# Patient Record
Sex: FEMALE | Race: WHITE | HISPANIC OR LATINO | Employment: OTHER | URBAN - METROPOLITAN AREA
[De-identification: names, ages, dates, MRNs, and addresses within clinical notes are randomized per-mention and may not be internally consistent; named-entity substitution may affect disease eponyms.]

---

## 2017-01-13 ENCOUNTER — TELEPHONE (OUTPATIENT)
Dept: PAIN MEDICINE | Facility: CLINIC | Age: 52
End: 2017-01-13

## 2017-01-16 RX ORDER — TRAZODONE HYDROCHLORIDE 150 MG/1
TABLET ORAL
Qty: 30 TABLET | Refills: 0 | Status: SHIPPED | OUTPATIENT
Start: 2017-01-16 | End: 2017-03-13 | Stop reason: SDUPTHER

## 2017-01-30 ENCOUNTER — TELEPHONE (OUTPATIENT)
Dept: PAIN MEDICINE | Facility: CLINIC | Age: 52
End: 2017-01-30

## 2017-02-13 ENCOUNTER — OFFICE VISIT (OUTPATIENT)
Dept: INTERNAL MEDICINE | Facility: CLINIC | Age: 52
End: 2017-02-13
Attending: INTERNAL MEDICINE
Payer: MEDICARE

## 2017-02-13 ENCOUNTER — TELEPHONE (OUTPATIENT)
Dept: GASTROENTEROLOGY | Facility: CLINIC | Age: 52
End: 2017-02-13

## 2017-02-13 ENCOUNTER — OFFICE VISIT (OUTPATIENT)
Dept: PAIN MEDICINE | Facility: CLINIC | Age: 52
End: 2017-02-13
Payer: MEDICARE

## 2017-02-13 VITALS
BODY MASS INDEX: 17.77 KG/M2 | RESPIRATION RATE: 18 BRPM | DIASTOLIC BLOOD PRESSURE: 68 MMHG | SYSTOLIC BLOOD PRESSURE: 94 MMHG | HEART RATE: 55 BPM | TEMPERATURE: 98 F | WEIGHT: 96.56 LBS | HEIGHT: 62 IN

## 2017-02-13 VITALS
HEART RATE: 59 BPM | BODY MASS INDEX: 18.17 KG/M2 | OXYGEN SATURATION: 98 % | SYSTOLIC BLOOD PRESSURE: 86 MMHG | HEIGHT: 62 IN | DIASTOLIC BLOOD PRESSURE: 60 MMHG | WEIGHT: 98.75 LBS

## 2017-02-13 DIAGNOSIS — Z23 NEED FOR TDAP VACCINATION: Primary | ICD-10-CM

## 2017-02-13 DIAGNOSIS — F31.32 BIPOLAR AFFECTIVE DISORDER, DEPRESSED, MODERATE DEGREE: Primary | ICD-10-CM

## 2017-02-13 DIAGNOSIS — R19.8 CHANGE IN BOWEL MOVEMENT: ICD-10-CM

## 2017-02-13 DIAGNOSIS — Z23 NEED FOR PNEUMOCOCCAL VACCINATION: ICD-10-CM

## 2017-02-13 PROCEDURE — 99999 PR PBB SHADOW E&M-EST. PATIENT-LVL III: CPT | Mod: PBBFAC,,, | Performed by: PSYCHIATRY & NEUROLOGY

## 2017-02-13 PROCEDURE — 99213 OFFICE O/P EST LOW 20 MIN: CPT | Mod: PBBFAC | Performed by: PSYCHIATRY & NEUROLOGY

## 2017-02-13 PROCEDURE — 99999 PR PBB SHADOW E&M-EST. PATIENT-LVL III: CPT | Mod: PBBFAC,,, | Performed by: INTERNAL MEDICINE

## 2017-02-13 PROCEDURE — 99213 OFFICE O/P EST LOW 20 MIN: CPT | Mod: S$PBB,,, | Performed by: INTERNAL MEDICINE

## 2017-02-13 PROCEDURE — 99213 OFFICE O/P EST LOW 20 MIN: CPT | Mod: S$PBB,,, | Performed by: PSYCHIATRY & NEUROLOGY

## 2017-02-13 RX ORDER — OLANZAPINE 5 MG/1
5 TABLET ORAL NIGHTLY
Qty: 30 TABLET | Refills: 1 | Status: SHIPPED | OUTPATIENT
Start: 2017-02-13 | End: 2017-03-13

## 2017-02-13 NOTE — TELEPHONE ENCOUNTER
----- Message from Nita Martinez sent at 2/13/2017  2:11 PM CST -----  Contact: Pt# [phone]  Pt states she was calling to scheduled an Appt

## 2017-02-13 NOTE — MR AVS SNAPSHOT
[facility]  [address]  [facility] [address]  Phone: [phone]  Fax: [phone]                  Gay Gurrola   2017 11:40 AM   Office Visit    Description:  Female : 1965   Provider:  Jessica Houston MD   Department:  [facility]           Reason for Visit     Follow-up                To Do List           Future Appointments        Provider Department Dept Phone    2017 1:20 PM Ismael He MD [facility] [phone]    3/13/2017 11:40 AM Jessica Houston MD [facility] [phone]      Goals (5 Years of Data)     None       These Medications        Disp Refills Start End    olanzapine (ZYPREXA) 5 MG tablet 30 tablet 1 2017    Take 1 tablet (5 mg total) by mouth every evening. - Oral    Pharmacy: [id number] - [place], [place] - [address] Ph #: [phone]         [facility] On Call     [facility] On Call Nurse Care Line -  Assistance  Registered nurses in the [facility] On Call Center provide clinical advisement, health education, appointment booking, and other advisory services.  Call for this free service at [phone].             Medications           Message regarding Medications     Verify the changes and/or additions to your medication regime listed below are the same as discussed with your clinician today.  If any of these changes or additions are incorrect, please notify your healthcare provider.        START taking these NEW medications        Refills    olanzapine (ZYPREXA) 5 MG tablet 1    Sig: Take 1 tablet (5 mg total) by mouth every evening.    Class: Normal    Route: Oral      STOP taking these medications     risperidone (RISPERDAL) 1 MG tablet Take 1 tablet (1 mg total) by mouth every evening.           Verify that the below list of medications is an accurate representation of the medications you are currently taking.  If none reported,  "the list may be blank. If incorrect, please contact your healthcare provider. Carry this list with you in case of emergency.           Current Medications     Ca-D3-mag#11-zinc-cupr-man-bor 600 mg calcium- 800 unit-50 mg Tab Take 1 tablet by mouth once daily.    citalopram (CELEXA) 20 MG tablet Take 1 tablet (20 mg total) by mouth once daily.    cyanocobalamin, vitamin B-12, (VITAMIN B-12) 5,000 mcg Subl Place under the tongue once daily.      divalproex (DEPAKOTE ER) 500 MG Tb24 Take 1 tablet (500 mg total) by mouth once daily.    ketoconazole (NIZORAL) 2 % shampoo Wash hair with medicated shampoo at least 2x/week - let sit on scalp at least 5 minutes prior to rinsing    levothyroxine (SYNTHROID) 25 MCG tablet Take 1 tablet (25 mcg total) by mouth once daily.    trazodone (DESYREL) 150 MG tablet TAKE ONE TABLET BY MOUTH ONCE DAILY IN THE EVENING    tretinoin (RETIN-A) 0.025 % cream Apply topically nightly.    olanzapine (ZYPREXA) 5 MG tablet Take 1 tablet (5 mg total) by mouth every evening.           Clinical Reference Information           Your Vitals Were     BP Pulse Temp Resp Height Weight    94/68 55 97.5 °F (36.4 °C) (Oral) 18 5' 2" (1.575 m) 43.8 kg (96 lb 9 oz)    BMI                17.66 kg/m2          Blood Pressure          Most Recent Value    BP  94/68      Allergies as of 2/13/2017     No Known Allergies      Immunizations Administered on Date of Encounter - 2/13/2017     None      Language Assistance Services     ATTENTION: Language assistance services are available, free of charge. Please call [phone].      ATENCIÓN: Si cesarla melyssamaximilaino, tiene a hodge disposición servicios gratuitos de asistencia lingüística. Llbonnie al [phone].     PADMINI Ý: N?u b?n nói Ti?ng Vi?t, có các d?ch v? h? tr? ngôn ng? mi?n phí dành cho b?n. G?i s? [phone].         [facility] complies with applicable Federal civil rights laws and does not discriminate on the basis of race, color, national origin, " age, disability, or sex.

## 2017-02-13 NOTE — MR AVS SNAPSHOT
[facility]  [address]  [place] [address]  Phone: [phone]  Fax: [phone]                  Gay Gurrola   2017 1:20 PM   Office Visit    Description:  Female : 1965   Provider:  Ismael He MD   Department:  [facility]           Reason for Visit     Abdominal Pain           Diagnoses this Visit        Comments    Need for Tdap vaccination    -  Primary     Need for pneumococcal vaccination         Change in bowel movement                To Do List           Future Appointments        Provider Department Dept Phone    3/13/2017 11:40 AM Jessica Houston MD [facility] [phone]      Goals (5 Years of Data)     None      [facility] On Call     [facility] On Call Nurse Care Line -  Assistance  Registered nurses in the [facility] On Call Center provide clinical advisement, health education, appointment booking, and other advisory services.  Call for this free service at [phone].             Medications           Message regarding Medications     Verify the changes and/or additions to your medication regime listed below are the same as discussed with your clinician today.  If any of these changes or additions are incorrect, please notify your healthcare provider.             Verify that the below list of medications is an accurate representation of the medications you are currently taking.  If none reported, the list may be blank. If incorrect, please contact your healthcare provider. Carry this list with you in case of emergency.           Current Medications     Ca-D3-mag#11-zinc-cupr-man-bor 600 mg calcium- 800 unit-50 mg Tab Take 1 tablet by mouth once daily.    citalopram (CELEXA) 20 MG tablet Take 1 tablet (20 mg total) by mouth once daily.    cyanocobalamin, vitamin B-12, (VITAMIN B-12) 5,000 mcg Subl Place under the tongue once daily.      divalproex (DEPAKOTE ER) 500 MG Tb24 Take 1 tablet (500 mg  "total) by mouth once daily.    ketoconazole (NIZORAL) 2 % shampoo Wash hair with medicated shampoo at least 2x/week - let sit on scalp at least 5 minutes prior to rinsing    levothyroxine (SYNTHROID) 25 MCG tablet Take 1 tablet (25 mcg total) by mouth once daily.    trazodone (DESYREL) 150 MG tablet TAKE ONE TABLET BY MOUTH ONCE DAILY IN THE EVENING    tretinoin (RETIN-A) 0.025 % cream Apply topically nightly.    olanzapine (ZYPREXA) 5 MG tablet Take 1 tablet (5 mg total) by mouth every evening.           Clinical Reference Information           Your Vitals Were     BP Pulse Height Weight Last Period SpO2    86/60 59 5' 2" (1.575 m) 44.8 kg (98 lb 12.3 oz) 02/01/2017 98%    BMI                18.06 kg/m2          Blood Pressure          Most Recent Value    BP  (!)  86/60      Allergies as of 2/13/2017     No Known Allergies      Immunizations Administered on Date of Encounter - 2/13/2017     None      Orders Placed During Today's Visit      Normal Orders This Visit    Ambulatory consult to Gastroenterology       Instructions    FODMAP diet for IBS like symptoms.      Please contact Dr. Zev Moreno in the Gastroenterology department at [phone] to get scheduled for an appointment.       Language Assistance Services     ATTENTION: Language assistance services are available, free of charge. Please call [phone].      ATENCIÓN: Si christopher barron, tiene a hodge disposición servicios gratuitos de asistencia lingüística. Llame al [phone].     CHÚ Ý: N?u b?n nói Ti?ng Vi?t, có các d?ch v? h? tr? ngôn ng? mi?n phí dành cho b?n. G?i s? [phone].         [facility] complies with applicable Federal civil rights laws and does not discriminate on the basis of race, color, national origin, age, disability, or sex.        "

## 2017-02-13 NOTE — PATIENT INSTRUCTIONS
FODMAP diet for IBS like symptoms.      Please contact Dr. Zev Moreno in the Gastroenterology department at [phone] to get scheduled for an appointment.

## 2017-02-13 NOTE — PROGRESS NOTES
"Outpatient Psychiatry Follow-Up Visit (MD/NP)    2/13/2017    Clinical Status of Patient:  Outpatient (Ambulatory)    Chief Complaint:  Gay Gurrola is a 51 y.o. female who presents today for follow-up of mood disorder.  Met with patient.      Interval History and Content of Current Session:  Interim Events/Subjective Report/Content of Current Session: Pt reports that she has been having a lot of GI issues and been trying to get an appointment to see a GI MD. That she has lost over 10 lbs in the last several weeks. That she has " an allergy to something" that she is eating because she has been having diarrhea. That her mood has been " better", she and her exhusband are getting along better and her son is also better.    Psychotherapy:  · Target symptoms: depression  · Why chosen therapy is appropriate versus another modality: relevant to diagnosis, patient responds to this modality  · Outcome monitoring methods: self-report, observation  · Therapeutic intervention type: behavior modifying psychotherapy, supportive psychotherapy  · Topics discussed/themes: medical comorbidities  · The patient's response to the intervention is accepting. The patient's progress toward treatment goals is fair.   · Duration of intervention: 30 minutes.    Review of Systems   · PSYCHIATRIC: Pertinant items are noted in the narrative.    Past Medical, Family and Social History: The patient's past medical, family and social history have been reviewed and updated as appropriate within the electronic medical record - see encounter notes.    Compliance: yes    Side effects: None    Risk Parameters:  Patient reports no suicidal ideation  Patient reports no homicidal ideation  Patient reports no self-injurious behavior  Patient reports no violent behavior    Exam (detailed: at least 9 elements; comprehensive: all 15 elements)   Constitutional  Vitals:  Most recent vital signs, dated less than 90 days prior to this appointment, were " "reviewed.   Vitals:    02/13/17 1159   BP: 94/68   Pulse: (!) 55   Resp: 18   Temp: 97.5 °F (36.4 °C)   TempSrc: Oral   Weight: 43.8 kg (96 lb 9 oz)   Height: 5' 2" (1.575 m)        General:  thin & gaunt looking     Musculoskeletal  Muscle Strength/Tone:  no tremor, no tic   Gait & Station:  non-ataxic     Psychiatric  Speech:  no latency; no press   Mood & Affect:  anxious  congruent and appropriate   Thought Process:  normal and logical, goal-directed   Associations:  intact   Thought Content:  normal, no suicidality, no homicidality, delusions, or paranoia   Insight:  intact   Judgement: behavior is adequate to circumstances   Orientation:  grossly intact   Memory: intact for content of interview   Language: grossly intact   Attention Span & Concentration:  able to focus   Fund of Knowledge:  intact and appropriate to age and level of education     Assessment and Diagnosis   Status/Progress: Based on the examination today, the patient's problem(s) is/are adequately but not ideally controlled.  New problems have been presented today.   Co-morbidities are complicating management of the primary condition.  There are no active rule-out diagnoses for this patient at this time.       General Impression: Pt is a 50 Y/O HW with    Bipolar disorder II    Alcohol dep in efr.                  Intervention/Counseling/Treatment Plan             Medication Management: Continue current medications. The risks and benefits of medication were discussed with the patient.  · Counseling provided with patient as follows: importance of compliance with chosen treatment options was emphasized, risks and benefits of treatment options, including medications, were discussed with the patient  Will continue the current treatment regiment that she is on currently. Cont  500 mgQhs ,D/C risperdal, will try zyprexa 5 mg q evening. and celexa 20 mg and d/c trazodone and d/c  lunesta 1mg  qhs prn sleep..  Have encouraged her to take the " medications that she is prescribed    Cont to maintain sobriety  Cont AA, groups.  Provided Supportive psychotherapy  Will Cont care with Primary care., discuss weight loss            Return to Clinic: 1 month

## 2017-02-13 NOTE — PROGRESS NOTES
"Subjective:       Patient ID: Gay Gurrola is a 51 y.o. female.    Chief Complaint: Abdominal Pain    HPI Comments: Here for urgent visit    Worsening abdominal discomfort for the past 2 months. She reports frequent "rumbling" and bloating that occurs most predominately in the morning and is relived by BM. She reports her stools have recently become pencil thin and she is concerned. She notes frequent food triggers of fruits, kale and spinach. She reports one day of abdominal pain but symptoms are mostly a discomfort. She denies any constipation, BRBPR, melena, weight loss, excessive belching, frequent heartburn. No family hx of colon CA or IBD.       Review of Systems       Objective:      Vitals:    02/13/17 1331   BP: (!) 86/60   Pulse: (!) 59   SpO2: 98%   Weight: 44.8 kg (98 lb 12.3 oz)   Height: 5' 2" (1.575 m)      Physical Exam   Constitutional: She is oriented to person, place, and time. She appears well-developed and well-nourished. She does not have a sickly appearance. No distress.   HENT:   Head: Normocephalic and atraumatic.   Eyes: Conjunctivae and EOM are normal. Right eye exhibits no discharge. Left eye exhibits no discharge. No scleral icterus.   Pulmonary/Chest: Effort normal. No respiratory distress.   Abdominal: Normal appearance. She exhibits no distension. There is no tenderness. There is no rigidity and no guarding.   Neurological: She is alert and oriented to person, place, and time.   Skin: Skin is warm and dry. She is not diaphoretic.   Psychiatric: She has a normal mood and affect. Her speech is normal.       Assessment:       1. Need for Tdap vaccination    2. Need for pneumococcal vaccination    3. Change in bowel movement        Plan:       Gay was seen today for abdominal pain.    Diagnoses and all orders for this visit    Change in bowel movement  -IBS like symptoms. FODMAP diet and continued food diary. Pt is near due for screening colonolscopy.   -     Ambulatory " consult to Gastroenterology                 Side effects of medication(s) were discussed in detail and patient voiced understanding.  Patient will call back for any issues or complications.

## 2017-02-21 RX ORDER — CITALOPRAM 20 MG/1
TABLET, FILM COATED ORAL
Qty: 30 TABLET | Refills: 0 | Status: SHIPPED | OUTPATIENT
Start: 2017-02-21 | End: 2017-03-13 | Stop reason: SDUPTHER

## 2017-03-13 ENCOUNTER — OFFICE VISIT (OUTPATIENT)
Dept: PAIN MEDICINE | Facility: CLINIC | Age: 52
End: 2017-03-13
Payer: MEDICARE

## 2017-03-13 VITALS
DIASTOLIC BLOOD PRESSURE: 70 MMHG | HEART RATE: 70 BPM | HEIGHT: 62 IN | WEIGHT: 93.94 LBS | SYSTOLIC BLOOD PRESSURE: 110 MMHG | TEMPERATURE: 97 F | BODY MASS INDEX: 17.29 KG/M2 | RESPIRATION RATE: 20 BRPM

## 2017-03-13 DIAGNOSIS — L98.8 AGE-RELATED FACIAL WRINKLES: ICD-10-CM

## 2017-03-13 PROCEDURE — 99213 OFFICE O/P EST LOW 20 MIN: CPT | Mod: PBBFAC | Performed by: PSYCHIATRY & NEUROLOGY

## 2017-03-13 PROCEDURE — 99999 PR PBB SHADOW E&M-EST. PATIENT-LVL III: CPT | Mod: PBBFAC,,, | Performed by: PSYCHIATRY & NEUROLOGY

## 2017-03-13 PROCEDURE — 99213 OFFICE O/P EST LOW 20 MIN: CPT | Mod: S$PBB,,, | Performed by: PSYCHIATRY & NEUROLOGY

## 2017-03-13 RX ORDER — CITALOPRAM 20 MG/1
20 TABLET, FILM COATED ORAL DAILY
Qty: 30 TABLET | Refills: 2 | Status: SHIPPED | OUTPATIENT
Start: 2017-03-13 | End: 2017-05-18 | Stop reason: SDUPTHER

## 2017-03-13 RX ORDER — LURASIDONE HYDROCHLORIDE 20 MG/1
20 TABLET, FILM COATED ORAL DAILY
Qty: 30 TABLET | Refills: 1 | Status: ON HOLD | OUTPATIENT
Start: 2017-03-13 | End: 2017-04-12 | Stop reason: HOSPADM

## 2017-03-13 RX ORDER — TRAZODONE HYDROCHLORIDE 150 MG/1
150 TABLET ORAL NIGHTLY PRN
Qty: 30 TABLET | Refills: 1 | Status: SHIPPED | OUTPATIENT
Start: 2017-03-13 | End: 2017-04-18

## 2017-03-13 NOTE — PROGRESS NOTES
Outpatient Psychiatry Follow-Up Visit (MD/NP)    3/13/2017    Clinical Status of Patient:  Outpatient (Ambulatory)    Chief Complaint:  Gay Gurrola is a 51 y.o. female who presents today for follow-up of mood disorder.  Met with patient.      Interval History and Content of Current Session:  Interim Events/Subjective Report/Content of Current Session: Pt reports that she has been compliant with her medications but she does not feel that they are helping her. That she has also been feeling more depressed. Discussed past medications, pt reports that she did a lot better when she was on Latuda and it was discontinued , will retry latuda. Discussed individual psychotherapy. Pt reports that she has also been very worried about her losing weight but she has an appointment with a Gastroenterologist coming up.     Psychotherapy:  · Target symptoms: depression  · Why chosen therapy is appropriate versus another modality: relevant to diagnosis, patient responds to this modality  · Outcome monitoring methods: self-report, observation  · Therapeutic intervention type: behavior modifying psychotherapy, supportive psychotherapy  · Topics discussed/themes: medical comorbidities  · The patient's response to the intervention is accepting. The patient's progress toward treatment goals is fair.   · Duration of intervention: 30 minutes.    Review of Systems   · PSYCHIATRIC: Pertinant items are noted in the narrative.    Past Medical, Family and Social History: The patient's past medical, family and social history have been reviewed and updated as appropriate within the electronic medical record - see encounter notes.    Compliance: yes    Side effects: None    Risk Parameters:  Patient reports no suicidal ideation  Patient reports no homicidal ideation  Patient reports no self-injurious behavior  Patient reports no violent behavior    Exam (detailed: at least 9 elements; comprehensive: all 15 elements)  "  Constitutional  Vitals:  Most recent vital signs, dated less than 90 days prior to this appointment, were reviewed.   Vitals:    03/13/17 1144   BP: 110/70   Pulse: 70   Resp: 20   Temp: 97.4 °F (36.3 °C)   TempSrc: Oral   Weight: 42.6 kg (93 lb 14.7 oz)   Height: 5' 2" (1.575 m)        General:  thin & gaunt looking     Musculoskeletal  Muscle Strength/Tone:  no tremor, no tic   Gait & Station:  non-ataxic     Psychiatric  Speech:  no latency; no press   Mood & Affect:  anxious  congruent and appropriate   Thought Process:  normal and logical, goal-directed   Associations:  intact   Thought Content:  normal, no suicidality, no homicidality, delusions, or paranoia   Insight:  intact   Judgement: behavior is adequate to circumstances   Orientation:  grossly intact   Memory: intact for content of interview   Language: grossly intact   Attention Span & Concentration:  able to focus   Fund of Knowledge:  intact and appropriate to age and level of education     Assessment and Diagnosis   Status/Progress: Based on the examination today, the patient's problem(s) is/are adequately but not ideally controlled.  New problems have been presented today.   Co-morbidities are complicating management of the primary condition.  There are no active rule-out diagnoses for this patient at this time.       General Impression: Pt is a 52 Y/O HW with    Bipolar disorder II    Alcohol dep in efr.                  Intervention/Counseling/Treatment Plan             Medication Management: Continue current medications. The risks and benefits of medication were discussed with the patient.  · Counseling provided with patient as follows: importance of compliance with chosen treatment options was emphasized, risks and benefits of treatment options, including medications, were discussed with the patient  Will continue the current treatment regiment that she is on currently. Cont depakote  500 mgQhs ,D/C risperdal, will d/c zyprexa. and celexa 20 " mg and d/c trazodone and d/c  lunesta .  Will retry latuda  Will try belsomra for sleep.  Have encouraged her to take the medications that she is prescribed    Cont to maintain sobriety  Cont AA, groups.  Provided Supportive psychotherapy  Will Cont care with Primary care., discuss weight loss            Return to Clinic: 1 month

## 2017-03-13 NOTE — MR AVS SNAPSHOT
[facility]  [address]  [facility] [address]  Phone: [phone]  Fax: [phone]                  Gay Gurrola   3/13/2017 11:40 AM   Office Visit    Description:  Female : 1965   Provider:  Jessica Houston MD   Department:  [facility]           Reason for Visit     Follow-up           Diagnoses this Visit        Comments    Age-related facial wrinkles                To Do List           Future Appointments        Provider Department Dept Phone    3/20/2017 4:30 PM Estuardo Salazar MD [facility] G[facility]enterology [phone]    2017 11:40 AM Jessica Houston MD [facility] [phone]      Goals (5 Years of Data)     None       These Medications        Disp Refills Start End    lurasidone (LATUDA) 20 mg Tab tablet 30 tablet 1 3/13/2017 3/13/2018    Take 1 tablet (20 mg total) by mouth once daily. - Oral    Pharmacy: [facility] - [place], [place] - [address] Ph #: [phone]       suvorexant (BELSOMRA) 5 mg Tab 30 tablet 0 3/13/2017     Take 5 mg by mouth nightly as needed. - Oral    Pharmacy: [facility] - [place], [place] - [address] Ph #: [phone]       trazodone (DESYREL) 150 MG tablet 30 tablet 1 3/13/2017     Take 1 tablet (150 mg total) by mouth nightly as needed for Insomnia. - Oral    Pharmacy: [facility] - [place], [place] - [address] Ph #: [phone]       citalopram (CELEXA) 20 MG tablet 30 tablet 2 3/13/2017     Take 1 tablet (20 mg total) by mouth once daily. - Oral    Pharmacy: [facility] - [place], [place] - [address] Ph #: [phone]         [facility] On Call     [facility] On Call Nurse Care Line -  Assistance  Registered nurses in the [facility] On Call Center provide clinical advisement, health education, appointment booking, and other advisory services.  Call for this free service at [phone].             Medications            Message regarding Medications     Verify the changes and/or additions to your medication regime listed below are the same as discussed with your clinician today.  If any of these changes or additions are incorrect, please notify your healthcare provider.        START taking these NEW medications        Refills    lurasidone (LATUDA) 20 mg Tab tablet 1    Sig: Take 1 tablet (20 mg total) by mouth once daily.    Class: Normal    Route: Oral    suvorexant (BELSOMRA) 5 mg Tab 0    Sig: Take 5 mg by mouth nightly as needed.    Class: Print    Route: Oral      CHANGE how you are taking these medications     Start Taking Instead of    trazodone (DESYREL) 150 MG tablet trazodone (DESYREL) 150 MG tablet    Dosage:  Take 1 tablet (150 mg total) by mouth nightly as needed for Insomnia. Dosage:  TAKE ONE TABLET BY MOUTH ONCE DAILY IN THE EVENING    Reason for Change:  Reorder     citalopram (CELEXA) 20 MG tablet citalopram (CELEXA) 20 MG tablet    Dosage:  Take 1 tablet (20 mg total) by mouth once daily. Dosage:  TAKE ONE TABLET BY MOUTH ONCE DAILY    Reason for Change:  Reorder       STOP taking these medications     olanzapine (ZYPREXA) 5 MG tablet Take 1 tablet (5 mg total) by mouth every evening.           Verify that the below list of medications is an accurate representation of the medications you are currently taking.  If none reported, the list may be blank. If incorrect, please contact your healthcare provider. Carry this list with you in case of emergency.           Current Medications     Ca-D3-mag#11-zinc-cupr-man-bor 600 mg calcium- 800 unit-50 mg Tab Take 1 tablet by mouth once daily.    citalopram (CELEXA) 20 MG tablet Take 1 tablet (20 mg total) by mouth once daily.    cyanocobalamin, vitamin B-12, (VITAMIN B-12) 5,000 mcg Subl Place under the tongue once daily.      divalproex (DEPAKOTE ER) 500 MG Tb24 Take 1 tablet (500 mg total) by mouth once daily.    ketoconazole (NIZORAL) 2 % shampoo Wash hair with  "medicated shampoo at least 2x/week - let sit on scalp at least 5 minutes prior to rinsing    levothyroxine (SYNTHROID) 25 MCG tablet Take 1 tablet (25 mcg total) by mouth once daily.    trazodone (DESYREL) 150 MG tablet Take 1 tablet (150 mg total) by mouth nightly as needed for Insomnia.    tretinoin (RETIN-A) 0.025 % cream Apply topically nightly.    lurasidone (LATUDA) 20 mg Tab tablet Take 1 tablet (20 mg total) by mouth once daily.    suvorexant (BELSOMRA) 5 mg Tab Take 5 mg by mouth nightly as needed.           Clinical Reference Information           Your Vitals Were     BP Pulse Temp Resp Height Weight    110/70 70 97.4 °F (36.3 °C) (Oral) 20 5' 2" (1.575 m) 42.6 kg (93 lb 14.7 oz)    Last Period BMI             02/01/2017 17.18 kg/m2         Blood Pressure          Most Recent Value    BP  110/70      Allergies as of 3/13/2017     No Known Allergies      Immunizations Administered on Date of Encounter - 3/13/2017     None      Orders Placed During Today's Visit      Normal Orders This Visit    Ambulatory Referral to Psychology       Language Assistance Services     ATTENTION: Language assistance services are available, free of charge. Please call [phone].      ATENCIÓN: Si christopher anderson, tiene a hodge disposición servicios gratuitos de asistencia lingüística. Llame al [phone].     PADMINI Ý: N?u b?n nói Ti?ng Vi?t, có các d?ch v? h? tr? ngôn ng? mi?n phí dành cho b?n. G?i s? [phone].         [facility] complies with applicable Federal civil rights laws and does not discriminate on the basis of race, color, national origin, age, disability, or sex.        "

## 2017-03-17 ENCOUNTER — TELEPHONE (OUTPATIENT)
Dept: PAIN MEDICINE | Facility: CLINIC | Age: 52
End: 2017-03-17

## 2017-03-20 ENCOUNTER — LAB VISIT (OUTPATIENT)
Dept: LAB | Facility: HOSPITAL | Age: 52
End: 2017-03-20
Attending: INTERNAL MEDICINE
Payer: MEDICARE

## 2017-03-20 ENCOUNTER — OFFICE VISIT (OUTPATIENT)
Dept: GASTROENTEROLOGY | Facility: CLINIC | Age: 52
End: 2017-03-20
Payer: MEDICARE

## 2017-03-20 VITALS
DIASTOLIC BLOOD PRESSURE: 60 MMHG | HEART RATE: 65 BPM | SYSTOLIC BLOOD PRESSURE: 92 MMHG | BODY MASS INDEX: 17.06 KG/M2 | HEIGHT: 62 IN | WEIGHT: 92.69 LBS

## 2017-03-20 DIAGNOSIS — R63.4 WEIGHT LOSS: ICD-10-CM

## 2017-03-20 DIAGNOSIS — Z12.11 SPECIAL SCREENING FOR MALIGNANT NEOPLASMS, COLON: ICD-10-CM

## 2017-03-20 DIAGNOSIS — R63.4 WEIGHT LOSS: Primary | ICD-10-CM

## 2017-03-20 LAB
ALBUMIN SERPL BCP-MCNC: 4.2 G/DL
ALP SERPL-CCNC: 45 U/L
ALT SERPL W/O P-5'-P-CCNC: 12 U/L
ANION GAP SERPL CALC-SCNC: 12 MMOL/L
AST SERPL-CCNC: 24 U/L
BASOPHILS # BLD AUTO: 0.02 K/UL
BASOPHILS NFR BLD: 0.5 %
BILIRUB SERPL-MCNC: 0.5 MG/DL
BUN SERPL-MCNC: 9 MG/DL
CALCIUM SERPL-MCNC: 9.6 MG/DL
CHLORIDE SERPL-SCNC: 102 MMOL/L
CO2 SERPL-SCNC: 28 MMOL/L
CREAT SERPL-MCNC: 1 MG/DL
DIFFERENTIAL METHOD: ABNORMAL
EOSINOPHIL # BLD AUTO: 0 K/UL
EOSINOPHIL NFR BLD: 0.9 %
ERYTHROCYTE [DISTWIDTH] IN BLOOD BY AUTOMATED COUNT: 15.1 %
EST. GFR  (AFRICAN AMERICAN): >60 ML/MIN/1.73 M^2
EST. GFR  (NON AFRICAN AMERICAN): >60 ML/MIN/1.73 M^2
GLUCOSE SERPL-MCNC: 76 MG/DL
HCT VFR BLD AUTO: 38.2 %
HGB BLD-MCNC: 12.5 G/DL
LYMPHOCYTES # BLD AUTO: 2 K/UL
LYMPHOCYTES NFR BLD: 46.9 %
MCH RBC QN AUTO: 27.2 PG
MCHC RBC AUTO-ENTMCNC: 32.7 %
MCV RBC AUTO: 83 FL
MONOCYTES # BLD AUTO: 0.2 K/UL
MONOCYTES NFR BLD: 5.3 %
NEUTROPHILS # BLD AUTO: 2 K/UL
NEUTROPHILS NFR BLD: 46.4 %
PLATELET # BLD AUTO: 173 K/UL
PMV BLD AUTO: 11.2 FL
POTASSIUM SERPL-SCNC: 4 MMOL/L
PROT SERPL-MCNC: 7.5 G/DL
RBC # BLD AUTO: 4.6 M/UL
SODIUM SERPL-SCNC: 142 MMOL/L
TSH SERPL DL<=0.005 MIU/L-ACNC: 2.07 UIU/ML
WBC # BLD AUTO: 4.35 K/UL

## 2017-03-20 PROCEDURE — 99204 OFFICE O/P NEW MOD 45 MIN: CPT | Mod: S$PBB,,, | Performed by: INTERNAL MEDICINE

## 2017-03-20 PROCEDURE — 99213 OFFICE O/P EST LOW 20 MIN: CPT | Mod: PBBFAC | Performed by: INTERNAL MEDICINE

## 2017-03-20 PROCEDURE — 80053 COMPREHEN METABOLIC PANEL: CPT

## 2017-03-20 PROCEDURE — 86677 HELICOBACTER PYLORI ANTIBODY: CPT

## 2017-03-20 PROCEDURE — 99999 PR PBB SHADOW E&M-EST. PATIENT-LVL III: CPT | Mod: PBBFAC,,, | Performed by: INTERNAL MEDICINE

## 2017-03-20 PROCEDURE — 84443 ASSAY THYROID STIM HORMONE: CPT

## 2017-03-20 PROCEDURE — 86703 HIV-1/HIV-2 1 RESULT ANTBDY: CPT

## 2017-03-20 PROCEDURE — 36415 COLL VENOUS BLD VENIPUNCTURE: CPT

## 2017-03-20 PROCEDURE — 85025 COMPLETE CBC W/AUTO DIFF WBC: CPT

## 2017-03-20 NOTE — PROGRESS NOTES
[facility] Consultation Note    Reason for Consult:  The primary encounter diagnosis was Weight loss. A diagnosis of Special screening for malignant neoplasms, colon was also pertinent to this visit.    PCP:   Jaja Mckeon   [address] / [facility] [address]    Referring MD:  Ismael He Md  [address]  [address]  [place] [address]    HPI:  This is a 51 y.o. female here for evaluation of weight loss of 12 lbs over 6 weeks. Pencil thin stools. Slimy in the morning.  Tried low fodmap diet which has helped with BM urgency.  Also gets bloated and gassy. Lactose seems to play some role.  She runs a few times a week.   and kids have H pylori.    Abdominal pain - has improved  Reflux - resolved after few weeks of prilosec.  Dysphagia - no   Bowel habits - normal  GI bleeding - none  NSAID usage - none    Interval history:      ROS:  Constitutional: No fevers, chills, + weight loss  ENT: No allergies  CV: No chest pain  Pulm: No cough, No shortness of breath  Ophtho: No vision changes  GI: see HPI  Derm: No rash  Heme: No lymphadenopathy, No bruising  MSK: No arthritis  : No dysuria, No hematuria  Endo: No hot or cold intolerance  Neuro: No syncope, No seizure  Psych: No anxiety, No depression    Medical History:  has a past medical history of Alcohol abuse, in remission; Anxiety; Bipolar affective; Depression; Headache(784.0); Hypothyroid; Memory loss; Osteopenia; and Seizures.    Surgical History:  has a past surgical history that includes Tubal ligation and Bunionectomy.    Family History: family history includes Crohn's disease in her maternal aunt and other; Dementia in her maternal grandmother; Diabetes in her mother; Stroke in her mother. There is no history of Melanoma, Psoriasis, Lupus, or Colon cancer..     Social History:  reports that she has never smoked. She has never used smokeless tobacco. She reports that she does not drink alcohol  "or use illicit drugs.    Review of patient's allergies indicates:  No Known Allergies    Current Outpatient Prescriptions   Medication Sig Dispense Refill    Ca-D3-mag#11-zinc-cupr-man-damian 600 mg calcium- 800 unit-50 mg Tab Take 1 tablet by mouth once daily.      citalopram (CELEXA) 20 MG tablet Take 1 tablet (20 mg total) by mouth once daily. 30 tablet 2    cyanocobalamin, vitamin B-12, (VITAMIN B-12) 5,000 mcg Subl Place under the tongue once daily.        divalproex (DEPAKOTE ER) 500 MG Tb24 Take 1 tablet (500 mg total) by mouth once daily. 30 tablet 2    ketoconazole (NIZORAL) 2 % shampoo Wash hair with medicated shampoo at least 2x/week - let sit on scalp at least 5 minutes prior to rinsing 120 mL 5    levothyroxine (SYNTHROID) 25 MCG tablet Take 1 tablet (25 mcg total) by mouth once daily. 90 tablet 3    lurasidone (LATUDA) 20 mg Tab tablet Take 1 tablet (20 mg total) by mouth once daily. 30 tablet 1    suvorexant (BELSOMRA) 5 mg Tab Take 5 mg by mouth nightly as needed. 30 tablet 0    trazodone (DESYREL) 150 MG tablet Take 1 tablet (150 mg total) by mouth nightly as needed for Insomnia. 30 tablet 1    tretinoin (RETIN-A) 0.025 % cream Apply topically nightly. 45 g 1     No current facility-administered medications for this visit.            Objective Findings:    Vital Signs:  BP 92/60  Pulse 65  Ht 5' 2" (1.575 m)  Wt 42 kg (92 lb 10.6 oz)  LMP 03/08/2017  BMI 16.95 kg/m2  Body mass index is 16.95 kg/(m^2).    Physical Exam:  General Appearance: Well appearing in no acute distress  Head:   Normocephalic, without obvious abnormality  Eyes:    No scleral icterus, EOMI  ENT: Neck supple, Lips, mucosa, and tongue normal; teeth and gums normal  Lungs: CTA bilaterally in anterior and posterior fields, no wheezes, no crackles.  Heart:  Regular rate and rhythm, S1, S2 normal, no murmurs heard  Abdomen: Soft, non tender, non distended with positive bowel sounds in all four quadrants. No " hepatosplenomegaly, ascites, or mass  Extremities: 2+ pulses, no clubbing, cyanosis or edema  Skin: No rash  Neurologic: CN II-XII intact      Labs:  Lab Results   Component Value Date    WBC 3.72 (L) 11/22/2016    HGB 12.6 11/22/2016    HCT 41.1 11/22/2016     11/22/2016    CHOL 169 11/22/2016    TRIG 34 11/22/2016    HDL 84 (H) 11/22/2016    ALT 13 11/22/2016    AST 21 11/22/2016     11/22/2016    K 4.8 11/22/2016     11/22/2016    CREATININE 0.9 11/22/2016    BUN 11 11/22/2016    CO2 28 11/22/2016    TSH 2.372 11/22/2016    TSH 2.352 11/22/2016         Imaging:    Endoscopy:      Assessment:  1. Weight loss    2. Special screening for malignant neoplasms, colon           Recommendations:  1. Labs today, repeat TSH and add HIV  2. Colon as she is due for screening and EGD as well    No Follow-up on file.      Order summary:  Orders Placed This Encounter    CBC auto differential    Comprehensive metabolic panel    TSH    H.Pylori Antibody IgG    HIV-1 and HIV-2 antibodies    Case request GI: ESOPHAGOGASTRODUODENOSCOPY (EGD), COLONOSCOPY         Thank you so much for allowing me to participate in the care of Gay Salazar MD

## 2017-03-20 NOTE — LETTER
March 20, 2017      Ismael He MD  [address]  [address]  [facility] [address]           [facility]  [address]  [place] [address]  Phone: [phone]  Fax: [phone]          Patient: Gay Gurrola   MR Number: [id number]   YOB: 1965   Date of Visit: 3/20/2017       Dear Dr. Ismael He:    Thank you for referring Gay Gurrola to me for evaluation. Attached you will find relevant portions of my assessment and plan of care.    If you have questions, please do not hesitate to call me. I look forward to following Gay Gurrola along with you.    Sincerely,    Estuardo Salazar MD    Enclosure  CC:  No Recipients    If you would like to receive this communication electronically, please contact [email or web address] or [phone] to request more information on [employer] Link access.    For providers and/or their staff who would like to refer a patient to [facility], please contact us through our one-stop-shop provider referral line, [facility], at [phone].    If you feel you have received this communication in error or would no longer like to receive these types of communications, please e-mail [email or web address]

## 2017-03-20 NOTE — MR AVS SNAPSHOT
[facility]  [address] [address]  Phone: [phone]  Fax: [phone]                  Gay Gurrola   3/20/2017 4:30 PM   Office Visit    Description:  Female : 1965   Provider:  Estuardo Salazar MD   Department:  [facility]           Reason for Visit     Abdominal Pain     Heartburn           Diagnoses this Visit        Comments    Weight loss    -  Primary     Special screening for malignant neoplasms, colon                To Do List           Future Appointments        Provider Department Dept Phone    3/20/2017 4:30 PM MD Abraham Bryant [facility] Gastroenterology [phone]    3/20/2017 5:10 PM LAB, APPOINTMENT [place] [facility] [phone]    2017 11:40 AM Jessica Houston MD [facility] - Pain Management [phone]      Goals (5 Years of Data)     None      [facility] On Call     [facility] On Call Nurse Care Line -  Assistance  Registered nurses in the [facility] On Call Center provide clinical advisement, health education, appointment booking, and other advisory services.  Call for this free service at [phone].             Medications           Message regarding Medications     Verify the changes and/or additions to your medication regime listed below are the same as discussed with your clinician today.  If any of these changes or additions are incorrect, please notify your healthcare provider.             Verify that the below list of medications is an accurate representation of the medications you are currently taking.  If none reported, the list may be blank. If incorrect, please contact your healthcare provider. Carry this list with you in case of emergency.           Current Medications     Ca-D3-mag#11-zinc-cupr-man-bor 600 mg calcium- 800 unit-50 mg Tab Take 1 tablet by mouth once daily.    citalopram (CELEXA) 20 MG tablet Take 1 tablet (20 mg total) by mouth once daily.    cyanocobalamin,  "vitamin B-12, (VITAMIN B-12) 5,000 mcg Subl Place under the tongue once daily.      divalproex (DEPAKOTE ER) 500 MG Tb24 Take 1 tablet (500 mg total) by mouth once daily.    ketoconazole (NIZORAL) 2 % shampoo Wash hair with medicated shampoo at least 2x/week - let sit on scalp at least 5 minutes prior to rinsing    levothyroxine (SYNTHROID) 25 MCG tablet Take 1 tablet (25 mcg total) by mouth once daily.    lurasidone (LATUDA) 20 mg Tab tablet Take 1 tablet (20 mg total) by mouth once daily.    suvorexant (BELSOMRA) 5 mg Tab Take 5 mg by mouth nightly as needed.    trazodone (DESYREL) 150 MG tablet Take 1 tablet (150 mg total) by mouth nightly as needed for Insomnia.    tretinoin (RETIN-A) 0.025 % cream Apply topically nightly.           Clinical Reference Information           Your Vitals Were     BP Pulse Height Weight Last Period BMI    92/60 65 5' 2" (1.575 m) 42 kg (92 lb 10.6 oz) 03/08/2017 16.95 kg/m2      Blood Pressure          Most Recent Value    BP  92/60      Allergies as of 3/20/2017     No Known Allergies      Immunizations Administered on Date of Encounter - 3/20/2017     None      Orders Placed During Today's Visit      Normal Orders This Visit    Case request GI: ESOPHAGOGASTRODUODENOSCOPY (EGD), COLONOSCOPY     Future Labs/Procedures Expected by Expires    CBC auto differential  3/20/2017 5/19/2018    Comprehensive metabolic panel  3/20/2017 5/19/2018    H.Pylori Antibody IgG  3/20/2017 5/19/2018    HIV-1 and HIV-2 antibodies  3/20/2017 5/19/2018    TSH  3/20/2017 5/19/2018      Language Assistance Services     ATTENTION: Language assistance services are available, free of charge. Please call [phone].      ATENCIÓN: Si cesarla anderson, tiene a hodge disposición servicios gratuitos de asistencia lingüística. Llame al [phone].     CHÚ Ý: N?u b?n nói Ti?ng Vi?t, có các d?ch v? h? tr? ngôn ng? mi?n phí dành cho b?n. G?i s? [phone].         [facility] complies with " applicable Federal civil rights laws and does not discriminate on the basis of race, color, national origin, age, disability, or sex.

## 2017-03-21 LAB — HIV 1+2 AB+HIV1 P24 AG SERPL QL IA: NEGATIVE

## 2017-03-22 ENCOUNTER — PATIENT MESSAGE (OUTPATIENT)
Dept: GASTROENTEROLOGY | Facility: CLINIC | Age: 52
End: 2017-03-22

## 2017-03-22 ENCOUNTER — TELEPHONE (OUTPATIENT)
Dept: ENDOSCOPY | Facility: HOSPITAL | Age: 52
End: 2017-03-22

## 2017-03-22 DIAGNOSIS — Z12.11 SPECIAL SCREENING FOR MALIGNANT NEOPLASMS, COLON: Primary | ICD-10-CM

## 2017-03-22 LAB — H PYLORI IGG SERPL QL IA: POSITIVE

## 2017-03-22 RX ORDER — POLYETHYLENE GLYCOL 3350, SODIUM SULFATE ANHYDROUS, SODIUM BICARBONATE, SODIUM CHLORIDE, POTASSIUM CHLORIDE 236; 22.74; 6.74; 5.86; 2.97 G/4L; G/4L; G/4L; G/4L; G/4L
4 POWDER, FOR SOLUTION ORAL ONCE
Qty: 4000 ML | Refills: 0 | Status: SHIPPED | OUTPATIENT
Start: 2017-03-22 | End: 2017-03-22

## 2017-03-22 RX ORDER — AMOXICILLIN 500 MG/1
1000 TABLET, FILM COATED ORAL EVERY 12 HOURS
Qty: 56 TABLET | Refills: 0 | Status: SHIPPED | OUTPATIENT
Start: 2017-03-22 | End: 2017-04-05

## 2017-03-22 RX ORDER — CLARITHROMYCIN 500 MG/1
500 TABLET, FILM COATED ORAL EVERY 12 HOURS
Qty: 28 TABLET | Refills: 0 | Status: SHIPPED | OUTPATIENT
Start: 2017-03-22 | End: 2017-04-05

## 2017-03-22 RX ORDER — OMEPRAZOLE 20 MG/1
20 CAPSULE, DELAYED RELEASE ORAL 2 TIMES DAILY
Qty: 28 CAPSULE | Refills: 0 | Status: SHIPPED | OUTPATIENT
Start: 2017-03-22 | End: 2017-04-17 | Stop reason: SDUPTHER

## 2017-03-22 NOTE — TELEPHONE ENCOUNTER
Patient is scheduled for EGD & Colonoscopy 4/12/2017 with Dr. Salazar.  Instructions sent via Mail.  Prep used: PEG. PM Prep.

## 2017-03-23 ENCOUNTER — TELEPHONE (OUTPATIENT)
Dept: PAIN MEDICINE | Facility: CLINIC | Age: 52
End: 2017-03-23

## 2017-03-24 ENCOUNTER — TELEPHONE (OUTPATIENT)
Dept: GASTROENTEROLOGY | Facility: CLINIC | Age: 52
End: 2017-03-24

## 2017-03-24 NOTE — TELEPHONE ENCOUNTER
----- Message from Estuardo Salazar MD sent at 3/24/2017 11:26 AM CDT -----  Mrs Gurrola,     The blood test was positive for H pylori bacteria so I will go ahead and treat with antibiotics.   Will see you at the endoscopy test   Also do not start taking the Latuda medicine until finishing the antibiotics as they could have an interaction       Dr. salazar    ----- Message -----     From: Odette Syed MA     Sent: 3/24/2017  11:19 AM       To: Estuardo Salazar MD    [facility] so can you sent the results to me so I can inform her on what they are? Please advise

## 2017-03-27 ENCOUNTER — TELEPHONE (OUTPATIENT)
Dept: GASTROENTEROLOGY | Facility: CLINIC | Age: 52
End: 2017-03-27

## 2017-03-27 NOTE — TELEPHONE ENCOUNTER
----- Message from Nicole Bryant sent at 3/27/2017 10:51 AM CDT -----  Contact: Self- [phone]  Martin- pt called to speak with Odette- wanted her lab results- please call pt back at [phone]

## 2017-03-29 ENCOUNTER — TELEPHONE (OUTPATIENT)
Dept: PAIN MEDICINE | Facility: CLINIC | Age: 52
End: 2017-03-29

## 2017-03-29 NOTE — TELEPHONE ENCOUNTER
----- Message from Sloane Seals sent at 3/29/2017 12:24 PM CDT -----  _X  1st Request  _  2nd Request  _  3rd Request        Who: MEHDI ARVIZU [[id number]]    Why: Pt would like to speak with Dr. Houston before she leaves today. Please call back.    What Number to Call Back:[phone]    When to Expect a call back: (Before the end of the day)   -- if the call is after 12:00, the call back will be tomorrow.

## 2017-03-30 RX ORDER — TEMAZEPAM 7.5 MG/1
15 CAPSULE ORAL NIGHTLY PRN
Qty: 30 CAPSULE | Refills: 0 | Status: SHIPPED | OUTPATIENT
Start: 2017-03-30 | End: 2017-04-18 | Stop reason: SDUPTHER

## 2017-04-12 ENCOUNTER — ANESTHESIA EVENT (OUTPATIENT)
Dept: ENDOSCOPY | Facility: HOSPITAL | Age: 52
End: 2017-04-12
Payer: MEDICARE

## 2017-04-12 ENCOUNTER — HOSPITAL ENCOUNTER (OUTPATIENT)
Facility: HOSPITAL | Age: 52
Discharge: HOME | End: 2017-04-12
Attending: INTERNAL MEDICINE | Admitting: INTERNAL MEDICINE
Payer: MEDICARE

## 2017-04-12 ENCOUNTER — SURGERY (OUTPATIENT)
Age: 52
End: 2017-04-12

## 2017-04-12 ENCOUNTER — ANESTHESIA (OUTPATIENT)
Dept: ENDOSCOPY | Facility: HOSPITAL | Age: 52
End: 2017-04-12
Payer: MEDICARE

## 2017-04-12 VITALS
RESPIRATION RATE: 16 BRPM | BODY MASS INDEX: 18.03 KG/M2 | DIASTOLIC BLOOD PRESSURE: 60 MMHG | HEIGHT: 62 IN | OXYGEN SATURATION: 100 % | TEMPERATURE: 98 F | HEART RATE: 75 BPM | SYSTOLIC BLOOD PRESSURE: 97 MMHG | WEIGHT: 98 LBS

## 2017-04-12 DIAGNOSIS — F32.A DEPRESSION, UNSPECIFIED DEPRESSION TYPE: Primary | ICD-10-CM

## 2017-04-12 LAB
B-HCG UR QL: NEGATIVE
CTP QC/QA: YES

## 2017-04-12 PROCEDURE — G0121 COLON CA SCRN NOT HI RSK IND: HCPCS | Performed by: INTERNAL MEDICINE

## 2017-04-12 PROCEDURE — 43239 EGD BIOPSY SINGLE/MULTIPLE: CPT | Performed by: INTERNAL MEDICINE

## 2017-04-12 PROCEDURE — 63600175 PHARM REV CODE 636 W HCPCS: Performed by: NURSE ANESTHETIST, CERTIFIED REGISTERED

## 2017-04-12 PROCEDURE — G0121 COLON CA SCRN NOT HI RSK IND: HCPCS | Mod: ,,, | Performed by: INTERNAL MEDICINE

## 2017-04-12 PROCEDURE — 88305 TISSUE EXAM BY PATHOLOGIST: CPT | Performed by: PATHOLOGY

## 2017-04-12 PROCEDURE — 27201012 HC FORCEPS, HOT/COLD, DISP: Performed by: INTERNAL MEDICINE

## 2017-04-12 PROCEDURE — 25000003 PHARM REV CODE 250: Performed by: INTERNAL MEDICINE

## 2017-04-12 PROCEDURE — 43239 EGD BIOPSY SINGLE/MULTIPLE: CPT | Mod: 51,,, | Performed by: INTERNAL MEDICINE

## 2017-04-12 PROCEDURE — 88305 TISSUE EXAM BY PATHOLOGIST: CPT | Mod: 26,,, | Performed by: PATHOLOGY

## 2017-04-12 PROCEDURE — 37000009 HC ANESTHESIA EA ADD 15 MINS: Performed by: INTERNAL MEDICINE

## 2017-04-12 PROCEDURE — 25000003 PHARM REV CODE 250: Performed by: NURSE ANESTHETIST, CERTIFIED REGISTERED

## 2017-04-12 PROCEDURE — D9220A PRA ANESTHESIA: Mod: 33,CRNA,, | Performed by: NURSE ANESTHETIST, CERTIFIED REGISTERED

## 2017-04-12 PROCEDURE — 81025 URINE PREGNANCY TEST: CPT | Performed by: INTERNAL MEDICINE

## 2017-04-12 PROCEDURE — 37000008 HC ANESTHESIA 1ST 15 MINUTES: Performed by: INTERNAL MEDICINE

## 2017-04-12 PROCEDURE — D9220A PRA ANESTHESIA: Mod: 33,ANES,, | Performed by: ANESTHESIOLOGY

## 2017-04-12 RX ORDER — SODIUM CHLORIDE 9 MG/ML
INJECTION, SOLUTION INTRAVENOUS CONTINUOUS
Status: DISCONTINUED | OUTPATIENT
Start: 2017-04-12 | End: 2017-04-12 | Stop reason: HOSPADM

## 2017-04-12 RX ORDER — PROPOFOL 10 MG/ML
INJECTION, EMULSION INTRAVENOUS
Status: DISCONTINUED | OUTPATIENT
Start: 2017-04-12 | End: 2017-04-12

## 2017-04-12 RX ORDER — FENTANYL CITRATE 50 UG/ML
25 INJECTION, SOLUTION INTRAMUSCULAR; INTRAVENOUS EVERY 5 MIN PRN
Status: DISCONTINUED | OUTPATIENT
Start: 2017-04-12 | End: 2017-04-12 | Stop reason: HOSPADM

## 2017-04-12 RX ORDER — ONDANSETRON 2 MG/ML
4 INJECTION INTRAMUSCULAR; INTRAVENOUS DAILY PRN
Status: DISCONTINUED | OUTPATIENT
Start: 2017-04-12 | End: 2017-04-12 | Stop reason: HOSPADM

## 2017-04-12 RX ORDER — PROPOFOL 10 MG/ML
INJECTION, EMULSION INTRAVENOUS CONTINUOUS PRN
Status: DISCONTINUED | OUTPATIENT
Start: 2017-04-12 | End: 2017-04-12

## 2017-04-12 RX ORDER — LIDOCAINE HCL/PF 100 MG/5ML
SYRINGE (ML) INTRAVENOUS
Status: DISCONTINUED | OUTPATIENT
Start: 2017-04-12 | End: 2017-04-12

## 2017-04-12 RX ADMIN — SODIUM CHLORIDE: 0.9 INJECTION, SOLUTION INTRAVENOUS at 02:04

## 2017-04-12 RX ADMIN — PROPOFOL 50 MG: 10 INJECTION, EMULSION INTRAVENOUS at 02:04

## 2017-04-12 RX ADMIN — PROPOFOL 200 MCG/KG/MIN: 10 INJECTION, EMULSION INTRAVENOUS at 02:04

## 2017-04-12 RX ADMIN — PROPOFOL 100 MG: 10 INJECTION, EMULSION INTRAVENOUS at 02:04

## 2017-04-12 RX ADMIN — LIDOCAINE HYDROCHLORIDE 100 MG: 20 INJECTION, SOLUTION INTRAVENOUS at 02:04

## 2017-04-12 NOTE — IP AVS SNAPSHOT
[facility]  [address]  [facility] [address]  Phone: [phone]           Patient Discharge Instructions   Our goal is to set you up for success. This packet includes information on your condition, medications, and your home care.  It will help you care for yourself to prevent having to return to the hospital.     Please ask your nurse if you have any questions.      There are many details to remember when preparing to leave the hospital. Here is what you will need to do:    1. Take your medicine. If you are prescribed medications, review your Medication List on the following pages. You may have new medications to  at the pharmacy and others that you'll need to stop taking. Review the instructions for how and when to take your medications. Talk with your doctor or nurses if you are unsure of what to do.     2. Go to your follow-up appointments. Specific follow-up information is listed in the following pages. Your may be contacted by a nurse or clinical provider about future appointments. Be sure we have all of the phone numbers to reach you. Please contact your provider's office if you are unable to make an appointment.     3. Watch for warning signs. Your doctor or nurse will give you detailed warning signs to watch for and when to call for assistance. These instructions may also include educational information about your condition. If you experience any of warning signs to your health, call your doctor.           [facility]  Unless otherwise directed by your provider, please   contact [facility], our nurse care line   that is available for 24/7 assistance.     [phone] (toll-free)     Registered nurses in the [facility]   provide: appointment scheduling, clinical advisement, health education, and other advisory services.                  ** Verify the list of medication(s) below is accurate and up to date. Carry this with you in case of  emergency. If your medications have changed, please notify your healthcare provider.             Medication List      CONTINUE taking these medications        Additional Info                      Ca-D3-mag#11-zinc-cupr-man-bor 600 mg calcium- 800 unit-50 mg Tab   Refills:  0   Dose:  1 tablet    Instructions:  Take 1 tablet by mouth once daily.     Begin Date    AM    Noon    PM    Bedtime       citalopram 20 MG tablet   Commonly known as:  CELEXA   Quantity:  30 tablet   Refills:  2   Dose:  20 mg    Instructions:  Take 1 tablet (20 mg total) by mouth once daily.     Begin Date    AM    Noon    PM    Bedtime       divalproex 500 MG Tb24   Commonly known as:  DEPAKOTE ER   Quantity:  30 tablet   Refills:  2   Dose:  500 mg    Instructions:  Take 1 tablet (500 mg total) by mouth once daily.     Begin Date    AM    Noon    PM    Bedtime       ketoconazole 2 % shampoo   Commonly known as:  NIZORAL   Quantity:  120 mL   Refills:  5    Instructions:  Wash hair with medicated shampoo at least 2x/week - let sit on scalp at least 5 minutes prior to rinsing     Begin Date    AM    Noon    PM    Bedtime       levothyroxine 25 MCG tablet   Commonly known as:  SYNTHROID   Quantity:  90 tablet   Refills:  3   Dose:  25 mcg    Instructions:  Take 1 tablet (25 mcg total) by mouth once daily.     Begin Date    AM    Noon    PM    Bedtime       omeprazole 20 MG capsule   Commonly known as:  PRILOSEC   Quantity:  28 capsule   Refills:  0   Dose:  20 mg    Instructions:  Take 1 capsule (20 mg total) by mouth 2 (two) times daily.     Begin Date    AM    Noon    PM    Bedtime       temazepam 7.5 MG Cap   Commonly known as:  RESTORIL   Quantity:  30 capsule   Refills:  0   Dose:  15 mg    Instructions:  Take 2 capsules (15 mg total) by mouth nightly as needed.     Begin Date    AM    Noon    PM    Bedtime       trazodone 150 MG tablet   Commonly known as:  DESYREL   Quantity:  30 tablet   Refills:  1   Dose:  150 mg    Instructions:   Take 1 tablet (150 mg total) by mouth nightly as needed for Insomnia.     Begin Date    AM    Noon    PM    Bedtime       tretinoin 0.025 % cream   Commonly known as:  RETIN-A   Quantity:  45 g   Refills:  1    Instructions:  Apply topically nightly.     Begin Date    AM    Noon    PM    Bedtime       VITAMIN B-12 5,000 mcg Subl   Refills:  0   Generic drug:  cyanocobalamin (vitamin B-12)    Instructions:  Place under the tongue once daily.     Begin Date    AM    Noon    PM    Bedtime         STOP taking these medications     lurasidone 20 mg Tab tablet   Commonly known as:  LATUDA       suvorexant 5 mg Tab   Commonly known as:  BELSOMRA                  Please bring to all follow up appointments:    1. A copy of your discharge instructions.  2. All medicines you are currently taking in their original bottles.  3. Identification and insurance card.    Please arrive 15 minutes ahead of scheduled appointment time.    Please call 24 hours in advance if you must reschedule your appointment and/or time.        Your Scheduled Appointments     Apr 18, 2017 11:40 AM CDT   Established Patient Visit with Jessica Houston MD   [facility] ([facility])    [address]  [place] [address]   [phone]                Discharge Instructions     Future Orders    Diet general     Questions:    Total calories:      Fat restriction, if any:      Protein restriction, if any:      Na restriction, if any:      Fluid restriction:      Additional restrictions:          Discharge Instructions         Upper GI Endoscopy     During endoscopy, a long, flexible tube is used to view the inside of your upper GI tract.      Upper GI endoscopy allows your healthcare provider to look directly into the beginning of your gastrointestinal (GI) tract. The esophagus, stomach, and duodenum (the first part of the small intestine) make up the upper GI tract.   Before the exam  Follow these and any other instructions you  are given before your endoscopy. If you dont follow the healthcare providers instructions carefully, the test may need to be canceled or done over:  · Don't eat or drink anything after midnight the night before your exam. If your exam is in the afternoon, drink only clear liquids in the morning. Don't eat or drink anything for 8 hours before the exam. In some cases, you may be able to take medicines with sips of water until 2 hours before the procedure. Speak with your healthcare provider about this.   · Bring your X-rays and any other test results you have.  · Because you will be sedated, arrange for an adult to drive you home after the exam.  · Tell your healthcare provider before the exam if you are taking any medicines or have any medical problems.  The procedure  Here is what to expect:  · You will lie on the endoscopy table. Usually patients lie on the left side.  · You will be monitored and given oxygen.  · Your throat may be numbed with a spray or gargle. You are given medicine through an intravenous (IV) line that will help you relax and remain comfortable. You may be awake or asleep during the procedure.  · The healthcare provider will put the endoscope in your mouth and down your esophagus. It is thinner than most pieces of food that you swallow. It will not affect your breathing. The medicine helps keep you from gagging.  · Air is put into your GI tract to expand it. It can make you burp.  · During the procedure, the healthcare provider can take biopsies (tissue samples), remove abnormalities, such as polyps, or treat abnormalities through a variety of devices placed through the endoscope. You will not feel this.   · The endoscope carries images of your upper GI tract to a video screen. If you are awake, you may be able to look at the images.  · After the procedure is done, you will rest for a time. An adult must drive you home.  When to call your healthcare provider  Contact your healthcare provider  if you have:  · Black or tarry stools, or blood in your stool  · Fever  · Pain in your belly that does not go away  · Nausea and vomiting, or vomiting blood   Date Last Reviewed: 7/1/2016 © 2000-2016 [employer]. [address] [place] [address]. All rights reserved. This information is not intended as a substitute for professional medical care. Always follow your healthcare professional's instructions.      Colonoscopy     A camera attached to a flexible tube with a viewing lens is used to take video pictures.     Colonoscopy is a test to view the inside of your lower digestive tract (colon and rectum). Sometimes it can show the last part of the small intestine (ileum). During the test, small pieces of tissue may be removed for testing. This is called a biopsy. Small growths, such as polyps, may also be removed.   Why is colonoscopy done?  The test is done to help look for colon cancer. And it can help find the source of abdominal pain, bleeding, and changes in bowel habits. It may be needed once a year, depending on factors such as your:  · Age  · Health history  · Family health history  · Symptoms  · Results from any prior colonoscopy  Risks and possible complications  These include:  · Bleeding               · A puncture or tear in the colon   · Risks of anesthesia  · A cancer lesion not being seen  Getting ready   To prepare for the test:  · Talk with your healthcare provider about the risks of the test (see below). Also ask your healthcare provider about alternatives to the test.  · Tell your healthcare provider about any medicines you take. Also tell him or her about any health conditions you may have.  · Make sure your rectum and colon are empty for the test. Follow the diet and bowel prep instructions exactly. If you dont, the test may need to be rescheduled.  · Plan for a friend or family member to drive you home after the test.     Colonoscopy provides an inside view of the entire  colon.     You may discuss the results with your doctor right away or at a future visit.  During the test   The test is usually done in the hospital on an outpatient basis. This means you go home the same day. The procedure takes about 30 minutes. During that time:  · You are given relaxing (sedating) medicine through an IV line. You may be drowsy, or fully asleep.  · The healthcare provider will first give you a physical exam to check for anal and rectal problems.  · Then the anus is lubricated and the scope inserted.  · If you are awake, you may have a feeling similar to needing to have a bowel movement. You may also feel pressure as air is pumped into the colon. Its OK to pass gas during the procedure.  · Biopsy, polyp removal, or other treatments may be done during the test.  After the test   You may have gas right after the test. It can help to try to pass it to help prevent later bloating. Your healthcare provider may discuss the results with you right away. Or you may need to schedule a follow-up visit to talk about the results. After the test, you can go back to your normal eating and other activities. You may be tired from the sedation and need to rest for a few hours.  Date Last Reviewed: 11/1/2016  © 4567-6134 [employer]. [address] [place] [address], [place] [address]. All rights reserved. This information is not intended as a substitute for professional medical care. Always follow your healthcare professional's instructions.            Admission Information     Date & Time Provider Department CSN    4/12/2017  1:39 PM Estuardo Salazar MD [facility] [id number]      Care Providers     Provider Role Specialty Primary office phone    Estuardo Salazar MD Attending Provider Gastroenterology [phone]    Estuardo Salazar MD Surgeon  Gastroenterology [phone]      Your Vitals Were     BP Pulse Temp Resp Height Weight    92/57 (BP Location: Left arm, Patient Position: Lying, BP Method:  "Automatic) 73 97.6 °F (36.4 °C) (Oral) 16 5' 2" (1.575 m) 44.5 kg (98 lb)    Last Period SpO2 BMI          04/12/2017 (Exact Date) 100% 17.92 kg/m2        Recent Lab Values     No lab values to display.      Pending Labs     Order Current Status    Specimen to Pathology - Surgery Collected (04/12/17 1509)      Allergies as of 4/12/2017     No Known Allergies      Advance Directives     An advance directive is a document which, in the event you are no longer able to make decisions for yourself, tells your healthcare team what kind of treatment you do or do not want to receive, or who you would like to make those decisions for you.  If you do not currently have an advance directive, [facility] encourages you to create one.  For more information call:  [phone]), [phone] ([phone]),  or log on to [email or web address].        Language Assistance Services     ATTENTION: Language assistance services are available, free of charge. Please call [phone].      ATENCIÓN: Si habla español, tiene a hodge disposición servicios gratuitos de asistencia lingüística. Llame al [phone].     [facility] Ý: N?u b?n nói Ti?ng Vi?t, có các d?ch v? h? tr? ngôn ng? mi?n phí dành cho b?n. G?i s? [phone].         [facility] complies with applicable Federal civil rights laws and does not discriminate on the basis of race, color, national origin, age, disability, or sex.        "

## 2017-04-12 NOTE — ANESTHESIA PREPROCEDURE EVALUATION
04/12/2017  Gay Gurrola is a 51 y.o., female   with a pre-operative diagnosis of Special screening for malignant neoplasms, colon [Z12.11]  Weight loss [R63.4] who is scheduled for Procedure(s) (LRB):  ESOPHAGOGASTRODUODENOSCOPY (EGD) (N/A)  COLONOSCOPY (N/A).     Requested anesthesia type: General  Surgeon: Estuardo Salazar MD  Allergies: Review of patient's allergies indicates:  No Known Allergies  Vital Sign Range:    Chronic Medications:   Prescriptions Prior to Admission   Medication Sig Dispense Refill Last Dose    Ca-D3-mag#11-zinc-cupr-man-bor 600 mg calcium- 800 unit-50 mg Tab Take 1 tablet by mouth once daily.   Taking    citalopram (CELEXA) 20 MG tablet Take 1 tablet (20 mg total) by mouth once daily. 30 tablet 2 Taking    cyanocobalamin, vitamin B-12, (VITAMIN B-12) 5,000 mcg Subl Place under the tongue once daily.     Taking    divalproex (DEPAKOTE ER) 500 MG Tb24 Take 1 tablet (500 mg total) by mouth once daily. 30 tablet 2 Taking    ketoconazole (NIZORAL) 2 % shampoo Wash hair with medicated shampoo at least 2x/week - let sit on scalp at least 5 minutes prior to rinsing 120 mL 5 Taking    levothyroxine (SYNTHROID) 25 MCG tablet Take 1 tablet (25 mcg total) by mouth once daily. 90 tablet 3 Taking    lurasidone (LATUDA) 20 mg Tab tablet Take 1 tablet (20 mg total) by mouth once daily. 30 tablet 1 Taking    omeprazole (PRILOSEC) 20 MG capsule Take 1 capsule (20 mg total) by mouth 2 (two) times daily. 28 capsule 0     suvorexant (BELSOMRA) 5 mg Tab Take 5 mg by mouth nightly as needed. 30 tablet 0 Taking    temazepam (RESTORIL) 7.5 MG Cap Take 2 capsules (15 mg total) by mouth nightly as needed. 30 capsule 0     trazodone (DESYREL) 150 MG tablet Take 1 tablet (150 mg total) by mouth nightly as needed for Insomnia. 30 tablet 1 Taking    tretinoin (RETIN-A) 0.025 % cream Apply  topically nightly. 45 g 1 Taking     Current Medications:   No current facility-administered medications for this encounter.      Medical History:   Past Medical History:   Diagnosis Date    Alcohol abuse, in remission     Anxiety     Bipolar affective     since teenage years    Depression     H. pylori infection     Headache     followed by Dr. Corona    Hypothyroid     Memory loss     Osteopenia     Seizures      .    [facility] Anesthesia Evaluation    I have reviewed the Patient Summary Reports.    I have reviewed the Nursing Notes.   I have reviewed the Medications.     Review of Systems  Anesthesia Hx:  No problems with previous Anesthesia  Denies Family Hx of Anesthesia complications.   Denies Personal Hx of Anesthesia complications.   Social:  Alcohol Use    Hematology/Oncology:  Hematology Normal   Oncology Normal     EENT/Dental:EENT/Dental Normal   Cardiovascular:  Cardiovascular Normal     Pulmonary:  Pulmonary Normal    Renal/:  Renal/ Normal     Hepatic/GI:  Hepatic/GI Normal    Musculoskeletal:  Musculoskeletal Normal    Neurological:   Headaches Seizures, well controlled    Endocrine:   Hypothyroidism    Dermatological:  Skin Normal    Psych:   Psychiatric History          Physical Exam  General:  Malnutrition    Airway/Jaw/Neck:  Airway Findings: Mouth Opening: Normal Tongue: Normal  General Airway Assessment: Adult, Good  Jaw/Neck Findings:     Neck ROM: Normal ROM      Dental:  Dental Findings: In tact   Chest/Lungs:  Chest/Lungs Findings: Clear to auscultation, Normal Respiratory Rate     Heart/Vascular:  Heart Findings: Rate: Normal  Rhythm: Regular Rhythm  Sounds: Normal     Abdomen:  Abdomen Findings:  Normal, Soft, Nontender            Anesthesia Plan  Type of Anesthesia, risks & benefits discussed:  Anesthesia Type:  general, MAC  Patient's Preference: as indicated  Intra-op Monitoring Plan: standard ASA monitors  Intra-op Monitoring Plan Comments:   Post Op Pain Control Plan:   Post  Op Pain Control Plan Comments:   Induction:   IV  Beta Blocker:  Patient is not currently on a Beta-Blocker (No further documentation required).       Informed Consent: Patient understands risks and agrees with Anesthesia plan.  Questions answered. Anesthesia consent signed with patient.  ASA Score: 3     Day of Surgery Review of History & Physical:  There are no significant changes.  H&P update referred to the provider.     Anesthesia Plan Notes: Reassurance given.        Ready For Surgery From Anesthesia Perspective.

## 2017-04-12 NOTE — INTERVAL H&P NOTE
The patient has been examined and the H&P has been reviewed:    I concur with the findings and no changes have occurred since H&P was written.     History and Exam unchanged from visit.    Procedure - EGD/Colon  Neck - supple  Plan of anesthesia - General  ASA - per anesthesia  Mallampati - per anesthesia  Anesthesia problems - no  Family history of anesthesia problems - no      Anesthesia/Surgery risks, benefits and alternative options discussed and understood by patient/family.          There are no hospital problems to display for this patient.

## 2017-04-12 NOTE — ANESTHESIA POSTPROCEDURE EVALUATION
"Anesthesia Post Evaluation    Patient: Gay Gurrola    Procedure(s) Performed: Procedure(s) (LRB):  ESOPHAGOGASTRODUODENOSCOPY (EGD) (N/A)  COLONOSCOPY (N/A)    Final Anesthesia Type: general  Patient location during evaluation: GI PACU  Patient participation: Yes- Able to Participate  Level of consciousness: awake and alert and oriented  Post-procedure vital signs: reviewed and stable  Pain management: adequate  Airway patency: patent  PONV status at discharge: No PONV  Anesthetic complications: no      Cardiovascular status: stable  Respiratory status: unassisted, spontaneous ventilation and room air  Hydration status: euvolemic  Follow-up not needed.          Post vital signs: BP 92/57 (BP Location: Left arm, Patient Position: Lying, BP Method: Automatic)  Pulse 73  Temp 36.4 °C (97.6 °F) (Oral)   Resp 16  Ht 5' 2" (1.575 m)  Wt 44.5 kg (98 lb)  LMP 04/12/2017 (Exact Date)  SpO2 100%  Breastfeeding? No  BMI 17.92 kg/m2    Pain/Cat Score: Pain Assessment Performed: Yes (4/12/2017  3:27 PM)  Presence of Pain: denies (4/12/2017  3:27 PM)  Cat Score: 10 (4/12/2017  3:27 PM)      "

## 2017-04-12 NOTE — TRANSFER OF CARE
"Anesthesia Transfer of Care Note    Patient: Gay Gurrola    Procedure(s) Performed: Procedure(s) (LRB):  ESOPHAGOGASTRODUODENOSCOPY (EGD) (N/A)  COLONOSCOPY (N/A)    Patient location: PACU    Anesthesia Type: general    Transport from OR: Transported from OR on 2-3 L/min O2 by NC with adequate spontaneous ventilation    Post pain: adequate analgesia    Post assessment: no apparent anesthetic complications    Post vital signs: stable    Level of consciousness: sedated    Nausea/Vomiting: no nausea/vomiting    Complications: none          Last vitals:   Visit Vitals    BP (!) 100/53 (BP Location: Left arm, Patient Position: Lying, BP Method: Automatic)    Pulse 73    Temp 36.7 °C (98 °F) (Oral)    Resp 18    Ht 5' 2" (1.575 m)    Wt 44.5 kg (98 lb)    LMP 04/12/2017 (Exact Date)    SpO2 100%    Breastfeeding No    BMI 17.92 kg/m2     "

## 2017-04-12 NOTE — H&P (VIEW-ONLY)
[facility] Consultation Note    Reason for Consult:  The primary encounter diagnosis was Weight loss. A diagnosis of Special screening for malignant neoplasms, colon was also pertinent to this visit.    PCP:   Jaja Mckeon   [address] / [facility] [address]    Referring MD:  Ismael He Md  [address]  [address]  [place] [address]    HPI:  This is a 51 y.o. female here for evaluation of weight loss of 12 lbs over 6 weeks. Pencil thin stools. Slimy in the morning.  Tried low fodmap diet which has helped with BM urgency.  Also gets bloated and gassy. Lactose seems to play some role.  She runs a few times a week.   and kids have H pylori.    Abdominal pain - has improved  Reflux - resolved after few weeks of prilosec.  Dysphagia - no   Bowel habits - normal  GI bleeding - none  NSAID usage - none    Interval history:      ROS:  Constitutional: No fevers, chills, + weight loss  ENT: No allergies  CV: No chest pain  Pulm: No cough, No shortness of breath  Ophtho: No vision changes  GI: see HPI  Derm: No rash  Heme: No lymphadenopathy, No bruising  MSK: No arthritis  : No dysuria, No hematuria  Endo: No hot or cold intolerance  Neuro: No syncope, No seizure  Psych: No anxiety, No depression    Medical History:  has a past medical history of Alcohol abuse, in remission; Anxiety; Bipolar affective; Depression; Headache(784.0); Hypothyroid; Memory loss; Osteopenia; and Seizures.    Surgical History:  has a past surgical history that includes Tubal ligation and Bunionectomy.    Family History: family history includes Crohn's disease in her maternal aunt and other; Dementia in her maternal grandmother; Diabetes in her mother; Stroke in her mother. There is no history of Melanoma, Psoriasis, Lupus, or Colon cancer..     Social History:  reports that she has never smoked. She has never used smokeless tobacco. She reports that she does not drink alcohol  "or use illicit drugs.    Review of patient's allergies indicates:  No Known Allergies    Current Outpatient Prescriptions   Medication Sig Dispense Refill    Ca-D3-mag#11-zinc-cupr-man-damian 600 mg calcium- 800 unit-50 mg Tab Take 1 tablet by mouth once daily.      citalopram (CELEXA) 20 MG tablet Take 1 tablet (20 mg total) by mouth once daily. 30 tablet 2    cyanocobalamin, vitamin B-12, (VITAMIN B-12) 5,000 mcg Subl Place under the tongue once daily.        divalproex (DEPAKOTE ER) 500 MG Tb24 Take 1 tablet (500 mg total) by mouth once daily. 30 tablet 2    ketoconazole (NIZORAL) 2 % shampoo Wash hair with medicated shampoo at least 2x/week - let sit on scalp at least 5 minutes prior to rinsing 120 mL 5    levothyroxine (SYNTHROID) 25 MCG tablet Take 1 tablet (25 mcg total) by mouth once daily. 90 tablet 3    lurasidone (LATUDA) 20 mg Tab tablet Take 1 tablet (20 mg total) by mouth once daily. 30 tablet 1    suvorexant (BELSOMRA) 5 mg Tab Take 5 mg by mouth nightly as needed. 30 tablet 0    trazodone (DESYREL) 150 MG tablet Take 1 tablet (150 mg total) by mouth nightly as needed for Insomnia. 30 tablet 1    tretinoin (RETIN-A) 0.025 % cream Apply topically nightly. 45 g 1     No current facility-administered medications for this visit.            Objective Findings:    Vital Signs:  BP 92/60  Pulse 65  Ht 5' 2" (1.575 m)  Wt 42 kg (92 lb 10.6 oz)  LMP 03/08/2017  BMI 16.95 kg/m2  Body mass index is 16.95 kg/(m^2).    Physical Exam:  General Appearance: Well appearing in no acute distress  Head:   Normocephalic, without obvious abnormality  Eyes:    No scleral icterus, EOMI  ENT: Neck supple, Lips, mucosa, and tongue normal; teeth and gums normal  Lungs: CTA bilaterally in anterior and posterior fields, no wheezes, no crackles.  Heart:  Regular rate and rhythm, S1, S2 normal, no murmurs heard  Abdomen: Soft, non tender, non distended with positive bowel sounds in all four quadrants. No " hepatosplenomegaly, ascites, or mass  Extremities: 2+ pulses, no clubbing, cyanosis or edema  Skin: No rash  Neurologic: CN II-XII intact      Labs:  Lab Results   Component Value Date    WBC 3.72 (L) 11/22/2016    HGB 12.6 11/22/2016    HCT 41.1 11/22/2016     11/22/2016    CHOL 169 11/22/2016    TRIG 34 11/22/2016    HDL 84 (H) 11/22/2016    ALT 13 11/22/2016    AST 21 11/22/2016     11/22/2016    K 4.8 11/22/2016     11/22/2016    CREATININE 0.9 11/22/2016    BUN 11 11/22/2016    CO2 28 11/22/2016    TSH 2.372 11/22/2016    TSH 2.352 11/22/2016         Imaging:    Endoscopy:      Assessment:  1. Weight loss    2. Special screening for malignant neoplasms, colon           Recommendations:  1. Labs today, repeat TSH and add HIV  2. Colon as she is due for screening and EGD as well    No Follow-up on file.      Order summary:  Orders Placed This Encounter    CBC auto differential    Comprehensive metabolic panel    TSH    H.Pylori Antibody IgG    HIV-1 and HIV-2 antibodies    Case request GI: ESOPHAGOGASTRODUODENOSCOPY (EGD), COLONOSCOPY         Thank you so much for allowing me to participate in the care of Gay Salazar MD

## 2017-04-12 NOTE — DISCHARGE INSTRUCTIONS
Upper GI Endoscopy     During endoscopy, a long, flexible tube is used to view the inside of your upper GI tract.      Upper GI endoscopy allows your healthcare provider to look directly into the beginning of your gastrointestinal (GI) tract. The esophagus, stomach, and duodenum (the first part of the small intestine) make up the upper GI tract.   Before the exam  Follow these and any other instructions you are given before your endoscopy. If you dont follow the healthcare providers instructions carefully, the test may need to be canceled or done over:  · Don't eat or drink anything after midnight the night before your exam. If your exam is in the afternoon, drink only clear liquids in the morning. Don't eat or drink anything for 8 hours before the exam. In some cases, you may be able to take medicines with sips of water until 2 hours before the procedure. Speak with your healthcare provider about this.   · Bring your X-rays and any other test results you have.  · Because you will be sedated, arrange for an adult to drive you home after the exam.  · Tell your healthcare provider before the exam if you are taking any medicines or have any medical problems.  The procedure  Here is what to expect:  · You will lie on the endoscopy table. Usually patients lie on the left side.  · You will be monitored and given oxygen.  · Your throat may be numbed with a spray or gargle. You are given medicine through an intravenous (IV) line that will help you relax and remain comfortable. You may be awake or asleep during the procedure.  · The healthcare provider will put the endoscope in your mouth and down your esophagus. It is thinner than most pieces of food that you swallow. It will not affect your breathing. The medicine helps keep you from gagging.  · Air is put into your GI tract to expand it. It can make you burp.  · During the procedure, the healthcare provider can take biopsies (tissue samples), remove abnormalities,  such as polyps, or treat abnormalities through a variety of devices placed through the endoscope. You will not feel this.   · The endoscope carries images of your upper GI tract to a video screen. If you are awake, you may be able to look at the images.  · After the procedure is done, you will rest for a time. An adult must drive you home.  When to call your healthcare provider  Contact your healthcare provider if you have:  · Black or tarry stools, or blood in your stool  · Fever  · Pain in your belly that does not go away  · Nausea and vomiting, or vomiting blood   Date Last Reviewed: 7/1/2016 © 2000-2016 [facility]. [address] [place] [address], [place] [address]. All rights reserved. This information is not intended as a substitute for professional medical care. Always follow your healthcare professional's instructions.      Colonoscopy     A camera attached to a flexible tube with a viewing lens is used to take video pictures.     Colonoscopy is a test to view the inside of your lower digestive tract (colon and rectum). Sometimes it can show the last part of the small intestine (ileum). During the test, small pieces of tissue may be removed for testing. This is called a biopsy. Small growths, such as polyps, may also be removed.   Why is colonoscopy done?  The test is done to help look for colon cancer. And it can help find the source of abdominal pain, bleeding, and changes in bowel habits. It may be needed once a year, depending on factors such as your:  · Age  · Health history  · Family health history  · Symptoms  · Results from any prior colonoscopy  Risks and possible complications  These include:  · Bleeding               · A puncture or tear in the colon   · Risks of anesthesia  · A cancer lesion not being seen  Getting ready   To prepare for the test:  · Talk with your healthcare provider about the risks of the test (see below). Also ask your healthcare provider about alternatives to the  test.  · Tell your healthcare provider about any medicines you take. Also tell him or her about any health conditions you may have.  · Make sure your rectum and colon are empty for the test. Follow the diet and bowel prep instructions exactly. If you dont, the test may need to be rescheduled.  · Plan for a friend or family member to drive you home after the test.     Colonoscopy provides an inside view of the entire colon.     You may discuss the results with your doctor right away or at a future visit.  During the test   The test is usually done in the hospital on an outpatient basis. This means you go home the same day. The procedure takes about 30 minutes. During that time:  · You are given relaxing (sedating) medicine through an IV line. You may be drowsy, or fully asleep.  · The healthcare provider will first give you a physical exam to check for anal and rectal problems.  · Then the anus is lubricated and the scope inserted.  · If you are awake, you may have a feeling similar to needing to have a bowel movement. You may also feel pressure as air is pumped into the colon. Its OK to pass gas during the procedure.  · Biopsy, polyp removal, or other treatments may be done during the test.  After the test   You may have gas right after the test. It can help to try to pass it to help prevent later bloating. Your healthcare provider may discuss the results with you right away. Or you may need to schedule a follow-up visit to talk about the results. After the test, you can go back to your normal eating and other activities. You may be tired from the sedation and need to rest for a few hours.  Date Last Reviewed: 11/1/2016 © 2000-2016 [employer]. [address] [place] [address]. All rights reserved. This information is not intended as a substitute for professional medical care. Always follow your healthcare professional's instructions.

## 2017-04-12 NOTE — ANESTHESIA RELEASE NOTE
"Anesthesia Release from PACU Note    Patient: Gay Gurrola    Procedure(s) Performed: Procedure(s) (LRB):  ESOPHAGOGASTRODUODENOSCOPY (EGD) (N/A)  COLONOSCOPY (N/A)    Anesthesia type: GEN    Post pain: Adequate analgesia reported    Post assessment: no apparent anesthetic complications, tolerated procedure well and no evidence of recall    Post vital signs: BP (!) 92/57 (BP Location: Left arm, Patient Position: Lying, BP Method: Automatic)  Pulse 73  Temp 36.4 °C (97.6 °F) (Oral)   Resp 16  Ht 5' 2" (1.575 m)  Wt 44.5 kg (98 lb)  LMP 04/12/2017 (Exact Date)  SpO2 100%  Breastfeeding? No  BMI 17.92 kg/m2    Level of consciousness: awake, alert and oriented    Nausea/Vomiting: no nausea/no vomiting    Complications: none    Airway Patency: patent    Respiratory: unassisted, spontaneous ventilation, room air    Cardiovascular: stable and blood pressure at baseline    Hydration: euvolemic    "

## 2017-04-17 RX ORDER — OMEPRAZOLE 20 MG/1
20 CAPSULE, DELAYED RELEASE ORAL DAILY
Qty: 30 CAPSULE | Refills: 3 | Status: SHIPPED | OUTPATIENT
Start: 2017-04-17 | End: 2017-12-19 | Stop reason: SDUPTHER

## 2017-04-17 NOTE — TELEPHONE ENCOUNTER
----- Message from Nicole Manny sent at 2017 11:42 AM CDT -----  Contact: Self- [phone]  Martin- pt called to speak with Odette- needs her rx omeprazole (PRILOSEC) 20 MG capsule () refilled- pt uses [facility] - [place], [place] - [address] [phone]  - please call pt back at [phone]

## 2017-04-18 ENCOUNTER — OFFICE VISIT (OUTPATIENT)
Dept: PAIN MEDICINE | Facility: CLINIC | Age: 52
End: 2017-04-18
Payer: MEDICARE

## 2017-04-18 ENCOUNTER — PATIENT MESSAGE (OUTPATIENT)
Dept: GASTROENTEROLOGY | Facility: CLINIC | Age: 52
End: 2017-04-18

## 2017-04-18 VITALS
RESPIRATION RATE: 20 BRPM | TEMPERATURE: 98 F | BODY MASS INDEX: 16.95 KG/M2 | WEIGHT: 92.13 LBS | SYSTOLIC BLOOD PRESSURE: 100 MMHG | DIASTOLIC BLOOD PRESSURE: 64 MMHG | HEIGHT: 62 IN | HEART RATE: 60 BPM

## 2017-04-18 DIAGNOSIS — F31.32 BIPOLAR AFFECTIVE DISORDER, DEPRESSED, MODERATE DEGREE: Primary | ICD-10-CM

## 2017-04-18 PROCEDURE — 99213 OFFICE O/P EST LOW 20 MIN: CPT | Mod: S$PBB,,, | Performed by: PSYCHIATRY & NEUROLOGY

## 2017-04-18 PROCEDURE — 99999 PR PBB SHADOW E&M-EST. PATIENT-LVL III: CPT | Mod: PBBFAC,,, | Performed by: PSYCHIATRY & NEUROLOGY

## 2017-04-18 PROCEDURE — 99213 OFFICE O/P EST LOW 20 MIN: CPT | Mod: PBBFAC | Performed by: PSYCHIATRY & NEUROLOGY

## 2017-04-18 RX ORDER — AMITRIPTYLINE HYDROCHLORIDE 50 MG/1
50 TABLET, FILM COATED ORAL NIGHTLY
Qty: 30 TABLET | Refills: 0 | Status: SHIPPED | OUTPATIENT
Start: 2017-04-18 | End: 2017-08-14 | Stop reason: SDUPTHER

## 2017-04-18 RX ORDER — RISPERIDONE 1 MG/1
1 TABLET ORAL 2 TIMES DAILY
Qty: 60 TABLET | Refills: 0 | Status: SHIPPED | OUTPATIENT
Start: 2017-04-18 | End: 2017-05-18 | Stop reason: SDUPTHER

## 2017-04-18 RX ORDER — TEMAZEPAM 7.5 MG/1
7.5 CAPSULE ORAL NIGHTLY PRN
Qty: 30 CAPSULE | Refills: 0 | Status: SHIPPED | OUTPATIENT
Start: 2017-04-18 | End: 2017-05-18

## 2017-04-18 NOTE — TELEPHONE ENCOUNTER
[facility]

## 2017-04-18 NOTE — PROGRESS NOTES
Outpatient Psychiatry Follow-Up Visit (MD/NP)    4/18/2017    Clinical Status of Patient:  Outpatient (Ambulatory)    Chief Complaint:  Gay Gurrola is a 51 y.o. female who presents today for follow-up of mood disorder.  Met with patient.      Interval History and Content of Current Session:  Interim Events/Subjective Report/Content of Current Session: Pt reports that she has been compliant with her medications and that the risperidal is helping with her mood but that she is still not getting much sleep at all. That she gets maybe 2-3 hrs and  Is very tired but that she does not even take a nap during the day time. Talked about the test that she had done. She is on a carbohydrate  restricted diet and that she feels that she is eating enough. She will continue to follow up with her GI MD.        Psychotherapy:  · Target symptoms: depression   · Why chosen therapy is appropriate versus another modality: relevant to diagnosis, patient responds to this modality  · Outcome monitoring methods: self-report, observation  · Therapeutic intervention type: behavior modifying psychotherapy, supportive psychotherapy  · Topics discussed/themes: medical comorbidities  · The patient's response to the intervention is accepting. The patient's progress toward treatment goals is fair.   · Duration of intervention: 30 minutes.    Review of Systems   · PSYCHIATRIC: Pertinant items are noted in the narrative.    Past Medical, Family and Social History: The patient's past medical, family and social history have been reviewed and updated as appropriate within the electronic medical record - see encounter notes.    Compliance: yes    Side effects: None    Risk Parameters:  Patient reports no suicidal ideation  Patient reports no homicidal ideation  Patient reports no self-injurious behavior  Patient reports no violent behavior    Exam (detailed: at least 9 elements; comprehensive: all 15 elements)   Constitutional  Vitals:  Most  recent vital signs, dated less than 90 days prior to this appointment, were reviewed.   There were no vitals filed for this visit.     General:  thin & gaunt looking     Musculoskeletal  Muscle Strength/Tone:  no tremor, no tic   Gait & Station:  non-ataxic     Psychiatric  Speech:  no latency; no press   Mood & Affect:  anxious  congruent and appropriate   Thought Process:  normal and logical, goal-directed   Associations:  intact   Thought Content:  normal, no suicidality, no homicidality, delusions, or paranoia   Insight:  intact   Judgement: behavior is adequate to circumstances   Orientation:  grossly intact   Memory: intact for content of interview   Language: grossly intact   Attention Span & Concentration:  able to focus   Fund of Knowledge:  intact and appropriate to age and level of education     Assessment and Diagnosis   Status/Progress: Based on the examination today, the patient's problem(s) is/are adequately but not ideally controlled.  New problems have been presented today.   Co-morbidities are complicating management of the primary condition.  There are no active rule-out diagnoses for this patient at this time.       General Impression: Pt is a 52 Y/O HW with    Bipolar disorder II    Alcohol dep in efr.                  Intervention/Counseling/Treatment Plan             Medication Management: Continue current medications. The risks and benefits of medication were discussed with the patient.  · Counseling provided with patient as follows: importance of compliance with chosen treatment options was emphasized, risks and benefits of treatment options, including medications, were discussed with the patient  Will continue the current treatment regiment that she is on currently. Cont depakote  500 mgQhs.  Will cont Risperidal 1 mg bid.  Cont celexa 20 mg daily.   Will try elavil 50 mg qhs prn sleep. If this does not help with sleep will start on restoril 7.5 mg qhs prn.  Have encouraged her to take the  medications that she is prescribed    Cont to maintain sobriety  Cont AA, groups.  Provided Supportive psychotherapy  Will have pt cont to f/u with GI and Primary Care MD.            Return to Clinic:1 month

## 2017-04-18 NOTE — MR AVS SNAPSHOT
[facility]  [address]  [facility] [address]  Phone: [phone]  Fax: [phone]                  Gay Gurrola   2017 11:40 AM   Office Visit    Description:  Female : 1965   Provider:  Jessica oHuston MD   Department:  [facility]           Reason for Visit     Follow-up                To Do List           Goals (5 Years of Data)     None       These Medications        Disp Refills Start End    temazepam (RESTORIL) 7.5 MG Cap 30 capsule 0 2017    Take 1 capsule (7.5 mg total) by mouth nightly as needed. - Oral    Pharmacy: [facility] - [place], [place] - [address] Ph #: [phone]       amitriptyline (ELAVIL) 50 MG tablet 30 tablet 0 2017    Take 1 tablet (50 mg total) by mouth every evening. - Oral    Pharmacy: [facility] - [place], [place] - [address] Ph #: [phone]       risperidone (RISPERDAL) 1 MG tablet 60 tablet 0 2017    Take 1 tablet (1 mg total) by mouth 2 (two) times daily. - Oral    Pharmacy: [facility] - [place], [place] - [address] Ph #: [phone]         [facility]     [facility] -  Assistance  Unless otherwise directed by your provider, please contact [facility], our nurse care line that is available for  assistance.     Registered nurses in the [facility] provide: appointment scheduling, clinical advisement, health education, and other advisory services.  Call: [phone] (toll free)               Medications           Message regarding Medications     Verify the changes and/or additions to your medication regime listed below are the same as discussed with your clinician today.  If any of these changes or additions are incorrect, please notify your healthcare provider.        START taking these NEW medications        Refills    amitriptyline (ELAVIL) 50 MG tablet 0     Sig: Take 1 tablet (50 mg total) by mouth every evening.    Class: Normal    Route: Oral    risperidone (RISPERDAL) 1 MG tablet 0    Sig: Take 1 tablet (1 mg total) by mouth 2 (two) times daily.    Class: Normal    Route: Oral      CHANGE how you are taking these medications     Start Taking Instead of    temazepam (RESTORIL) 7.5 MG Cap temazepam (RESTORIL) 7.5 MG Cap    Dosage:  Take 1 capsule (7.5 mg total) by mouth nightly as needed. Dosage:  Take 2 capsules (15 mg total) by mouth nightly as needed.    Reason for Change:  Reorder       STOP taking these medications     trazodone (DESYREL) 150 MG tablet Take 1 tablet (150 mg total) by mouth nightly as needed for Insomnia.           Verify that the below list of medications is an accurate representation of the medications you are currently taking.  If none reported, the list may be blank. If incorrect, please contact your healthcare provider. Carry this list with you in case of emergency.           Current Medications     amitriptyline (ELAVIL) 50 MG tablet Take 1 tablet (50 mg total) by mouth every evening.    Ca-D3-mag#11-zinc-cupr-man-bor 600 mg calcium- 800 unit-50 mg Tab Take 1 tablet by mouth once daily.    citalopram (CELEXA) 20 MG tablet Take 1 tablet (20 mg total) by mouth once daily.    cyanocobalamin, vitamin B-12, (VITAMIN B-12) 5,000 mcg Subl Place under the tongue once daily.      divalproex (DEPAKOTE ER) 500 MG Tb24 Take 1 tablet (500 mg total) by mouth once daily.    ketoconazole (NIZORAL) 2 % shampoo Wash hair with medicated shampoo at least 2x/week - let sit on scalp at least 5 minutes prior to rinsing    levothyroxine (SYNTHROID) 25 MCG tablet Take 1 tablet (25 mcg total) by mouth once daily.    omeprazole (PRILOSEC) 20 MG capsule Take 1 capsule (20 mg total) by mouth once daily.    risperidone (RISPERDAL) 1 MG tablet Take 1 tablet (1 mg total) by mouth 2 (two) times daily.    temazepam (RESTORIL) 7.5 MG Cap Take 1 capsule (7.5 mg total) by  "mouth nightly as needed.    tretinoin (RETIN-A) 0.025 % cream Apply topically nightly.           Clinical Reference Information           Your Vitals Were     BP Pulse Temp Resp Height Weight    100/64 60 97.8 °F (36.6 °C) (Oral) 20 5' 2" (1.575 m) 41.8 kg (92 lb 2.4 oz)    Last Period BMI             04/12/2017 (Exact Date) 16.85 kg/m2         Blood Pressure          Most Recent Value    BP  100/64      Allergies as of 4/18/2017     No Known Allergies      Immunizations Administered on Date of Encounter - 4/18/2017     None      Language Assistance Services     ATTENTION: Language assistance services are available, free of charge. Please call [phone].      ATENCIÓN: Si cesarla anderson, tiene a hodge disposición servicios gratuitos de asistencia lingüística. Llame al [phone].     CHÚ Ý: N?u b?n nói Ti?ng Vi?t, có các d?ch v? h? tr? ngôn ng? mi?n phí dành cho b?n. G?i s? [phone].         [facility] complies with applicable Federal civil rights laws and does not discriminate on the basis of race, color, national origin, age, disability, or sex.        "

## 2017-05-18 ENCOUNTER — OFFICE VISIT (OUTPATIENT)
Dept: PAIN MEDICINE | Facility: CLINIC | Age: 52
End: 2017-05-18
Payer: MEDICARE

## 2017-05-18 ENCOUNTER — TELEPHONE (OUTPATIENT)
Dept: PAIN MEDICINE | Facility: CLINIC | Age: 52
End: 2017-05-18

## 2017-05-18 VITALS
HEIGHT: 62 IN | RESPIRATION RATE: 18 BRPM | DIASTOLIC BLOOD PRESSURE: 58 MMHG | BODY MASS INDEX: 16.68 KG/M2 | TEMPERATURE: 98 F | HEART RATE: 73 BPM | SYSTOLIC BLOOD PRESSURE: 95 MMHG | WEIGHT: 90.63 LBS

## 2017-05-18 DIAGNOSIS — F31.11 BIPOLAR AFFECTIVE DISORDER, CURRENTLY MANIC, MILD: Primary | ICD-10-CM

## 2017-05-18 PROCEDURE — 99999 PR PBB SHADOW E&M-EST. PATIENT-LVL III: CPT | Mod: PBBFAC,,, | Performed by: PSYCHIATRY & NEUROLOGY

## 2017-05-18 PROCEDURE — 99213 OFFICE O/P EST LOW 20 MIN: CPT | Mod: PBBFAC | Performed by: PSYCHIATRY & NEUROLOGY

## 2017-05-18 PROCEDURE — 99213 OFFICE O/P EST LOW 20 MIN: CPT | Mod: S$PBB,,, | Performed by: PSYCHIATRY & NEUROLOGY

## 2017-05-18 RX ORDER — DIVALPROEX SODIUM 500 MG/1
500 TABLET, FILM COATED, EXTENDED RELEASE ORAL DAILY
Qty: 30 TABLET | Refills: 2 | Status: SHIPPED | OUTPATIENT
Start: 2017-05-18 | End: 2017-08-14 | Stop reason: SDUPTHER

## 2017-05-18 RX ORDER — RISPERIDONE 1 MG/1
1 TABLET ORAL 2 TIMES DAILY
Qty: 60 TABLET | Refills: 2 | Status: SHIPPED | OUTPATIENT
Start: 2017-05-18 | End: 2017-08-14

## 2017-05-18 RX ORDER — CITALOPRAM 20 MG/1
20 TABLET, FILM COATED ORAL DAILY
Qty: 30 TABLET | Refills: 2 | Status: SHIPPED | OUTPATIENT
Start: 2017-05-18 | End: 2017-08-14 | Stop reason: SDUPTHER

## 2017-05-18 NOTE — MR AVS SNAPSHOT
[facility]  [address]  [facility] [address]  Phone: [phone]  Fax: [phone]                  Gay Gurrola   2017 12:20 PM   Office Visit    Description:  Female : 1965   Provider:  Jessica Houston MD   Department:  [facility]           Reason for Visit     Follow-up                To Do List           Goals (5 Years of Data)     None       These Medications        Disp Refills Start End    risperidone (RISPERDAL) 1 MG tablet 60 tablet 2 2017    Take 1 tablet (1 mg total) by mouth 2 (two) times daily. - Oral    Pharmacy: [id number] - [place], [place] - [address] Ph #: [phone]       divalproex ER (DEPAKOTE ER) 500 MG Tb24 30 tablet 2 2017    Take 1 tablet (500 mg total) by mouth once daily. - Oral    Pharmacy: [facility] - [place], [place] - [address] Ph #: [phone]       citalopram (CELEXA) 20 MG tablet 30 tablet 2 2017     Take 1 tablet (20 mg total) by mouth once daily. - Oral    Pharmacy: [facility] - [place], [place] - [address] Ph #: [phone]         [facility] On Call     [facility] -  Assistance  Unless otherwise directed by your provider, please contact [facility], our nurse care line that is available for  assistance.     Registered nurses in the [facility] provide: appointment scheduling, clinical advisement, health education, and other advisory services.  Call: [phone] (toll free)               Medications           Message regarding Medications     Verify the changes and/or additions to your medication regime listed below are the same as discussed with your clinician today.  If any of these changes or additions are incorrect, please notify your healthcare provider.        CHANGE how you are taking these medications     Start Taking Instead of    divalproex ER (DEPAKOTE ER) 500  "MG Tb24 divalproex (DEPAKOTE ER) 500 MG Tb24    Dosage:  Take 1 tablet (500 mg total) by mouth once daily. Dosage:  Take 1 tablet (500 mg total) by mouth once daily.    Reason for Change:  Reorder            Verify that the below list of medications is an accurate representation of the medications you are currently taking.  If none reported, the list may be blank. If incorrect, please contact your healthcare provider. Carry this list with you in case of emergency.           Current Medications     amitriptyline (ELAVIL) 50 MG tablet Take 1 tablet (50 mg total) by mouth every evening.    Ca-D3-mag#11-zinc-cupr-man-bor 600 mg calcium- 800 unit-50 mg Tab Take 1 tablet by mouth once daily.    citalopram (CELEXA) 20 MG tablet Take 1 tablet (20 mg total) by mouth once daily.    cyanocobalamin, vitamin B-12, (VITAMIN B-12) 5,000 mcg Subl Place under the tongue once daily.      divalproex ER (DEPAKOTE ER) 500 MG Tb24 Take 1 tablet (500 mg total) by mouth once daily.    ketoconazole (NIZORAL) 2 % shampoo Wash hair with medicated shampoo at least 2x/week - let sit on scalp at least 5 minutes prior to rinsing    levothyroxine (SYNTHROID) 25 MCG tablet Take 1 tablet (25 mcg total) by mouth once daily.    omeprazole (PRILOSEC) 20 MG capsule Take 1 capsule (20 mg total) by mouth once daily.    risperidone (RISPERDAL) 1 MG tablet Take 1 tablet (1 mg total) by mouth 2 (two) times daily.    temazepam (RESTORIL) 7.5 MG Cap Take 1 capsule (7.5 mg total) by mouth nightly as needed.    tretinoin (RETIN-A) 0.025 % cream Apply topically nightly.           Clinical Reference Information           Your Vitals Were     BP Pulse Temp Resp Height Weight    95/58 73 97.8 °F (36.6 °C) (Oral) 18 5' 2" (1.575 m) 41.1 kg (90 lb 9.7 oz)    BMI                16.57 kg/m2          Blood Pressure          Most Recent Value    BP  (!)  95/58      Allergies as of 5/18/2017     No Known Allergies      Immunizations Administered on Date of Encounter - " 5/18/2017     None      Language Assistance Services     ATTENTION: Language assistance services are available, free of charge. Please call [phone].      ATENCIÓN: Si habla anderson, tiene a hodge disposición servicios gratuitos de asistencia lingüística. Llame al [phone].     CHÚ Ý: N?u b?n nói Ti?ng Vi?t, có các d?ch v? h? tr? ngôn ng? mi?n phí dành cho b?n. G?i s? [phone].         [facility] complies with applicable Federal civil rights laws and does not discriminate on the basis of race, color, national origin, age, disability, or sex.

## 2017-05-18 NOTE — PROGRESS NOTES
Outpatient Psychiatry Follow-Up Visit (MD/NP)    5/18/2017    Clinical Status of Patient:  Outpatient (Ambulatory)    Chief Complaint:  Gay Gurrola is a 51 y.o. female who presents today for follow-up of mood disorder.  Met with patient.      Interval History and Content of Current Session:  Interim Events/Subjective Report/Content of Current Session: Pt reports that she has been doing better. She is taking the risperidal 1mg bid, the depakote  mg qhs, celexa 20 mg daily  and temazepam 7.5 mg qhs. I have discusse with her that the plan was not for her to take this everynight but only as needed. She reports that she is aware of that. That she has been running 5 miles every morning 5 days a week because she feels better when she stays active, That she is trying to volunteer and might even be getting a job. Talked about other plans that she is working on.Still sober        Psychotherapy:  · Target symptoms: depression   · Why chosen therapy is appropriate versus another modality: relevant to diagnosis, patient responds to this modality  · Outcome monitoring methods: self-report, observation  · Therapeutic intervention type: behavior modifying psychotherapy, supportive psychotherapy  · Topics discussed/themes: medical comorbidities  · The patient's response to the intervention is accepting. The patient's progress toward treatment goals is fair.   · Duration of intervention: 30 minutes.    Review of Systems   · PSYCHIATRIC: Pertinant items are noted in the narrative.    Past Medical, Family and Social History: The patient's past medical, family and social history have been reviewed and updated as appropriate within the electronic medical record - see encounter notes.    Compliance: yes    Side effects: None    Risk Parameters:  Patient reports no suicidal ideation  Patient reports no homicidal ideation  Patient reports no self-injurious behavior  Patient reports no violent behavior    Exam (detailed:  "at least 9 elements; comprehensive: all 15 elements)   Constitutional  Vitals:  Most recent vital signs, dated less than 90 days prior to this appointment, were reviewed.   Vitals:    05/18/17 1223   BP: (!) 95/58   Pulse: 73   Resp: 18   Temp: 97.8 °F (36.6 °C)   TempSrc: Oral   Weight: 41.1 kg (90 lb 9.7 oz)   Height: 5' 2" (1.575 m)        General:  thin & gaunt looking     Musculoskeletal  Muscle Strength/Tone:  no tremor, no tic   Gait & Station:  non-ataxic     Psychiatric  Speech:  no latency; no press   Mood & Affect:  anxious  congruent and appropriate   Thought Process:  normal and logical, goal-directed   Associations:  intact   Thought Content:  normal, no suicidality, no homicidality, delusions, or paranoia   Insight:  intact   Judgement: behavior is adequate to circumstances   Orientation:  grossly intact   Memory: intact for content of interview   Language: grossly intact   Attention Span & Concentration:  able to focus   Fund of Knowledge:  intact and appropriate to age and level of education     Assessment and Diagnosis   Status/Progress: Based on the examination today, the patient's problem(s) is/are adequately but not ideally controlled.  New problems have been presented today.   Co-morbidities are complicating management of the primary condition.  There are no active rule-out diagnoses for this patient at this time.       General Impression: Pt is a 50 Y/O HW with    Bipolar disorder II    Alcohol dep in efr.                  Intervention/Counseling/Treatment Plan             Medication Management: Continue current medications. The risks and benefits of medication were discussed with the patient.  · Counseling provided with patient as follows: importance of compliance with chosen treatment options was emphasized, risks and benefits of treatment options, including medications, were discussed with the patient  Will continue the current treatment regiment that she is on currently. Cont depakote  500 " mgQhs.  Will cont Risperidal 1 mg bid.  Cont celexa 20 mg daily.    Cont  on restoril 7.5 mg qhs prn.  Have encouraged her to take the medications that she is prescribed    Cont to maintain sobriety  Cont AA, groups.  Provided Supportive psychotherapy  Will have pt cont to f/u with GI and Primary Care MD.            Return to Clinic: 3 month

## 2017-06-01 ENCOUNTER — TELEPHONE (OUTPATIENT)
Dept: PAIN MEDICINE | Facility: CLINIC | Age: 52
End: 2017-06-01

## 2017-06-06 ENCOUNTER — TELEPHONE (OUTPATIENT)
Dept: PAIN MEDICINE | Facility: CLINIC | Age: 52
End: 2017-06-06

## 2017-06-07 ENCOUNTER — TELEPHONE (OUTPATIENT)
Dept: PAIN MEDICINE | Facility: CLINIC | Age: 52
End: 2017-06-07

## 2017-06-07 NOTE — TELEPHONE ENCOUNTER
Called [facility] and spoke with Rosa M today and refilled trazodone (DESYREL) 150 MG tablet #60 called to   [facility] [address] [place] [address] [address] [phone] (Phone)  [phone] (Fax)     Per Dr Celso najera and Shelby acuna. No refill for pt and follow schedule for 7/17/17 at 11:40pm..

## 2017-07-12 ENCOUNTER — TELEPHONE (OUTPATIENT)
Dept: PAIN MEDICINE | Facility: CLINIC | Age: 52
End: 2017-07-12

## 2017-07-12 NOTE — TELEPHONE ENCOUNTER
----- Message from Cristina Benitez sent at 7/12/2017  1:25 PM CDT -----  _  1st Request  _  2nd Request  _  3rd Request        Who: patient    Why: pt is calling to schedule an appt in august     What Number to Call Back: [phone]     When to Expect a call back: (With in 24 hours)

## 2017-07-12 NOTE — TELEPHONE ENCOUNTER
----- Message from Desire Hernandez sent at 7/12/2017  9:42 AM CDT -----  x_  1st Request  _  2nd Request  _  3rd Request        Who: carolina    Why: pt.would like to schedule appt. For august,2017    What Number to Call Back:[phone]    When to Expect a call back: (With in 24 hours)

## 2017-07-12 NOTE — TELEPHONE ENCOUNTER
Called and spoke with pt today about rescheduling her appointment from 7/12/17 to 8/14/17 with Dr. Houston.

## 2017-08-03 ENCOUNTER — TELEPHONE (OUTPATIENT)
Dept: PAIN MEDICINE | Facility: CLINIC | Age: 52
End: 2017-08-03

## 2017-08-03 NOTE — TELEPHONE ENCOUNTER
----- Message from Cristina Benitez sent at 8/3/2017  9:41 AM CDT -----  _  1st Request  _  2nd Request  _  3rd Request        Who: patient    Why: Nasrin, please call pt, she needs to speak to you and states message too long     What Number to Call Back:[phone]    When to Expect a call back: (With in 24 hours)

## 2017-08-08 ENCOUNTER — TELEPHONE (OUTPATIENT)
Dept: PAIN MEDICINE | Facility: CLINIC | Age: 52
End: 2017-08-08

## 2017-08-08 NOTE — TELEPHONE ENCOUNTER
----- Message from Marjorie Powers sent at 8/8/2017 11:08 AM CDT -----  x 1st Request  _ 2nd Request  _ 3rd Request    Who: Gay     Why: Pt states she is returning a calling from santi in regards to a medication. Please call and advise.     What Number to Call Back: [phone]     When to Expect a call back: (Before the end of the day)  -- if call after 3:00 call back will be tomorrow.

## 2017-08-14 ENCOUNTER — OFFICE VISIT (OUTPATIENT)
Dept: PAIN MEDICINE | Facility: CLINIC | Age: 52
End: 2017-08-14
Payer: MEDICARE

## 2017-08-14 VITALS
DIASTOLIC BLOOD PRESSURE: 52 MMHG | WEIGHT: 96.81 LBS | RESPIRATION RATE: 20 BRPM | SYSTOLIC BLOOD PRESSURE: 90 MMHG | HEART RATE: 85 BPM | BODY MASS INDEX: 17.81 KG/M2 | TEMPERATURE: 98 F | HEIGHT: 62 IN

## 2017-08-14 DIAGNOSIS — F31.32 BIPOLAR AFFECTIVE DISORDER, DEPRESSED, MODERATE DEGREE: Primary | ICD-10-CM

## 2017-08-14 PROCEDURE — 99213 OFFICE O/P EST LOW 20 MIN: CPT | Mod: PBBFAC | Performed by: PSYCHIATRY & NEUROLOGY

## 2017-08-14 PROCEDURE — 99214 OFFICE O/P EST MOD 30 MIN: CPT | Mod: S$PBB,,, | Performed by: PSYCHIATRY & NEUROLOGY

## 2017-08-14 PROCEDURE — 99999 PR PBB SHADOW E&M-EST. PATIENT-LVL III: CPT | Mod: PBBFAC,,, | Performed by: PSYCHIATRY & NEUROLOGY

## 2017-08-14 RX ORDER — DIVALPROEX SODIUM 500 MG/1
500 TABLET, FILM COATED, EXTENDED RELEASE ORAL DAILY
Qty: 30 TABLET | Refills: 2 | Status: SHIPPED | OUTPATIENT
Start: 2017-08-14 | End: 2017-09-28 | Stop reason: SDUPTHER

## 2017-08-14 RX ORDER — CITALOPRAM 20 MG/1
20 TABLET, FILM COATED ORAL DAILY
Qty: 30 TABLET | Refills: 2 | Status: SHIPPED | OUTPATIENT
Start: 2017-08-14 | End: 2017-09-28 | Stop reason: SDUPTHER

## 2017-08-14 RX ORDER — TRAZODONE HYDROCHLORIDE 150 MG/1
150 TABLET ORAL NIGHTLY
Qty: 60 TABLET | Refills: 0 | Status: SHIPPED | OUTPATIENT
Start: 2017-08-14 | End: 2017-10-30

## 2017-08-14 RX ORDER — LURASIDONE HYDROCHLORIDE 20 MG/1
80 TABLET, FILM COATED ORAL DAILY
Qty: 30 TABLET | Refills: 1 | Status: SHIPPED | OUTPATIENT
Start: 2017-08-14 | End: 2017-08-17 | Stop reason: SDUPTHER

## 2017-08-14 NOTE — PROGRESS NOTES
"Outpatient Psychiatry Follow-Up Visit (MD/NP)    8/14/2017    Clinical Status of Patient:  Outpatient (Ambulatory)    Chief Complaint:  Gay Gurrola is a 52 y.o. female who presents today for follow-up of mood disorder.  Met with patient.      Interval History and Content of Current Session:  Interim Events/Subjective Report/Content of Current Session: Pt reports that she has been doing better continues to stay sober. That she is staying busy and doing all that she can to make good  use of her time. That her main issue at this time is that she is not able to sleep at night. That she is very tired and looks forward to the rest but then tosses and turns the " whole night". That her son has been in town and she has been having a good time with him, she and her  have been getting along well and that she does not have as much to worry about yet she is not sleeping.          Psychotherapy:  · Target symptoms: depression   · Why chosen therapy is appropriate versus another modality: relevant to diagnosis, patient responds to this modality  · Outcome monitoring methods: self-report, observation  · Therapeutic intervention type: behavior modifying psychotherapy, supportive psychotherapy  · Topics discussed/themes: medical comorbidities  · The patient's response to the intervention is accepting. The patient's progress toward treatment goals is fair.   · Duration of intervention: 30 minutes.    Review of Systems   · PSYCHIATRIC: Pertinant items are noted in the narrative.    Past Medical, Family and Social History: The patient's past medical, family and social history have been reviewed and updated as appropriate within the electronic medical record - see encounter notes.    Compliance: yes    Side effects: None    Risk Parameters:  Patient reports no suicidal ideation  Patient reports no homicidal ideation  Patient reports no self-injurious behavior  Patient reports no violent behavior    Exam (detailed: " "at least 9 elements; comprehensive: all 15 elements)   Constitutional  Vitals:  Most recent vital signs, dated less than 90 days prior to this appointment, were reviewed.   Vitals:    08/14/17 1101   BP: (!) 90/52   Pulse: 85   Resp: 20   Temp: 97.9 °F (36.6 °C)   TempSrc: Oral   Weight: 43.9 kg (96 lb 12.5 oz)   Height: 5' 2" (1.575 m)        General:  thin & gaunt looking     Musculoskeletal  Muscle Strength/Tone:  no tremor, no tic   Gait & Station:  non-ataxic     Psychiatric  Speech:  no latency; no press   Mood & Affect:  anxious  congruent and appropriate   Thought Process:  normal and logical, goal-directed   Associations:  intact   Thought Content:  normal, no suicidality, no homicidality, delusions, or paranoia   Insight:  intact   Judgement: behavior is adequate to circumstances   Orientation:  grossly intact   Memory: intact for content of interview   Language: grossly intact   Attention Span & Concentration:  able to focus   Fund of Knowledge:  intact and appropriate to age and level of education     Assessment and Diagnosis   Status/Progress: Based on the examination today, the patient's problem(s) is/are adequately but not ideally controlled.  New problems have been presented today.   Co-morbidities are complicating management of the primary condition.  There are no active rule-out diagnoses for this patient at this time.       General Impression: Pt is a 52 Y/O HW with    Bipolar disorder II    Alcohol dep in efr.                  Intervention/Counseling/Treatment Plan             Medication Management: Continue current medications. The risks and benefits of medication were discussed with the patient.  · Counseling provided with patient as follows: importance of compliance with chosen treatment options was emphasized, risks and benefits of treatment options, including medications, were discussed with the patient  Will continue the current treatment regiment that she is on currently. Cont depakote  " 500 mgQhs.  Will D/C Risperidal  Cont celexa 20 mg daily.   Will retry latuda 20 mg at bedtime.   Have encouraged her to take the medications that she is prescribed    Cont the trazodone 150 mg 1-2 nightly   Cont to maintain sobriety  Cont AA, groups.  Provided Supportive psychotherapy  Will also try CBTI.            Return to Clinic: 1 month

## 2017-08-17 RX ORDER — LURASIDONE HYDROCHLORIDE 20 MG/1
20 TABLET, FILM COATED ORAL DAILY
Qty: 30 TABLET | Refills: 1 | Status: SHIPPED | OUTPATIENT
Start: 2017-08-17 | End: 2017-09-28 | Stop reason: SDUPTHER

## 2017-08-18 ENCOUNTER — TELEPHONE (OUTPATIENT)
Dept: PAIN MEDICINE | Facility: CLINIC | Age: 52
End: 2017-08-18

## 2017-08-18 NOTE — TELEPHONE ENCOUNTER
Pharmacist sent a request for PA on the Latuda. A form has been sent to Dr. Houston to request for pt PA. Patient was inform on Thursday and wanted to get an Rx for Seroquel. Spoke with pt letting her know about PA and she states will wait until Monday.

## 2017-08-23 ENCOUNTER — TELEPHONE (OUTPATIENT)
Dept: PAIN MEDICINE | Facility: CLINIC | Age: 52
End: 2017-08-23

## 2017-08-23 NOTE — TELEPHONE ENCOUNTER
----- Message from Veronica Ordaz sent at 8/23/2017  8:56 AM CDT -----  Contact: pt  _  1st Request  _  2nd Request  _  3rd Request        Who: pt    Why: pt is requesting to change her appt 9/12/17 to the end of the month @ 11:40 a.m. Schedule not open to reschedule.    What Number to Call Back:[phone]    When to Expect a call back: (With in 24 hours)

## 2017-08-23 NOTE — TELEPHONE ENCOUNTER
Called and spoke with [facility] pharmacist about pt needing a Prior Authorization for LUTUDA 20 mg tablet. Per Dr Celso mcadams PA was initiated on 8/21/17 faxed to [facility] [phone] phone #[phone].

## 2017-08-30 ENCOUNTER — TELEPHONE (OUTPATIENT)
Dept: PAIN MEDICINE | Facility: CLINIC | Age: 52
End: 2017-08-30

## 2017-09-05 ENCOUNTER — TELEPHONE (OUTPATIENT)
Dept: PAIN MEDICINE | Facility: CLINIC | Age: 52
End: 2017-09-05

## 2017-09-05 NOTE — TELEPHONE ENCOUNTER
----- Message from Devonte Coles MA sent at 8/31/2017  2:23 PM CDT -----  Called and spoke with pt today about her condition she states she haven't taking any of her bipolar medicine and it's bad. She hasn't gotten ant sleep in a while. She found Zequel over the counter. That didn't help. I advise pt that we are waiting on the PA to approve her meds. Is there anything else for her to take while we'll waiting.  ----- Message -----  From: Marjorie Powers  Sent: 8/31/2017  12:25 PM  To: [facility] Lopez Staff    x 1st Request  _ 2nd Request  _ 3rd Request    Who: pt      Why: pt is calling to speak to  in regards to her bipolar. Please call and advise.     What Number to Call Back: [phone]     When to Expect a call back: (Before the end of the day)  -- if call after 3:00 call back will be tomorrow.     Spoke to pt today, she just got the latuda filled and just started it yesterday and that she is feeling better. She has an appointment coping up will discuss furhter changes then.

## 2017-09-28 ENCOUNTER — OFFICE VISIT (OUTPATIENT)
Dept: PAIN MEDICINE | Facility: CLINIC | Age: 52
End: 2017-09-28
Payer: MEDICARE

## 2017-09-28 VITALS
HEART RATE: 60 BPM | WEIGHT: 94.56 LBS | RESPIRATION RATE: 20 BRPM | BODY MASS INDEX: 17.4 KG/M2 | HEIGHT: 62 IN | DIASTOLIC BLOOD PRESSURE: 55 MMHG | TEMPERATURE: 98 F | SYSTOLIC BLOOD PRESSURE: 84 MMHG

## 2017-09-28 DIAGNOSIS — F31.32 BIPOLAR AFFECTIVE DISORDER, DEPRESSED, MODERATE DEGREE: Primary | ICD-10-CM

## 2017-09-28 PROCEDURE — 99213 OFFICE O/P EST LOW 20 MIN: CPT | Mod: S$PBB,,, | Performed by: PSYCHIATRY & NEUROLOGY

## 2017-09-28 PROCEDURE — 99999 PR PBB SHADOW E&M-EST. PATIENT-LVL III: CPT | Mod: PBBFAC,,, | Performed by: PSYCHIATRY & NEUROLOGY

## 2017-09-28 PROCEDURE — 99213 OFFICE O/P EST LOW 20 MIN: CPT | Mod: PBBFAC | Performed by: PSYCHIATRY & NEUROLOGY

## 2017-09-28 RX ORDER — DIVALPROEX SODIUM 500 MG/1
500 TABLET, FILM COATED, EXTENDED RELEASE ORAL DAILY
Qty: 30 TABLET | Refills: 2 | Status: SHIPPED | OUTPATIENT
Start: 2017-09-28 | End: 2017-10-30 | Stop reason: SDUPTHER

## 2017-09-28 RX ORDER — LURASIDONE HYDROCHLORIDE 40 MG/1
40 TABLET, FILM COATED ORAL DAILY
Qty: 45 TABLET | Refills: 1 | Status: SHIPPED | OUTPATIENT
Start: 2017-09-28 | End: 2017-10-30 | Stop reason: SDUPTHER

## 2017-09-28 RX ORDER — CITALOPRAM 20 MG/1
20 TABLET, FILM COATED ORAL DAILY
Qty: 30 TABLET | Refills: 2 | Status: SHIPPED | OUTPATIENT
Start: 2017-09-28 | End: 2017-10-30 | Stop reason: SDUPTHER

## 2017-09-28 RX ORDER — VALACYCLOVIR HYDROCHLORIDE 500 MG/1
500 TABLET, FILM COATED ORAL 2 TIMES DAILY
Qty: 14 TABLET | Refills: 0 | Status: SHIPPED | OUTPATIENT
Start: 2017-09-28 | End: 2018-02-27

## 2017-09-28 RX ORDER — TEMAZEPAM 15 MG/1
15 CAPSULE ORAL NIGHTLY PRN
Qty: 30 CAPSULE | Refills: 1 | Status: SHIPPED | OUTPATIENT
Start: 2017-09-28 | End: 2017-10-28

## 2017-09-28 NOTE — PROGRESS NOTES
Outpatient Psychiatry Follow-Up Visit (MD/NP)    9/28/2017    Clinical Status of Patient:  Outpatient (Ambulatory)    Chief Complaint:  Gay Gurrola is a 52 y.o. female who presents today for follow-up of mood disorder.  Met with patient.      Interval History and Content of Current Session:  Interim Events/Subjective Report/Content of Current Session: Pt reports that she has been doing better  With the latuda  And her mood has been more stable. That she is however not chasity to sleep at night for more than 2-3 hours and then she feels very tired during the daytime. That she does not take any naps and is working partime and stay busy with her family. That she continues to sty sober. That trazodone is not helping with sleep at all.         Psychotherapy:  · Target symptoms: mood swings, insomnia.  · Why chosen therapy is appropriate versus another modality: relevant to diagnosis, patient responds to this modality  · Outcome monitoring methods: self-report, observation  · Therapeutic intervention type: behavior modifying psychotherapy, supportive psychotherapy  · Topics discussed/themes: medical comorbidities  · The patient's response to the intervention is accepting. The patient's progress toward treatment goals is fair.   · Duration of intervention: 30 minutes.    Review of Systems   · PSYCHIATRIC: Pertinant items are noted in the narrative.    Past Medical, Family and Social History: The patient's past medical, family and social history have been reviewed and updated as appropriate within the electronic medical record - see encounter notes.    Compliance: yes    Side effects: None    Risk Parameters:  Patient reports no suicidal ideation  Patient reports no homicidal ideation  Patient reports no self-injurious behavior  Patient reports no violent behavior    Exam (detailed: at least 9 elements; comprehensive: all 15 elements)   Constitutional  Vitals:  Most recent vital signs, dated less than 90 days  prior to this appointment, were reviewed.   There were no vitals filed for this visit.     General:  thin & gaunt looking     Musculoskeletal  Muscle Strength/Tone:  no tremor, no tic   Gait & Station:  non-ataxic     Psychiatric  Speech:  no latency; no press   Mood & Affect:  anxious  congruent and appropriate   Thought Process:  normal and logical, goal-directed   Associations:  intact   Thought Content:  normal, no suicidality, no homicidality, delusions, or paranoia   Insight:  intact   Judgement: behavior is adequate to circumstances   Orientation:  grossly intact   Memory: intact for content of interview   Language: grossly intact   Attention Span & Concentration:  able to focus   Fund of Knowledge:  intact and appropriate to age and level of education     Assessment and Diagnosis   Status/Progress: Based on the examination today, the patient's problem(s) is/are adequately but not ideally controlled.  New problems have been presented today.   Co-morbidities are complicating management of the primary condition.  There are no active rule-out diagnoses for this patient at this time.       General Impression: Pt is a 50 Y/O HW with    Bipolar disorder II    Alcohol dep in efr.                  Intervention/Counseling/Treatment Plan             Medication Management: Continue current medications. The risks and benefits of medication were discussed with the patient.  · Counseling provided with patient as follows: importance of compliance with chosen treatment options was emphasized, risks and benefits of treatment options, including medications, were discussed with the patient  Will continue depakote  500 mgQhs.    Cont celexa 20 mg daily.   Will  Cont  latuda 60 mg at bedtime.   Have encouraged her to take the medications that she is prescribed    D/C trazodone  Will add restoril  Cont to maintain sobriety  Cont AA, groups.  Provided Supportive psychotherapy  Will also try CBTI.            Return to Clinic:1  month

## 2017-10-30 ENCOUNTER — TELEPHONE (OUTPATIENT)
Dept: PAIN MEDICINE | Facility: CLINIC | Age: 52
End: 2017-10-30

## 2017-10-30 ENCOUNTER — OFFICE VISIT (OUTPATIENT)
Dept: PAIN MEDICINE | Facility: CLINIC | Age: 52
End: 2017-10-30
Payer: MEDICARE

## 2017-10-30 VITALS
WEIGHT: 97.88 LBS | HEIGHT: 62 IN | RESPIRATION RATE: 20 BRPM | BODY MASS INDEX: 18.01 KG/M2 | TEMPERATURE: 98 F | HEART RATE: 80 BPM | DIASTOLIC BLOOD PRESSURE: 56 MMHG | SYSTOLIC BLOOD PRESSURE: 96 MMHG

## 2017-10-30 DIAGNOSIS — F31.11 BIPOLAR AFFECTIVE DISORDER, CURRENTLY MANIC, MILD: Primary | ICD-10-CM

## 2017-10-30 PROCEDURE — 99213 OFFICE O/P EST LOW 20 MIN: CPT | Mod: S$PBB,,, | Performed by: PSYCHIATRY & NEUROLOGY

## 2017-10-30 PROCEDURE — 99213 OFFICE O/P EST LOW 20 MIN: CPT | Mod: PBBFAC | Performed by: PSYCHIATRY & NEUROLOGY

## 2017-10-30 PROCEDURE — 99999 PR PBB SHADOW E&M-EST. PATIENT-LVL III: CPT | Mod: PBBFAC,,, | Performed by: PSYCHIATRY & NEUROLOGY

## 2017-10-30 RX ORDER — LURASIDONE HYDROCHLORIDE 60 MG/1
60 TABLET, FILM COATED ORAL DAILY
Qty: 30 TABLET | Refills: 1 | Status: SHIPPED | OUTPATIENT
Start: 2017-10-30 | End: 2017-11-30 | Stop reason: SDUPTHER

## 2017-10-30 RX ORDER — LEVOTHYROXINE SODIUM 25 UG/1
25 TABLET ORAL DAILY
Qty: 90 TABLET | Refills: 3 | Status: SHIPPED | OUTPATIENT
Start: 2017-10-30 | End: 2018-11-23 | Stop reason: SDUPTHER

## 2017-10-30 RX ORDER — DIVALPROEX SODIUM 500 MG/1
500 TABLET, FILM COATED, EXTENDED RELEASE ORAL DAILY
Qty: 30 TABLET | Refills: 2 | Status: SHIPPED | OUTPATIENT
Start: 2017-10-30 | End: 2017-11-30 | Stop reason: SDUPTHER

## 2017-10-30 RX ORDER — CITALOPRAM 20 MG/1
20 TABLET, FILM COATED ORAL DAILY
Qty: 30 TABLET | Refills: 2 | Status: SHIPPED | OUTPATIENT
Start: 2017-10-30 | End: 2017-11-30 | Stop reason: SDUPTHER

## 2017-10-30 NOTE — TELEPHONE ENCOUNTER
Called and spoke with  Levon Marmolejo at the [facility] for schedule appointment with Dr. Jeffery and he's no longer taking new patient. However, pt was in a group setting with him and Dr. Houston perfer him to see pt. A message was sent to Dr. Jeffery for approval to see Ms. Galileo.

## 2017-10-30 NOTE — TELEPHONE ENCOUNTER
----- Message from Veronica Ordaz sent at 10/30/2017 10:58 AM CDT -----  Contact: pt      _  1st Request  _  2nd Request  _  3rd Request    Please refill the medication(s) listed below. Please call the patient when the prescription(s) is ready for  at this phone number      [phone]      Medication #1levothyroxine (SYNTHROID) 25 MCG tablet     Medication #2      [facility] [address]

## 2017-10-30 NOTE — PROGRESS NOTES
Outpatient Psychiatry Follow-Up Visit (MD/NP)    10/30/2017    Clinical Status of Patient:  Outpatient (Ambulatory)    Chief Complaint:  Gay Gurrola is a 52 y.o. female who presents today for follow-up of mood disorder.  Met with patient.      Interval History and Content of Current Session:  Interim Events/Subjective Report/Content of Current Session: Pt reports that she continues to do better on the latuda. She has been complaint and is tolerating it well. She is still having issues with sleep, she has an appointment with a sleep MD That she does not take any naps and is working partime and stay busy with her family. That she continues to stay sober, has no urges to drink. . That she is working now part time. She is getting along well with family memebers and her stress level has been a lot lower with these issues. I have discussed with her that I am leaving [facility]. She would like to follow up with Dr Valdivia. Will assist with the appointment.          Psychotherapy:  · Target symptoms: mood swings, insomnia.  · Why chosen therapy is appropriate versus another modality: relevant to diagnosis, patient responds to this modality  · Outcome monitoring methods: self-report, observation  · Therapeutic intervention type: behavior modifying psychotherapy, supportive psychotherapy  · Topics discussed/themes: medical comorbidities  · The patient's response to the intervention is accepting. The patient's progress toward treatment goals is fair.   · Duration of intervention: 30 minutes.    Review of Systems   · PSYCHIATRIC: Pertinant items are noted in the narrative.    Past Medical, Family and Social History: The patient's past medical, family and social history have been reviewed and updated as appropriate within the electronic medical record - see encounter notes.    Compliance: yes    Side effects: None    Risk Parameters:  Patient reports no suicidal ideation  Patient reports no homicidal  ideation  Patient reports no self-injurious behavior  Patient reports no violent behavior    Exam (detailed: at least 9 elements; comprehensive: all 15 elements)   Constitutional  Vitals:  Most recent vital signs, dated less than 90 days prior to this appointment, were reviewed.   There were no vitals filed for this visit.     General:  thin & gaunt looking     Musculoskeletal  Muscle Strength/Tone:  no tremor, no tic   Gait & Station:  non-ataxic     Psychiatric  Speech:  no latency; no press   Mood & Affect:  anxious  congruent and appropriate   Thought Process:  normal and logical, goal-directed   Associations:  intact   Thought Content:  normal, no suicidality, no homicidality, delusions, or paranoia   Insight:  intact   Judgement: behavior is adequate to circumstances   Orientation:  grossly intact   Memory: intact for content of interview   Language: grossly intact   Attention Span & Concentration:  able to focus   Fund of Knowledge:  intact and appropriate to age and level of education     Assessment and Diagnosis   Status/Progress: Based on the examination today, the patient's problem(s) is/are adequately but not ideally controlled.  New problems have been presented today.   Co-morbidities are complicating management of the primary condition.  There are no active rule-out diagnoses for this patient at this time.       General Impression: Pt is a 52 Y/O HW with    Bipolar disorder II    Alcohol dep in efr.                  Intervention/Counseling/Treatment Plan             Medication Management: Continue current medications. The risks and benefits of medication were discussed with the patient.  · Counseling provided with patient as follows: importance of compliance with chosen treatment options was emphasized, risks and benefits of treatment options, including medications, were discussed with the patient  Will continue depakote  500 mgQhs.    Cont celexa 20 mg daily.   Will  Cont  latuda increase  60 mg at  bedtime.   Have encouraged her to take the medications that she is prescribed    D/C trazodone  Will add restoril  Cont to maintain sobriety  Cont AA, groups.  Provided Supportive psychotherapy  Will also try CBTI.            Return to Clinic: 1 month.

## 2017-11-07 ENCOUNTER — TELEPHONE (OUTPATIENT)
Dept: PAIN MEDICINE | Facility: CLINIC | Age: 52
End: 2017-11-07

## 2017-11-07 NOTE — TELEPHONE ENCOUNTER
----- Message from Devonte Coles MA sent at 11/1/2017  5:22 PM CDT -----  Message   Received: Today      Pt Advice   Message Contents   Cristina Lopez Staff  Caller: Unspecified (Today, 10:46 AM)         _  1st Request   _  2nd Request   _  3rd Request         Who: patient     Why: Requesting a call back in regards to an appt at the [facility], please call pt   The doctor you referred her to is no longer taking new patients and the one they referred her to can't see her until march.       What Number to Call Back: [phone]   When to Expect a call back: (Within 24 hours)     Please return the call at earliest convenience. Thanks!

## 2017-11-07 NOTE — TELEPHONE ENCOUNTER
Patient called to letting you know the only opening for her to see Lay Pérez MD on 03/02/2018. The other doctor is booked and are not accepting her as a new pt. Should she see Lay Pérez MD or do you have anyone else in mine. Please advise.

## 2017-11-08 ENCOUNTER — TELEPHONE (OUTPATIENT)
Dept: PAIN MEDICINE | Facility: CLINIC | Age: 52
End: 2017-11-08

## 2017-11-13 ENCOUNTER — TELEPHONE (OUTPATIENT)
Dept: PAIN MEDICINE | Facility: CLINIC | Age: 52
End: 2017-11-13

## 2017-11-13 NOTE — TELEPHONE ENCOUNTER
Called and spoke with pt today about she didn't get her Rx for restoril. She will be over her today for another appointment. Can she pick it up then. Please advise.

## 2017-11-13 NOTE — TELEPHONE ENCOUNTER
----- Message from Madisyn Frances sent at 11/13/2017  9:33 AM CST -----  Contact: MEHDI ARVIZU [[id number]]  _x  1st Request  _  2nd Request  _  3rd Request        Who: MEHDI ARVIZU [[id number]]    Why: patient states she would like a call back in regards to needing a refill on Rx Restoril and would like to know if she can come tomorrow to  Rx. Please advise.     What Number to Call Back: [phone]     When to Expect a call back: (Before the end of the day)   -- if call after 3:00 call back will be tomorrow.

## 2017-11-14 ENCOUNTER — OFFICE VISIT (OUTPATIENT)
Dept: SLEEP MEDICINE | Facility: CLINIC | Age: 52
End: 2017-11-14
Payer: MEDICARE

## 2017-11-14 VITALS
BODY MASS INDEX: 17.9 KG/M2 | DIASTOLIC BLOOD PRESSURE: 68 MMHG | HEIGHT: 62 IN | SYSTOLIC BLOOD PRESSURE: 105 MMHG | WEIGHT: 97.25 LBS | HEART RATE: 57 BPM

## 2017-11-14 DIAGNOSIS — G47.00 INSOMNIA, UNSPECIFIED TYPE: ICD-10-CM

## 2017-11-14 DIAGNOSIS — F31.9 BIPOLAR 1 DISORDER: ICD-10-CM

## 2017-11-14 DIAGNOSIS — G47.30 SLEEP APNEA, UNSPECIFIED TYPE: Primary | ICD-10-CM

## 2017-11-14 PROCEDURE — 99213 OFFICE O/P EST LOW 20 MIN: CPT | Mod: PBBFAC | Performed by: PSYCHIATRY & NEUROLOGY

## 2017-11-14 PROCEDURE — 99999 PR PBB SHADOW E&M-EST. PATIENT-LVL III: CPT | Mod: PBBFAC,,, | Performed by: PSYCHIATRY & NEUROLOGY

## 2017-11-14 PROCEDURE — 99204 OFFICE O/P NEW MOD 45 MIN: CPT | Mod: S$PBB,,, | Performed by: PSYCHIATRY & NEUROLOGY

## 2017-11-14 RX ORDER — TEMAZEPAM 30 MG/1
30 CAPSULE ORAL NIGHTLY PRN
Qty: 30 CAPSULE | Refills: 0 | Status: SHIPPED | OUTPATIENT
Start: 2017-11-14 | End: 2017-12-14

## 2017-11-14 RX ORDER — TEMAZEPAM 30 MG/1
30 CAPSULE ORAL NIGHTLY PRN
Qty: 30 CAPSULE | Refills: 0 | Status: SHIPPED | OUTPATIENT
Start: 2017-11-14 | End: 2017-11-14 | Stop reason: SDUPTHER

## 2017-11-14 RX ORDER — QUETIAPINE FUMARATE 25 MG/1
TABLET, FILM COATED ORAL
Qty: 30 TABLET | Refills: 3 | Status: SHIPPED | OUTPATIENT
Start: 2017-11-14 | End: 2018-02-22 | Stop reason: ALTCHOICE

## 2017-11-14 NOTE — PATIENT INSTRUCTIONS
Please do CBTI- meditations -> tools -> quiet your mind  Try to go to bed later when sleepy  Use another bedrooom to unwind    Will add a very small dose Seroquel 25 mg to alternate or combine with Restoril 30 mg.     SLEEP LAB ([facility]) will contact you to schedulethe sleep study. Their number is [phone] (ext 2). Please call them if you do not hear from them in 10 business days from now.  The [facility] is located on 7th floor of the [facility]; [facility] is located in [facility].    SLEEP CLINIC (my assistant) will call you when the sleep study results are ready - if you have not heard from us by 2 weeks from the date of the study, please call [phone] (ext 1) or you can use [facility] to contact me.    You are advised to abstain from driving should you feel sleepy or drowsy.

## 2017-11-14 NOTE — PROGRESS NOTES
Gay Gurrola  was seen as a new patient at the request of  Self, Aaareferral for the evaluation of  insomnia.    CHIEF COMPLAINT:    Chief Complaint   Patient presents with    Insomnia       HISTORY OF PRESENT ILLNESS: Gay Gurrola is a 52 y.o. female is here for sleep evaluation.   Patient with h/o Bipolar, chronic headache, h/o seizure is here for evaluation of insomnia since age of 22.  Patient admits to being a light sleeper.  Patient found out that her  was cheating in her with her niece and sleep difficulty worsen.  Patient stated that her relationship with family is in a good state.  Yet, patient still has difficulty with sleep initiation and maintenance.  Patient had taken that elavil, ambien with some initial success.  Patient was switched different meds due to lack of effectiveness.  Latest med was restoril with improvement in sleep latency and sleep maintenance issue.  However, patient with excessive daytime sedating effect.  Patient was recently switched from restoril to neurontin and seroquel.  Currently, sleep latency of 45 minutes while watching tv in bed.  Patient admits to having lots of anxiety about sleeping.  Patient has stopped working due to disability.  No awakening in the middle of the night.     Patient denied snoring or witnessed sleep apnea.  +fatigue upon awakening.  +sleepiness a/w driving at age 28 year old.  Otherwise, no other episode of sleepiness a/w driving.  No parasomnia.  No cataplexy.  No RLS symptoms.    [place] Sleepiness Scale score during initial sleep evaluation was 2.    INTERVAL HISTORY:    11/14/2017:  The patient has not presented any new complaints since the previous visit. No longer taking Seroquel - had sleepwalking on it (but took high dose). Now just taking  Tried Ambien and Lunesta before. Trazodone exacerbated bipolar (stopped 6 months ago).  Exercises regularly. She has had insomnia since very young age. His son has insomnia. Runs 4  miles every day. Coffee  Just in AM. Bedroom pitch black. T 69-70%. Tried sleep meditations - 2 times a week.      SLEEP ROUTINE:  Activity the hour prior to sleep: watch tv or read in bed    Bed partner:  alone  Time to bed:  10 pm - unwinds for 2 hrs  Lights off:  Most nights  Sleep onset latency:  40-60 minutes        Disruptions or awakenings:    5-6  Wakeup time:      6 am   Perceived sleep quality:  tired       Daytime naps:      none  Weekend sleep routine:      same  Caffeine use: none  exercise habit:   Walk 3 miles per day      PAST MEDICAL HISTORY:    Active Ambulatory Problems     Diagnosis Date Noted    Depression     Alcohol abuse, in remission     Memory loss 07/24/2012    Bipolar affective     Alcohol related seizure 07/24/2012    Attention or concentration deficit 07/24/2012    Alcohol dependence, continuous     Other and unspecified alcohol dependence, continuous drinking behavior     Bipolar I disorder, most recent episode (or current) unspecified     Alcohol dependence in remission     Hypothyroid      Resolved Ambulatory Problems     Diagnosis Date Noted    No Resolved Ambulatory Problems     Past Medical History:   Diagnosis Date    Alcohol abuse, in remission     Anxiety     Bipolar affective     Depression     H. pylori infection     Headache(784.0)     Hypothyroid     Memory loss     Osteopenia     Seizures                 PAST SURGICAL HISTORY:    Past Surgical History:   Procedure Laterality Date    BUNIONECTOMY      COLONOSCOPY N/A 4/12/2017    Procedure: COLONOSCOPY;  Surgeon: Estuardo Salazar MD;  Location: [facility] ([address] [place] [address]);  Service: Endoscopy;  Laterality: N/A;  PM prep    TUBAL LIGATION           FAMILY HISTORY:                Family History   Problem Relation Age of Onset    Stroke Mother     Diabetes Mother     Dementia Maternal Grandmother     Crohn's disease Maternal Aunt     Crohn's disease Other     Melanoma Neg Hx     Psoriasis Neg Hx     Lupus  Neg Hx     Colon cancer Neg Hx        SOCIAL HISTORY:          Tobacco:   History   Smoking Status    Never Smoker   Smokeless Tobacco    Never Used       alcohol use:    History   Alcohol Use No     Comment: History of alcoholism in remission.                 Occupation:  Former cardiology tech    ALLERGIES:  No Known Allergies    CURRENT MEDICATIONS:    Current Outpatient Prescriptions   Medication Sig Dispense Refill    Ca-D3-mag#11-zinc-cupr-man-bor 600 mg calcium- 800 unit-50 mg Tab Take 1 tablet by mouth once daily.      citalopram (CELEXA) 20 MG tablet Take 1 tablet (20 mg total) by mouth once daily. 30 tablet 2    cyanocobalamin, vitamin B-12, (VITAMIN B-12) 5,000 mcg Subl Place under the tongue once daily.        divalproex ER (DEPAKOTE ER) 500 MG Tb24 Take 1 tablet (500 mg total) by mouth once daily. 30 tablet 2    ketoconazole (NIZORAL) 2 % shampoo Wash hair with medicated shampoo at least 2x/week - let sit on scalp at least 5 minutes prior to rinsing 120 mL 5    levothyroxine (SYNTHROID) 25 MCG tablet Take 1 tablet (25 mcg total) by mouth once daily. 90 tablet 3    lurasidone 60 mg Tab tablet Take 1 tablet (60 mg total) by mouth once daily. 30 tablet 1    omeprazole (PRILOSEC) 20 MG capsule Take 1 capsule (20 mg total) by mouth once daily. 30 capsule 3    tretinoin (RETIN-A) 0.025 % cream Apply topically nightly. 45 g 1    valacyclovir (VALTREX) 500 MG tablet Take 1 tablet (500 mg total) by mouth 2 (two) times daily. 14 tablet 0     No current facility-administered medications for this visit.                   REVIEW OF SYSTEMS:     Sleep related symptoms as per HPI.  CONST:Denies weight gain    HEENT: Denies sinus congestion  PULM: Denies dyspnea  CARD:  Denies palpitations   GI:  Denies acid reflux  : Denies polyuria  NEURO: Denies headaches  PSYCH: bipolar  HEME: Denies anemia   Otherwise, a balance of systems reviewed is negative.          PHYSICAL EXAM:  Vitals:    11/14/17 0837   BP:  "105/68   Pulse: (!) 57   Weight: 44.1 kg (97 lb 3.6 oz)   Height: 5' 2" (1.575 m)   PainSc: 0-No pain     Body mass index is 17.78 kg/m².     GENERAL: Normal development, well groomed  HEENT:  Conjunctivae are non-erythematous; Pupils equal, round, and reactive to light; Nose is symmetrical; Nasal mucosa is pink and moist; Septum is midline; Inferior turbinates are normal; Nasal airflow is normal; Posterior pharynx is pink; Modified Mallampati: 2; Posterior palate is normal; Tonsils +1; Uvula is normal and pink;Tongue is normal; Dentition is fair; No TMJ tenderness; Jaw opening and protrusion without click and without discomfort.  NECK: Supple. Neck circumference is 11.75 inches. No thyromegaly. No palpable nodes.     SKIN: On face and neck: No abrasions, no rashes, no lesions.  No subcutaneous nodules are palpable.  RESPIRATORY: Chest is clear to auscultation.  Normal chest expansion and non-labored breathing at rest.  CARDIOVASCULAR: Normal S1, S2.  No murmurs, gallops or rubs. No carotid bruits bilaterally.  EXTREMITIES: No edema. No clubbing. No cyanosis. Station normal. Gait normal.        NEURO/PSYCH: Oriented to time, place and person. Normal attention span and concentration. Affect is full. Mood is normal.                                              DATA no prior sleep study   Lab Results   Component Value Date    TSH 2.069 03/20/2017     ASSESSMENT  No diagnosis found.    PLAN:    Insomnia - multifactorial.  Bipolar + anxiety + psychophysiologic. Idiopathic component.   Currently on seroquel and neurontin.    Possible sleep apnea - crowded airway, very interrupted sleep, although no clear snoring or gasping.       Following recommendations were given in the AVS:   Please do CBTI- meditations -> tools -> quiet your mind  Try to go to bed later when sleepy  Use another bedrooom to unwind  Sleep diary was given    Will add a very small dose Seroquel 25 mg to alternate or combine with Restoril 30 mg. "     Precautions: The patient was advised to abstain from driving should they feel sleepy or drowsy.       Thank you for allowing me the opportunity to participate in the care of your patient.    Patient will No Follow-up on file.    Please cc note to  Self, Aaareferral.  This is 45 minutes visit, over 50% of time spent in direct consultation with patient.

## 2017-11-17 ENCOUNTER — TELEPHONE (OUTPATIENT)
Dept: PAIN MEDICINE | Facility: CLINIC | Age: 52
End: 2017-11-17

## 2017-11-24 ENCOUNTER — TELEPHONE (OUTPATIENT)
Dept: SLEEP MEDICINE | Facility: CLINIC | Age: 52
End: 2017-11-24

## 2017-11-30 ENCOUNTER — OFFICE VISIT (OUTPATIENT)
Dept: INTERNAL MEDICINE | Facility: CLINIC | Age: 52
End: 2017-11-30
Attending: FAMILY MEDICINE
Payer: MEDICARE

## 2017-11-30 ENCOUNTER — HOSPITAL ENCOUNTER (OUTPATIENT)
Dept: RADIOLOGY | Facility: OTHER | Age: 52
Discharge: HOME | End: 2017-11-30
Attending: FAMILY MEDICINE
Payer: MEDICARE

## 2017-11-30 ENCOUNTER — OFFICE VISIT (OUTPATIENT)
Dept: PAIN MEDICINE | Facility: CLINIC | Age: 52
End: 2017-11-30
Payer: MEDICARE

## 2017-11-30 VITALS
TEMPERATURE: 97 F | BODY MASS INDEX: 17.64 KG/M2 | SYSTOLIC BLOOD PRESSURE: 96 MMHG | RESPIRATION RATE: 18 BRPM | HEIGHT: 62 IN | WEIGHT: 95.88 LBS | HEART RATE: 55 BPM | DIASTOLIC BLOOD PRESSURE: 66 MMHG

## 2017-11-30 VITALS
SYSTOLIC BLOOD PRESSURE: 99 MMHG | DIASTOLIC BLOOD PRESSURE: 68 MMHG | BODY MASS INDEX: 17.85 KG/M2 | WEIGHT: 97 LBS | HEIGHT: 62 IN

## 2017-11-30 DIAGNOSIS — M85.80 OSTEOPENIA, UNSPECIFIED LOCATION: ICD-10-CM

## 2017-11-30 DIAGNOSIS — Z00.00 ANNUAL PHYSICAL EXAM: Primary | ICD-10-CM

## 2017-11-30 DIAGNOSIS — E03.9 HYPOTHYROIDISM, UNSPECIFIED TYPE: ICD-10-CM

## 2017-11-30 DIAGNOSIS — Z20.1 EXPOSURE TO TB: ICD-10-CM

## 2017-11-30 DIAGNOSIS — Z00.00 ANNUAL PHYSICAL EXAM: ICD-10-CM

## 2017-11-30 DIAGNOSIS — F31.9 BIPOLAR AFFECTIVE DISORDER, REMISSION STATUS UNSPECIFIED: ICD-10-CM

## 2017-11-30 DIAGNOSIS — F31.32 BIPOLAR AFFECTIVE DISORDER, DEPRESSED, MODERATE DEGREE: Primary | ICD-10-CM

## 2017-11-30 DIAGNOSIS — G47.00 INSOMNIA, UNSPECIFIED TYPE: ICD-10-CM

## 2017-11-30 PROCEDURE — 99214 OFFICE O/P EST MOD 30 MIN: CPT | Mod: S$PBB,,, | Performed by: FAMILY MEDICINE

## 2017-11-30 PROCEDURE — 90471 IMMUNIZATION ADMIN: CPT | Mod: PBBFAC

## 2017-11-30 PROCEDURE — 99213 OFFICE O/P EST LOW 20 MIN: CPT | Mod: PBBFAC,25 | Performed by: FAMILY MEDICINE

## 2017-11-30 PROCEDURE — 90715 TDAP VACCINE 7 YRS/> IM: CPT | Mod: PBBFAC

## 2017-11-30 PROCEDURE — 99213 OFFICE O/P EST LOW 20 MIN: CPT | Mod: PBBFAC,25 | Performed by: PSYCHIATRY & NEUROLOGY

## 2017-11-30 PROCEDURE — 99999 PR PBB SHADOW E&M-EST. PATIENT-LVL III: CPT | Mod: PBBFAC,,, | Performed by: PSYCHIATRY & NEUROLOGY

## 2017-11-30 PROCEDURE — 71020 XR CHEST PA AND LATERAL: CPT | Mod: 26,,, | Performed by: RADIOLOGY

## 2017-11-30 PROCEDURE — 99999 PR PBB SHADOW E&M-EST. PATIENT-LVL III: CPT | Mod: PBBFAC,,, | Performed by: FAMILY MEDICINE

## 2017-11-30 PROCEDURE — 71020 XR CHEST PA AND LATERAL: CPT | Mod: TC

## 2017-11-30 RX ORDER — DIVALPROEX SODIUM 500 MG/1
500 TABLET, FILM COATED, EXTENDED RELEASE ORAL DAILY
Qty: 30 TABLET | Refills: 3 | Status: ON HOLD | OUTPATIENT
Start: 2017-11-30 | End: 2018-10-23

## 2017-11-30 RX ORDER — CITALOPRAM 20 MG/1
20 TABLET, FILM COATED ORAL DAILY
Qty: 30 TABLET | Refills: 3 | Status: SHIPPED | OUTPATIENT
Start: 2017-11-30 | End: 2018-06-26 | Stop reason: ALTCHOICE

## 2017-11-30 RX ORDER — LURASIDONE HYDROCHLORIDE 60 MG/1
60 TABLET, FILM COATED ORAL DAILY
Qty: 30 TABLET | Refills: 3 | Status: SHIPPED | OUTPATIENT
Start: 2017-11-30 | End: 2018-02-27

## 2017-11-30 NOTE — PROGRESS NOTES
"Subjective:      Patient ID: Gay Gurrola is a 52 y.o. female.    Chief Complaint: Annual Exam    She is here for annual exam. She is weight lifting at home 5 days a week and running 5 miles a week. She does report her bloating has resolved. She is following with a new psychiatrist in the next 3 months. She was exposed to TB in 1999 and completed the treatment regimen at that time.       Review of Systems   Constitutional: Negative.    HENT: Negative.    Respiratory: Negative.    Cardiovascular: Negative.    Genitourinary: Negative.    Musculoskeletal: Negative for back pain.     I personally reviewed Past Medical History, Past Surgical history,  Past Social History and Family History    Objective:   BP 99/68   Ht 5' 2" (1.575 m)   Wt 44 kg (97 lb)   BMI 17.74 kg/m²     Physical Exam   Constitutional: She is oriented to person, place, and time. She appears well-developed and well-nourished. No distress.   HENT:   Head: Normocephalic.   Right Ear: External ear normal.   Left Ear: External ear normal.   Mouth/Throat: Oropharynx is clear and moist.   Eyes: Conjunctivae and EOM are normal. Pupils are equal, round, and reactive to light. Right eye exhibits no discharge. Left eye exhibits no discharge. No scleral icterus.   Neck: Normal range of motion. No tracheal deviation present. No thyromegaly present.   Cardiovascular: Normal rate, regular rhythm, normal heart sounds and intact distal pulses.  Exam reveals no gallop.    No murmur heard.  Pulmonary/Chest: Effort normal and breath sounds normal. No respiratory distress. She has no wheezes. She has no rales. She exhibits no tenderness.   Abdominal: Soft. Bowel sounds are normal. She exhibits no distension and no mass. There is no tenderness. There is no rebound and no guarding.   Musculoskeletal: Normal range of motion.   Neurological: She is alert and oriented to person, place, and time.   Skin: Skin is warm and dry.   Psychiatric: She has a normal " mood and affect. Her behavior is normal. Judgment and thought content normal.   Vitals reviewed.      Gay was seen today for annual exam.    Diagnoses and all orders for this visit:    Annual physical exam  -     CBC auto differential; Future  -     Comprehensive metabolic panel; Future  -     Lipid panel; Future  -     Vitamin D; Future  -     TSH; Future  -     T4, free; Future  -     Vitamin B12; Future  -     Iron and TIBC; Future  -     Ferritin; Future  -     QUANTIFERON GOLD TB; Future  -     X-Ray Chest PA And Lateral; Future    Hypothyroidism, unspecified type  -check TSH, cont synthroid     Bipolar affective disorder, remission status unspecified  Insomnia, unspecified type  -improving, followed by psychiatry     Osteopenia, unspecified location  -cont calcium/vitamin D supplement and weight bearing exercise     Exposure to TB  -     X-Ray Chest PA And Lateral; Future    Other orders  -     Tdap Vaccine  -     Cancel: Pneumococcal Polysaccharide Vaccine (23 Valent) (SQ/IM)

## 2017-11-30 NOTE — PROGRESS NOTES
"Patient was given vaccine information sheet for the Flu Vaccine. The area of injection was palpated using the acromion process as a landmark. This area was cleaned with alcohol. Using a 25g 1" safety needle, 0.5mL of the vaccine was placed into the left muscle. The injection site was dressed with a bandage. Patient experienced no complications and was discharged in stable condition. Fluzone vaccine Lot: [id number] Exp: [id number]    "

## 2017-11-30 NOTE — PROGRESS NOTES
Outpatient Psychiatry Follow-Up Visit (MD/NP)    11/30/2017    Clinical Status of Patient:  Outpatient (Ambulatory)    Chief Complaint:  Gay Gurrola is a 52 y.o. female who presents today for follow-up of mood disorder.  Met with patient.      Interval History and Content of Current Session:  Interim Events/Subjective Report/Content of Current Session: Pt reports that she continues to do better on the latuda. She has been complaint and is tolerating it well. She is still having issues with sleep, she has an appointment with a sleep MD and [facility] that she will be having test done. That she is still staying busy with her part time work and with her family.Things between her and her ex are better and that he has been a lot more understanding and supportive.Discussed with her that I am leaving [facility], will refer her to psychiatrist in the [facility].      Psychotherapy:  · Target symptoms: mood swings, insomnia.  · Why chosen therapy is appropriate versus another modality: relevant to diagnosis, patient responds to this modality  · Outcome monitoring methods: self-report, observation  · Therapeutic intervention type: behavior modifying psychotherapy, supportive psychotherapy  · Topics discussed/themes: medical comorbidities  · The patient's response to the intervention is accepting. The patient's progress toward treatment goals is fair.   · Duration of intervention: 30 minutes.    Review of Systems   · PSYCHIATRIC: Pertinant items are noted in the narrative.    Past Medical, Family and Social History: The patient's past medical, family and social history have been reviewed and updated as appropriate within the electronic medical record - see encounter notes.    Compliance: yes    Side effects: None    Risk Parameters:  Patient reports no suicidal ideation  Patient reports no homicidal ideation  Patient reports no self-injurious behavior  Patient reports no violent behavior    Exam (detailed: at  "least 9 elements; comprehensive: all 15 elements)   Constitutional  Vitals:  Most recent vital signs, dated less than 90 days prior to this appointment, were reviewed.   Vitals:    11/30/17 0936   BP: 96/66   Pulse: (!) 55   Resp: 18   Temp: 97.3 °F (36.3 °C)   TempSrc: Oral   Weight: 43.5 kg (95 lb 14.4 oz)   Height: 5' 2" (1.575 m)        General:  thin & gaunt looking     Musculoskeletal  Muscle Strength/Tone:  no tremor, no tic   Gait & Station:  non-ataxic     Psychiatric  Speech:  no latency; no press   Mood & Affect:  anxious  congruent and appropriate   Thought Process:  normal and logical, goal-directed   Associations:  intact   Thought Content:  normal, no suicidality, no homicidality, delusions, or paranoia   Insight:  intact   Judgement: behavior is adequate to circumstances   Orientation:  grossly intact   Memory: intact for content of interview   Language: grossly intact   Attention Span & Concentration:  able to focus   Fund of Knowledge:  intact and appropriate to age and level of education     Assessment and Diagnosis   Status/Progress: Based on the examination today, the patient's problem(s) is/are adequately but not ideally controlled.  New problems have been presented today.   Co-morbidities are complicating management of the primary condition.  There are no active rule-out diagnoses for this patient at this time.       General Impression: Pt is a 50 Y/O HW with    Bipolar disorder II    Alcohol dep in efr.                  Intervention/Counseling/Treatment Plan             Medication Management: Continue current medications. The risks and benefits of medication were discussed with the patient.  · Counseling provided with patient as follows: importance of compliance with chosen treatment options was emphasized, risks and benefits of treatment options, including medications, were discussed with the patient  Will continue depakote  500 mgQhs.    Cont celexa 20 mg daily.   Will  Cont  latuda " increase  60 mg at bedtime.    Will cont  restoril 30 mg qhs prn sleep.  Cont to maintain sobriety  Cont AA, groups.  Provided Supportive psychotherapy  Will also try CBTI.   Will give her enough refills on her meds till she has her next appointment.        Return to Clinic: Pt will follow up with psychiatrist in [facility].

## 2017-12-05 ENCOUNTER — TELEPHONE (OUTPATIENT)
Dept: INTERNAL MEDICINE | Facility: CLINIC | Age: 52
End: 2017-12-05

## 2017-12-05 DIAGNOSIS — Z00.00 ANNUAL PHYSICAL EXAM: Primary | ICD-10-CM

## 2017-12-05 NOTE — TELEPHONE ENCOUNTER
----- Message from Cristina Benitez sent at 12/4/2017 12:45 PM CST -----  _  1st Request  _  2nd Request  _  3rd Request        Who: patient    Why: Requesting a call back in regards to the results of her chest XR and blood work    What Number to Call Back: [phone]    When to Expect a call back: (Within 24 hours)    Please return the call at earliest convenience. Thanks!

## 2017-12-05 NOTE — TELEPHONE ENCOUNTER
----- Message from Veronica Orlin sent at 12/5/2017 10:39 AM CST -----  Contact: pt  _  1st Request  _  2nd Request  _  3rd Request        Who: pt    Why: Requesting a call back in regards to returned the nurse's phone call regards to labs    What Number to Call Back:[phone]    When to Expect a call back: (Within 24 hours)    Please return the call at earliest convenience. Thanks!

## 2017-12-05 NOTE — TELEPHONE ENCOUNTER
Informed pt of lab results and advice. Pt verbalized understanding and had no further questions or concerns.  appt has been scheduled for repeat

## 2017-12-05 NOTE — TELEPHONE ENCOUNTER
Talked to pt, pt is requesting a copy of lab results and letter with lab advice from pcp be mailed to her. Labs and letter has been mailed as requested. Pt demonstrated verbal understanding of information and had no further questions or concerns at this time.

## 2017-12-05 NOTE — TELEPHONE ENCOUNTER
----- Message from Cristina Benitez sent at 12/4/2017 12:45 PM CST -----  _  1st Request  _  2nd Request  _  3rd Request        Who: patient    Why: Requesting a call back in regards to the results of her chest XR and blood work    What Number to Call Back: [phone]    When to Expect a call back: (Within 24 hours)    Please return the call at earliest convenience. Thanks!

## 2017-12-05 NOTE — TELEPHONE ENCOUNTER
----- Message from Rahul Dao sent at 12/5/2017 11:24 AM CST -----  Contact: Pt  X_ 1st Request  _ 2nd Request  _ 3rd Request    Who: MEHDI ARVIZU [[id number]]    Why: Patient would like to have print out mail to her about her lab results and what vitamins she is suppose to take.    What Number to Call Back: [phone]    When to Expect a call back: (Before the end of the day)  -- if call after 3:00 call back will be tomorrow.

## 2017-12-05 NOTE — TELEPHONE ENCOUNTER
Please inform   Chest xray is negative  We will need to recheck her complete blood count and liver function in 6 weeks, please make sure she is hydrated prior to repeat  She has iron deficiency anemia and would benefit from a daily iron supplement available over the counter of 325 mg daily   You are low risk for a heart attack based on your cholesterol.    Vitamin D and thyroid are stable

## 2017-12-06 ENCOUNTER — HOSPITAL ENCOUNTER (OUTPATIENT)
Dept: SLEEP MEDICINE | Facility: OTHER | Age: 52
Discharge: HOME | End: 2017-12-06
Attending: PSYCHIATRY & NEUROLOGY
Payer: MEDICARE

## 2017-12-06 DIAGNOSIS — G47.30 SLEEP APNEA, UNSPECIFIED TYPE: ICD-10-CM

## 2017-12-06 PROCEDURE — 95810 POLYSOM 6/> YRS 4/> PARAM: CPT

## 2017-12-06 PROCEDURE — 95810 POLYSOM 6/> YRS 4/> PARAM: CPT | Mod: 26,,, | Performed by: PSYCHIATRY & NEUROLOGY

## 2017-12-07 NOTE — PROGRESS NOTES
A PSG study was preformed on Gay Gurrola on the night of 12/6/17. The procedure was explained to the patient in great detail, which included the function of all the wires, when and why the tech would need to enter the room and the possibility of being placed on cpap during the night if criteria was meet, which was all discussed prior to the start of the study. All questions were asked and answered prior to the setup. The patient did not meet split night criteria.     A small number of sleep disorder breathing events were noted during the night. Snoring was noted to be mild at times. PLM's appeared to have been noted during position changes. Supine REM was noted during the night. The EKG appeared to have shown NSR. the patient did sleep with her mouth open most of the night. An end of the night instruction sheet was given to the patient upon discharge

## 2017-12-14 ENCOUNTER — PATIENT MESSAGE (OUTPATIENT)
Dept: INTERNAL MEDICINE | Facility: CLINIC | Age: 52
End: 2017-12-14

## 2017-12-14 DIAGNOSIS — R79.89 ELEVATED LFTS: ICD-10-CM

## 2017-12-14 DIAGNOSIS — D64.9 ANEMIA, UNSPECIFIED TYPE: Primary | ICD-10-CM

## 2017-12-14 NOTE — TELEPHONE ENCOUNTER
Your   electrolytes, and kidney function are normal. Your liver function is slightly elevated, we will recheck in 6-8 weeks, please make sure you are well hydrated prior to repeat    You have iron deficiency anemia. I would recommend a daily supplement available over the counter of at least 325 mg daily. I would also recommend you take a stool softener with it.

## 2017-12-14 NOTE — TELEPHONE ENCOUNTER
Pt was notified of her test results and Dr. Mckeon recommendations from her note below:  Your   electrolytes, and kidney function are normal. Your liver function is slightly elevated, we will recheck in 6-8 weeks, please make sure you are well hydrated prior to repeat     You have iron deficiency anemia. I would recommend a daily supplement available over the counter of at least 325 mg daily. I would also recommend you take a stool softener with it.       Documentation    Conversation was understood and it ended. Will mail reminder

## 2017-12-19 RX ORDER — OMEPRAZOLE 20 MG/1
20 CAPSULE, DELAYED RELEASE ORAL DAILY
Qty: 90 CAPSULE | Refills: 3 | Status: SHIPPED | OUTPATIENT
Start: 2017-12-19 | End: 2019-02-21

## 2017-12-19 NOTE — TELEPHONE ENCOUNTER
----- Message from Catalino Mars sent at 12/19/2017  8:53 AM CST -----  Contact: Gay Gurrola      X_  1st Request  _  2nd Request  _  3rd Request    Please refill the medication(s) listed below. Please call the patient when the prescription(s) is ready for  at this phone number         Patient requesting a 90 day supply       Medication #1  omeprazole (PRILOSEC) 20 MG capsule 30 capsule 3 4/17/2017  No  Sig - Route: Take 1 capsule (20 mg total) by mouth once daily. - Oral  Class: Normal  Order: [id number]  Date/Time Signed: 4/17/2017 15:52      E-Prescribing Status: Receipt confirmed by pharmacy (4/17/2017  3:52 PM CDT)          Medication #2      Preferred Pharmacy:    [id number] - [place], [place] - [address] [phone] (Phone)  [phone] (Fax)

## 2017-12-26 ENCOUNTER — TELEPHONE (OUTPATIENT)
Dept: SLEEP MEDICINE | Facility: CLINIC | Age: 52
End: 2017-12-26

## 2017-12-26 NOTE — TELEPHONE ENCOUNTER
----- Message from Felicita Murillo sent at 12/22/2017  2:23 PM CST -----  Contact: self  x_  1st Request  _  2nd Request  _  3rd Request    Who: pt    Why: pt would like results on sleep study.. Please advise    What Number to Call Back: [phone]    When to Expect a call back: (Before the end of the day)   -- if call after 3:00 call back will be tomorrow.

## 2017-12-29 ENCOUNTER — TELEPHONE (OUTPATIENT)
Dept: SLEEP MEDICINE | Facility: CLINIC | Age: 52
End: 2017-12-29

## 2018-01-01 ENCOUNTER — TELEPHONE (OUTPATIENT)
Dept: SLEEP MEDICINE | Facility: CLINIC | Age: 53
End: 2018-01-01

## 2018-01-01 NOTE — TELEPHONE ENCOUNTER
Please schedule for the follow up with MD / NP  to review sleep study results and to follow up on insomnia treatment .  Positive for very interrupted sleep; no sleep apnea, but some snoring was noted.    Thanks!

## 2018-01-02 NOTE — TELEPHONE ENCOUNTER
Called and scheduled with Dr. Yuan per patient request. First available 2/22. letter mailed per request

## 2018-01-15 ENCOUNTER — TELEPHONE (OUTPATIENT)
Dept: NEUROLOGY | Facility: CLINIC | Age: 53
End: 2018-01-15

## 2018-01-15 NOTE — TELEPHONE ENCOUNTER
Last visit 11/14/17, next visit 2/22    Dr. Goodson is no longer here and Dr. Yuan had filled the temazepam to get her through until she sees new psych provider. That's why she sent request to her instead of PCP who has never filled it for her.    She doesn't have appointment with new psychiatrist until 3rd week of March.    I advised Dr. Yuan is out of the clinic until first week of February and we will forward her request to another provider. She states that is fine.

## 2018-01-15 NOTE — TELEPHONE ENCOUNTER
----- Message from Veronica Ordaz sent at 1/15/2018  3:05 PM CST -----  Contact: pt      _  1st Request  _  2nd Request  _  3rd Request    Please refill the medication(s) listed below. Please call the patient when the prescription(s) is ready for  at this phone number    [phone]        Medication #1asking for prescription for restoril or temazepam. Please call pt     Medication #2      [facility] [address]

## 2018-01-17 ENCOUNTER — TELEPHONE (OUTPATIENT)
Dept: SLEEP MEDICINE | Facility: CLINIC | Age: 53
End: 2018-01-17

## 2018-01-17 NOTE — TELEPHONE ENCOUNTER
Ying JURADO MA          1/15/18 3:20 PM   Note      Last visit 11/14/17, next visit 2/22     Dr. Goodson is no longer here and Dr. Yuan had filled the temazepam to get her through until she sees new psych provider. That's why she sent request to her instead of PCP who has never filled it for her.     She doesn't have appointment with new psychiatrist until 3rd week of March.     I advised Dr. Yuan is out of the clinic until first week of February and we will forward her request to another provider. She states that is fine.

## 2018-01-19 ENCOUNTER — TELEPHONE (OUTPATIENT)
Dept: SLEEP MEDICINE | Facility: CLINIC | Age: 53
End: 2018-01-19

## 2018-01-19 DIAGNOSIS — G47.00 INSOMNIA, UNSPECIFIED TYPE: Primary | ICD-10-CM

## 2018-01-19 RX ORDER — TEMAZEPAM 15 MG/1
15 CAPSULE ORAL NIGHTLY PRN
Qty: 30 CAPSULE | Refills: 0 | Status: SHIPPED | OUTPATIENT
Start: 2018-01-19 | End: 2018-02-18

## 2018-01-19 NOTE — TELEPHONE ENCOUNTER
----- Message from Chantell Duran sent at 1/19/2018 12:35 PM CST -----  Contact: Patient herself  _  1st Request  X  2nd Request  _  3rd Request    Please refill the medication(s) listed below. Please call the patient when the prescription(s) is ready for  at the phone number [phone] .    Medication #1  TEMAZEPAM  /  RESTORIL  15 mg    Preferred Pharmacy:  [id number] - [place] - [address] Ph# [phone]  /  Fax# [phone]

## 2018-02-15 ENCOUNTER — TELEPHONE (OUTPATIENT)
Dept: INTERNAL MEDICINE | Facility: CLINIC | Age: 53
End: 2018-02-15

## 2018-02-15 NOTE — TELEPHONE ENCOUNTER
----- Message from Vanda Christianson sent at 2/15/2018  4:01 PM CST -----  Contact: pt  _ x 1st Request  _  2nd Request  _  3rd Request      Who:pt    Why: pt would like to speak to staff in regards to her lower back pain she is experiencing      What Number to Call Back: [phone]    When to Expect a call back: (Before the end of the day)   -- if call after 3:00 call back will be tomorrow.  4:14 PM  Patient stated that she has been experiencing excruciating upper leg and butt pain for the past three weeks. Patient has tried ibuprofen, tylenol and aleve but it is not mediating the pain

## 2018-02-15 NOTE — TELEPHONE ENCOUNTER
"Heat to area, ibuprofen 600mg TID for 1 week, and google "piriformis muscle stretches" schedule appt if no improvement.    "

## 2018-02-22 ENCOUNTER — OFFICE VISIT (OUTPATIENT)
Dept: SLEEP MEDICINE | Facility: CLINIC | Age: 53
End: 2018-02-22
Payer: COMMERCIAL

## 2018-02-22 ENCOUNTER — LAB VISIT (OUTPATIENT)
Dept: LAB | Facility: OTHER | Age: 53
End: 2018-02-22
Attending: FAMILY MEDICINE
Payer: MEDICARE

## 2018-02-22 VITALS
BODY MASS INDEX: 17.21 KG/M2 | SYSTOLIC BLOOD PRESSURE: 82 MMHG | HEART RATE: 56 BPM | WEIGHT: 93.5 LBS | HEIGHT: 62 IN | DIASTOLIC BLOOD PRESSURE: 54 MMHG

## 2018-02-22 DIAGNOSIS — D64.9 ANEMIA, UNSPECIFIED TYPE: ICD-10-CM

## 2018-02-22 DIAGNOSIS — R79.89 ELEVATED LFTS: ICD-10-CM

## 2018-02-22 DIAGNOSIS — G47.00 INSOMNIA, UNSPECIFIED TYPE: Primary | ICD-10-CM

## 2018-02-22 LAB
ALBUMIN SERPL BCP-MCNC: 4.1 G/DL
ALP SERPL-CCNC: 46 U/L
ALT SERPL W/O P-5'-P-CCNC: 18 U/L
AST SERPL-CCNC: 25 U/L
BASOPHILS # BLD AUTO: 0.02 K/UL
BASOPHILS NFR BLD: 0.5 %
BILIRUB DIRECT SERPL-MCNC: 0.2 MG/DL
BILIRUB SERPL-MCNC: 0.4 MG/DL
DIFFERENTIAL METHOD: ABNORMAL
EOSINOPHIL # BLD AUTO: 0.1 K/UL
EOSINOPHIL NFR BLD: 3 %
ERYTHROCYTE [DISTWIDTH] IN BLOOD BY AUTOMATED COUNT: 16.4 %
HCT VFR BLD AUTO: 44.3 %
HGB BLD-MCNC: 13.8 G/DL
LYMPHOCYTES # BLD AUTO: 2.4 K/UL
LYMPHOCYTES NFR BLD: 59.6 %
MCH RBC QN AUTO: 27.7 PG
MCHC RBC AUTO-ENTMCNC: 31.2 G/DL
MCV RBC AUTO: 89 FL
MONOCYTES # BLD AUTO: 0.3 K/UL
MONOCYTES NFR BLD: 6.6 %
NEUTROPHILS # BLD AUTO: 1.2 K/UL
NEUTROPHILS NFR BLD: 30.3 %
PLATELET # BLD AUTO: 171 K/UL
PMV BLD AUTO: 12.1 FL
PROT SERPL-MCNC: 6.9 G/DL
RBC # BLD AUTO: 4.98 M/UL
WBC # BLD AUTO: 3.94 K/UL

## 2018-02-22 PROCEDURE — 36415 COLL VENOUS BLD VENIPUNCTURE: CPT

## 2018-02-22 PROCEDURE — 80076 HEPATIC FUNCTION PANEL: CPT

## 2018-02-22 PROCEDURE — 99214 OFFICE O/P EST MOD 30 MIN: CPT | Mod: S$PBB,,, | Performed by: PSYCHIATRY & NEUROLOGY

## 2018-02-22 PROCEDURE — 99999 PR PBB SHADOW E&M-EST. PATIENT-LVL IV: CPT | Mod: PBBFAC,,, | Performed by: PSYCHIATRY & NEUROLOGY

## 2018-02-22 PROCEDURE — 85025 COMPLETE CBC W/AUTO DIFF WBC: CPT

## 2018-02-22 PROCEDURE — 99214 OFFICE O/P EST MOD 30 MIN: CPT | Mod: PBBFAC | Performed by: PSYCHIATRY & NEUROLOGY

## 2018-02-22 RX ORDER — ZALEPLON 5 MG/1
CAPSULE ORAL
Qty: 30 CAPSULE | Refills: 1 | Status: SHIPPED | OUTPATIENT
Start: 2018-02-22 | End: 2018-06-26 | Stop reason: SDUPTHER

## 2018-02-22 NOTE — PROGRESS NOTES
Gay Gurrola  was seen as a new patient at the request of  No ref. provider found for the evaluation of  insomnia.    CHIEF COMPLAINT:    Chief Complaint   Patient presents with    Sleep Apnea       HISTORY OF PRESENT ILLNESS: Gay Gurrola is a 52 y.o. female is here for sleep evaluation.   Patient with h/o Bipolar, chronic headache, h/o seizure is here for evaluation of insomnia since age of 22.  Patient admits to being a light sleeper.  Patient found out that her  was cheating in her with her niece and sleep difficulty worsen.  Patient stated that her relationship with family is in a good state.  Yet, patient still has difficulty with sleep initiation and maintenance.  Patient had taken that elavil, ambien with some initial success.  Patient was switched different meds due to lack of effectiveness.  Latest med was restoril with improvement in sleep latency and sleep maintenance issue.  However, patient with excessive daytime sedating effect.  Patient was recently switched from restoril to neurontin and seroquel.  Currently, sleep latency of 45 minutes while watching tv in bed.  Patient admits to having lots of anxiety about sleeping.  Patient has stopped working due to disability.  No awakening in the middle of the night.     Patient denied snoring or witnessed sleep apnea.  +fatigue upon awakening.  +sleepiness a/w driving at age 28 year old.  Otherwise, no other episode of sleepiness a/w driving.  No parasomnia.  No cataplexy.  No RLS symptoms.    Atalissa Sleepiness Scale score during initial sleep evaluation was 2.    INTERVAL HISTORY:    11/14/2017:  The patient has not presented any new complaints since the previous visit. No longer taking Seroquel - had sleepwalking on it (but took high dose). Now just taking  Tried Ambien and Lunesta before. Trazodone exacerbated bipolar (stopped 6 months ago).  Exercises regularly. She has had insomnia since very young age. His son has insomnia.  Runs 4 miles every day. Coffee  Just in AM. Bedroom pitch black. T 69-70%. Tried sleep meditations - 2 times a week.    02/22/2018  Since last time - restarted Seroquel - still sleep walked.Still taking Restoril 30 mg - in a few months - tolerance -> Trazodone -> back on Restoril.  Never tried Besomra.  Remeron - helped 10 years ago. Doxepin- does not recall.  Doing meditations; taking baths at night, trying to unwind -> able to fall asleep at 10 - waking up at 1:30 -> in several hours falls asleep some more > starting her day at 5 AM  Now reports pain (believes due to pyriformis muscles spasm) -> unable to exercise. Feels lack of energy and motivation lately.  Has apt to see psychiatrist - Dr. Houston; and psychogist in [facility]. Taking Celexa and Depakote and Latuda. Lost some weight.  Takes MVT, B12, makes fresh juice, l;emon water, eats a lot of fruit and vegetable, fish and sardines.   Correcting iron deficiency with Ferogon.    1 cup of coffee in AM, but a lot of green tea.  Very dark bedroom; good sleep hygiene     PHONE ADDENDUm 04/18/2018  Talked to the pt - Sonata 5-10 mg + Belsomra 20 mg combo worked for a month - then stopped.  Recently saw Dr. Houston for bipolar disorder - Trazodone 150 mg BID was added - taking it int he evening with Sonata does not help her sleep.    She was advised to stop Sonata and re-start Restoril 30 mg.       SLEEP ROUTINE:  Activity the hour prior to sleep: watch tv or read in bed    Bed partner:  alone  Time to bed:  10 pm - unwinds for 2 hrs  Lights off:  Most nights  Sleep onset latency:  40-60 minutes        Disruptions or awakenings:    5-6  Wakeup time:      6 am   Perceived sleep quality:  tired       Daytime naps:      none  Weekend sleep routine:      same  Caffeine use: none  exercise habit:   Walk 3 miles per day      PAST MEDICAL HISTORY:    Active Ambulatory Problems     Diagnosis Date Noted    Depression     Alcohol abuse, in remission     Memory loss  07/24/2012    Bipolar affective     Alcohol related seizure 07/24/2012    Attention or concentration deficit 07/24/2012    Alcohol dependence, continuous     Other and unspecified alcohol dependence, continuous drinking behavior     Bipolar I disorder, most recent episode (or current) unspecified     Alcohol dependence in remission     Hypothyroid     Osteopenia 11/30/2017    Insomnia 11/30/2017    Sleep apnea      Resolved Ambulatory Problems     Diagnosis Date Noted    No Resolved Ambulatory Problems     Past Medical History:   Diagnosis Date    Alcohol abuse, in remission     Anxiety     Bipolar affective     Depression     H. pylori infection     Headache(784.0)     Hypothyroid     Memory loss     Osteopenia     Seizures                 PAST SURGICAL HISTORY:    Past Surgical History:   Procedure Laterality Date    BUNIONECTOMY      COLONOSCOPY N/A 4/12/2017    Procedure: COLONOSCOPY;  Surgeon: Estuardo Salazar MD;  Location: [facility] ([address] [place] [address]);  Service: Endoscopy;  Laterality: N/A;  PM prep    TUBAL LIGATION           FAMILY HISTORY:                Family History   Problem Relation Age of Onset    Stroke Mother     Diabetes Mother     Dementia Maternal Grandmother     Crohn's disease Maternal Aunt     Crohn's disease Other     Melanoma Neg Hx     Psoriasis Neg Hx     Lupus Neg Hx     Colon cancer Neg Hx        SOCIAL HISTORY:          Tobacco:   History   Smoking Status    Never Smoker   Smokeless Tobacco    Never Used       alcohol use:    History   Alcohol Use No     Comment: History of alcoholism in remission.                 Occupation:  Former cardiology tech    ALLERGIES:  No Known Allergies    CURRENT MEDICATIONS:    Current Outpatient Prescriptions   Medication Sig Dispense Refill    Ca-D3-mag#11-zinc-cupr-man-bor 600 mg calcium- 800 unit-50 mg Tab Take 1 tablet by mouth once daily.      citalopram (CELEXA) 20 MG tablet Take 1 tablet (20 mg total) by mouth once daily.  "30 tablet 3    cyanocobalamin, vitamin B-12, (VITAMIN B-12) 5,000 mcg Subl Place under the tongue once daily.        divalproex ER (DEPAKOTE ER) 500 MG Tb24 Take 1 tablet (500 mg total) by mouth once daily. 30 tablet 3    ketoconazole (NIZORAL) 2 % shampoo Wash hair with medicated shampoo at least 2x/week - let sit on scalp at least 5 minutes prior to rinsing 120 mL 5    levothyroxine (SYNTHROID) 25 MCG tablet Take 1 tablet (25 mcg total) by mouth once daily. 90 tablet 3    lurasidone 60 mg Tab tablet Take 1 tablet (60 mg total) by mouth once daily. 30 tablet 3    omeprazole (PRILOSEC) 20 MG capsule Take 1 capsule (20 mg total) by mouth once daily. 90 capsule 3    QUEtiapine (SEROQUEL) 25 MG Tab As needed 1 pill PO at bedtime for insomnia 30 tablet 3    tretinoin (RETIN-A) 0.025 % cream Apply topically nightly. 45 g 1    valacyclovir (VALTREX) 500 MG tablet Take 1 tablet (500 mg total) by mouth 2 (two) times daily. 14 tablet 0     No current facility-administered medications for this visit.                   REVIEW OF SYSTEMS:     Sleep related symptoms as per HPI.  CONST:Denies weight gain    HEENT: Denies sinus congestion  PULM: Denies dyspnea  CARD:  Denies palpitations   GI:  Denies acid reflux  : Denies polyuria  NEURO: Denies headaches  PSYCH: bipolar  HEME: Denies anemia   Otherwise, a balance of systems reviewed is negative.          PHYSICAL EXAM:  Vitals:    02/22/18 0820   BP: (!) 82/54   Pulse: (!) 56   Weight: 42.4 kg (93 lb 7.6 oz)   Height: 5' 2" (1.575 m)   PainSc: 0-No pain     Body mass index is 17.1 kg/m².     GENERAL: Normal development, well groomed  HEENT:  Conjunctivae are non-erythematous; Pupils equal, round, and reactive to light; Nose is symmetrical; Nasal mucosa is pink and moist; Septum is midline; Inferior turbinates are normal; Nasal airflow is normal; Posterior pharynx is pink; Modified Mallampati: 2; Posterior palate is normal; Tonsils +1; Uvula is normal and pink;Tongue " is normal; Dentition is fair; No TMJ tenderness; Jaw opening and protrusion without click and without discomfort.  NECK: Supple. Neck circumference is 11.75 inches. No thyromegaly. No palpable nodes.     SKIN: On face and neck: No abrasions, no rashes, no lesions.  No subcutaneous nodules are palpable.  RESPIRATORY: Chest is clear to auscultation.  Normal chest expansion and non-labored breathing at rest.  CARDIOVASCULAR: Normal S1, S2.  No murmurs, gallops or rubs. No carotid bruits bilaterally.  EXTREMITIES: No edema. No clubbing. No cyanosis. Station normal. Gait normal.        NEURO/PSYCH: Oriented to time, place and person. Normal attention span and concentration. Affect is full. Mood is normal.                                              DATA no prior sleep study   Lab Results   Component Value Date    TSH 3.143 11/30/2017     ASSESSMENT  No diagnosis found.    PLAN:    Insomnia - multifactorial.  Bipolar + anxiety + psychophysiologic. Idiopathic component.   Currently on seroquel and neurontin.  Good sleep hygiene.  Alternating Restoril and Trazodone - develops tolerance.    1. Try Restoril WITH Trazodone  2. If still awake in the middle of the night - then add short acting Melatonin 3-5 mg  3. Hold on to Belsomra rx with discount card  4. Will send rx for Sonata to the pharmacy to take at 1 AM as needed if unable to sleep.OK to take if awake in the middle of the night and unable to return to sleep. Please allow at least 4 hours sleep opportunity after taking Sonata.    In future we can try Doxepin or Remeron for your sleep maintenance insomnia    AVS:   Please do CBTI- meditations -> tools -> quiet your mind  Try to go to bed later when sleepy  Use another bedrooom to unwind  Sleep diary was given    Will add a very small dose Seroquel 25 mg to alternate or combine with Restoril 30 mg.     Precautions: The patient was advised to abstain from driving should they feel sleepy or drowsy.       Thank you  for allowing me the opportunity to participate in the care of your patient.    Patient will No Follow-up on file.    Please cc note to  No ref. provider found.  This is 45 minutes visit, over 50% of time spent in direct consultation with patient.

## 2018-02-22 NOTE — PATIENT INSTRUCTIONS
1. Try Restoril WITH Trazodone  2. If still awake in the middle of the night - then add short acting Melatonin 3-5 mg  3. Hold on to Belsomra rx with discount card  4. Will send rx for Sonata to the pharmacy to take at 1 AM as needed if unable to sleep.OK to take if awake in the middle of the night and unable to return to sleep. Please allow at least 4 hours sleep opportunity after taking Sonata.    In future we can try Doxepin or Remeron for your sleep maintenance insomnia

## 2018-02-23 ENCOUNTER — TELEPHONE (OUTPATIENT)
Dept: INTERNAL MEDICINE | Facility: CLINIC | Age: 53
End: 2018-02-23

## 2018-02-23 NOTE — TELEPHONE ENCOUNTER
----- Message from Cristina Benitez sent at 2/23/2018  2:50 PM CST -----  _  1st Request  _xx  2nd Request  _  3rd Request        Who: patient     Why: Requesting a call back in regards to the results of her labs from yesterday    What Number to Call Back:[phone]  Please return the call at earliest convenience. Thanks!    When to Expect a call back: (Within 24 hours)      3:40 PM  Patient stated that the she is still feeling lower butt pain and f/u appointment was scheduled as requested. Patient also inquired about lab order that were drawn on yesterday. Patient was informed that Dr. Mckeon request up to 7 business days to thoroughly review lab results. Patient verbalized understanding and had no further concerns.

## 2018-02-27 ENCOUNTER — OFFICE VISIT (OUTPATIENT)
Dept: INTERNAL MEDICINE | Facility: CLINIC | Age: 53
End: 2018-02-27
Attending: FAMILY MEDICINE
Payer: MEDICARE

## 2018-02-27 ENCOUNTER — HOSPITAL ENCOUNTER (OUTPATIENT)
Dept: RADIOLOGY | Facility: OTHER | Age: 53
Discharge: HOME | End: 2018-02-27
Attending: FAMILY MEDICINE
Payer: MEDICARE

## 2018-02-27 VITALS
HEIGHT: 62 IN | BODY MASS INDEX: 16.95 KG/M2 | WEIGHT: 92.13 LBS | OXYGEN SATURATION: 97 % | HEART RATE: 57 BPM | SYSTOLIC BLOOD PRESSURE: 98 MMHG | DIASTOLIC BLOOD PRESSURE: 56 MMHG

## 2018-02-27 DIAGNOSIS — M25.559 ISCHIAL PAIN, UNSPECIFIED LATERALITY: ICD-10-CM

## 2018-02-27 DIAGNOSIS — M85.80 OSTEOPENIA, UNSPECIFIED LOCATION: ICD-10-CM

## 2018-02-27 DIAGNOSIS — Z00.00 ANNUAL PHYSICAL EXAM: ICD-10-CM

## 2018-02-27 DIAGNOSIS — G47.00 INSOMNIA, UNSPECIFIED TYPE: ICD-10-CM

## 2018-02-27 DIAGNOSIS — M79.18 PAIN IN BUTTOCK: ICD-10-CM

## 2018-02-27 DIAGNOSIS — S76.019S: ICD-10-CM

## 2018-02-27 DIAGNOSIS — R53.83 FATIGUE, UNSPECIFIED TYPE: ICD-10-CM

## 2018-02-27 DIAGNOSIS — G57.00 PIRIFORMIS SYNDROME, UNSPECIFIED LATERALITY: ICD-10-CM

## 2018-02-27 DIAGNOSIS — M85.80 OSTEOPENIA, UNSPECIFIED LOCATION: Primary | ICD-10-CM

## 2018-02-27 PROCEDURE — 73521 X-RAY EXAM HIPS BI 2 VIEWS: CPT | Mod: 26,,, | Performed by: RADIOLOGY

## 2018-02-27 PROCEDURE — 99214 OFFICE O/P EST MOD 30 MIN: CPT | Mod: S$PBB,,, | Performed by: FAMILY MEDICINE

## 2018-02-27 PROCEDURE — 99214 OFFICE O/P EST MOD 30 MIN: CPT | Mod: PBBFAC,25 | Performed by: FAMILY MEDICINE

## 2018-02-27 PROCEDURE — 99999 PR PBB SHADOW E&M-EST. PATIENT-LVL IV: CPT | Mod: PBBFAC,,, | Performed by: FAMILY MEDICINE

## 2018-02-27 PROCEDURE — 73521 X-RAY EXAM HIPS BI 2 VIEWS: CPT | Mod: TC,FY

## 2018-02-27 RX ORDER — LURASIDONE HYDROCHLORIDE 80 MG/1
TABLET, FILM COATED ORAL
COMMUNITY
Start: 2018-02-23 | End: 2018-05-16

## 2018-02-27 RX ORDER — TIZANIDINE 2 MG/1
2 TABLET ORAL NIGHTLY PRN
Qty: 20 TABLET | Refills: 0 | Status: SHIPPED | OUTPATIENT
Start: 2018-02-27 | End: 2018-03-09

## 2018-02-27 RX ORDER — MELOXICAM 15 MG/1
15 TABLET ORAL DAILY PRN
Qty: 30 TABLET | Refills: 0 | Status: SHIPPED | OUTPATIENT
Start: 2018-02-27 | End: 2018-03-15 | Stop reason: SDUPTHER

## 2018-02-27 RX ORDER — TEMAZEPAM 30 MG/1
CAPSULE ORAL
COMMUNITY
Start: 2017-12-23 | End: 2018-02-27

## 2018-02-27 NOTE — PROGRESS NOTES
"Subjective:      Patient ID: Gay Gurrola is a 52 y.o. female.    Chief Complaint: Fatigue    In the last 5 weeks she has stopped running, tried ibuprofen for 10 days, warm baths, piriformis stretches and no improvement. She reports as the day progresses her pain in the buttocks gets better. It is affecting her lifestyle. She has had 5 weeks of soreness/ache that stays in the buttocks. She denies any falls or trauma to the area. She denies easy bruising or easy bleeding. She reports her appetite is not as good. She did start muringa powder recently.       Review of Systems   Constitutional: Negative.    Respiratory: Negative.    Cardiovascular: Negative.    Gastrointestinal: Negative.    Genitourinary: Negative.      I personally reviewed Past Medical History, Past Surgical history,  Past Social History and Family History    Objective:   BP (!) 98/56   Pulse (!) 57   Ht 5' 2" (1.575 m)   Wt 41.8 kg (92 lb 2.4 oz)   SpO2 97%   BMI 16.85 kg/m²     Physical Exam   Constitutional: She is oriented to person, place, and time. She appears well-developed and well-nourished. No distress.   HENT:   Head: Normocephalic and atraumatic.   Right Ear: External ear normal.   Left Ear: External ear normal.   Mouth/Throat: Oropharynx is clear and moist.   Eyes: Conjunctivae and EOM are normal. Pupils are equal, round, and reactive to light. Right eye exhibits no discharge. Left eye exhibits no discharge. No scleral icterus.   Neck: Normal range of motion. Neck supple.   Cardiovascular: Normal rate, regular rhythm, normal heart sounds and intact distal pulses.  Exam reveals no gallop.    No murmur heard.  Pulmonary/Chest: Effort normal and breath sounds normal. No respiratory distress. She has no wheezes. She has no rales. She exhibits no tenderness.   Abdominal: Soft. Bowel sounds are normal. She exhibits no distension and no mass. There is no tenderness. There is no rebound and no guarding.   Musculoskeletal: " Normal range of motion.        Right hip: Normal.        Left hip: Normal.   TTP BL ischial tuberosity    Neurological: She is alert and oriented to person, place, and time.   Skin: Skin is warm and dry.   Psychiatric: She has a normal mood and affect. Her behavior is normal. Judgment and thought content normal.   Vitals reviewed.      Gay was seen today for fatigue.    Diagnoses and all orders for this visit:          Insomnia, unspecified type  Fatigue, unspecified type  -followed by sleep medicine, just started a treatment regimen for chronic insomnia, recommend following up in fatigue does not improve with improved sleep  -reviewed with patient all labs for vitamins and iron, recommend continued iron supplementation     Piriformis syndrome, unspecified laterality  Muscle strain of gluteal region, unspecified laterality, sequela  Osteopenia, unspecified location  Pain in buttock  Ischial pain, unspecified laterality  -call if no improvement or worsening   -     X-Ray Pelvis Complete min 3 views; Future  -     Ambulatory consult to Rheumatology  -     Ambulatory Referral to Physical/Occupational Therapy  -     meloxicam (MOBIC) 15 MG tablet; Take 1 tablet (15 mg total) by mouth daily as needed.  -     tiZANidine (ZANAFLEX) 2 MG tablet; Take 1 tablet (2 mg total) by mouth nightly as needed (muscle spasm).    Other orders

## 2018-03-05 ENCOUNTER — OFFICE VISIT (OUTPATIENT)
Dept: PSYCHIATRY | Facility: CLINIC | Age: 53
End: 2018-03-05
Payer: MEDICARE

## 2018-03-05 ENCOUNTER — TELEPHONE (OUTPATIENT)
Dept: INTERNAL MEDICINE | Facility: CLINIC | Age: 53
End: 2018-03-05

## 2018-03-05 DIAGNOSIS — F31.9 BIPOLAR 1 DISORDER: Primary | ICD-10-CM

## 2018-03-05 PROCEDURE — 90791 PSYCH DIAGNOSTIC EVALUATION: CPT | Mod: S$PBB,,, | Performed by: PSYCHOLOGIST

## 2018-03-05 PROCEDURE — 90791 PSYCH DIAGNOSTIC EVALUATION: CPT | Mod: PBBFAC | Performed by: PSYCHOLOGIST

## 2018-03-05 NOTE — TELEPHONE ENCOUNTER
Please let her know that Dr. Lopez covering for Dr. NUÑEZ, her valproic acid level is slightly low, her x-ray was normal.

## 2018-03-05 NOTE — PROGRESS NOTES
"Psychiatry Initial Visit (PhD/LCSW)   Diagnostic Interview - CPT 52871     Date: 2018    Site: [facility]     Referral source: Self    Clinical status of patient: Outpatient     Gay Gurrola, a 52 y.o. female, presented for initial evaluation visit. Before this evaluation was initiated, the purposes and process of the assessment and the limits of confidentiality were discussed with the patient who expressed understanding of these issues and orally consented to proceed with the evaluation.     Chief complaint/reason for encounter: Ms. Gurrola describes feelings of dissatisfaction, and complains of fatigue and irritability in the past 6 weeks.     History of present illness: Ms. Gurrola is known to me from her participation in ABU program in . She has a history of Bipolar Disorder as well as Alcohol Abuse in full remission. She has been abstinent from alcohol since , and reports no urges or cravings to use since that time. She recognizes now that she was most likely medicating her mood disorder and insomnia with alcohol for many years, particularly after her mother  in . She reports that her Bipolar Disorder has been relatively stable for 6 months or so, with the use of Latuda, Celexa, and Depakote. She did experience hypomania last year as well as depressive episodes. Currently, she denies any subjective feelings of depression but reports that she has felt "very unmotivated". She injured her piriformis muscle which has led to much decreased activity (she would walk from 5-8 miles per day five days per week prior to this) as well as frequent pain. She feels irritable and "thin-skinned" which she states is not like her typically, and she has been fussing at her  and son. 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 Ms. Gurrola has lost weight, though she reports to be eating fairly regularly and healthily she admits " "that she "probably doesn't eat enough calories". She used to wake up at 5 AM and would keep herself buys throughout the day, but now mostly stays home and it takes her longer than usual to finish chores. She no longer dresses up or does her hair, staying in sweatpants and a bun all day, which again she describes as "not like her".      Medical history: See "Problem List"     Pain: 0/10 - Ms. Gurrola reports no current pain, but has had a lot of pain with piriformis muscle and is starting PT this week. She takes Ibuprofen for pain.    Current psychiatric symptoms:   Depression - denied significant emotional symptoms of depression; however, she is unmotivated, fatigued, irritable, and apathetic (i.e. About appearance).  Yuliya/Hypomania - denied significant symptoms of yuliya/hypomania.  Anxiety - denied significant symptoms of anxiety.   Thoughts - denied delusions, hallucinations.  Suicidal thoughts/behaviors - denied.  Substance abuse - denied abuse or dependence.   Sleep - she reports that she has always had poor sleep, and has tried multiple medications for insomnia with poor results. Sleeps about 4 hours per night. Can initiate sleep but wakes up in the middle of the night and is unable to fall asleep again.  Self-injury - denied.    Current psychiatric treatment:  Medications: Latuda, Celexa, Depakote    Psychotherapy: She has not been in psychotherapy since completing ABU program in 2014.    Treating clinicians: Dr. Jessica Houston is her psychiatrist. She followed Dr. Houston from her practice at [facility] to her new private practice in [place].    Health behaviors: Reported adherence to pharmacologic regimen.      Psychiatric history:   Previous diagnosis: Ms. Gurrola was diagnosed with Bipolar Disorder in the early 2000's and has a prior diagnosis of Alcohol Use Disorder.    Previous hospitalizations: Denies.     History of outpatient treatment: She attended and completed ABU program in 2014. She " followed up in weekly aftercare group with Dr. Cruz until completion.    Previous suicide attempt: Denies.      Trauma history:  Denies.      Family history of psychiatric illness: None reported.     Social history (marriage, employment, etc.): Ms. Gurrola is the youngest of 5 siblings. Her 4 siblings share the same father but she has a different father; she reports that after her mother  in , she has had little to do with any of her siblings. 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 Ms. Gurrola is  from her , and they lived apart for 10 years, but in the past few years they have reconciled and they now live together again. She has 2 sons - one is 32 and is a psychologist and  living in [place], the other is 25 and living at home in [place] with her and her . Ms. Gurrola identifies as Sabianist, but has not been active in her irving for over 3 years. She reports that she has one close friend, Leslie, whom she met in [facility] but that she moved to [place]; however, they still talk on the phone often.    Current psychosocial stressors:  Relationship with  - she found out last year that he had been cheating on her with a young woman he had met in [facility] on his travels by finding lewd pictures of her on his phone one day. Since that time she has felt down about their relationship and disinterested in having sex with him.     Report of coping skills: Used to enjoy walking, volunteering. Currently not engaged in these activities and mostly is watching TV at home.    Support system: Sons are supportive,  is also to an extent. She has one close friend as mentioned above. She has no extended family as she does not speak to siblings.    Substance use:   Alcohol: Denied current use; history of Alcohol Use Disorder and abstinent since .  Drugs: Denied current use; denied history of abuse or dependency.  Tobacco: None.   Caffeine:  "Unknown.    Current medications and drug reactions (include OTC, herbal): see medication list     Strengths and liabilities: Strength: Patient accepts guidance/feedback, Strength: Patient is expressive/articulate., Strength: Patient is intelligent., Strength: Patient is motivated for change., Strength: Patient has positive support network., Strength: Patient has reasonable judgment., Liability: Patient has poor coping skills.    Current Evaluation:    Mental Status Exam:   General Appearance:  age appropriate, well dressed, neatly groomed, underweight    Speech:  normal tone, normal rate, normal pitch, normal volume    Level of Cooperation:  cooperative    Thought Processes:  normal and logical    Mood:  euthymic    Thought Content:  normal, no suicidality, no homicidality, delusions, or paranoia    Affect:  congruent and appropriate    Orientation:  oriented x3    Memory:  WNL   Attention Span & Concentration:  WNL   Fund of General Knowledge:  appropriate for education    Abstract Reasoning:  WNL   Judgment & Insight:  good    Language  intact        Diagnostic Impression - Plan:      Diagnostic Impression:  Bipolar Disorder I, last episode manic  Alcohol Use Disorder, in remission    Summary/Conclusion:   Ms. Gurrola is currently relatively stable in her chronic mood disorder; however, she is experiencing some symptoms suggesting current dysthymia. Through discussion, pt reports that she recognizes a tendency to be "all or nothing" in her approach to life and that currently she is doing "nothing" about several important areas. For instance, since she can't walk 5 miles per day, she is doing no activity; since she can't go back to her Confucianist due to their judgment and harshness, she can't have a spiritual life. We discussed the possibility of working toward getting more balance and "gray area" to help her open up to more meaning and mental flexibility in her life.    Recommendations:  - She is interested in " pursuing regular therapy with me. We scheduled follow up.

## 2018-03-05 NOTE — TELEPHONE ENCOUNTER
----- Message from Madisyn Frances sent at 3/5/2018  7:42 AM CST -----  Contact: MEHDI ARVIZU [[id number]]  x_  1st Request  _  2nd Request  _  3rd Request        Who: MEHDI ARVIZU [[id number]]    Why: patient states she would like a call back with the results from her lab and xray from 2/27/18    What Number to Call Back: [phone]    When to Expect a call back: (Before the end of the day)   -- if call after 3:00 call back will be tomorrow.

## 2018-03-06 NOTE — TELEPHONE ENCOUNTER
----- Message from Marjorie Powers sent at 3/6/2018  4:14 PM CST -----  Contact: pt   x 1st Request  _ 2nd Request  _ 3rd Request    Who:pt     Why:pt is returning K[facility] call. Please call and advise.     What Number to Call Back:[phone]     When to Expect a call back: (Before the end of the day)  -- if call after 3:00 call back will be tomorrow.     4:44 PM  2nd message left to return call

## 2018-03-06 NOTE — TELEPHONE ENCOUNTER
----- Message from Felicita Murillo sent at 3/6/2018  3:45 PM CST -----  Contact: self  _x  1st Request  _  2nd Request  _  3rd Request    Who: pt    Why: pt needs test results.. Please advise    What Number to Call Back: [phone]    When to Expect a call back: (Before the end of the day)   -- if call after 3:00 call back will be tomorrow.    4:13 PM  Left vm returning patient's call

## 2018-03-07 ENCOUNTER — TELEPHONE (OUTPATIENT)
Dept: INTERNAL MEDICINE | Facility: CLINIC | Age: 53
End: 2018-03-07

## 2018-03-07 NOTE — TELEPHONE ENCOUNTER
----- Message from Cristina Benitez sent at 3/7/2018 11:09 AM CST -----  _  1st Request  _  2nd Request  _  3rd Request        Who: patient     Why: Returning a call from your office concerning her results  Please return the call at earliest convenience. Thanks!    What Number to Call Back:[phone]      When to Expect a call back: (Within 24 hours)  .  2:45 PM  Left vm to return call

## 2018-03-08 NOTE — TELEPHONE ENCOUNTER
----- Message from Cristina Benitez sent at 3/8/2018  7:24 AM CST -----  _  1st Request  _  2nd Request  _  3rd Request        Who: patient     Why: Returning a call from your office, pt phone not working yesterday. Please return the call at earliest convenience. Thanks!    What Number to Call Back: [phone]      When to Expect a call back: (Within 24 hours)

## 2018-03-08 NOTE — TELEPHONE ENCOUNTER
----- Message from Cristina Benitez sent at 3/8/2018  7:24 AM CST -----  _  1st Request  _  2nd Request  _  3rd Request        Who: patient     Why: Returning a call from your office, pt phone not working yesterday. Please return the call at earliest convenience. Thanks!    What Number to Call Back: [phone]      When to Expect a call back: (Within 24 hours)  5:29 PM  Patient was informed her labs and verbalized understanding

## 2018-03-15 ENCOUNTER — INITIAL CONSULT (OUTPATIENT)
Dept: RHEUMATOLOGY | Facility: CLINIC | Age: 53
End: 2018-03-15
Payer: MEDICARE

## 2018-03-15 VITALS
SYSTOLIC BLOOD PRESSURE: 86 MMHG | HEART RATE: 66 BPM | WEIGHT: 94 LBS | BODY MASS INDEX: 17.19 KG/M2 | DIASTOLIC BLOOD PRESSURE: 57 MMHG

## 2018-03-15 DIAGNOSIS — M79.18 BILATERAL BUTTOCK PAIN: Primary | ICD-10-CM

## 2018-03-15 PROCEDURE — 99999 PR PBB SHADOW E&M-EST. PATIENT-LVL III: CPT | Mod: PBBFAC,,, | Performed by: INTERNAL MEDICINE

## 2018-03-15 PROCEDURE — 99203 OFFICE O/P NEW LOW 30 MIN: CPT | Mod: S$PBB,,, | Performed by: INTERNAL MEDICINE

## 2018-03-15 PROCEDURE — 99213 OFFICE O/P EST LOW 20 MIN: CPT | Mod: PBBFAC | Performed by: INTERNAL MEDICINE

## 2018-03-15 RX ORDER — MELOXICAM 15 MG/1
15 TABLET ORAL DAILY PRN
Qty: 30 TABLET | Refills: 0 | Status: SHIPPED | OUTPATIENT
Start: 2018-03-15 | End: 2018-07-23

## 2018-03-15 NOTE — LETTER
March 19, 2018      Jaja Mckeon MD  [address]  [facility] [address]           [facility]  [address]  [place] [address]  Phone: [phone]  Fax: [phone]          Patient: Gay Gurrola   MR Number: [id number]   YOB: 1965   Date of Visit: 3/15/2018       Dear Dr. Jaja Mckeon:    Thank you for referring Gay Gurrola to me for evaluation. Attached you will find relevant portions of my assessment and plan of care.    If you have questions, please do not hesitate to call me. I look forward to following Gay Gurrola along with you.    Sincerely,    Hemanth Ventura  CC:  No Recipients    If you would like to receive this communication electronically, please contact [email or web address] or [phone] to request more information on [employer] access.    For providers and/or their staff who would like to refer a patient to [facility], please contact us through our one-stop-shop provider referral line, [facility], at [phone].    If you feel you have received this communication in error or would no longer like to receive these types of communications, please e-mail [email or web address]

## 2018-03-21 NOTE — PROGRESS NOTES
History of present illness: 52-year-old female comes in because of an 8 week history of pain and but ox.  She walks on a regular basis but had no history of antecedent trauma.  She denies any similar problem in the past.  The pain is bad in the morning, especially when she tries to get out of bed.  As 2 hours of morning stiffness, all only in the buttocks.  It is bad when she takes her first few steps after prolonged sitting.  It tends to be better during the day but she has been restricting her activity.  It does not wake her up at night.  It was of gradual onset.  It does not radiate down the legs.  She denies any pain in the mid back.  She has had no pain in the neck or upper extremities.  She has had no problems in her knees, ankles, or feet.    She has been taking ibuprofen but she does not think it is helping.  She had been placed on meloxicam which gave her some relief.  She has not been taking it on a daily basis.  Heat and ice give her some relief.  She has not tried topical medications.    No fever, headache, rash, conjunctivitis, oral ulcers, dry eyes or mouth, Raynaud's phenomenon, pleurisy, chronic or bloody diarrhea, vaginal or urethral discharge or ulcer, numbness or tingling, blood clots or phlebitis.  She has no family history of arthritis.  She had no history of antecedent URI.    Physical examination:  Musculoskeletal: Shoulders, elbows, wrists, small joints of the hand have no tenderness or pain on range of motion.  Cervical and lumbar spine are unremarkable.  She has good range of motion of lumbar spine without pain on range of motion.  Straight leg raising is negative.  She has tenderness in the buttocks bilaterally.  She has no tenderness over the greater trochanter.  She has full range of motion of the hips.  She has a positive Jona's sign on the left leg.  Knees, ankles, small joints of feet are unremarkable.  Radiology: Sacroiliac joints are normal.    Assessment: I suspect we are  dealing with a soft tissue problems such as tendinitis or bursitis.  I do not think this is an arthritic problem.    Plans:  1.  I recommend taking meloxicam on a daily basis  2.  I concur with starting physical therapy  3.  If she does not respond, I would recommend referral to physical medicine  4.  I did not give her regular return appointment but would be happy to see her in follow-up if necessary.

## 2018-03-26 ENCOUNTER — OFFICE VISIT (OUTPATIENT)
Dept: PSYCHIATRY | Facility: CLINIC | Age: 53
End: 2018-03-26
Payer: MEDICARE

## 2018-03-26 DIAGNOSIS — F31.9 BIPOLAR 1 DISORDER: Primary | ICD-10-CM

## 2018-03-26 PROCEDURE — 90834 PSYTX W PT 45 MINUTES: CPT | Mod: S$PBB,,, | Performed by: PSYCHOLOGIST

## 2018-03-26 PROCEDURE — 90834 PSYTX W PT 45 MINUTES: CPT | Mod: PBBFAC | Performed by: PSYCHOLOGIST

## 2018-03-26 NOTE — PROGRESS NOTES
"Individual Psychotherapy (PhD/LCSW)    3/26/2018    Site:  [facility]         Therapeutic Intervention: Met with patient.  Outpatient - Behavior modifying psychotherapy 45 min - CPT code 95568 and Outpatient - Supportive psychotherapy 45 min - CPT Code 51204    Chief complaint/reason for encounter: depression     Interval history and content of current session: Pt reports that she believes she is experiencing depression. Since our initial evaluation several weeks ago, she has continued to experience difficulty with motivation, some days even struggling to get out of bed in the morning. She is spending most days indoors reading and doing a few chores. Her son has been away for 1 week and she feels the "emptiness" of the house but denies feeling lonely due to being an introvert. She reports that she had an appointment scheduled with her psychiatrist for a few days ago but had cancelled it because she didn't feel like driving there and because she "expected to feel better" within several days. Questioned pt about this decision, as she is now regretting it and feeling that she probably needs a medication adjustment. Explored pt's tendency to make impulsive emotional-based decisions, as well as her all-or-nothing thinking and high expectations for herself (i.e. Completing her spring cleaning). Worked with pt on slowing herself down through mindfulness exercises before she makes decisions that are not well thought-out. Noted that since she stopped volunteering over the winter, she has not been engaging in purpose-driven activities and feels there is not enough meaning in her life, so we also explored the relationship between this and her depression as well as how she might start to work on finding new meaning. Worked on overhauling her current daily schedule and set of large expectations to shrink down her expectations to what is reasonable given her depression, to make daily commitments, and to get out of her " house once per day. Encouraged pt to reschedule with her psychiatrist as soon as possible.     Treatment plan:  · Target symptoms: depression  · Why chosen therapy is appropriate versus another modality: relevant to diagnosis, patient responds to this modality, evidence based practice  · Outcome monitoring methods: self-report, observation  · Therapeutic intervention type: behavior modifying psychotherapy, supportive psychotherapy    Risk parameters:  Patient reports no suicidal ideation  Patient reports no homicidal ideation  Patient reports no self-injurious behavior  Patient reports no violent behavior    Verbal deficits: None    Patient's response to intervention:  The patient's response to intervention is accepting.    Progress toward goals and other mental status changes:  The patient's progress toward goals is limited.    Diagnosis:   Bipolar Disorder    Plan:  individual psychotherapy    Return to clinic: 3 weeks    Length of Service (minutes): 45

## 2018-04-18 ENCOUNTER — TELEPHONE (OUTPATIENT)
Dept: SLEEP MEDICINE | Facility: CLINIC | Age: 53
End: 2018-04-18

## 2018-04-18 DIAGNOSIS — G47.00 INSOMNIA, UNSPECIFIED TYPE: Primary | ICD-10-CM

## 2018-04-18 RX ORDER — TEMAZEPAM 30 MG/1
30 CAPSULE ORAL NIGHTLY PRN
Qty: 30 CAPSULE | Refills: 3 | Status: SHIPPED | OUTPATIENT
Start: 2018-04-18 | End: 2018-05-18

## 2018-04-18 NOTE — TELEPHONE ENCOUNTER
----- Message from Felicita Murillo sent at 4/18/2018  8:47 AM CDT -----            Name of Who is Calling: MEHDI ARVIZU [[id number]]      What is the request in detail:pt states zaleplon (SONATA) 5 MG Cap is no longer working for her.. Pt has only been sleeping 3 hrs at night.. Please advise      Can the clinic reply by [facility]: no      [facility]: [phone]

## 2018-04-18 NOTE — TELEPHONE ENCOUNTER
Talked to the pt - Sonata 5-10 mg + Belsomra 20 mg combo worked for a month - then stopped.  Recently saw Dr. Houston for bipolar disorder - Trazodone 150 mg BID was added - taking it int he evening with Sonata does not help her sleep.    She was advised to stop Sonata and re-start Restoril 30 mg.

## 2018-04-18 NOTE — TELEPHONE ENCOUNTER
Patient states that Sonata 5mg is not working for her anymore. She would like to go back to Trazadone or Restoril 30 mg if possible.

## 2018-05-15 ENCOUNTER — TELEPHONE (OUTPATIENT)
Dept: INTERNAL MEDICINE | Facility: CLINIC | Age: 53
End: 2018-05-15

## 2018-05-15 NOTE — TELEPHONE ENCOUNTER
Pt states chest pain and palpations has been going on for 2 days but did not go to ER or call us bc she doesn't have fam hx of heart attack and she does not have heart dx or high cholesterol. Pt informed we still need her to go to the ER immediately for further work up and evaluation. Pt demonstrated verbal understanding of information and had no further questions or concerns at this time. '

## 2018-05-15 NOTE — TELEPHONE ENCOUNTER
"----- Message from Chantell Duran sent at 5/15/2018  8:10 AM CDT -----  Contact: MEHDI ARVIZU [[id number]]      Name of Who is Calling: MEHDI ARVIZU [[id number]]      What is the request in detail: Patient called requesting a call. Says, "she's experiencing heart palpitations and chest discomfort."  I did advised that the patient should go to the ED but she refused requesting to speak with .  Please give a call back at your earliest convenience.      THANKS!      Can the clinic reply by [facility]: No      What Number to Call Back : MEHDI ARVIZU / # [phone]                                    "

## 2018-05-16 ENCOUNTER — TELEPHONE (OUTPATIENT)
Dept: CARDIOLOGY | Facility: CLINIC | Age: 53
End: 2018-05-16

## 2018-05-16 ENCOUNTER — HOSPITAL ENCOUNTER (OUTPATIENT)
Dept: CARDIOLOGY | Facility: CLINIC | Age: 53
Discharge: HOME | End: 2018-05-16
Payer: MEDICARE

## 2018-05-16 ENCOUNTER — OFFICE VISIT (OUTPATIENT)
Dept: CARDIOLOGY | Facility: CLINIC | Age: 53
End: 2018-05-16
Payer: MEDICARE

## 2018-05-16 VITALS
HEIGHT: 62 IN | HEART RATE: 67 BPM | BODY MASS INDEX: 17.69 KG/M2 | DIASTOLIC BLOOD PRESSURE: 68 MMHG | SYSTOLIC BLOOD PRESSURE: 112 MMHG | WEIGHT: 96.13 LBS

## 2018-05-16 DIAGNOSIS — R00.2 PALPITATION: ICD-10-CM

## 2018-05-16 DIAGNOSIS — R00.2 PALPITATION: Primary | ICD-10-CM

## 2018-05-16 DIAGNOSIS — R07.9 CHEST PAIN, UNSPECIFIED TYPE: Primary | ICD-10-CM

## 2018-05-16 PROCEDURE — 93010 ELECTROCARDIOGRAM REPORT: CPT | Mod: S$PBB,,, | Performed by: INTERNAL MEDICINE

## 2018-05-16 PROCEDURE — 93005 ELECTROCARDIOGRAM TRACING: CPT | Mod: PBBFAC | Performed by: INTERNAL MEDICINE

## 2018-05-16 PROCEDURE — 99999 PR PBB SHADOW E&M-EST. PATIENT-LVL IV: CPT | Mod: PBBFAC,GC,, | Performed by: STUDENT IN AN ORGANIZED HEALTH CARE EDUCATION/TRAINING PROGRAM

## 2018-05-16 PROCEDURE — 99204 OFFICE O/P NEW MOD 45 MIN: CPT | Mod: S$PBB,GC,, | Performed by: STUDENT IN AN ORGANIZED HEALTH CARE EDUCATION/TRAINING PROGRAM

## 2018-05-16 PROCEDURE — 99214 OFFICE O/P EST MOD 30 MIN: CPT | Mod: PBBFAC,25 | Performed by: STUDENT IN AN ORGANIZED HEALTH CARE EDUCATION/TRAINING PROGRAM

## 2018-05-16 RX ORDER — NITROGLYCERIN 0.4 MG/1
0.4 TABLET SUBLINGUAL EVERY 5 MIN PRN
Qty: 25 TABLET | Refills: 0 | Status: SHIPPED | OUTPATIENT
Start: 2018-05-16 | End: 2018-11-01

## 2018-05-16 NOTE — PROGRESS NOTES
Cardiology Clinic Note  Reason for Visit: Palpitations    HPI:   Pt is a 52 year old lady referred here for palpitations and chest pain.    She has a hx of Anxiety/Depression, Bipolar Disorder, Etoh abuse who called her PCP with left sided intermittent chest pain over the course of 2-3 days, achy/sharp, 6/10, activity makes the pain worse, sitting down makes the pain better. Has also had palpitations, fatigue, urinary retention.    Usually runs 5 miles per day but has not run since last week due to chest pain.     Review of Systems   All other systems reviewed and are negative.      PMH:     Past Medical History:   Diagnosis Date    Alcohol abuse, in remission     Anxiety     Bipolar affective     since teenage years    Depression     H. pylori infection     Headache(784.0)     followed by Dr. Corona    Hypothyroid     Memory loss     Osteopenia     Seizures      Past Surgical History:   Procedure Laterality Date    BUNIONECTOMY      COLONOSCOPY N/A 4/12/2017    Procedure: COLONOSCOPY;  Surgeon: Estuardo Salazar MD;  Location: [facility] ([address] [place] [address]);  Service: Endoscopy;  Laterality: N/A;  PM prep    TUBAL LIGATION       Allergies:   Review of patient's allergies indicates:  No Known Allergies  Medications:     Current Outpatient Prescriptions on File Prior to Visit   Medication Sig Dispense Refill    Ca-D3-mag#11-zinc-cupr-man-bor 600 mg calcium- 800 unit-50 mg Tab Take 1 tablet by mouth once daily.      citalopram (CELEXA) 20 MG tablet Take 1 tablet (20 mg total) by mouth once daily. 30 tablet 3    cyanocobalamin, vitamin B-12, (VITAMIN B-12) 5,000 mcg Subl Place under the tongue once daily.        divalproex ER (DEPAKOTE ER) 500 MG Tb24 Take 1 tablet (500 mg total) by mouth once daily. 30 tablet 3    levothyroxine (SYNTHROID) 25 MCG tablet Take 1 tablet (25 mcg total) by mouth once daily. 90 tablet 3    meloxicam (MOBIC) 15 MG tablet Take 1 tablet (15 mg total) by mouth daily as needed. 30 tablet 0  "   omeprazole (PRILOSEC) 20 MG capsule Take 1 capsule (20 mg total) by mouth once daily. 90 capsule 3    temazepam (RESTORIL) 30 mg capsule Take 1 capsule (30 mg total) by mouth nightly as needed for Insomnia. 30 capsule 3    zaleplon (SONATA) 5 MG Cap 1 pill PO as needed for insomnia. 30 capsule 1    [DISCONTINUED] LATUDA 80 mg Tab tablet        No current facility-administered medications on file prior to visit.      Social History:     Social History   Substance Use Topics    Smoking status: Never Smoker    Smokeless tobacco: Never Used    Alcohol use No      Comment: History of alcoholism in remission.     Family History:     Family History   Problem Relation Age of Onset    Stroke Mother     Diabetes Mother     Dementia Maternal Grandmother     Crohn's disease Maternal Aunt     Crohn's disease Other     Melanoma Neg Hx     Psoriasis Neg Hx     Lupus Neg Hx     Colon cancer Neg Hx        Physical Exam  /68 (BP Location: Left arm, Patient Position: Sitting, BP Method: Pediatric (Automatic))   Pulse 67   Ht 5' 2" (1.575 m)   Wt 43.6 kg (96 lb 1.9 oz)   BMI 17.58 kg/m²    GEN: Alert and oriented in NAD  NECK: no JVD appreciated   CVS: RRR, s1/s2, no MRG  PULM: CTAB no rales  ABD: NT/ND BS +  Extremities: warm and dry, palpable pulses, no edema  NEURO: Alert and oriented x 3  PSYCH: appropriate affect.             Labs:     Lab Results   Component Value Date     11/30/2017    K 4.4 11/30/2017     11/30/2017    CO2 32 (H) 11/30/2017    BUN 11 11/30/2017    CREATININE 0.8 11/30/2017    ANIONGAP 10 11/30/2017     No results found for: HGBA1C  No results found for: BNP, BNPTRIAGEBLO Lab Results   Component Value Date    WBC 3.94 02/22/2018    HGB 13.8 02/22/2018    HCT 44.3 02/22/2018     02/22/2018    GRAN 1.2 (L) 02/22/2018    GRAN 30.3 (L) 02/22/2018     Lab Results   Component Value Date    CHOL 184 11/30/2017     (H) 11/30/2017    LDLCALC 61.0 (L) 11/30/2017    " TRIG 35 11/30/2017          No results found for: EF    EKG: normal sinus rhythm, no blocks or conduction defects, no ischemic changes    Assessment and Plan  Gay Gurrola is a 52 y.o. here with chest pain.     Chest pain   left sided intermittent chest pain over the course of 2-3 days, achy/sharp, 6/10, activity makes the pain worse, sitting down makes the pain better. Pt with no risk factors aside from age but has a very good story. Will start on nitroglycerin and will order a stress echo to further evaluate.       Signed:        Jules Denton MD  Cardiology Fellow  Pager [phone]

## 2018-05-16 NOTE — TELEPHONE ENCOUNTER
----- Message from Kelli Willoughby sent at 5/16/2018  2:52 PM CDT -----  Regarding: EKG ORDER  Please put in EKG order, thanks. Kelli [id number]

## 2018-05-16 NOTE — ASSESSMENT & PLAN NOTE
left sided intermittent chest pain over the course of 2-3 days, achy/sharp, 6/10, activity makes the pain worse, sitting down makes the pain better. Pt with no risk factors aside from age but has a very good story. Will start on nitroglycerin and will order a stress echo to further evaluate.

## 2018-05-16 NOTE — PROGRESS NOTES
I have personally taken the history and examined this patient and agree with the Fellow's note as stated above.    I'm not sure what to make of the story of recent exertional chest discomfort, sometimes like elephant on chest, in low risk active lady who has totally normal ekg at the time that she is having 6/10 pain.  She agrees to stress echo  I have explained the use of ntg which we will Rx.

## 2018-05-21 ENCOUNTER — HOSPITAL ENCOUNTER (OUTPATIENT)
Dept: CARDIOLOGY | Facility: CLINIC | Age: 53
Discharge: HOME | DRG: 287 | End: 2018-05-21
Attending: STUDENT IN AN ORGANIZED HEALTH CARE EDUCATION/TRAINING PROGRAM
Payer: MEDICARE

## 2018-05-21 DIAGNOSIS — R07.9 CHEST PAIN, UNSPECIFIED TYPE: ICD-10-CM

## 2018-05-21 LAB
DIASTOLIC DYSFUNCTION: NO
RETIRED EF AND QEF - SEE NOTES: 60 (ref 55–65)

## 2018-05-21 PROCEDURE — 93321 DOPPLER ECHO F-UP/LMTD STD: CPT | Mod: 26,S$PBB,, | Performed by: INTERNAL MEDICINE

## 2018-05-21 PROCEDURE — 93351 STRESS TTE COMPLETE: CPT | Mod: PBBFAC | Performed by: INTERNAL MEDICINE

## 2018-05-23 ENCOUNTER — HOSPITAL ENCOUNTER (INPATIENT)
Facility: HOSPITAL | Age: 53
LOS: 1 days | Discharge: HOME | DRG: 287 | End: 2018-05-24
Attending: EMERGENCY MEDICINE | Admitting: HOSPITALIST
Payer: MEDICARE

## 2018-05-23 ENCOUNTER — TELEPHONE (OUTPATIENT)
Dept: CARDIOLOGY | Facility: HOSPITAL | Age: 53
End: 2018-05-23

## 2018-05-23 DIAGNOSIS — R07.9 CHEST PAIN: Primary | ICD-10-CM

## 2018-05-23 DIAGNOSIS — E03.9 HYPOTHYROIDISM, UNSPECIFIED TYPE: ICD-10-CM

## 2018-05-23 DIAGNOSIS — F32.9 MAJOR DEPRESSIVE DISORDER, SINGLE EPISODE: ICD-10-CM

## 2018-05-23 PROBLEM — R07.89 OTHER CHEST PAIN: Status: ACTIVE | Noted: 2018-05-16

## 2018-05-23 LAB
ABO + RH BLD: NORMAL
ALBUMIN SERPL BCP-MCNC: 4.3 G/DL
ALP SERPL-CCNC: 33 U/L
ALT SERPL W/O P-5'-P-CCNC: 16 U/L
AMPHET+METHAMPHET UR QL: NEGATIVE
ANION GAP SERPL CALC-SCNC: 13 MMOL/L
AST SERPL-CCNC: 36 U/L
BARBITURATES UR QL SCN>200 NG/ML: NEGATIVE
BASOPHILS # BLD AUTO: 0.03 K/UL
BASOPHILS NFR BLD: 0.5 %
BENZODIAZ UR QL SCN>200 NG/ML: NEGATIVE
BILIRUB SERPL-MCNC: 0.7 MG/DL
BLD GP AB SCN CELLS X3 SERPL QL: NORMAL
BUN SERPL-MCNC: 16 MG/DL
BZE UR QL SCN: NEGATIVE
CALCIUM SERPL-MCNC: 9.4 MG/DL
CANNABINOIDS UR QL SCN: NEGATIVE
CHLORIDE SERPL-SCNC: 100 MMOL/L
CO2 SERPL-SCNC: 21 MMOL/L
CREAT SERPL-MCNC: 1 MG/DL
CREAT UR-MCNC: 11 MG/DL
DIFFERENTIAL METHOD: ABNORMAL
EOSINOPHIL # BLD AUTO: 0.1 K/UL
EOSINOPHIL NFR BLD: 0.9 %
ERYTHROCYTE [DISTWIDTH] IN BLOOD BY AUTOMATED COUNT: 14.8 %
EST. GFR  (AFRICAN AMERICAN): >60 ML/MIN/1.73 M^2
EST. GFR  (NON AFRICAN AMERICAN): >60 ML/MIN/1.73 M^2
ETHANOL UR-MCNC: <10 MG/DL
GLUCOSE SERPL-MCNC: 99 MG/DL
HCT VFR BLD AUTO: 39.2 %
HGB BLD-MCNC: 12.9 G/DL
IMM GRANULOCYTES # BLD AUTO: 0.01 K/UL
IMM GRANULOCYTES NFR BLD AUTO: 0.2 %
LYMPHOCYTES # BLD AUTO: 1.8 K/UL
LYMPHOCYTES NFR BLD: 32 %
MCH RBC QN AUTO: 29.7 PG
MCHC RBC AUTO-ENTMCNC: 32.9 G/DL
MCV RBC AUTO: 90 FL
METHADONE UR QL SCN>300 NG/ML: NEGATIVE
MONOCYTES # BLD AUTO: 0.5 K/UL
MONOCYTES NFR BLD: 9.3 %
NEUTROPHILS # BLD AUTO: 3.2 K/UL
NEUTROPHILS NFR BLD: 57.1 %
NRBC BLD-RTO: 0 /100 WBC
OPIATES UR QL SCN: NEGATIVE
PCP UR QL SCN>25 NG/ML: NEGATIVE
PLATELET # BLD AUTO: 210 K/UL
PMV BLD AUTO: 11.6 FL
POTASSIUM SERPL-SCNC: 4.7 MMOL/L
PROT SERPL-MCNC: 7.5 G/DL
RBC # BLD AUTO: 4.35 M/UL
SODIUM SERPL-SCNC: 134 MMOL/L
TOXICOLOGY INFORMATION: ABNORMAL
TROPONIN I SERPL DL<=0.01 NG/ML-MCNC: <0.006 NG/ML
TSH SERPL DL<=0.005 MIU/L-ACNC: 2.08 UIU/ML
VALPROATE SERPL-MCNC: 45.6 UG/ML
WBC # BLD AUTO: 5.6 K/UL

## 2018-05-23 PROCEDURE — 25000003 PHARM REV CODE 250

## 2018-05-23 PROCEDURE — 84484 ASSAY OF TROPONIN QUANT: CPT

## 2018-05-23 PROCEDURE — 25000003 PHARM REV CODE 250: Performed by: STUDENT IN AN ORGANIZED HEALTH CARE EDUCATION/TRAINING PROGRAM

## 2018-05-23 PROCEDURE — 11000001 HC ACUTE MED/SURG PRIVATE ROOM

## 2018-05-23 PROCEDURE — 63600175 PHARM REV CODE 636 W HCPCS

## 2018-05-23 PROCEDURE — 93005 ELECTROCARDIOGRAM TRACING: CPT

## 2018-05-23 PROCEDURE — 4A023N7 MEASUREMENT OF CARDIAC SAMPLING AND PRESSURE, LEFT HEART, PERCUTANEOUS APPROACH: ICD-10-PCS | Performed by: PODIATRIST

## 2018-05-23 PROCEDURE — 84443 ASSAY THYROID STIM HORMONE: CPT

## 2018-05-23 PROCEDURE — 99223 1ST HOSP IP/OBS HIGH 75: CPT | Mod: ,,, | Performed by: HOSPITALIST

## 2018-05-23 PROCEDURE — 85025 COMPLETE CBC W/AUTO DIFF WBC: CPT

## 2018-05-23 PROCEDURE — 25000003 PHARM REV CODE 250: Performed by: EMERGENCY MEDICINE

## 2018-05-23 PROCEDURE — 93458 L HRT ARTERY/VENTRICLE ANGIO: CPT | Mod: 26,,, | Performed by: INTERNAL MEDICINE

## 2018-05-23 PROCEDURE — 99152 MOD SED SAME PHYS/QHP 5/>YRS: CPT

## 2018-05-23 PROCEDURE — 36000 PLACE NEEDLE IN VEIN: CPT

## 2018-05-23 PROCEDURE — 80164 ASSAY DIPROPYLACETIC ACD TOT: CPT

## 2018-05-23 PROCEDURE — 93458 L HRT ARTERY/VENTRICLE ANGIO: CPT

## 2018-05-23 PROCEDURE — 63600175 PHARM REV CODE 636 W HCPCS: Performed by: EMERGENCY MEDICINE

## 2018-05-23 PROCEDURE — 25000003 PHARM REV CODE 250: Performed by: INTERNAL MEDICINE

## 2018-05-23 PROCEDURE — B2111ZZ FLUOROSCOPY OF MULTIPLE CORONARY ARTERIES USING LOW OSMOLAR CONTRAST: ICD-10-PCS | Performed by: PODIATRIST

## 2018-05-23 PROCEDURE — 99152 MOD SED SAME PHYS/QHP 5/>YRS: CPT | Mod: ,,, | Performed by: INTERNAL MEDICINE

## 2018-05-23 PROCEDURE — 93010 ELECTROCARDIOGRAM REPORT: CPT | Mod: ,,, | Performed by: INTERNAL MEDICINE

## 2018-05-23 PROCEDURE — 80053 COMPREHEN METABOLIC PANEL: CPT

## 2018-05-23 PROCEDURE — 99285 EMERGENCY DEPT VISIT HI MDM: CPT | Mod: 25

## 2018-05-23 PROCEDURE — 86850 RBC ANTIBODY SCREEN: CPT

## 2018-05-23 PROCEDURE — 80307 DRUG TEST PRSMV CHEM ANLYZR: CPT

## 2018-05-23 RX ORDER — TEMAZEPAM 22.5 MG/1
30 CAPSULE ORAL NIGHTLY PRN
COMMUNITY
End: 2020-02-14

## 2018-05-23 RX ORDER — IBUPROFEN 200 MG
24 TABLET ORAL
Status: DISCONTINUED | OUTPATIENT
Start: 2018-05-23 | End: 2018-05-24 | Stop reason: HOSPADM

## 2018-05-23 RX ORDER — NITROGLYCERIN 0.4 MG/1
0.4 TABLET SUBLINGUAL
Status: COMPLETED | OUTPATIENT
Start: 2018-05-23 | End: 2018-05-23

## 2018-05-23 RX ORDER — PANTOPRAZOLE SODIUM 40 MG/1
40 TABLET, DELAYED RELEASE ORAL DAILY
Status: DISCONTINUED | OUTPATIENT
Start: 2018-05-23 | End: 2018-05-24 | Stop reason: HOSPADM

## 2018-05-23 RX ORDER — ASPIRIN 325 MG
325 TABLET ORAL
Status: COMPLETED | OUTPATIENT
Start: 2018-05-23 | End: 2018-05-23

## 2018-05-23 RX ORDER — SODIUM CHLORIDE 0.9 % (FLUSH) 0.9 %
5 SYRINGE (ML) INJECTION
Status: DISCONTINUED | OUTPATIENT
Start: 2018-05-23 | End: 2018-05-24 | Stop reason: HOSPADM

## 2018-05-23 RX ORDER — CITALOPRAM 10 MG/1
20 TABLET ORAL DAILY
Status: DISCONTINUED | OUTPATIENT
Start: 2018-05-23 | End: 2018-05-24 | Stop reason: HOSPADM

## 2018-05-23 RX ORDER — DIPHENHYDRAMINE HCL 50 MG
50 CAPSULE ORAL ONCE
Status: COMPLETED | OUTPATIENT
Start: 2018-05-23 | End: 2018-05-23

## 2018-05-23 RX ORDER — GLUCAGON 1 MG
1 KIT INJECTION
Status: DISCONTINUED | OUTPATIENT
Start: 2018-05-23 | End: 2018-05-24 | Stop reason: HOSPADM

## 2018-05-23 RX ORDER — TEMAZEPAM 30 MG/1
30 CAPSULE ORAL NIGHTLY PRN
Status: DISCONTINUED | OUTPATIENT
Start: 2018-05-23 | End: 2018-05-24 | Stop reason: HOSPADM

## 2018-05-23 RX ORDER — IBUPROFEN 200 MG
16 TABLET ORAL
Status: DISCONTINUED | OUTPATIENT
Start: 2018-05-23 | End: 2018-05-24 | Stop reason: HOSPADM

## 2018-05-23 RX ORDER — ENOXAPARIN SODIUM 100 MG/ML
40 INJECTION SUBCUTANEOUS EVERY 24 HOURS
Status: DISCONTINUED | OUTPATIENT
Start: 2018-05-24 | End: 2018-05-24 | Stop reason: HOSPADM

## 2018-05-23 RX ORDER — RAMELTEON 8 MG/1
8 TABLET ORAL NIGHTLY PRN
Status: DISCONTINUED | OUTPATIENT
Start: 2018-05-23 | End: 2018-05-23

## 2018-05-23 RX ORDER — LORAZEPAM 1 MG/1
1 TABLET ORAL NIGHTLY
Status: DISCONTINUED | OUTPATIENT
Start: 2018-05-23 | End: 2018-05-23

## 2018-05-23 RX ORDER — CLOPIDOGREL 300 MG/1
600 TABLET, FILM COATED ORAL
Status: COMPLETED | OUTPATIENT
Start: 2018-05-23 | End: 2018-05-23

## 2018-05-23 RX ORDER — LEVOTHYROXINE SODIUM 25 UG/1
25 TABLET ORAL
Status: DISCONTINUED | OUTPATIENT
Start: 2018-05-24 | End: 2018-05-24 | Stop reason: HOSPADM

## 2018-05-23 RX ORDER — DIVALPROEX SODIUM 500 MG/1
500 TABLET, FILM COATED, EXTENDED RELEASE ORAL DAILY
Status: DISCONTINUED | OUTPATIENT
Start: 2018-05-23 | End: 2018-05-24 | Stop reason: HOSPADM

## 2018-05-23 RX ORDER — CLOPIDOGREL 300 MG/1
600 TABLET, FILM COATED ORAL
Status: DISCONTINUED | OUTPATIENT
Start: 2018-05-23 | End: 2018-05-23

## 2018-05-23 RX ADMIN — PANTOPRAZOLE SODIUM 40 MG: 40 TABLET, DELAYED RELEASE ORAL at 05:05

## 2018-05-23 RX ADMIN — TEMAZEPAM 30 MG: 30 CAPSULE ORAL at 09:05

## 2018-05-23 RX ADMIN — SODIUM CHLORIDE 3 ML/KG/HR: 0.9 INJECTION, SOLUTION INTRAVENOUS at 04:05

## 2018-05-23 RX ADMIN — DIPHENHYDRAMINE HYDROCHLORIDE 50 MG: 50 CAPSULE ORAL at 02:05

## 2018-05-23 RX ADMIN — DIVALPROEX SODIUM 500 MG: 500 TABLET, EXTENDED RELEASE ORAL at 05:05

## 2018-05-23 RX ADMIN — NITROGLYCERIN 0.4 MG: 0.4 TABLET SUBLINGUAL at 01:05

## 2018-05-23 RX ADMIN — CLOPIDOGREL BISULFATE 600 MG: 300 TABLET, FILM COATED ORAL at 01:05

## 2018-05-23 RX ADMIN — ASPIRIN 325 MG ORAL TABLET 325 MG: 325 PILL ORAL at 12:05

## 2018-05-23 NOTE — HPI
"Ms Gurrola is 53 yo female with hypothyroidism and bipolar disorder who presents with typical chest pain for the past week that has been progressively getting worse. Pain feels like tightness on chest "elephant sitting on my chest", no radiation but has numbness of left arm. Gets worse with activity and relieved with nitroglycerin. Had exercise stress echo 2 days ago suggestive of inferolateral wall ischemia. Also has sob with it and "flutter'. No prior episodes of chest pain. Denies nausea, vomiting, fever, chills, headaches.Also reports she has been feeling fatigues for the past few months. Usually runs up to 5 miles a day for 4-5 days per week.     Patient has family history of DM II and heart disease. Patient does not smoke nor drinks alcohol.     In the ED patient has normal troponin and EKG with NSR with nonspecific ST changes. Basic labs are unremarkable. Chest xray with no acute process. Given aspirin and nitroglycerin in the Ed with some relief. Cards plan for angio today.   "

## 2018-05-23 NOTE — ED PROVIDER NOTES
"Encounter Date: 5/23/2018    SCRIBE #1 NOTE: I, Anisha Morriscaitlyn, am scribing for, and in the presence of, Dr. Bowles.       History     Chief Complaint   Patient presents with    Chest Pain     with recent abnormal stress test. States had excruciating CP pain last night, worse than normal CP. Took 2 nitroglycerin PTA, reports only temporary relief.      Time seen by provider: 12:02 PM    This is a 52 y.o. female with medical conditions including headache, seizure, bipolar, who presents with complaint of chest pain and palpitations. The patient had an abnormal stress test 2 days ago and since has been experiencing crushing chest pain and palpitations. The pain began while she was at rest and has associated shortness of breath. She did take 2 nitroglycerin today, 15 minutes apart, but the pain worsened again with some very mild exertion. The patient is currently having pain and severe palpitaitons described as "My heart feels like it is squeezing harder". The patient reports no history of heart disease in her family but states her mother did die at a young age.       The history is provided by the patient.     Review of patient's allergies indicates:  No Known Allergies  Past Medical History:   Diagnosis Date    Alcohol abuse, in remission     Anxiety     Bipolar affective     since teenage years    Depression     H. pylori infection     Headache(784.0)     followed by Dr. Corona    Hypothyroid     Memory loss     Osteopenia     Seizures      Past Surgical History:   Procedure Laterality Date    BUNIONECTOMY      COLONOSCOPY N/A 4/12/2017    Procedure: COLONOSCOPY;  Surgeon: Estuardo Salazar MD;  Location: [address]);  Service: Endoscopy;  Laterality: N/A;  PM prep    TUBAL LIGATION       Family History   Problem Relation Age of Onset    Stroke Mother     Diabetes Mother     Dementia Maternal Grandmother     Crohn's disease Maternal Aunt     Crohn's disease Other     Melanoma Neg Hx     " Psoriasis Neg Hx     Lupus Neg Hx     Colon cancer Neg Hx      Social History   Substance Use Topics    Smoking status: Never Smoker    Smokeless tobacco: Never Used    Alcohol use No      Comment: History of alcoholism in remission.     Review of Systems   Constitutional: Negative for chills and fever.   HENT: Negative for facial swelling and nosebleeds.    Eyes: Negative for visual disturbance.   Respiratory: Positive for shortness of breath.    Cardiovascular: Positive for chest pain and palpitations.   Gastrointestinal: Negative for abdominal pain.   Genitourinary: Negative for difficulty urinating.   Musculoskeletal: Negative for joint swelling.   Skin: Negative for rash.   Neurological: Negative for speech difficulty.       Physical Exam     Initial Vitals [05/23/18 1149]   BP Pulse Resp Temp SpO2   117/60 87 18 98.5 °F (36.9 °C) 100 %      MAP       79         Physical Exam    Nursing note and vitals reviewed.  Constitutional: She appears well-developed and well-nourished. No distress.   HENT:   Head: Normocephalic and atraumatic.   Eyes: EOM are normal. Pupils are equal, round, and reactive to light.   Neck: Normal range of motion. Neck supple.   Cardiovascular: Normal rate, regular rhythm and normal heart sounds.   Pulmonary/Chest: Breath sounds normal. No respiratory distress. She has no wheezes. She has no rhonchi. She has no rales.   Abdominal: Soft. She exhibits no distension. There is no tenderness.   Musculoskeletal: Normal range of motion. She exhibits no edema.   Neurological: She is alert and oriented to person, place, and time. She has normal strength.   Skin: Skin is warm and dry.         ED Course   Procedures  Labs Reviewed   CBC W/ AUTO DIFFERENTIAL - Abnormal; Notable for the following:        Result Value    RDW 14.8 (*)     All other components within normal limits   COMPREHENSIVE METABOLIC PANEL - Abnormal; Notable for the following:     Sodium 134 (*)     CO2 21 (*)     Alkaline  Phosphatase 33 (*)     All other components within normal limits   TOXICOLOGY SCREEN, URINE, RANDOM (COMPLIANCE) - Abnormal; Notable for the following:     Creatinine, Random Ur 11.0 (*)     All other components within normal limits    Narrative:     yellow and gray tops    VALPROIC ACID - Abnormal; Notable for the following:     Valproic Acid Lvl 45.6 (*)     All other components within normal limits    Narrative:     ADD ON VALP PER DR. MJ JUDGE AT  05/23/2018  14:41   ADD ON TSH PER DR. ELAYNE SMITH AT  05/23/2018  14:58    TROPONIN I   VALPROIC ACID   TSH             Medical Decision Making:   History:   Old Medical Records: I decided to obtain old medical records.  Old Records Summarized: records from clinic visits.       <> Summary of Records: Patient with a history of chest pain and palpitations. Patient was evaluated by cardiology on May 16th for exertional chest pain and sent for a stress test. She had an exercise stress echo on May 21st. It was abnormal showing ischemia in the inferolateral wall.   Initial Assessment:   51 yo f, h/o bipolar, c/o cp, exertional, pressure-like, x weeks, seen by cardiology as outpatient, with positive stress test 2 days ago.  Now here 2/2 worsening CP - severe episode last night and again this am, ax with neck and L arm pain.  NTG x 2 today with improvement in sx, only mild chest pressure at this time.  + SOB and palpitations, no n/v.  On exam, VSS, well-appearing, EKG without significant abnormalities  Differential Diagnosis:   Likely ACS  ED Management:  ASA  NTG  Cardiology consulted  Troponin  admission            Scribe Attestation:   Scribe #1: I performed the above scribed service and the documentation accurately describes the services I performed. I attest to the accuracy of the note.    Attending Attestation:             Attending ED Notes:   12:58 PM  Cardiology aware of pt  Troponin normal  Will admit to medicine             Clinical Impression:   The  primary encounter diagnosis was Chest pain. Diagnoses of Hypothyroidism, unspecified type and Major depressive disorder, single episode were also pertinent to this visit.       I, Dr. Madisyn Bowles, personally performed the services described in this documentation. All medical record entries made by the scribe were at my direction and in my presence.  I have reviewed the chart and agree that the record reflects my personal performance and is accurate and complete. Madisyn Bowles MD.  12:37 PM 05/24/2018                        Madisyn Bowles MD  05/24/18 0846

## 2018-05-23 NOTE — ED NOTES
Spoke with Sukhi RN on Cath Lab pt changed into gown only and 2nd IV established / meds given on call and pt transported to room via stretcher / monitor and RN

## 2018-05-23 NOTE — MEDICAL/APP STUDENT
"[facility]  History & Physical    SUBJECTIVE:     Chief Complaint/Reason for Admission: Chest pain     History of Present Illness:  Patient is a 53 yo female w/ PMH significant for MDD, anxiety, bipolar, alcohol abuse presenting today with L-sided substernal chest pain described has "heavy" and "as if an elephant is sitting on my chest". Pain is rated 10/10 and radiates to the left arm and scapula accompanied with left arm numbness. Pain has been going on intermittently since last Monday (5/14) and comes on with exertion and is relieved by rest. Last night pain was severe enough to prevent her from sleeping. Patient had the pain in the morning and took a nitroglycerin tablet which reduced the pain and after calling the cardiologist office presented to ED. She endorsed SOB, clammy hands, but denied nausea, vomiting, dizziness, sweating, cough, blurry vision.     PTA Medications   Medication Sig    Ca-D3-mag#11-zinc-cupr-man-bor 600 mg calcium- 800 unit-50 mg Tab Take 1 tablet by mouth once daily.    cariprazine (VRAYLAR) 1.5 mg Cap Take 1.5 mg by mouth once daily.    citalopram (CELEXA) 20 MG tablet Take 1 tablet (20 mg total) by mouth once daily.    cyanocobalamin, vitamin B-12, (VITAMIN B-12) 5,000 mcg Subl Place under the tongue once daily.      divalproex ER (DEPAKOTE ER) 500 MG Tb24 Take 1 tablet (500 mg total) by mouth once daily.    levothyroxine (SYNTHROID) 25 MCG tablet Take 1 tablet (25 mcg total) by mouth once daily.    nitroGLYCERIN (NITROSTAT) 0.4 MG SL tablet Place 1 tablet (0.4 mg total) under the tongue every 5 (five) minutes as needed for Chest pain.    temazepam (RESTORIL) 22.5 MG capsule Take 30 mg by mouth nightly as needed for Insomnia.    meloxicam (MOBIC) 15 MG tablet Take 1 tablet (15 mg total) by mouth daily as needed.    omeprazole (PRILOSEC) 20 MG capsule Take 1 capsule (20 mg total) by mouth once daily.    zaleplon (SONATA) 5 MG Cap 1 pill PO as needed for " insomnia.       Review of patient's allergies indicates:  No Known Allergies    Past Medical History:   Diagnosis Date    Alcohol abuse, in remission     Anxiety     Bipolar affective     since teenage years    Depression     H. pylori infection     Headache(784.0)     followed by Dr. Corona    Hypothyroid     Memory loss     Osteopenia     Seizures      Past Surgical History:   Procedure Laterality Date    BUNIONECTOMY      COLONOSCOPY N/A 4/12/2017    Procedure: COLONOSCOPY;  Surgeon: Estuardo Salazar MD;  Location: [address]);  Service: Endoscopy;  Laterality: N/A;  PM prep    TUBAL LIGATION       Family History   Problem Relation Age of Onset    Stroke Mother     Diabetes Mother     Dementia Maternal Grandmother     Crohn's disease Maternal Aunt     Crohn's disease Other     Melanoma Neg Hx     Psoriasis Neg Hx     Lupus Neg Hx     Colon cancer Neg Hx      Social History   Substance Use Topics    Smoking status: Never Smoker    Smokeless tobacco: Never Used    Alcohol use No      Comment: History of alcoholism in remission.        Review of Systems:  Review of Systems   Constitutional: Positive for diaphoresis. Negative for weight loss.   Eyes: Negative for blurred vision.   Respiratory: Positive for shortness of breath. Negative for cough.    Cardiovascular: Positive for chest pain and palpitations. Negative for leg swelling.   Gastrointestinal: Negative for abdominal pain, heartburn, nausea and vomiting.   Skin: Negative for rash.   Neurological: Negative for weakness.         OBJECTIVE:     Vital Signs (Most Recent):  Temp: 98.5 °F (36.9 °C) (05/23/18 1149)  Pulse: 66 (05/23/18 1600)  Resp: 18 (05/23/18 1600)  BP: 117/78 (05/23/18 1600)  SpO2: 96 % (05/23/18 1600)    Physical Exam:  Physical Exam   Cardiovascular: Regular rhythm, S1 normal and S2 normal.  Tachycardia present.  Exam reveals no gallop and no friction rub.        Laboratory:  CBC:   Recent Labs  Lab 05/23/18  1213    WBC 5.60   RBC 4.35   HGB 12.9   HCT 39.2      MCV 90   MCH 29.7   MCHC 32.9     CMP:   Recent Labs  Lab 05/23/18  1213   GLU 99   CALCIUM 9.4   ALBUMIN 4.3   PROT 7.5   *   K 4.7   CO2 21*      BUN 16   CREATININE 1.0   ALKPHOS 33*   ALT 16   AST 36   BILITOT 0.7       Cardiac markers:   Recent Labs  Lab 05/23/18  1213   TROPONINI <0.006       Diagnostic Results:  CXR: Heart size normal.  The lungs are clear.  No pleural effusion    EKG: Normal sinus rhythm. Nonspecific ST abnormality    ASSESSMENT/PLAN:     ***    Plan: {Plan; Diagnostics:20213}

## 2018-05-23 NOTE — H&P
"[facility] Hospital Medicine  History & Physical    Patient Name: Gay Gurrola  MRN: [id number]  Admission Date: 5/23/2018  Attending Physician: Lloyd Horton MD   Primary Care Provider: Jaja Mckeon MD    [facility] Medicine Team: [facility] HOSP MED 3 Jason Colon MD     Patient information was obtained from patient, past medical records and ER records.     Subjective:     Principal Problem:Chest pain    Chief Complaint:   Chief Complaint   Patient presents with    Chest Pain     with recent abnormal stress test. States had excruciating CP pain last night, worse than normal CP. Took 2 nitroglycerin PTA, reports only temporary relief.         HPI: Ms Gurrola is 53 yo female with hypothyroidism and bipolar disorder who presents with typical chest pain for the past week that has been progressively getting worse. Pain feels like tightness on chest "elephant sitting on my chest", no radiation but has numbness of left arm. Gets worse with activity and relieved with nitroglycerin. Had exercise stress echo 2 days ago suggestive of inferolateral wall ischemia. Also has sob with it and "flutter'. No prior episodes of chest pain. Denies nausea, vomiting, fever, chills, headaches.Also reports she has been feeling fatigues for the past few months. Usually runs up to 5 miles a day for 4-5 days per week.     Patient has family history of DM II and heart disease. Patient does not smoke nor drinks alcohol.     In the ED patient has normal troponin and EKG with NSR with nonspecific ST changes. Basic labs are unremarkable. Chest xray with no acute process. Given aspirin and nitroglycerin in the Ed with some relief. Cards plan for angio today.     Past Medical History:   Diagnosis Date    Alcohol abuse, in remission     Anxiety     Bipolar affective     since teenage years    Depression     H. pylori infection     Headache(784.0)     followed by Dr. Corona    Hypothyroid     Memory loss "     Osteopenia     Seizures        Past Surgical History:   Procedure Laterality Date    BUNIONECTOMY      COLONOSCOPY N/A 4/12/2017    Procedure: COLONOSCOPY;  Surgeon: Estuardo Salazar MD;  Location: [facility] ([address] [place] [address]);  Service: Endoscopy;  Laterality: N/A;  PM prep    TUBAL LIGATION         Review of patient's allergies indicates:  No Known Allergies    No current facility-administered medications on file prior to encounter.      Current Outpatient Prescriptions on File Prior to Encounter   Medication Sig    Ca-D3-mag#11-zinc-cupr-man-bor 600 mg calcium- 800 unit-50 mg Tab Take 1 tablet by mouth once daily.    cariprazine (VRAYLAR) 1.5 mg Cap Take 1.5 mg by mouth once daily.    citalopram (CELEXA) 20 MG tablet Take 1 tablet (20 mg total) by mouth once daily.    cyanocobalamin, vitamin B-12, (VITAMIN B-12) 5,000 mcg Subl Place under the tongue once daily.      divalproex ER (DEPAKOTE ER) 500 MG Tb24 Take 1 tablet (500 mg total) by mouth once daily.    levothyroxine (SYNTHROID) 25 MCG tablet Take 1 tablet (25 mcg total) by mouth once daily.    nitroGLYCERIN (NITROSTAT) 0.4 MG SL tablet Place 1 tablet (0.4 mg total) under the tongue every 5 (five) minutes as needed for Chest pain.    meloxicam (MOBIC) 15 MG tablet Take 1 tablet (15 mg total) by mouth daily as needed.    omeprazole (PRILOSEC) 20 MG capsule Take 1 capsule (20 mg total) by mouth once daily.    zaleplon (SONATA) 5 MG Cap 1 pill PO as needed for insomnia.     Family History     Problem Relation (Age of Onset)    Crohn's disease Maternal Aunt, Other    Dementia Maternal Grandmother    Diabetes Mother    Stroke Mother        Social History Main Topics    Smoking status: Never Smoker    Smokeless tobacco: Never Used    Alcohol use No      Comment: History of alcoholism in remission.    Drug use: No    Sexual activity: Not Currently     Review of Systems   Constitutional: Positive for fatigue. Negative for diaphoresis, fever and unexpected  weight change.   Respiratory: Positive for chest tightness and shortness of breath. Negative for cough and wheezing.    Cardiovascular: Positive for chest pain and palpitations. Negative for leg swelling.   Gastrointestinal: Negative for abdominal distention, abdominal pain, nausea and vomiting.   Genitourinary: Negative for dysuria, frequency and urgency.   Musculoskeletal: Negative for back pain, joint swelling and myalgias.   Skin: Negative for pallor and rash.   Neurological: Negative for dizziness, weakness and headaches.     Objective:     Vital Signs (Most Recent):  Temp: 98.5 °F (36.9 °C) (05/23/18 1149)  Pulse: 94 (05/23/18 1332)  Resp: 14 (05/23/18 1332)  BP: 113/68 (05/23/18 1332)  SpO2: 100 % (05/23/18 1332) Vital Signs (24h Range):  Temp:  [98.5 °F (36.9 °C)] 98.5 °F (36.9 °C)  Pulse:  [69-98] 94  Resp:  [10-18] 14  SpO2:  [100 %] 100 %  BP: (113-138)/(60-74) 113/68     Weight: 44 kg (97 lb)  Body mass index is 17.74 kg/m².    Physical Exam   Constitutional: She is oriented to person, place, and time. No distress.   Thin women in no acute distress    HENT:   Head: Normocephalic and atraumatic.   Eyes: EOM are normal. Pupils are equal, round, and reactive to light.   Neck: Normal range of motion. Neck supple. No JVD present.   Cardiovascular: Normal rate and regular rhythm.    No murmur heard.  Tachycardic    Pulmonary/Chest: Effort normal and breath sounds normal.   Abdominal: Soft. Bowel sounds are normal. There is no tenderness.   Musculoskeletal: Normal range of motion. She exhibits no edema or deformity.   Neurological: She is alert and oriented to person, place, and time. No cranial nerve deficit.   Skin: Skin is warm and dry. Capillary refill takes less than 2 seconds. No erythema.   Psychiatric: She has a normal mood and affect. Her behavior is normal.         CRANIAL NERVES     CN III, IV, VI   Pupils are equal, round, and reactive to light.  Extraocular motions are normal.        Significant  Labs:   CBC:   Recent Labs  Lab 05/23/18  1213   WBC 5.60   HGB 12.9   HCT 39.2        CMP:   Recent Labs  Lab 05/23/18  1213   *   K 4.7      CO2 21*   GLU 99   BUN 16   CREATININE 1.0   CALCIUM 9.4   PROT 7.5   ALBUMIN 4.3   BILITOT 0.7   ALKPHOS 33*   AST 36   ALT 16   ANIONGAP 13   EGFRNONAA >60.0     Troponin:   Recent Labs  Lab 05/23/18  1213   TROPONINI <0.006       Significant Imaging: I have reviewed all pertinent imaging results/findings within the past 24 hours.    Assessment/Plan:     * Chest pain    53 yo female with  Hypothyroidism, bipolar disorder, insomnia presents with new onset chest pain for the past week concerning for ACS. ACS secondary to vasospasm vs atherosclerotic. Also considered psychiatric etiologies such as anxiety/panic attack given history of mental illness. But no recent unusual stressful life event.    -Left sided chest pain with left arm numbness, exacerbated with activity and relief with nitro suggestive of typical chest pain concerning for ACS   -No prior history of chest pain  -Stress echo 2 days ago with inferolateral ischemic changes  -EKG with non specific ST changes and normal troponin   -Chest xray with no acute process  -Echo 2 days ago with normal EF, no wall motion abnormalities   -Lipid panel wnl   -Asprin and Nitro in the ED with some relief    Plan:  -Cath lab for angio- patent coronary arteries in all vessels  -BP with in goal   -Nitroglycerin as needed   -Consider ca++ blockers for likely coronary vasospasm  -Follow up with Lipid panel- recent labs unremarkable  -Follow up with PCP       Hypothyroid    -continue home synthroid       Bipolar affective    -Continue home regimen       Insomnia    -Extensive history of insomnia follows up with sleep medicine, tried multiple medications   -Currently takes Restoril and Zoleplon as needed, will hod for now  -Ramelteon  for now as needed.         VTE Risk Mitigation         Ordered     enoxaparin  injection 40 mg  Daily      05/23/18 1639     IP VTE LOW RISK PATIENT  Once      05/23/18 1309          Dispo:plan to discharge tomorrow if continued clinical stability.     Jason Colon MD  Department of Hospital Medicine   [facility]

## 2018-05-23 NOTE — HOSPITAL COURSE
Patient was admitted to hospital medicine on 05/23 with typical chest pain and sob with EKG showing non specific ST changes, normal Troponin given Nitroglycerin with symptomatic relief. Was taken to the cath lab by cardiology since patient had recent stress Echo that suggested inferolateral ischemia, angiogram done on admit showed patent coronary arteries. Patient stable post [facility] with symptoms resolved. Clinically stable, discharged home with PCP follow up. Take nitro as needed. Continue home medications.

## 2018-05-23 NOTE — PROGRESS NOTES
POST CATH NOTE    Procedure:  Coronary angiogram and [facility]  Referring MD:  Dr Horton  Indication: UA  Access:  R radial    Findings:  Normal Coronaries  LVEDP: 7 mmHg      Intervention:  None    Patient tolerated the procedure well, no complications    Post Cath Exam:  Vitals:    05/23/18 1332   BP: 113/68   Pulse: 94   Resp: 14   Temp:      No unusual pain, hematoma or thrill at vascular access site.  Distal pulse present without signs of ischemia.    Post-procedure orders as per EPIC    Recommendation:  -ok to discontinue DAPT and heparin  -continue outpatient medications  -check TSH   -follow up with PCP  -ok to be discharged from cardiac standpoint after cath orders are completed.     Hollis Mauro MD  Cardiology Fellow, PGY VI  Pager: [phone]  5/23/2018 3:05 PM

## 2018-05-23 NOTE — ASSESSMENT & PLAN NOTE
-Extensive history of insomnia follows up with sleep medicine, tried multiple medications   -Currently takes Restoril and Zoleplon as needed, will hod for now  -Ramelteon  for now as needed.

## 2018-05-23 NOTE — TELEPHONE ENCOUNTER
Pt was called an informed about her positive stress test (likely RCA territory) was told that we will set her up to see interventional clinic. However she is complaining of chest pain at this time and has some associated shortness of breath which is new. She does have nitro at home but has not taken them. Have instructed patient go to the ER for further evaluation.     Jules Denton MD  Cardiology Fellow  Pager [phone]

## 2018-05-23 NOTE — SUBJECTIVE & OBJECTIVE
Past Medical History:   Diagnosis Date    Alcohol abuse, in remission     Anxiety     Bipolar affective     since teenage years    Depression     H. pylori infection     Headache(784.0)     followed by Dr. Corona    Hypothyroid     Memory loss     Osteopenia     Seizures        Past Surgical History:   Procedure Laterality Date    BUNIONECTOMY      COLONOSCOPY N/A 4/12/2017    Procedure: COLONOSCOPY;  Surgeon: Estuardo Salazar MD;  Location: [address]);  Service: Endoscopy;  Laterality: N/A;  PM prep    TUBAL LIGATION         Review of patient's allergies indicates:  No Known Allergies    No current facility-administered medications on file prior to encounter.      Current Outpatient Prescriptions on File Prior to Encounter   Medication Sig    Ca-D3-mag#11-zinc-cupr-man-bor 600 mg calcium- 800 unit-50 mg Tab Take 1 tablet by mouth once daily.    cariprazine (VRAYLAR) 1.5 mg Cap Take 1.5 mg by mouth once daily.    citalopram (CELEXA) 20 MG tablet Take 1 tablet (20 mg total) by mouth once daily.    cyanocobalamin, vitamin B-12, (VITAMIN B-12) 5,000 mcg Subl Place under the tongue once daily.      divalproex ER (DEPAKOTE ER) 500 MG Tb24 Take 1 tablet (500 mg total) by mouth once daily.    levothyroxine (SYNTHROID) 25 MCG tablet Take 1 tablet (25 mcg total) by mouth once daily.    nitroGLYCERIN (NITROSTAT) 0.4 MG SL tablet Place 1 tablet (0.4 mg total) under the tongue every 5 (five) minutes as needed for Chest pain.    meloxicam (MOBIC) 15 MG tablet Take 1 tablet (15 mg total) by mouth daily as needed.    omeprazole (PRILOSEC) 20 MG capsule Take 1 capsule (20 mg total) by mouth once daily.    zaleplon (SONATA) 5 MG Cap 1 pill PO as needed for insomnia.     Family History     Problem Relation (Age of Onset)    Crohn's disease Maternal Aunt, Other    Dementia Maternal Grandmother    Diabetes Mother    Stroke Mother        Social History Main Topics    Smoking status: Never Smoker    Smokeless  tobacco: Never Used    Alcohol use No      Comment: History of alcoholism in remission.    Drug use: No    Sexual activity: Not Currently     Review of Systems   Constitutional: Positive for fatigue. Negative for diaphoresis, fever and unexpected weight change.   Respiratory: Positive for chest tightness and shortness of breath. Negative for cough and wheezing.    Cardiovascular: Positive for chest pain and palpitations. Negative for leg swelling.   Gastrointestinal: Negative for abdominal distention, abdominal pain, nausea and vomiting.   Genitourinary: Negative for dysuria, frequency and urgency.   Musculoskeletal: Negative for back pain, joint swelling and myalgias.   Skin: Negative for pallor and rash.   Neurological: Negative for dizziness, weakness and headaches.     Objective:     Vital Signs (Most Recent):  Temp: 98.5 °F (36.9 °C) (05/23/18 1149)  Pulse: 94 (05/23/18 1332)  Resp: 14 (05/23/18 1332)  BP: 113/68 (05/23/18 1332)  SpO2: 100 % (05/23/18 1332) Vital Signs (24h Range):  Temp:  [98.5 °F (36.9 °C)] 98.5 °F (36.9 °C)  Pulse:  [69-98] 94  Resp:  [10-18] 14  SpO2:  [100 %] 100 %  BP: (113-138)/(60-74) 113/68     Weight: 44 kg (97 lb)  Body mass index is 17.74 kg/m².    Physical Exam   Constitutional: She is oriented to person, place, and time. No distress.   Thin women in no acute distress    HENT:   Head: Normocephalic and atraumatic.   Eyes: EOM are normal. Pupils are equal, round, and reactive to light.   Neck: Normal range of motion. Neck supple. No JVD present.   Cardiovascular: Normal rate and regular rhythm.    No murmur heard.  Tachycardic    Pulmonary/Chest: Effort normal and breath sounds normal.   Abdominal: Soft. Bowel sounds are normal. There is no tenderness.   Musculoskeletal: Normal range of motion. She exhibits no edema or deformity.   Neurological: She is alert and oriented to person, place, and time. No cranial nerve deficit.   Skin: Skin is warm and dry. Capillary refill takes  less than 2 seconds. No erythema.   Psychiatric: She has a normal mood and affect. Her behavior is normal.         CRANIAL NERVES     CN III, IV, VI   Pupils are equal, round, and reactive to light.  Extraocular motions are normal.        Significant Labs:   CBC:   Recent Labs  Lab 05/23/18  1213   WBC 5.60   HGB 12.9   HCT 39.2        CMP:   Recent Labs  Lab 05/23/18  1213   *   K 4.7      CO2 21*   GLU 99   BUN 16   CREATININE 1.0   CALCIUM 9.4   PROT 7.5   ALBUMIN 4.3   BILITOT 0.7   ALKPHOS 33*   AST 36   ALT 16   ANIONGAP 13   EGFRNONAA >60.0     Troponin:   Recent Labs  Lab 05/23/18  1213   TROPONINI <0.006       Significant Imaging: I have reviewed all pertinent imaging results/findings within the past 24 hours.

## 2018-05-23 NOTE — H&P
[facility]  Cardiology Consult Note    Attending Physician: Lloyd Horton MD  Reason for Consult: Chest pain    HPI:   Ms. Gurrola is a 52 y.o. Lady with MDD, anxiety/bipolar - who presents with chest pain.  She reports the chest pain first began several weeks ago, initially only with exertion but has progressed to chest pain at rest in the past several days.  She describes the chest pain as dull, substernal to left sided with radiation to the left arm and back.  Pain is relieved with rest.  Denies any palpitations, no syncope, no orthopnea, no pre-syncope.  She has never had symptoms like this prior to this month, normally exercise running 5 miles daily without complaint.  In the past 24 hours she began to have episodes of chest pain night while at rest associated with shortness of breath and diaphoresis, episodes lasting several minutes every hour. No tobacco no family history. She recently saw Dr. Denton in clinic last week who ordered an exercise stress echo, during which time she exercise to 17 METs, with mild EKG changes and echo which was interpreted as positive in the inferolateral wall.  She was given sL NTG last week which she has taken last night and this morning and symptoms are relieved with NTG.    Review of Systems   Review of Systems   Constitution: Positive for diaphoresis.   HENT: Negative.    Eyes: Negative.    Cardiovascular: Positive for chest pain and dyspnea on exertion. Negative for claudication, cyanosis, irregular heartbeat, leg swelling, near-syncope, orthopnea, palpitations, paroxysmal nocturnal dyspnea and syncope.   Respiratory: Positive for shortness of breath.    Endocrine: Negative.    Hematologic/Lymphatic: Negative.    Skin: Negative.    Musculoskeletal: Negative.    Gastrointestinal: Negative.    Genitourinary: Negative.    Neurological: Negative.          PMH:     Past Medical History:   Diagnosis Date    Alcohol abuse, in remission     Anxiety     Bipolar  affective     since teenage years    Depression     H. pylori infection     Headache(784.0)     followed by Dr. Corona    Hypothyroid     Memory loss     Osteopenia     Seizures      Past Surgical History:   Procedure Laterality Date    BUNIONECTOMY      COLONOSCOPY N/A 4/12/2017    Procedure: COLONOSCOPY;  Surgeon: Estuardo Salazar MD;  Location: [facility] ([address] [place] [address]);  Service: Endoscopy;  Laterality: N/A;  PM prep    TUBAL LIGATION          Allergies:     Review of patient's allergies indicates:  No Known Allergies     Medications:     No current facility-administered medications on file prior to encounter.      Current Outpatient Prescriptions on File Prior to Encounter   Medication Sig Dispense Refill    Ca-D3-mag#11-zinc-cupr-man-bor 600 mg calcium- 800 unit-50 mg Tab Take 1 tablet by mouth once daily.      cariprazine (VRAYLAR) 1.5 mg Cap Take 1.5 mg by mouth once daily.      citalopram (CELEXA) 20 MG tablet Take 1 tablet (20 mg total) by mouth once daily. 30 tablet 3    cyanocobalamin, vitamin B-12, (VITAMIN B-12) 5,000 mcg Subl Place under the tongue once daily.        divalproex ER (DEPAKOTE ER) 500 MG Tb24 Take 1 tablet (500 mg total) by mouth once daily. 30 tablet 3    levothyroxine (SYNTHROID) 25 MCG tablet Take 1 tablet (25 mcg total) by mouth once daily. 90 tablet 3    nitroGLYCERIN (NITROSTAT) 0.4 MG SL tablet Place 1 tablet (0.4 mg total) under the tongue every 5 (five) minutes as needed for Chest pain. 25 tablet 0    meloxicam (MOBIC) 15 MG tablet Take 1 tablet (15 mg total) by mouth daily as needed. 30 tablet 0    omeprazole (PRILOSEC) 20 MG capsule Take 1 capsule (20 mg total) by mouth once daily. 90 capsule 3    zaleplon (SONATA) 5 MG Cap 1 pill PO as needed for insomnia. 30 capsule 1        Social History:     Social History   Substance Use Topics    Smoking status: Never Smoker    Smokeless tobacco: Never Used    Alcohol use No      Comment: History of alcoholism in remission.         Family History:     Family History   Problem Relation Age of Onset    Stroke Mother     Diabetes Mother     Dementia Maternal Grandmother     Crohn's disease Maternal Aunt     Crohn's disease Other     Melanoma Neg Hx     Psoriasis Neg Hx     Lupus Neg Hx     Colon cancer Neg Hx         Physical Exam:     Vitals:  Temp:  [98.5 °F (36.9 °C)]   Pulse:  [69-98]   Resp:  [10-18]   BP: (114-138)/(60-74)   SpO2:  [100 %]   I/O's:  No intake or output data in the 24 hours ending 05/23/18 1332     Physical Exam   Constitutional: She is oriented to person, place, and time. She appears well-developed and well-nourished. No distress.   HENT:   Head: Normocephalic and atraumatic.   Mouth/Throat: No oropharyngeal exudate.   Eyes: EOM are normal. Pupils are equal, round, and reactive to light. No scleral icterus.   Neck: Normal range of motion. Neck supple. No JVD present.   Cardiovascular: Normal rate.  Exam reveals no gallop and no friction rub.    No murmur heard.  Pulmonary/Chest: Effort normal. No respiratory distress. She has no wheezes. She has no rales. She exhibits no tenderness.   Abdominal: Soft. She exhibits no distension and no mass. There is no tenderness. There is no rebound and no guarding.   Musculoskeletal: Normal range of motion. She exhibits no edema.   Neurological: She is alert and oriented to person, place, and time.   Skin: Skin is warm and dry. She is not diaphoretic. No erythema.   Psychiatric: She has a normal mood and affect.     Labs:     Recent Results (from the past 336 hour(s))   CBC auto differential    Collection Time: 05/23/18 12:13 PM   Result Value Ref Range    WBC 5.60 3.90 - 12.70 K/uL    Hemoglobin 12.9 12.0 - 16.0 g/dL    Hematocrit 39.2 37.0 - 48.5 %    Platelets 210 150 - 350 K/uL     Lab Results   Component Value Date    CHOL 184 11/30/2017     (H) 11/30/2017    LDLCALC 61.0 (L) 11/30/2017    TRIG 35 11/30/2017       Recent Labs  Lab 05/23/18  1213   TROPONINI  <0.006     Estimated Creatinine Clearance: 46.8 mL/min (based on SCr of 1 mg/dL).    Imaging:  CXR: PENDING    Echo:  TEST DESCRIPTION   The patient exercised for 10.0 minutes on a High Ramp protocol, corresponding to a functional capacity of 17 estimated METS, achieving a peak heart rate of 164 bpm, which is 101% of the age predicted maximum heart rate. The patient discontinued exercise   secondary to shortness of breath.     There were no significant electrocardiographic changes throughout the protocol suggesting ischemia.     EKG Conclusions:    1. The EKG portion of this study is negative for ischemia at a high workload, and peak heart rate of 164 bpm (101% of predicted).   2. Exercise capacity is above average.   3. Blood pressure response to exercise was normal (Presenting BP: 111/63 Peak BP: 149/80).   4. The following arrhythmias were present: occasional PVCs.   5. There were no symptoms of chest discomfort or significant dyspnea throughout the protocol.   6. The Duke treadmill score was 10 suggesting a low probability for future cardiovascular events.    Echocardiographic Description:      Aorta: The aortic root is normal in size, measuring 2.4 cm at sinotubular junction and 2.7 cm at Sinuses of Valsalva. The proximal ascending aorta is normal in size, measuring 2.6 cm across.     Left Atrium: The left atrial volume index is normal, measuring 25.91 cc/m2.     Left Ventricle: The left ventricle is normal in size, with an end-diastolic diameter of 4.0 cm, and an end-systolic diameter of 2.6 cm. LV wall thickness is normal, with the septum measuring 0.6 cm and the posterior wall measuring 0.5 cm across. Relative   wall thickness was normal at 0.25, and the LV mass index was 41.3 g/m2 consistent with normal left ventricular mass. There are no regional wall motion abnormalities. Left ventricular systolic function appears normal. Visually estimated ejection fraction   is 60-65%. The LV Doppler derived stroke  volume equals 60.0 ccs.     Diastolic indices: E wave velocity 1.1 m/s, E/A ratio 1.2,  msec., E/e' ratio(avg) 7. Diastolic function is normal.     Right Atrium: The right atrium is normal in size, measuring 4.2 cm in length and 3.3 cm in width in the apical view.     Right Ventricle: The right ventricle is normal in size measuring 2.8 cm at the base in the apical right ventricle-focused view. Global right ventricular systolic function appears normal. Tricuspid annular plane systolic excursion (TAPSE) is 2.4 cm.   Tissue Doppler-derived tricuspid annular peak systolic velocity (S prime) is 16.2 cm/s.     Aortic Valve:  Aortic valve is normal in structure with normal leaflet mobility.     Mitral Valve:  Mitral valve is normal in structure with normal leaflet mobility.     Tricuspid Valve:  Tricuspid valve is normal in structure with normal leaflet mobility.     Pulmonary Valve:  The pulmonic valve is not well seen.     Intracavitary: There is no evidence of pericardial effusion, intracavity mass, thrombi, or vegetation.     Post Exercise Imaging:    Immediate post exercise images demonstrate left ventricular function augmenting. Right ventricular function augments. Left ventricular end systolic volume decreases.     The inferolateral wall worsens becoming hypokinetic.       CONCLUSIONS     1 - Normal left ventricular systolic function (EF 60-65%).     2 - No wall motion abnormalities.     3 - Normal left ventricular diastolic function.     4 - Normal right ventricular systolic function .     5 - High work load w/o angina.     Positive stress echocardiographic study demonstrating an ischemic response involving the inferolateral wall.     EKG:   Sinus, non-specific ST changes    Assessment & Recommendations:   Ms. Gurrola is a 52 y.o. Lady with MDD, anxiety/bipolar - who presents with chest pain.    #Chest pain - initially typical pain however pain at rest in the past 24 hours.  Reassuring with no specific EKG  changes and initial troponin negative.  With pain relieved with NTG and recent positive stress test will evaluate for obstructive coronary artery disease with LHC    -LHC +/- PCI  -Right radial access  -s/p Plavix load and ASA, will evaluate for heparin in procedure.  -Risk and benefits of cath discussed  With patient and she is agreeable for procedure.    Thank you for this consult.     Signed:  Davin Eubanks M.D.  Cardiology Fellow  Pager: [phone]  5/23/2018 1:32 PM    Attending Addendum:

## 2018-05-23 NOTE — ED TRIAGE NOTES
"Pt reports onset several days with " elephant sitting on my chest" took NTG x 2 PTA with + results currently pain 3/ 10  "

## 2018-05-23 NOTE — ASSESSMENT & PLAN NOTE
53 yo female with  Hypothyroidism, bipolar disorder, insomnia presents with new onset chest pain for the past week concerning for ACS. ACS secondary to vasospasm vs atherosclerotic. Also considered psychiatric etiologies such as anxiety/panic attack given history of mental illness. But no recent unusual stressful life event.    -Left sided chest pain with left arm numbness, exacerbated with activity and relief with nitro suggestive of typical chest pain concerning for ACS   -No prior history of chest pain  -Stress echo 2 days ago with inferolateral ischemic changes  -EKG with non specific ST changes and normal troponin   -Chest xray with no acute process  -Echo 2 days ago with normal EF, no wall motion abnormalities   -Lipid panel wnl   -Asprin and Nitro in the ED with some relief    Plan:  -Cath lab for angio- patent coronary arteries in all vessels  -BP with in goal   -Nitroglycerin as needed   -Consider ca++ blockers for likely coronary vasospasm  -Follow up with Lipid panel- recent labs unremarkable  -Follow up with PCP

## 2018-05-24 VITALS
DIASTOLIC BLOOD PRESSURE: 71 MMHG | OXYGEN SATURATION: 96 % | HEART RATE: 62 BPM | TEMPERATURE: 98 F | RESPIRATION RATE: 20 BRPM | WEIGHT: 96.31 LBS | HEIGHT: 62 IN | BODY MASS INDEX: 17.72 KG/M2 | SYSTOLIC BLOOD PRESSURE: 107 MMHG

## 2018-05-24 LAB
ANION GAP SERPL CALC-SCNC: 6 MMOL/L
BASOPHILS # BLD AUTO: 0.03 K/UL
BASOPHILS NFR BLD: 0.7 %
BUN SERPL-MCNC: 13 MG/DL
CALCIUM SERPL-MCNC: 8.7 MG/DL
CHLORIDE SERPL-SCNC: 110 MMOL/L
CHOLEST SERPL-MCNC: 139 MG/DL
CHOLEST SERPL-MCNC: 139 MG/DL
CHOLEST/HDLC SERPL: 1.8 {RATIO}
CHOLEST/HDLC SERPL: 1.8 {RATIO}
CO2 SERPL-SCNC: 26 MMOL/L
CREAT SERPL-MCNC: 0.9 MG/DL
DIFFERENTIAL METHOD: ABNORMAL
EOSINOPHIL # BLD AUTO: 0.1 K/UL
EOSINOPHIL NFR BLD: 2.5 %
ERYTHROCYTE [DISTWIDTH] IN BLOOD BY AUTOMATED COUNT: 15 %
EST. GFR  (AFRICAN AMERICAN): >60 ML/MIN/1.73 M^2
EST. GFR  (NON AFRICAN AMERICAN): >60 ML/MIN/1.73 M^2
GLUCOSE SERPL-MCNC: 76 MG/DL
HCT VFR BLD AUTO: 38.8 %
HDLC SERPL-MCNC: 79 MG/DL
HDLC SERPL-MCNC: 79 MG/DL
HDLC SERPL: 56.8 %
HDLC SERPL: 56.8 %
HGB BLD-MCNC: 12.5 G/DL
IMM GRANULOCYTES # BLD AUTO: 0.01 K/UL
IMM GRANULOCYTES NFR BLD AUTO: 0.2 %
LDLC SERPL CALC-MCNC: 51.2 MG/DL
LDLC SERPL CALC-MCNC: 51.2 MG/DL
LYMPHOCYTES # BLD AUTO: 2 K/UL
LYMPHOCYTES NFR BLD: 48.3 %
MAGNESIUM SERPL-MCNC: 2.2 MG/DL
MCH RBC QN AUTO: 29.3 PG
MCHC RBC AUTO-ENTMCNC: 32.2 G/DL
MCV RBC AUTO: 91 FL
MONOCYTES # BLD AUTO: 0.5 K/UL
MONOCYTES NFR BLD: 11.4 %
NEUTROPHILS # BLD AUTO: 1.5 K/UL
NEUTROPHILS NFR BLD: 36.9 %
NONHDLC SERPL-MCNC: 60 MG/DL
NONHDLC SERPL-MCNC: 60 MG/DL
NRBC BLD-RTO: 0 /100 WBC
PHOSPHATE SERPL-MCNC: 4 MG/DL
PLATELET # BLD AUTO: 218 K/UL
PMV BLD AUTO: 10.5 FL
POTASSIUM SERPL-SCNC: 4.4 MMOL/L
RBC # BLD AUTO: 4.27 M/UL
SODIUM SERPL-SCNC: 142 MMOL/L
TRIGL SERPL-MCNC: 44 MG/DL
TRIGL SERPL-MCNC: 44 MG/DL
WBC # BLD AUTO: 4.04 K/UL

## 2018-05-24 PROCEDURE — 85025 COMPLETE CBC W/AUTO DIFF WBC: CPT

## 2018-05-24 PROCEDURE — 25000003 PHARM REV CODE 250: Performed by: STUDENT IN AN ORGANIZED HEALTH CARE EDUCATION/TRAINING PROGRAM

## 2018-05-24 PROCEDURE — 84100 ASSAY OF PHOSPHORUS: CPT

## 2018-05-24 PROCEDURE — 99239 HOSP IP/OBS DSCHRG MGMT >30: CPT | Mod: ,,, | Performed by: HOSPITALIST

## 2018-05-24 PROCEDURE — 80061 LIPID PANEL: CPT

## 2018-05-24 PROCEDURE — 36415 COLL VENOUS BLD VENIPUNCTURE: CPT

## 2018-05-24 PROCEDURE — 83735 ASSAY OF MAGNESIUM: CPT

## 2018-05-24 PROCEDURE — 80048 BASIC METABOLIC PNL TOTAL CA: CPT

## 2018-05-24 RX ADMIN — DIVALPROEX SODIUM 500 MG: 500 TABLET, EXTENDED RELEASE ORAL at 09:05

## 2018-05-24 RX ADMIN — PANTOPRAZOLE SODIUM 40 MG: 40 TABLET, DELAYED RELEASE ORAL at 09:05

## 2018-05-24 RX ADMIN — LEVOTHYROXINE SODIUM 25 MCG: 25 TABLET ORAL at 05:05

## 2018-05-24 RX ADMIN — CITALOPRAM HYDROBROMIDE 20 MG: 10 TABLET ORAL at 09:05

## 2018-05-24 NOTE — PLAN OF CARE
Problem: Patient Care Overview  Goal: Plan of Care Review  Outcome: Ongoing (interventions implemented as appropriate)  No acute events overnight. Patient denied any pain throughout the shift. Possible D/C in the am w/OP F/U w/Cardiology.

## 2018-05-24 NOTE — NURSING
Patient given discharge instructions. Reviewed with patient . Instructions given to patient. Verbalized understanding. Iv, tele, and Visi removed from patient.

## 2018-05-24 NOTE — DISCHARGE SUMMARY
"[facility] Hospital Medicine  Discharge Summary      Patient Name: Gay Gurrola  MRN: [id number]  Admission Date: 5/23/2018  Hospital Length of Stay: 1 days  Discharge Date and Time:  05/24/2018 11:14 AM  Attending Physician: Lloyd Horton MD   Discharging Provider: aJson Colon MD  Primary Care Provider: Jaja Mckeon MD  Hospital Medicine Team: [facility] HOSP MED 3 Jason Colon MD    HPI:   Ms Gurrola is 53 yo female with hypothyroidism and bipolar disorder who presents with typical chest pain for the past week that has been progressively getting worse. Pain feels like tightness on chest "elephant sitting on my chest", no radiation but has numbness of left arm. Gets worse with activity and relieved with nitroglycerin. Had exercise stress echo 2 days ago suggestive of inferolateral wall ischemia. Also has sob with it and "flutter'. No prior episodes of chest pain. Denies nausea, vomiting, fever, chills, headaches.Also reports she has been feeling fatigues for the past few months. Usually runs up to 5 miles a day for 4-5 days per week.     Patient has family history of DM II and heart disease. Patient does not smoke nor drinks alcohol.     In the ED patient has normal troponin and EKG with NSR with nonspecific ST changes. Basic labs are unremarkable. Chest xray with no acute process. Given aspirin and nitroglycerin in the Ed with some relief. Cards plan for angio today.     Procedure(s) (LRB):  Left heart cath (Left)      Hospital Course:   Patient was admitted to hospital medicine on 05/23 with typical chest pain and sob with EKG showing non specific ST changes, normal Troponin given Nitroglycerin with symptomatic relief. Was taken to the cath lab by cardiology since patient had recent stress Echo that suggested inferolateral ischemia, angiogram done on admit showed patent coronary arteries. Unclear underlying cause of chest pain likely vasospasm. Patient stable post [facility] " with symptoms resolved. Clinically stable, discharged home with PCP follow up. Take nitro as needed. Continue home medications.      Consults:   Vitals:    05/24/18 0833   BP: 107/71   Pulse: 67   Resp: 20   Temp: 97.6 °F (36.4 °C)       Physical Exam   Constitutional: She is oriented to person, place, and time. No distress.   Thin women in no acute distress    HENT:   Head: Normocephalic and atraumatic.   Eyes: EOM are normal. Pupils are equal, round, and reactive to light.   Neck: Normal range of motion. Neck supple. No JVD present.   Cardiovascular: Normal rate and regular rhythm.    No murmur heard.   Pulmonary/Chest: Effort normal and breath sounds normal.   Abdominal: Soft. Bowel sounds are normal. There is no tenderness.   Musculoskeletal: Normal range of motion. She exhibits no edema or deformity.   Neurological: She is alert and oriented to person, place, and time. No cranial nerve deficit.   Skin: Skin is warm and dry. Capillary refill takes less than 2 seconds. No erythema.   Psychiatric: She has a normal mood and affect. Her behavior is normal.      * Other chest pain    51 yo female with  Hypothyroidism, bipolar disorder, insomnia presents with new onset chest pain for the past week concerning for ACS. ACS secondary to vasospasm vs atherosclerotic. Also considered psychiatric etiologies such as anxiety/panic attack given history of mental illness. But no recent unusual stressful life event.    -Left sided chest pain with left arm numbness, exacerbated with activity and relief with nitro suggestive of typical chest pain concerning for ACS   -No prior history of chest pain  -Stress echo 2 days ago with inferolateral ischemic changes  -EKG with non specific ST changes and normal troponin   -Chest xray with no acute process  -Echo 2 days ago with normal EF, no wall motion abnormalities   -Lipid panel wnl   -Asprin and Nitro in the ED with some relief    Plan:  -Cath lab for angio- patent coronary arteries  in all vessels. Unclear etiology of chest pain. Likely vasospasm.   -BP with in goal   -Nitroglycerin as needed   -Lipid panel- unremarkable  -Follow up with PCP       Hypothyroid    -continue home synthroid       Bipolar affective    -Continue home regimen       Insomnia    -Extensive history of insomnia follows up with sleep medicine, tried multiple medications   -Currently takes Restoril and Zoleplon as needed, will hod for now  -Ramelteon  for now as needed.         Final Active Diagnoses:    Diagnosis Date Noted POA    PRINCIPAL PROBLEM:  Other chest pain [R07.89] 05/16/2018 Yes    Hypothyroid [E03.9]  Yes    Bipolar affective [F31.9]  Yes    Insomnia [G47.00] 11/30/2017 Yes      Problems Resolved During this Admission:    Diagnosis Date Noted Date Resolved POA       Discharged Condition: stable    Disposition: Home or Self Care    Follow Up:  Follow-up Information     Jaja Mckeon MD. Schedule an appointment as soon as possible for a visit in 1 week.    Specialty:  Family Medicine  Contact information:  [address] [facility] [facility] [address] [phone]                 Patient Instructions:     Activity as tolerated     Notify your health care provider if you experience any of the following:  severe uncontrolled pain         Significant Diagnostic Studies: Labs:   CMP   Recent Labs  Lab 05/23/18  1213 05/24/18  0549   * 142   K 4.7 4.4    110   CO2 21* 26   GLU 99 76   BUN 16 13   CREATININE 1.0 0.9   CALCIUM 9.4 8.7   PROT 7.5  --    ALBUMIN 4.3  --    BILITOT 0.7  --    ALKPHOS 33*  --    AST 36  --    ALT 16  --    ANIONGAP 13 6*   ESTGFRAFRICA >60.0 >60.0   EGFRNONAA >60.0 >60.0    and CBC   Recent Labs  Lab 05/23/18  1213 05/24/18  0549   WBC 5.60 4.04   HGB 12.9 12.5   HCT 39.2 38.8    218       Pending Diagnostic Studies:     None         Medications:  Reconciled Home Medications:      Medication List      CONTINUE taking these medications     [id number]-zinc--pam-bor 600 mg calcium- 800 unit-50 mg Tab  Take 1 tablet by mouth once daily.     citalopram 20 MG tablet  Commonly known as:  CELEXA  Take 1 tablet (20 mg total) by mouth once daily.     divalproex  MG Tb24  Commonly known as:  DEPAKOTE ER  Take 1 tablet (500 mg total) by mouth once daily.     levothyroxine 25 MCG tablet  Commonly known as:  SYNTHROID  Take 1 tablet (25 mcg total) by mouth once daily.     meloxicam 15 MG tablet  Commonly known as:  MOBIC  Take 1 tablet (15 mg total) by mouth daily as needed.     nitroGLYCERIN 0.4 MG SL tablet  Commonly known as:  NITROSTAT  Place 1 tablet (0.4 mg total) under the tongue every 5 (five) minutes as needed for Chest pain.     omeprazole 20 MG capsule  Commonly known as:  PRILOSEC  Take 1 capsule (20 mg total) by mouth once daily.     temazepam 22.5 MG capsule  Commonly known as:  RESTORIL  Take 30 mg by mouth nightly as needed for Insomnia.     VITAMIN B-12 5,000 mcg Subl  Generic drug:  cyanocobalamin (vitamin B-12)  Place under the tongue once daily.     VRAYLAR 1.5 mg Cap  Generic drug:  cariprazine  Take 1.5 mg by mouth once daily.     zaleplon 5 MG Cap  Commonly known as:  SONATA  1 pill PO as needed for insomnia.            Indwelling Lines/Drains at time of discharge:   Lines/Drains/Airways          No matching active lines, drains, or airways          Time spent on the discharge of patient: 45 minutes  Patient was seen and examined on the date of discharge and determined to be suitable for discharge.         Jason Colon MD  Department of Hospital Medicine  [facility]

## 2018-05-24 NOTE — ASSESSMENT & PLAN NOTE
53 yo female with  Hypothyroidism, bipolar disorder, insomnia presents with new onset chest pain for the past week concerning for ACS. ACS secondary to vasospasm vs atherosclerotic. Also considered psychiatric etiologies such as anxiety/panic attack given history of mental illness. But no recent unusual stressful life event.    -Left sided chest pain with left arm numbness, exacerbated with activity and relief with nitro suggestive of typical chest pain concerning for ACS   -No prior history of chest pain  -Stress echo 2 days ago with inferolateral ischemic changes  -EKG with non specific ST changes and normal troponin   -Chest xray with no acute process  -Echo 2 days ago with normal EF, no wall motion abnormalities   -Lipid panel wnl   -Asprin and Nitro in the ED with some relief    Plan:  -Cath lab for angio- patent coronary arteries in all vessels  -BP with in goal   -Nitroglycerin as needed   -Lipid panel- unremarkable  -Follow up with PCP

## 2018-05-24 NOTE — PLAN OF CARE
05/24/18 1229   Discharge Assessment   Assessment Type Discharge Planning Assessment   Assessment information obtained from? Medical Record   Expected Length of Stay (days) 1   Communicated expected length of stay with patient/caregiver no   Prior to hospitilization cognitive status: Alert/Oriented   Prior to hospitalization functional status: Independent   Current cognitive status: Alert/Oriented   Current Functional Status: Independent   Lives With spouse;child(yani), adult   Able to Return to Prior Arrangements yes   Is patient able to care for self after discharge? Yes   Who are your caregiver(s) and their phone number(s)? self care   Patient's perception of discharge disposition home or selfcare   Readmission Within The Last 30 Days no previous admission in last 30 days   Patient currently being followed by outpatient case management? No   Patient currently receives any other outside agency services? No   Equipment Currently Used at Home none   Do you have any problems affording any of your prescribed medications? No   Is the patient taking medications as prescribed? yes   Does the patient have transportation home? Yes   Transportation Available family or friend will provide   Does the patient receive services at the Coumadin Clinic? No   Discharge Plan A Home with family   Patient/Family In Agreement With Plan yes

## 2018-05-24 NOTE — MEDICAL/APP STUDENT
"[facility] Hospital Medicine  Progress Note    Patient Name: Gay Gurrola  MRN: [id number]  Patient Class: IP- Inpatient   Admission Date: 5/23/2018  Length of Stay: 1 days  Attending Physician: Lloyd Horton MD  Primary Care Provider: Jaja Mckeon MD    [facility] Medicine Team: [facility] HOSP MED 3 Srikanth Murray    Subjective:     Principal Problem:Other chest pain    HPI: Patient is a 53 yo female w/ PMH significant for MDD, anxiety, bipolar, alcohol abuse presenting today with L-sided substernal chest pain described has "heavy" and "as if an elephant is sitting on my chest". Pain is rated 10/10 and radiates to the left arm and scapula accompanied with left arm numbness. Pain has been going on intermittently since last Monday (5/14) and comes on with exertion and is relieved by rest. Last night pain was severe enough to prevent her from sleeping. Patient had the pain in the morning and took a nitroglycerin tablet which reduced the pain and after calling the cardiologist office presented to ED. She endorsed SOB, clammy hands, but denied nausea, vomiting, dizziness, sweating, cough, blurry vision.     Hospital Course:   5/23: Troponins negative. Angiogram revealed patent coronaries. Sx thought to be due to anxiety / panic attack.     Interval History: NAEON. Didn't sleep much but this is her baseline insomnia. No CP, SOB.     Review of Systems   Constitutional: Negative for chills, diaphoresis and fever.   Respiratory: Negative for cough, chest tightness and shortness of breath.    Cardiovascular: Negative for chest pain, palpitations and leg swelling.   Gastrointestinal: Negative for abdominal pain, nausea and vomiting.     Objective:     Vital Signs (Most Recent):  Temp: 97.6 °F (36.4 °C) (05/24/18 0833)  Pulse: 62 (05/24/18 1100)  Resp: 20 (05/24/18 0833)  BP: 107/71 (05/24/18 0833)  SpO2: 96 % (05/24/18 0833) Vital Signs (24h Range):  Temp:  [97.6 °F (36.4 °C)-98.6 °F (37 °C)] " 97.6 °F (36.4 °C)  Pulse:  [57-94] 62  Resp:  [12-20] 20  SpO2:  [95 %-100 %] 96 %  BP: ()/(58-82) 107/71     Weight: 43.7 kg (96 lb 4.8 oz)  Body mass index is 17.61 kg/m².  No intake or output data in the 24 hours ending 05/24/18 1327   Physical Exam   Constitutional: She is oriented to person, place, and time. She appears well-developed. No distress.   HENT:   Head: Normocephalic and atraumatic.   Eyes: Pupils are equal, round, and reactive to light.   Neck: Neck supple.   Cardiovascular: Normal rate, regular rhythm and normal heart sounds.  Exam reveals no gallop and no friction rub.    No murmur heard.  Pulmonary/Chest: Effort normal and breath sounds normal. She has no wheezes. She exhibits no tenderness.   Abdominal: Soft. She exhibits no distension and no mass. There is no tenderness.   Lymphadenopathy:     She has no cervical adenopathy.   Neurological: She is alert and oriented to person, place, and time.   Skin: Skin is warm and dry. She is not diaphoretic.   Psychiatric: She has a normal mood and affect. Her behavior is normal.       Significant Labs:   Bilirubin:   Recent Labs  Lab 05/23/18  1213   BILITOT 0.7     CBC:   Recent Labs  Lab 05/23/18  1213 05/24/18  0549   WBC 5.60 4.04   HGB 12.9 12.5   HCT 39.2 38.8    218     CMP:   Recent Labs  Lab 05/23/18  1213 05/24/18  0549   * 142   K 4.7 4.4    110   CO2 21* 26   GLU 99 76   BUN 16 13   CREATININE 1.0 0.9   CALCIUM 9.4 8.7   PROT 7.5  --    ALBUMIN 4.3  --    BILITOT 0.7  --    ALKPHOS 33*  --    AST 36  --    ALT 16  --    ANIONGAP 13 6*   EGFRNONAA >60.0 >60.0     Lipid Panel:   Recent Labs  Lab 05/24/18  0549   CHOL 139  139   HDL 79*  79*   LDLCALC 51.2*  51.2*   TRIG 44  44   CHOLHDL 56.8*  56.8*     Magnesium:   Recent Labs  Lab 05/24/18  0549   MG 2.2     Troponin:   Recent Labs  Lab 05/23/18  1213   TROPONINI <0.006     TSH:   Recent Labs  Lab 05/23/18  1213   TSH 2.082       Significant Imaging:   CXR: Heart  size normal.  The lungs are clear.  No pleural effusion  Assessment/Plan:      53 yo F w/ PMH of bipolar, MDD, anxiety here for chest pain and SOB, thought to be ACS now presumed to be anxiety vs. Vasospasm.     Chest Pain   - Clean angio, normal lipids reduce the likelihood of ACS  - stress Echo revealed inferolateral ischemic changes  - could be due to vasospasm or anxiety  - Plan to D/C today and F/U w/ cardiology as outpatient    Active Diagnoses:    Diagnosis Date Noted POA    PRINCIPAL PROBLEM:  Other chest pain [R07.89] 05/16/2018 Yes    Insomnia [G47.00] 11/30/2017 Yes    Hypothyroid [E03.9]  Yes    Bipolar affective [F31.9]  Yes      Problems Resolved During this Admission:    Diagnosis Date Noted Date Resolved POA     VTE Risk Mitigation         Ordered     enoxaparin injection 40 mg  Daily      05/23/18 1639     IP VTE LOW RISK PATIENT  Once      05/23/18 1309             Srikanth Murray  Department of Hospital Medicine   [facility]

## 2018-05-31 ENCOUNTER — OFFICE VISIT (OUTPATIENT)
Dept: INTERNAL MEDICINE | Facility: CLINIC | Age: 53
End: 2018-05-31
Attending: FAMILY MEDICINE
Payer: MEDICARE

## 2018-05-31 VITALS
DIASTOLIC BLOOD PRESSURE: 72 MMHG | OXYGEN SATURATION: 99 % | HEIGHT: 62 IN | BODY MASS INDEX: 17.21 KG/M2 | HEART RATE: 69 BPM | WEIGHT: 93.5 LBS | SYSTOLIC BLOOD PRESSURE: 120 MMHG

## 2018-05-31 DIAGNOSIS — R51.9 PERSISTENT HEADACHES: ICD-10-CM

## 2018-05-31 DIAGNOSIS — R23.2 HOT FLASHES: Primary | ICD-10-CM

## 2018-05-31 DIAGNOSIS — R09.89 LABILE BLOOD PRESSURE: ICD-10-CM

## 2018-05-31 DIAGNOSIS — I95.9 HYPOTENSION, UNSPECIFIED HYPOTENSION TYPE: ICD-10-CM

## 2018-05-31 DIAGNOSIS — Z82.49 FAMILY HISTORY OF BRAIN ANEURYSM: ICD-10-CM

## 2018-05-31 PROCEDURE — 99214 OFFICE O/P EST MOD 30 MIN: CPT | Mod: PBBFAC | Performed by: FAMILY MEDICINE

## 2018-05-31 PROCEDURE — 99999 PR PBB SHADOW E&M-EST. PATIENT-LVL IV: CPT | Mod: PBBFAC,,, | Performed by: FAMILY MEDICINE

## 2018-05-31 PROCEDURE — 99214 OFFICE O/P EST MOD 30 MIN: CPT | Mod: S$PBB,,, | Performed by: FAMILY MEDICINE

## 2018-05-31 RX ORDER — LANOLIN ALCOHOL/MO/W.PET/CERES
400 CREAM (GRAM) TOPICAL NIGHTLY
Qty: 90 TABLET | Refills: 1 | Status: ON HOLD | OUTPATIENT
Start: 2018-05-31 | End: 2018-10-23

## 2018-05-31 RX ORDER — TRAZODONE HYDROCHLORIDE 150 MG/1
TABLET ORAL
COMMUNITY
Start: 2018-05-23 | End: 2018-05-31

## 2018-05-31 RX ORDER — LURASIDONE HYDROCHLORIDE 20 MG/1
20 TABLET, FILM COATED ORAL DAILY
Status: ON HOLD | COMMUNITY
End: 2018-10-23

## 2018-05-31 NOTE — PROGRESS NOTES
"Subjective:      Patient ID: Gay Gurrola is a 52 y.o. female.    Chief Complaint: Chest Pain (f/u)    She is here for recent for follow up of recent hospitalization for chest pain. She is under a lot of stress because she does not feel well. She was discontinued on vraylare today by psychiatry and restarted on latuda. She reports her chest pain is better and no further episodes. She does have an active day, she will go for a walk about 2-3 miles about 3-5 days a week. She is meditating at home. She is cooking, cleaning, and laundry. Her blood pressure in the morning is 80/50 and afternoon 70/40. In the afternoons her blood pressure drops 70/40 in the last 3 weeks. She just started trazodone started one month ago.  She continues to get her menstrual cycle every 28 days.       Review of Systems   HENT: Negative.    Eyes: Negative.    Cardiovascular: Negative.    Gastrointestinal: Negative.    Genitourinary: Negative.    Neurological: Negative.      I personally reviewed Past Medical History, Past Surgical history,  Past Social History and Family History    Objective:   /72   Pulse 69   Ht 5' 2" (1.575 m)   Wt 42.4 kg (93 lb 7.6 oz)   SpO2 99%   BMI 17.10 kg/m²     Physical Exam   Constitutional: She is oriented to person, place, and time. She appears well-developed and well-nourished. No distress.   HENT:   Head: Normocephalic and atraumatic.   Right Ear: External ear normal.   Left Ear: External ear normal.   Mouth/Throat: Oropharynx is clear and moist.   Eyes: Conjunctivae and EOM are normal. Pupils are equal, round, and reactive to light. Right eye exhibits no discharge. Left eye exhibits no discharge. No scleral icterus.   Neck: Normal range of motion. Neck supple. No thyromegaly present.   Cardiovascular: Normal rate, regular rhythm, normal heart sounds and intact distal pulses.  Exam reveals no gallop.    No murmur heard.  Pulmonary/Chest: Effort normal and breath sounds normal. No " respiratory distress. She has no wheezes. She has no rales. She exhibits no tenderness.   Abdominal: Soft. Bowel sounds are normal. She exhibits no distension and no mass. There is no tenderness. There is no rebound and no guarding.   Musculoskeletal: Normal range of motion.   Neurological: She is alert and oriented to person, place, and time. No cranial nerve deficit.   Skin: Skin is warm and dry.   Vitals reviewed.      Gay was seen today for chest pain.    Diagnoses and all orders for this visit:    Hot flashes  Labile blood pressure  Hypotension, unspecified hypotension type  -she denies dizziness, chest pain, or syncope if the afternoon when her pressure is lower  -discussed holding trazodone for one week  -ER prompts reviewed  -reviewed with patient all labs recently checked in the last week, cbc, cmp, tsh, troponin, lipid, phos and magnesium, urine tox screen   -discussed remaining hydrated, patient to stop checking her blood pressure  -     Ambulatory consult to Endocrinology    Persistent headaches  Family history of brain aneurysm  -     magnesium oxide (MAG-OX) 400 mg tablet; Take 1 tablet (400 mg total) by mouth every evening.  -call with any worsening or no improvement   -     Ambulatory consult to Neurology

## 2018-06-07 ENCOUNTER — TELEPHONE (OUTPATIENT)
Dept: INTERNAL MEDICINE | Facility: CLINIC | Age: 53
End: 2018-06-07

## 2018-06-07 NOTE — TELEPHONE ENCOUNTER
Pt states she has appt with sleep on 6/26/2018, sleep deprived, only sleeping a few hours. Recently discontinued trazodone per PCP. C/o YIER  Dr. Hendricks advised her to f/u sleep.  States she is tired during the day due to no sleep at night, feeling stressed out and tired. Otherwise BP has improved, no longer having clammy hands or weakness,s/s from LOV have subsided.  Only concern is HA and insomnia. Pt states she is scheduled to see neuro as advised by PCP. Please advise/authorize?

## 2018-06-07 NOTE — TELEPHONE ENCOUNTER
Patient was informed medication recommendations. Verbalized understanding. Stated that she feels much better since the last visit.

## 2018-06-07 NOTE — TELEPHONE ENCOUNTER
Concern for patient's symptoms being related to serotonin syndrome which can occur with celexa and trazodone combination, would recommend melatonin nightly and discussion for alternatives with sleep medicine     Is patient feeling better from her last office visit?

## 2018-06-07 NOTE — TELEPHONE ENCOUNTER
----- Message from Felicita Murillo sent at 6/7/2018  8:07 AM CDT -----  Contact: pt            Name of Who is Calling:MEHDI ARVIZU [[id number]]      What is the request in detail: pt is calling to follow up.. Pt states she is feeling much better no more cold sweats and weakness,.. Pt states since she no longer takes sleep medication her sleep is worst.. Pt is taking supplement as well and she no longer experiences shortness of breath and chest pains.. Please advise      Can the clinic reply by [facility]: no      What Number to Call Back if not in [facility]: [phone]

## 2018-06-26 ENCOUNTER — TELEPHONE (OUTPATIENT)
Dept: SLEEP MEDICINE | Facility: CLINIC | Age: 53
End: 2018-06-26

## 2018-06-26 ENCOUNTER — OFFICE VISIT (OUTPATIENT)
Dept: SLEEP MEDICINE | Facility: CLINIC | Age: 53
End: 2018-06-26
Payer: MEDICARE

## 2018-06-26 VITALS
HEIGHT: 62 IN | WEIGHT: 95.69 LBS | HEART RATE: 70 BPM | SYSTOLIC BLOOD PRESSURE: 84 MMHG | BODY MASS INDEX: 17.61 KG/M2 | DIASTOLIC BLOOD PRESSURE: 55 MMHG

## 2018-06-26 DIAGNOSIS — G47.00 INSOMNIA, UNSPECIFIED TYPE: Primary | ICD-10-CM

## 2018-06-26 PROCEDURE — 99999 PR PBB SHADOW E&M-EST. PATIENT-LVL III: CPT | Mod: PBBFAC,,, | Performed by: PSYCHIATRY & NEUROLOGY

## 2018-06-26 PROCEDURE — 99214 OFFICE O/P EST MOD 30 MIN: CPT | Mod: S$PBB,,, | Performed by: PSYCHIATRY & NEUROLOGY

## 2018-06-26 PROCEDURE — 99213 OFFICE O/P EST LOW 20 MIN: CPT | Mod: PBBFAC | Performed by: PSYCHIATRY & NEUROLOGY

## 2018-06-26 RX ORDER — ZALEPLON 5 MG/1
5 CAPSULE ORAL NIGHTLY PRN
Qty: 60 CAPSULE | Refills: 1 | Status: SHIPPED | OUTPATIENT
Start: 2018-06-26 | End: 2018-07-23

## 2018-06-26 RX ORDER — ZALEPLON 5 MG/1
CAPSULE ORAL
Qty: 30 CAPSULE | Refills: 1 | Status: SHIPPED | OUTPATIENT
Start: 2018-06-26 | End: 2018-07-23

## 2018-06-26 RX ORDER — RAMELTEON 8 MG/1
8 TABLET ORAL NIGHTLY
Qty: 30 TABLET | Refills: 1 | Status: SHIPPED | OUTPATIENT
Start: 2018-06-26 | End: 2018-07-23

## 2018-06-26 RX ORDER — TRAZODONE HYDROCHLORIDE 150 MG/1
150 TABLET ORAL NIGHTLY
COMMUNITY
End: 2019-02-04

## 2018-06-26 NOTE — PATIENT INSTRUCTIONS
Cortisol Manager - can add at night  Melatonin - can move to earlier  Don't stay in bed if not sleepy  Try not to use phone at night - we installed blue light screen    When you run out of Temazepam, please start Sonata instead  Can use wither one with Trazodone  [facility] ([facility] [address] [facility] [place])

## 2018-06-26 NOTE — TELEPHONE ENCOUNTER
----- Message from Carina Priest sent at 6/26/2018  3:13 PM CDT -----  Name of Who is Calling: MEHDI ARVIZU [[id number]]    What is the request in detail:  Transfer zaleplon (SONATA) 5 MG Cap to [facility] - [place], [place] - [address]      Can the clinic reply by [facility]:    No       What Number to Call Back if not in [facility]: [phone]

## 2018-06-26 NOTE — TELEPHONE ENCOUNTER
Transfer zaleplon (SONATA) 5 MG Cap to [facility] - [place], [place] - [address]

## 2018-06-26 NOTE — PROGRESS NOTES
Gay Gurrola  was seen as a new patient at the request of  No ref. provider found for the evaluation of  insomnia.    CHIEF COMPLAINT:    No chief complaint on file.      HISTORY OF PRESENT ILLNESS: Gay Gurrola is a 52 y.o. female is here for sleep evaluation.   Patient with h/o Bipolar, chronic headache, h/o seizure is here for evaluation of insomnia since age of 22.  Patient admits to being a light sleeper.  Patient found out that her  was cheating in her with her niece and sleep difficulty worsen.  Patient stated that her relationship with family is in a good state.  Yet, patient still has difficulty with sleep initiation and maintenance.  Patient had taken that elavil, ambien with some initial success.  Patient was switched different meds due to lack of effectiveness.  Latest med was restoril with improvement in sleep latency and sleep maintenance issue.  However, patient with excessive daytime sedating effect.  Patient was recently switched from restoril to neurontin and seroquel.  Currently, sleep latency of 45 minutes while watching tv in bed.  Patient admits to having lots of anxiety about sleeping.  Patient has stopped working due to disability.  No awakening in the middle of the night.     Patient denied snoring or witnessed sleep apnea.  +fatigue upon awakening.  +sleepiness a/w driving at age 28 year old.  Otherwise, no other episode of sleepiness a/w driving.  No parasomnia.  No cataplexy.  No RLS symptoms.    [place] Sleepiness Scale score during initial sleep evaluation was 2.    INTERVAL HISTORY:    11/14/2017:  The patient has not presented any new complaints since the previous visit. No longer taking Seroquel - had sleepwalking on it (but took high dose). Now just taking  Tried Ambien and Lunesta before. Trazodone exacerbated bipolar (stopped 6 months ago).  Exercises regularly. She has had insomnia since very young age. His son has insomnia. Runs 4 miles every day. Coffee   Just in AM. Bedroom pitch black. T 69-70%. Tried sleep meditations - 2 times a week.    02/22/2018  Since last time - restarted Seroquel - still sleep walked.Still taking Restoril 30 mg - in a few months - tolerance -> Trazodone -> back on Restoril.  Never tried Besomra.  Remeron - helped 10 years ago. Doxepin- does not recall.  Doing meditations; taking baths at night, trying to unwind -> able to fall asleep at 10 - waking up at 1:30 -> in several hours falls asleep some more > starting her day at 5 AM  Now reports pain (believes due to pyriformis muscles spasm) -> unable to exercise. Feels lack of energy and motivation lately.  Has apt to see psychiatrist - Dr. Houston; and psychogist in [facility]. Taking Celexa and Depakote and Latuda. Lost some weight.  Takes MVT, B12, makes fresh juice, l;emon water, eats a lot of fruit and vegetable, fish and sardines.   Correcting iron deficiency with Ferogon.    1 cup of coffee in AM, but a lot of green tea.  Very dark bedroom; good sleep hygiene     PHONE ADDENDUm 04/18/2018  Talked to the pt - Sonata 5-10 mg + Belsomra 20 mg combo worked for a month - then stopped.  Recently saw Dr. Houston for bipolar disorder - Trazodone 150 mg BID was added - taking it int he evening with Sonata does not help her sleep.    She was advised to stop Sonata and re-start Restoril 30 mg.       SLEEP ROUTINE:  Activity the hour prior to sleep: watch tv or read in bed    Bed partner:  alone  Time to bed:  10 pm - unwinds for 2 hrs  Lights off:  Most nights  Sleep onset latency:  40-60 minutes        Disruptions or awakenings:    5-6  Wakeup time:      6 am   Perceived sleep quality:  tired       Daytime naps:      none  Weekend sleep routine:      same  Caffeine use: none  exercise habit:   Walk 3 miles per day      PAST MEDICAL HISTORY:    Active Ambulatory Problems     Diagnosis Date Noted    Depression     Alcohol abuse, in remission     Memory loss 07/24/2012    Bipolar affective      Alcohol related seizure 07/24/2012    Attention or concentration deficit 07/24/2012    Alcohol dependence, continuous     Other and unspecified alcohol dependence, continuous drinking behavior     Bipolar I disorder, most recent episode (or current) unspecified     Alcohol dependence in remission     Hypothyroid     Osteopenia 11/30/2017    Insomnia 11/30/2017    Sleep apnea     Other chest pain 05/16/2018     Resolved Ambulatory Problems     Diagnosis Date Noted    No Resolved Ambulatory Problems     Past Medical History:   Diagnosis Date    Abnormal coronary angiogram     Alcohol abuse, in remission     Anxiety     Bipolar affective     Depression     H. pylori infection     Headache(784.0)     Hypothyroid     Memory loss     Osteopenia     Seizures                 PAST SURGICAL HISTORY:    Past Surgical History:   Procedure Laterality Date    BUNIONECTOMY      COLONOSCOPY N/A 4/12/2017    Procedure: COLONOSCOPY;  Surgeon: Estuardo Salazar MD;  Location: [facility] ([address] [place] [address]);  Service: Endoscopy;  Laterality: N/A;  PM prep    TUBAL LIGATION           FAMILY HISTORY:                Family History   Problem Relation Age of Onset    Stroke Mother     Diabetes Mother     Anuerysm Mother         brain    Dementia Maternal Grandmother     Hypertension Father     Coronary artery disease Father     Kidney disease Father     Diabetes Father     Crohn's disease Maternal Aunt     Crohn's disease Other     Melanoma Neg Hx     Psoriasis Neg Hx     Lupus Neg Hx     Colon cancer Neg Hx        SOCIAL HISTORY:          Tobacco:   History   Smoking Status    Never Smoker   Smokeless Tobacco    Never Used       alcohol use:    History   Alcohol Use No     Comment: History of alcoholism in remission.                 Occupation:  Former cardiology tech    ALLERGIES:  No Known Allergies    CURRENT MEDICATIONS:    Current Outpatient Prescriptions   Medication Sig Dispense Refill     "Ca-D3-mag#11-zinc-cupr-man-damian 600 mg calcium- 800 unit-50 mg Tab Take 1 tablet by mouth once daily.      cyanocobalamin, vitamin B-12, (VITAMIN B-12) 5,000 mcg Subl Place under the tongue once daily.        divalproex ER (DEPAKOTE ER) 500 MG Tb24 Take 1 tablet (500 mg total) by mouth once daily. 30 tablet 3    levothyroxine (SYNTHROID) 25 MCG tablet Take 1 tablet (25 mcg total) by mouth once daily. 90 tablet 3    lurasidone (LATUDA) 20 mg Tab tablet Take 80 mg by mouth once daily.      magnesium oxide (MAG-OX) 400 mg tablet Take 1 tablet (400 mg total) by mouth every evening. 90 tablet 1    omeprazole (PRILOSEC) 20 MG capsule Take 1 capsule (20 mg total) by mouth once daily. 90 capsule 3    temazepam (RESTORIL) 22.5 MG capsule Take 30 mg by mouth nightly as needed for Insomnia.      traZODone (DESYREL) 150 MG tablet Take 150 mg by mouth every evening.      meloxicam (MOBIC) 15 MG tablet Take 1 tablet (15 mg total) by mouth daily as needed. 30 tablet 0    nitroGLYCERIN (NITROSTAT) 0.4 MG SL tablet Place 1 tablet (0.4 mg total) under the tongue every 5 (five) minutes as needed for Chest pain. 25 tablet 0    zaleplon (SONATA) 5 MG Cap 1 pill PO as needed for insomnia. 30 capsule 1     No current facility-administered medications for this visit.                   REVIEW OF SYSTEMS:     Sleep related symptoms as per HPI.  CONST:Denies weight gain    HEENT: Denies sinus congestion  PULM: Denies dyspnea  CARD:  Denies palpitations   GI:  Denies acid reflux  : Denies polyuria  NEURO: Denies headaches  PSYCH: bipolar  HEME: Denies anemia   Otherwise, a balance of systems reviewed is negative.          PHYSICAL EXAM:  Vitals:    06/26/18 1125   BP: (!) 84/55   Pulse: 70   Weight: 43.4 kg (95 lb 10.9 oz)   Height: 5' 2" (1.575 m)   PainSc: 0-No pain     Body mass index is 17.5 kg/m².     GENERAL: Normal development, well groomed  HEENT:  Conjunctivae are non-erythematous; Pupils equal, round, and reactive to " light; Nose is symmetrical; Nasal mucosa is pink and moist; Septum is midline; Inferior turbinates are normal; Nasal airflow is normal; Posterior pharynx is pink; Modified Mallampati: 2; Posterior palate is normal; Tonsils +1; Uvula is normal and pink;Tongue is normal; Dentition is fair; No TMJ tenderness; Jaw opening and protrusion without click and without discomfort.  NECK: Supple. Neck circumference is 11.75 inches. No thyromegaly. No palpable nodes.     SKIN: On face and neck: No abrasions, no rashes, no lesions.  No subcutaneous nodules are palpable.  RESPIRATORY: Chest is clear to auscultation.  Normal chest expansion and non-labored breathing at rest.  CARDIOVASCULAR: Normal S1, S2.  No murmurs, gallops or rubs. No carotid bruits bilaterally.  EXTREMITIES: No edema. No clubbing. No cyanosis. Station normal. Gait normal.        NEURO/PSYCH: Oriented to time, place and person. Normal attention span and concentration. Affect is full. Mood is normal.                                              DATA no prior sleep study   Lab Results   Component Value Date    TSH 2.082 05/23/2018     ASSESSMENT  No diagnosis found.    PLAN:    Insomnia - multifactorial.  Bipolar + anxiety + psychophysiologic. Idiopathic component.   Currently on seroquel and neurontin.  Good sleep hygiene.  Alternating Restoril and Trazodone - develops tolerance.    1. Try Restoril WITH Trazodone  2. If still awake in the middle of the night - then add short acting Melatonin 3-5 mg  3. Hold on to Belsomra rx with discount card  4. Will send rx for Sonata to the pharmacy to take at 1 AM as needed if unable to sleep.OK to take if awake in the middle of the night and unable to return to sleep. Please allow at least 4 hours sleep opportunity after taking Sonata.    In future we can try Doxepin or Remeron for your sleep maintenance insomnia    AVS:   Please do CBTI- meditations -> tools -> quiet your mind  Try to go to bed later when sleepy  Use  another bedrooom to unwind  Sleep diary was given    Will add a very small dose Seroquel 25 mg to alternate or combine with Restoril 30 mg.     Precautions: The patient was advised to abstain from driving should they feel sleepy or drowsy.       Thank you for allowing me the opportunity to participate in the care of your patient.    Patient will No Follow-up on file.    Please cc note to  No ref. provider found.  This is 45 minutes visit, over 50% of time spent in direct consultation with patient.        Transfer zaleplon (SONATA) 5 MG Cap to [facility] - [place], [place] - [address]          Documentation       Ara Blue MA 56 minutes ago (3:17 PM)         ----- Message from Carina Priest sent at 6/26/2018  3:13 PM CDT -----  Name of Who is Calling: MEHDI ARVIZU [[id number]]     What is the request in detail:  Transfer zaleplon (SONATA) 5 MG Cap to [place] [address]        Can the clinic reply by [facility]:    No         What Number to Call Back if not in [facility]: [phone]            Documentation

## 2018-07-05 ENCOUNTER — TELEPHONE (OUTPATIENT)
Dept: ENDOCRINOLOGY | Facility: CLINIC | Age: 53
End: 2018-07-05

## 2018-07-05 NOTE — TELEPHONE ENCOUNTER
----- Message from Lili Caba, RN sent at 7/4/2018 12:12 PM CDT -----  Maycol Gonzalez,   I was going through messages from Dr. ROMERO to ensure I didn't miss anything. I came across a few pts she forwarded as an FYI that she sent to Dr. Lira's staff to schedule [place] [facility] Madisyn were scheduling some and I was informed by Dr. ROMERO that you would be working on some too. Can you help with gettng this pt scheduled to see Dr. Lira in his Monday afternoon clinic?   Thank you,   Lili     ----- Message -----  From: Barb Gupta MD  Sent: 6/19/2018   4:51 PM  To: Jaja Mckeon MD, #    Will do.  Lili, can you help this patient get scheduled with Soraya in his PM clinic.    aob  ----- Message -----  From: Jaja Mckeon MD  Sent: 6/19/2018   4:47 PM  To: Barb Gupta MD    Yes sorry for the confusion she had this triad of symptoms and wanted to see if there was a possible endocrinologic reason:  Hot flashes  Labile blood pressure  Hypotension, unspecified hypotension type  Thank you

## 2018-07-18 ENCOUNTER — TELEPHONE (OUTPATIENT)
Dept: SLEEP MEDICINE | Facility: CLINIC | Age: 53
End: 2018-07-18

## 2018-07-18 NOTE — TELEPHONE ENCOUNTER
----- Message from Chantell Duran sent at 7/18/2018  8:03 AM CDT -----  Contact: MEHDI ARVIZU [[id number]]                                            PRIOR  AUTHORIZATION      Please refill the medication(s) listed below. Please call the patient when the prescription(s) is ready for  at this phone number    # [phone]       Medication #1 zaleplon (SONATA) 5 MG Cap        Preferred Pharmacy:   [facility] - [place] - [address]   [phone] (Phone)  [phone] (Fax)

## 2018-07-20 ENCOUNTER — TELEPHONE (OUTPATIENT)
Dept: SLEEP MEDICINE | Facility: CLINIC | Age: 53
End: 2018-07-20

## 2018-07-20 NOTE — TELEPHONE ENCOUNTER
----- Message from Pam Long sent at 7/20/2018  8:33 AM CDT -----  Contact: flores  Name of Who is Calling: Flores      What is the request in detail:Patient is requesting a call back to discuss her medication  zaleplon (SONATA) 5 MG Cap she states it is not working and she needs another medication called in for her     Can the clinic reply by [facility]: No      What Number to Call Back if not in [facility]: [phone]

## 2018-07-20 NOTE — TELEPHONE ENCOUNTER
Patient states that the medication Kwabena called in for her Rozerem never got filled because she was told that it was not on the formulary with her insurance and therefore not covered. Patient is also not taking Sonata 5 mg cap because it did not work for her and she was not getting sleep. Patient went back to [facility]. She would like to know what can be done.  Patient would like to try what will help her sleep.    Will route to [facility].

## 2018-07-23 ENCOUNTER — PATIENT MESSAGE (OUTPATIENT)
Dept: SLEEP MEDICINE | Facility: CLINIC | Age: 53
End: 2018-07-23

## 2018-07-23 ENCOUNTER — OFFICE VISIT (OUTPATIENT)
Dept: ENDOCRINOLOGY | Facility: CLINIC | Age: 53
End: 2018-07-23
Attending: FAMILY MEDICINE
Payer: MEDICARE

## 2018-07-23 VITALS
BODY MASS INDEX: 17.91 KG/M2 | WEIGHT: 97.31 LBS | HEART RATE: 75 BPM | SYSTOLIC BLOOD PRESSURE: 96 MMHG | HEIGHT: 62 IN | DIASTOLIC BLOOD PRESSURE: 61 MMHG

## 2018-07-23 DIAGNOSIS — E03.9 HYPOTHYROIDISM, UNSPECIFIED TYPE: Primary | ICD-10-CM

## 2018-07-23 DIAGNOSIS — I95.9 HYPOTENSION, UNSPECIFIED HYPOTENSION TYPE: ICD-10-CM

## 2018-07-23 DIAGNOSIS — G47.09 OTHER INSOMNIA: ICD-10-CM

## 2018-07-23 PROCEDURE — 99204 OFFICE O/P NEW MOD 45 MIN: CPT | Mod: S$GLB,,, | Performed by: INTERNAL MEDICINE

## 2018-07-23 NOTE — PROGRESS NOTES
Subjective:      Patient ID: Gay Gurrola is a 53 y.o. female.    Chief Complaint:  Other Misc (insomnia, low bp, difficulty gaining wt)    Referral from Dr. Mckeon    History of Present Illness  Ms. Gurrola presents for evaluation and management.    Has active history of hypothyroidism and biopolar d/o.    Has been having insomnia for several years and this has been worsening. Has seen sleep medicine and has tried multiple agents with little effect. She wonders if her insomnia is due to high cortisol levels.     Was running 4-5 miles per day 4-5 days per week. Now doing this 3 days per week. Also works out with weights. Has difficulty gaining weight.  BMI 17. Was diagnosed with osteopenia on outside bone density and was told exercise would help this.     Menstrual cycles have been occurring regularly. Reports having had hot flashes a few months ago, but this has improved.   Reports excessive diaphoresis.     BM's regular. No flushing or diarrhea.     Has headaches in the evenings, but she suspects this is from sleep deprivation. Has upcoming visit with neurologist.     Has always had low bp. Reports bp at home runs systolic 70-80's.  Denies orthostatic dizziness/lightheadeness. No N/V. Has normal appetite. Has difficulty gaining wt as mentioned above. No salt craving. Stays well hydrated at home (4 16 oz bottles per day). No excessive thirst. Denies hyperpigmentation. No hyper or hypokalemia in the past.     Was told to take cortisol manager supplement, but she hasn't started this yet.     Has occ palpitations at night with diaphoresis.     Currently taking levothyroxine 25 mcg PO daily. Has good energy despite not sleeping well. Feels more cold than hot. Has been losing more hair recently.  BM's regular. Recent TSH WNL.     Review of Systems   Constitutional: Positive for unexpected weight change.   HENT: Negative for voice change.    Eyes: Negative for visual disturbance.   Respiratory: Negative  for shortness of breath.    Cardiovascular: Negative for chest pain.   Gastrointestinal: Positive for abdominal pain.   Endocrine: Positive for cold intolerance.   Genitourinary: Negative for frequency.   Musculoskeletal: Negative for myalgias.   Skin: Negative for rash.       Objective:   Physical Exam   Constitutional: She is oriented to person, place, and time. She appears well-developed and well-nourished.   HENT:   Head: Normocephalic and atraumatic.   Right Ear: External ear normal.   Left Ear: External ear normal.   Nose: Nose normal.   Neck: No tracheal deviation present. No thyromegaly present.   Cardiovascular: Normal rate, regular rhythm and normal heart sounds.    No edema   Pulmonary/Chest: Effort normal and breath sounds normal.   Abdominal: Soft. Bowel sounds are normal. There is no tenderness.   Musculoskeletal:   Normal gait, no cyanosis or clubbing   Neurological: She is alert and oriented to person, place, and time. She has normal reflexes.   Vibration sense intact   Skin: Skin is warm and dry. No rash noted.   No nodules, no ulcers   Psychiatric: She has a normal mood and affect. Judgment normal.   Vitals reviewed.  No hyperpigmentation    Lab Review:   Results for MEHDI ARVIZU (MRN [id number]) as of 7/23/2018 07:49   Ref. Range 5/23/2018 12:13   TSH Latest Ref Range: 0.400 - 4.000 uIU/mL 2.082     Results for MEHDI ARVIZU (MRN [id number]) as of 7/23/2018 07:49   Ref. Range 11/30/2017 08:45 2/22/2018 07:21 5/23/2018 12:13 5/24/2018 05:49   Sodium Latest Ref Range: 136 - 145 mmol/L 143  134 (L) 142   Potassium Latest Ref Range: 3.5 - 5.1 mmol/L 4.4  4.7 4.4   Chloride Latest Ref Range: 95 - 110 mmol/L 101  100 110   CO2 Latest Ref Range: 23 - 29 mmol/L 32 (H)  21 (L) 26   Anion Gap Latest Ref Range: 8 - 16 mmol/L 10  13 6 (L)   BUN, Bld Latest Ref Range: 6 - 20 mg/dL 11  16 13   Creatinine Latest Ref Range: 0.5 - 1.4 mg/dL 0.8  1.0 0.9   eGFR if non  Latest Ref  Range: >60 mL/min/1.73 m^2 >60  >60.0 >60.0   eGFR if African American Latest Ref Range: >60 mL/min/1.73 m^2 >60  >60.0 >60.0   Glucose Latest Ref Range: 70 - 110 mg/dL 95  99 76   Calcium Latest Ref Range: 8.7 - 10.5 mg/dL 9.6  9.4 8.7   Phosphorus Latest Ref Range: 2.7 - 4.5 mg/dL    4.0   Magnesium Latest Ref Range: 1.6 - 2.6 mg/dL    2.2       Assessment:     1. Hypothyroidism, unspecified type    2. Other insomnia    3. Hypotension, unspecified hypotension type        Plan:     --Patient presenting with multiple signs/symptoms: insomnia, low bp, difficulty lowing wt, hot flashes  --She is concerned that she could be making excessive cortisol which is contributing to insomnia  --It is possible to make excessive cortisol as a physiologic rather than pathologic response in certain situations, but it is unclear whether or not this would cause insomnia (i.e stress, low BMI, depression)  --She does not have any features to suggest Cushing's  --She runs a chronically lower bp but has no symptoms from this so low suspicion for adrenal insufficiency  --Still having regular menstrual cycles but could be having perimenopausal symptoms  --Will check urine metanephrines, urine free cortisol, LH, FSH, estradiol, 8 AM cortisol and ACTH  --I advised that she decrease her exercise intensity and try and eat more calories and this may improve some of her symptoms    Copy to Dr. Linda Lira M.D. Staff Endocrinology

## 2018-07-23 NOTE — PATIENT INSTRUCTIONS
Needs 24 hr urine free cortisol and metanephrines    Needs 8 AM labs the morning she drops the jug off

## 2018-07-23 NOTE — LETTER
July 24, 2018      Jaja Mckeon MD  [address]  [facility] [address]           [facility] End[facility] [address]  [address], [address]  [facility] [address]  Phone: [phone]  Fax: [phone]          Patient: Gay Gurrola   MR Number: [id number]   YOB: 1965   Date of Visit: 7/23/2018       Dear Dr. Jaja Mckeon:    Thank you for referring Gay Gurrola to me for evaluation. Attached you will find relevant portions of my assessment and plan of care.    If you have questions, please do not hesitate to call me. I look forward to following Gay Gurrola along with you.    Sincerely,    Micah Lira MD    Enclosure  CC:  No Recipients    If you would like to receive this communication electronically, please contact [email or web address] or [phone] to request more information on [employer] access.    For providers and/or their staff who would like to refer a patient to [facility], please contact us through our one-stop-shop provider referral line, [facility], at [phone].    If you feel you have received this communication in error or would no longer like to receive these types of communications, please e-mail [email or web address]

## 2018-07-25 ENCOUNTER — TELEPHONE (OUTPATIENT)
Dept: SLEEP MEDICINE | Facility: CLINIC | Age: 53
End: 2018-07-25

## 2018-07-25 NOTE — TELEPHONE ENCOUNTER
Patient inquire about message from Dr. Yuan on the patient portal, but is unable get on the portal.    Sonata was being taken 30 min before bed and in the middle of the night as well to fall back asleep; patient is currently not taking Sonata, but has began taking Restoril and Trazodone 150 mg; Patient is also taking Melatonin in the evening around 8 pm. Patient still has prescription for Belsomra; Patient did state that she had not tried taking a melatonin to go back to sleep, but would try that tonight should she wake up.

## 2018-07-25 NOTE — TELEPHONE ENCOUNTER
----- Message from Chandrika Lino sent at 7/25/2018 10:20 AM CDT -----  Contact: pt   Name of Who is Calling: MEHDI ARVIZU [[id number]]      What is the request in detail: Patient is requesting a callback from staff in regards to message from Dr. Yuan on the portal. Please contact to further discuss and advise      Can the clinic reply by [facility]: No        What Number to Call Back if not in [facility]: [phone]

## 2018-07-26 ENCOUNTER — LAB VISIT (OUTPATIENT)
Dept: LAB | Facility: OTHER | Age: 53
End: 2018-07-26
Payer: MEDICARE

## 2018-07-26 DIAGNOSIS — E03.9 HYPOTHYROIDISM, UNSPECIFIED TYPE: ICD-10-CM

## 2018-07-26 DIAGNOSIS — I95.9 HYPOTENSION, UNSPECIFIED HYPOTENSION TYPE: ICD-10-CM

## 2018-07-26 DIAGNOSIS — G47.09 OTHER INSOMNIA: ICD-10-CM

## 2018-07-26 LAB
CORTIS SERPL-MCNC: 15 UG/DL
ESTRADIOL SERPL-MCNC: 117 PG/ML
FSH SERPL-ACNC: 6.9 MIU/ML
LH SERPL-ACNC: 2.9 MIU/ML

## 2018-07-26 PROCEDURE — 82024 ASSAY OF ACTH: CPT

## 2018-07-26 PROCEDURE — 82533 TOTAL CORTISOL: CPT

## 2018-07-26 PROCEDURE — 83835 ASSAY OF METANEPHRINES: CPT

## 2018-07-26 PROCEDURE — 83002 ASSAY OF GONADOTROPIN (LH): CPT

## 2018-07-26 PROCEDURE — 36415 COLL VENOUS BLD VENIPUNCTURE: CPT

## 2018-07-26 PROCEDURE — 82670 ASSAY OF TOTAL ESTRADIOL: CPT

## 2018-07-26 PROCEDURE — 83001 ASSAY OF GONADOTROPIN (FSH): CPT

## 2018-07-27 LAB — ACTH PLAS-MCNC: 23 PG/ML

## 2018-07-28 LAB
COLLECT DURATION TIME UR: 24 H
CORTIS 24H UR-MRATE: 24 MCG/24 H (ref 3.5–45)
SPECIMEN VOL ?TM UR: 2600 ML

## 2018-07-29 ENCOUNTER — PATIENT MESSAGE (OUTPATIENT)
Dept: ENDOCRINOLOGY | Facility: CLINIC | Age: 53
End: 2018-07-29

## 2018-07-29 LAB
COLLECT DURATION TIME SPEC: 24 HR
CREAT 24H UR-MRATE: 702 MG/D (ref 500–1400)
CREAT UR-MCNC: 27 MG/DL
METANEPH 24H UR-MCNC: 16 UG/L
METANEPH 24H UR-MRATE: 42 UG/D (ref 39–143)
METANEPH+NORMETANEPH UR-IMP: ABNORMAL
METANEPH/CREAT 24H UR: 59 UG/G CRT (ref 0–300)
NORMETANEPHRINE 24H UR-MCNC: 38 UG/L
NORMETANEPHRINE 24H UR-MRATE: 99 UG/D (ref 109–393)
NORMETANEPHRINE/CREAT 24H UR: 141 UG/G CRT (ref 0–400)
SPECIMEN VOL ?TM UR: 2600 ML

## 2018-08-02 ENCOUNTER — TELEPHONE (OUTPATIENT)
Dept: INTERNAL MEDICINE | Facility: CLINIC | Age: 53
End: 2018-08-02

## 2018-08-02 ENCOUNTER — TELEPHONE (OUTPATIENT)
Dept: ENDOCRINOLOGY | Facility: CLINIC | Age: 53
End: 2018-08-02

## 2018-08-02 NOTE — TELEPHONE ENCOUNTER
----- Message from Amee Weston sent at 8/2/2018  9:32 AM CDT -----  Contact: Self [phone]  PT called for lab results. [facility]

## 2018-08-02 NOTE — TELEPHONE ENCOUNTER
----- Message from Veronica Ordaz sent at 8/2/2018  9:25 AM CDT -----  Contact: pt            Name of Who is Calling: pt      What is the request in detail: pt wants to be seen Monday 8/6/18 or Tuesday 8/7/18. Has a cyst under her belly button. Please call pt. Nothing available until 8/15/18      Can the clinic reply by [facility]: no      What Number to Call Back if not in [facility]: [phone]

## 2018-08-02 NOTE — TELEPHONE ENCOUNTER
You can relay this message to her that I sent through [employer]. Can also mail her a copy of the labs.     Steven Ms. Gurrola,     All of the labs and urine studies came back normal. There is no evidence of Cushing's, adrenal insufficiency, menopause or an adrenalin tumor.     -Micah Lira

## 2018-08-02 NOTE — TELEPHONE ENCOUNTER
Spoke to patient and let her that Dr Lira reviewed your labs and urine studies came back normal. There is no evidence of Cushing's, adrenal insufficiency, menopause or an adrenalin tumor

## 2018-08-02 NOTE — TELEPHONE ENCOUNTER
Scheduled pt for the 6th as requested. Pt demonstrated verbal understanding of information and had no further questions or concerns at this time.

## 2018-08-06 ENCOUNTER — TELEPHONE (OUTPATIENT)
Dept: PHARMACY | Facility: CLINIC | Age: 53
End: 2018-08-06

## 2018-08-06 ENCOUNTER — HOSPITAL ENCOUNTER (OUTPATIENT)
Dept: RADIOLOGY | Facility: OTHER | Age: 53
Discharge: HOME | End: 2018-08-06
Attending: FAMILY MEDICINE
Payer: MEDICARE

## 2018-08-06 ENCOUNTER — OFFICE VISIT (OUTPATIENT)
Dept: NEUROLOGY | Facility: CLINIC | Age: 53
End: 2018-08-06
Attending: FAMILY MEDICINE
Payer: MEDICARE

## 2018-08-06 ENCOUNTER — TELEPHONE (OUTPATIENT)
Dept: GASTROENTEROLOGY | Facility: CLINIC | Age: 53
End: 2018-08-06

## 2018-08-06 ENCOUNTER — OFFICE VISIT (OUTPATIENT)
Dept: INTERNAL MEDICINE | Facility: CLINIC | Age: 53
End: 2018-08-06
Attending: FAMILY MEDICINE
Payer: MEDICARE

## 2018-08-06 VITALS
HEIGHT: 62 IN | OXYGEN SATURATION: 98 % | HEART RATE: 63 BPM | WEIGHT: 100.31 LBS | DIASTOLIC BLOOD PRESSURE: 68 MMHG | SYSTOLIC BLOOD PRESSURE: 100 MMHG | BODY MASS INDEX: 18.46 KG/M2

## 2018-08-06 VITALS
SYSTOLIC BLOOD PRESSURE: 100 MMHG | DIASTOLIC BLOOD PRESSURE: 60 MMHG | BODY MASS INDEX: 18.06 KG/M2 | WEIGHT: 98.13 LBS | HEART RATE: 90 BPM | HEIGHT: 62 IN

## 2018-08-06 DIAGNOSIS — G43.709 CHRONIC MIGRAINE WITHOUT AURA WITHOUT STATUS MIGRAINOSUS, NOT INTRACTABLE: Primary | ICD-10-CM

## 2018-08-06 DIAGNOSIS — G47.09 OTHER INSOMNIA: ICD-10-CM

## 2018-08-06 DIAGNOSIS — R10.9 ABDOMINAL PAIN, UNSPECIFIED ABDOMINAL LOCATION: Primary | ICD-10-CM

## 2018-08-06 DIAGNOSIS — R14.0 BLOATING: ICD-10-CM

## 2018-08-06 DIAGNOSIS — K31.89 CYST OF STOMACH: ICD-10-CM

## 2018-08-06 DIAGNOSIS — F31.62 BIPOLAR DISORDER, CURRENT EPISODE MIXED, MODERATE: ICD-10-CM

## 2018-08-06 DIAGNOSIS — Z82.49 FAMILY HISTORY OF BRAIN ANEURYSM: ICD-10-CM

## 2018-08-06 PROCEDURE — 99999 PR PBB SHADOW E&M-EST. PATIENT-LVL III: CPT | Mod: PBBFAC,,, | Performed by: PSYCHIATRY & NEUROLOGY

## 2018-08-06 PROCEDURE — 76999 ECHO EXAMINATION PROCEDURE: CPT | Mod: TC

## 2018-08-06 PROCEDURE — 99213 OFFICE O/P EST LOW 20 MIN: CPT | Mod: PBBFAC,25 | Performed by: PSYCHIATRY & NEUROLOGY

## 2018-08-06 PROCEDURE — 99999 PR PBB SHADOW E&M-EST. PATIENT-LVL III: CPT | Mod: PBBFAC,,, | Performed by: FAMILY MEDICINE

## 2018-08-06 PROCEDURE — 99214 OFFICE O/P EST MOD 30 MIN: CPT | Mod: S$PBB,,, | Performed by: PSYCHIATRY & NEUROLOGY

## 2018-08-06 PROCEDURE — 76705 ECHO EXAM OF ABDOMEN: CPT | Mod: 26,,, | Performed by: RADIOLOGY

## 2018-08-06 PROCEDURE — 99214 OFFICE O/P EST MOD 30 MIN: CPT | Mod: S$PBB,,, | Performed by: FAMILY MEDICINE

## 2018-08-06 PROCEDURE — 99213 OFFICE O/P EST LOW 20 MIN: CPT | Mod: PBBFAC,25,27 | Performed by: FAMILY MEDICINE

## 2018-08-06 RX ORDER — OMEPRAZOLE 40 MG/1
40 CAPSULE, DELAYED RELEASE ORAL DAILY
Qty: 30 CAPSULE | Refills: 0 | Status: SHIPPED | OUTPATIENT
Start: 2018-08-06 | End: 2018-11-01

## 2018-08-06 RX ORDER — QUETIAPINE FUMARATE 100 MG/1
300 TABLET, FILM COATED ORAL DAILY
COMMUNITY
End: 2020-10-27 | Stop reason: DRUGHIGH

## 2018-08-06 NOTE — PROGRESS NOTES
Subjective:       Patient ID: Gay Gurrola is a 53 y.o. female.    Chief Complaint:  Headache      Consultation Requested by:   Jaja Mckeon Md  [address]  [place] [address]    History of Present Illness  53-year-old right-handed female presents for evaluation of complex headaches.  She notes that she has some migrainous and sometimes from headache features.  She notes a major trigger for her and that being lack of sleep.  Unfortunately she also deals with chronic difficult to treat insomnia.  She notes that she only sleeps 4-5 hours per night.  She notes that she also has bipolar disorder which is not currently under control.  She is actively seeing a sleep  and a psychiatrist for her conditions.  She notes that since her insomnia has worsened in the last 5 years she notes a worsening of her headaches as well.  She notes that she continues to get regular.  And does not feel like she is perimenopausal.  She has recently had initiation and cessation of various psychiatric medications that can actually cause headache as a side effect although she notes usually her headache is predictably something that occurs in the afternoons.  She notes she deals with at least 4 days of headache a week.  She tries over-the-counter agents to help with her headaches.  She notes initially a few years ago Depakote actually helped with her headaches but now seems to no longer be working for her headaches and she is on it mainly for mood stabilization.  She has filled out a MIDAS and her score is 21.  This indicates severe disability due to headaches.     Past Medical History:   Diagnosis Date    Abnormal coronary angiogram     Alcohol abuse, in remission     Anxiety     Bipolar affective     since teenage years    Depression     H. pylori infection     Headache(784.0)     followed by Dr. Corona    Hypothyroid     Memory loss     Osteopenia     Seizures        Past Surgical  History:   Procedure Laterality Date    BUNIONECTOMY      COLONOSCOPY N/A 4/12/2017    Procedure: COLONOSCOPY;  Surgeon: Estuardo Salazar MD;  Location: [facility] ([address] [place] [address]);  Service: Endoscopy;  Laterality: N/A;  PM prep    TUBAL LIGATION         Family History   Problem Relation Age of Onset    Stroke Mother     Diabetes Mother     Anuerysm Mother         brain    Dementia Maternal Grandmother     Hypertension Father     Coronary artery disease Father     Kidney disease Father     Diabetes Father     Crohn's disease Maternal Aunt     Crohn's disease Other     Melanoma Neg Hx     Psoriasis Neg Hx     Lupus Neg Hx     Colon cancer Neg Hx        Social History     Social History    Marital status:      Spouse name: N/A    Number of children: N/A    Years of education: N/A     Occupational History    disability       Social History Main Topics    Smoking status: Never Smoker    Smokeless tobacco: Never Used    Alcohol use No      Comment: History of alcoholism in remission.    Drug use: No    Sexual activity: Not Currently     Other Topics Concern    None     Social History Narrative    None       Review of Systems  Review of Systems   Constitutional: Positive for activity change and fatigue.   Eyes: Positive for photophobia and visual disturbance.   Musculoskeletal: Positive for neck stiffness. Negative for neck pain.   Neurological: Positive for headaches. Negative for syncope, weakness and numbness.   Psychiatric/Behavioral: Positive for behavioral problems, decreased concentration, dysphoric mood and sleep disturbance. Negative for self-injury and suicidal ideas. The patient is nervous/anxious.    All other systems reviewed and are negative.      Objective:     Vitals:    08/06/18 1329   BP: 100/60   Pulse: 90      Physical Exam   Constitutional: She is oriented to person, place, and time. She appears well-developed and well-nourished. No distress.   HENT:   Head: Normocephalic and  atraumatic.   Right Ear: Hearing normal.   Left Ear: Hearing normal.   Eyes: EOM are normal. Pupils are equal, round, and reactive to light. Right eye exhibits normal extraocular motion and no nystagmus. Left eye exhibits normal extraocular motion and no nystagmus.   Neck: Normal range of motion. Neck supple. No spinous process tenderness and no muscular tenderness present. Carotid bruit is not present. No neck rigidity.       Tinel's at right NITESH and CHARLOTTE   Cardiovascular: Exam reveals no S4.    Musculoskeletal: Normal range of motion.   Neurological: She is alert and oriented to person, place, and time. She has normal strength and normal reflexes. She displays no atrophy and no tremor. No cranial nerve deficit or sensory deficit. She exhibits normal muscle tone. She displays a negative Romberg sign. Gait normal. Coordination and gait normal. GCS eye subscore is 4. GCS verbal subscore is 5. GCS motor subscore is 6.   Reflex Scores:       Tricep reflexes are 2+ on the right side and 2+ on the left side.       Bicep reflexes are 2+ on the right side and 2+ on the left side.       Brachioradialis reflexes are 2+ on the right side and 2+ on the left side.       Patellar reflexes are 2+ on the right side and 2+ on the left side.       Achilles reflexes are 2+ on the right side and 2+ on the left side.  Fundoscopic exam shows no papilledema, no hemorrhage, no exudates bilaterally.    Cranial nerves 2-12 are without deficit.   Psychiatric: She has a normal mood and affect. Her speech is normal and behavior is normal. Thought content normal.   Vitals reviewed.      Neurologic Exam     Mental Status   Oriented to person, place, and time.   Attention: normal. Concentration: normal.   Speech: speech is normal   Level of consciousness: alert  Knowledge: good.   Normal comprehension.     Cranial Nerves   Cranial nerves II through XII intact.     CN II   Visual fields full to confrontation.     CN III, IV, VI   Pupils are equal,  round, and reactive to light.  Extraocular motions are normal.     CN V   Facial sensation intact.     CN VII   Facial expression full, symmetric.     CN VIII   CN VIII normal.     CN IX, X   CN IX normal.   CN X normal.     CN XI   CN XI normal.     CN XII   CN XII normal.     Motor Exam   Muscle bulk: normal  Overall muscle tone: normal    Strength   Strength 5/5 throughout.     Sensory Exam   Light touch normal.   Pinprick normal.     Gait, Coordination, and Reflexes     Gait  Gait: normal    Reflexes   Right brachioradialis: 2+  Left brachioradialis: 2+  Right biceps: 2+  Left biceps: 2+  Right triceps: 2+  Left triceps: 2+  Right patellar: 2+  Left patellar: 2+  Right achilles: 2+  Left achilles: 2+    Results for orders placed or performed during the hospital encounter of 10/31/10   MRA Brain W WO Contrast    Narrative    DATE OF EXAM: Nov 1 2010      KMR   0073  -  MRA  HEAD W AND WO CONTRAST:     [id number]     CLINICAL HISTORY:   DIZZINESS, UNSTEADY GAIT     ICD 9 CODE(S):   ()     CPT 4 CODE(S)/MODIFIER(S):   ()     RESULTS:  Procedure: MRA of the head and neck     Technique: noncontrast 3-D time of flight MRA head and postcontrast 3-D   time-of-flight MRA head and neck.     Clinical Indication: 45-year-old female with dizziness     Comparison: None     Findings:  MRA head:     Anterior circulation: Bilateral distal cervical, Luz, cavernous and   supraclinoid segments of the ICAs are patent without significant focal   stenosis. There is hypoplasia of the left A1 segment of the FRANC. The   visualized anterior and middle cerebral arteries are patent without   significant focal stenosis there is limitation by motion artifact. 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     Bilateral PCOMs.     Posterior circulation: The distal right vertebral artery is dominant. The   distal vertebral arteries, basilar artery and poster cerebral arteries   are patent.  There is a small  fenestration in the proximal basilar artery      MRA neck: The origins of the right brachycephalic,  left common carotid   and left subclavian arteries from the arch are within normal limits.   There is development of variant with origin of the left vertebral artery   from the arch. Its origin and origin the right vertebral artery from the   right subclavian artery is within normal limits. The vertebral arteries   are unremarkable throughout their course without evidence for focal   stenosis or occlusion with right vertebral artery being dominant.     Right carotid: The right common carotid artery, carotid bifurcation and   extracranial portions of the internal carotid artery are patent without   evidence for focal stenosis or occlusion.     Left carotid: The left common carotid artery, carotid bifurcation and   extra cranial portions of the internal carotid artery are patent without   evidence for focal stenosis or occlusion.                 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 Otherwise unremarkable slightly motion limited MRA of   the head as described above.      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 Otherwise unremarkable MRA of the neck without evidence for   significant focal stenosis or occlusion.           : DAMON  Transcribe Date/Time: Nov 2 2010 11:51A  Dictated by : DIEGO CLAYTON DO  Report reviewed by:   Read On: Nov 2 2010 11:39A     Images were reviewed, findings were verified and document was   electronically  SIGNED BY: DIEGO CLAYTON DO On: Nov 2 2010 11:51A      CT Head Without Contrast    Narrative    DATE OF EXAM: Oct 31 2010      KCT   0027  -  CT HEAD W/O CONTRAST:     [id number]     CLINICAL HISTORY:   WEAKNESS     ICD 9 CODE(S):   ()     CPT 4 CODE(S)/MODIFIER(S):   ()     RESULTS:  CLINICAL  INDICATION: Weakness  Prior films are available for comparison -- none  Head CT without contrast:  5 mm contiguous transaxial cuts are taken from the base of the brain   through the vertex. No  intravenous contrast was administered.  The ventricles are of normal size and configuration. There are no intra   or extra-axial masses, lesions  or collections. The gray white matter distinction is maintained   throughout the brain. There is no  radiographic evidence of intracranial hemorrhage. No fracture of the   cranial vault.        IMPRESSION:  IMPRESSION:: normal noncontrast CT scan of the brain.  Thank you for allowing us to participate in the care of your patient.  Dictated and Authenticated by: Blade Hoskins MD  10/31/2010 9:05 AM Central Time           : PSC  Transcribe Date/Time: Nov 1 2010 10:48A  Dictated by : EDDIE BALTAZAR BUCK DR  Report reviewed by:   Read On: Nov 1 2010 10:47A     Images were reviewed, findings were verified and document was   electronically  SIGNED BY: SANCHEZ BUCK DR On: Nov 1 2010 10:48A      Results for orders placed or performed during the hospital encounter of 10/31/10   MRI Brain W WO Contrast    Narrative    DATE OF EXAM: Oct 31 2010      MRI   0007  -  MRI BRAIN W AND WO CONTRAST:     [id number]     CLINICAL HISTORY:   WEAKNESS     PROCEDURE COMMENT:        ICD 9 CODE(S):   ()     CPT 4 CODE(S)/MODIFIER(S):   ()     RESULTS: ROUTINE MRI OF THE BRAIN OBTAINED.  THE PATIENT RECEIVED 10 CC   OF GADOLINIUM CONTRAST.  DIFFUSION WEIGHTED IMAGES DEMONSTRATE NO   EVIDENCE OF ACUTE INFARCTION.  GRADIENT IMAGES DEMONSTRATE NO EVIDENCE OF   ACUTE HEMORRHAGE.  NO EVIDENCE OF SIZEABLE PRIOR INFARCTION.  FLAIR   IMAGES DEMONSTRATE NO EVIDENCE OF SIGNIFICANT MICROVASCULAR DISEASE.  NO   EVIDENCE OF GLIOSIS OR ENCEPHALOMALACIA.  THE CRANIOCERVICAL JUNCTION   APPEARS NORMAL.  THE SELLA AND PARASELLAR REGIONS DEMONSTRATE NO   ABNORMALITIES.  THE ORBITS, PARANASAL SINUSES,  AND MASTOID AIR CELLS   DEMONSTRATE NO ABNORMALITIES.  THE SKULL BASE FLOW VOIDS ARE ACCOUNTED   FOR.  NORMAL FLOW VOID IS SEEN WITHIN THE SUPERIOR SAGITTAL SINUS.       FOLLOWING THE ADMINISTRATION OF IV GADOLINIUM CONTRAST, NO INTRAAXIAL OR   EXTRAAXIAL AREAS OF ABNORMAL ENHANCEMENT SEEN TO SUGGEST AN INFLAMMATORY,   INFECTIOUS, OR TUMOR PROCESS.       IMPRESSION: NORMAL BRAIN MRI EXAMINATION.          :    Transcribe Date/Time: Nov 1 2010  9:03A  Dictated by : FRANCISCO BARRAGAN MD  Read On: Oct 31 2010  3:50P  FRANCISCO BARRAGAN M.D.  [id number]   Images were reviewed, findings were verified and document was   electronically  SIGNED BY: FRANCISCO BARRAGAN MD On: Nov 1 2010  9:27A      I have spent more than 50% of a 45 min visit in guidance, counseling discussion treatment options.    Assessment/Plan:     Problem List Items Addressed This Visit        Neuro    Chronic migraine without aura without status migrainosus, not intractable - Primary    Overview     Worse with insomnia, worse in the last 5 years  Sleeps 4-5 hours per night  Mother with headaches, insomnia, aneurysm   4 days of 9 with headache, more than 16 days of headache per month  Right side predominant  Triggers include stress, lack of sleep  Has tried and failed depakote, magnesium oxide, tylenol, ibuprofen, celexa, gabapentin, lamictal, mobic, phenergan, trazodone    Avoid triptans due to serotonin syndrome, avoid anti hypertensives due to low BP    Aimovig           Relevant Medications    erenumab-aooe 70 mg/mL AtIn       Psychiatric    Bipolar affective    Overview     since teenage years            Other    Insomnia      Other Visit Diagnoses     Family history of brain aneurysm                 53-year-old right-handed female presents for evaluation of headaches that are precipitated by her intractable insomnia.  For now we have discussed the complexity of her medications and the fact that they will continue change due to  her uncontrolled bipolar disorder.  She has tried and failed 10 different medications for headache.  Antihypertensives are contraindicated with her due to low normal blood pressure.  At this time we have discussed Aimovig.  I believe she would be a good candidate for this treatment as she is having headaches that last at least 8-10 hours at a time that occur more than 16 times a month and she has tried and failed at least 10 different agents including anti-inflammatories, antihypertensives, anti epileptic and Triptan therapy for her headache including sumatriptan in the past.  We have discussed that it may take 8-12 weeks for this medication to begin working so I will see her back in about a 3 month time.  We have had an extensive discussion on the mechanism of action of this biologic treatment and she seems amenable to trying it.      The patient verbalizes understanding and agreement with the treatment plan. Questions were sought and answered to her stated verbal satisfaction.        Forrest Rayo MD    This note is dictated on Dragon Natural Speaking word recognition program. There are word recognition mistakes that are occasionally missed on review.

## 2018-08-06 NOTE — LETTER
August 6, 2018      Jaja Mckeon MD  [address]  [place] [address]           [facility]  [address]  [place] [address]  Phone: [phone]  Fax: [phone]          Patient: Gay Gurrola   MR Number: [id number]   YOB: 1965   Date of Visit: 8/6/2018       Dear Dr. Jaja cMkeon:    Thank you for referring Gay Gurrola to me for evaluation. Attached you will find relevant portions of my assessment and plan of care.    If you have questions, please do not hesitate to call me. I look forward to following Gay Gurrola along with you.    Sincerely,    Eric Rayo III, MD    Enclosure  CC:  No Recipients    If you would like to receive this communication electronically, please contact [email or web address] or [phone] to request more information on [facility] access.    For providers and/or their staff who would like to refer a patient to [facility], please contact us through our one-stop-shop provider referral line, [facility], at [phone].    If you feel you have received this communication in error or would no longer like to receive these types of communications, please e-mail [email or web address]

## 2018-08-06 NOTE — PROGRESS NOTES
"Subjective:      Patient ID: Gay Gurrola is a 53 y.o. female.    Chief Complaint: Cyst and Abdominal Pain    10 days ago noticed a bump under her umbilicus. She has noticed more bloating in the last 10 days. She is having normal soft daily bowel movement no blood or black tarry stools. No changes in diet prior to onset. She denies fevers, chills or night sweats.       Review of Systems   Constitutional: Negative.    HENT: Negative.    Respiratory: Negative.    Cardiovascular: Negative.    Gastrointestinal: Negative for abdominal pain, diarrhea and vomiting.   Genitourinary: Negative for dysuria.     I personally reviewed Past Medical History, Past Surgical history,  Past Social History and Family History    Objective:   /68   Pulse 63   Ht 5' 2" (1.575 m)   Wt 45.5 kg (100 lb 5 oz)   SpO2 98%   BMI 18.35 kg/m²     Physical Exam   Constitutional: She is oriented to person, place, and time. She appears well-developed and well-nourished. No distress.   HENT:   Head: Normocephalic.   Right Ear: External ear normal.   Left Ear: External ear normal.   Mouth/Throat: Oropharynx is clear and moist.   Eyes: Conjunctivae and EOM are normal. Pupils are equal, round, and reactive to light. Right eye exhibits no discharge. Left eye exhibits no discharge. No scleral icterus.   Neck: Normal range of motion. Neck supple.   Cardiovascular: Normal rate, regular rhythm, normal heart sounds and intact distal pulses.  Exam reveals no gallop.    No murmur heard.  Pulmonary/Chest: Effort normal and breath sounds normal. No respiratory distress. She has no wheezes. She has no rales. She exhibits no tenderness.   Abdominal: Soft. Bowel sounds are normal. She exhibits no distension and no mass. There is no tenderness. There is no rebound and no guarding.   Inferior to umbilicus ~.5 cm cyst palpable    Neurological: She is alert and oriented to person, place, and time.   Skin: Skin is warm and dry.   Vitals " reviewed.      Gay was seen today for cyst and abdominal pain.    Diagnoses and all orders for this visit:    Abdominal pain, unspecified abdominal location  Bloating  -trial two weeks of prilosec for possible gastritis  -     omeprazole (PRILOSEC) 40 MG capsule; Take 1 capsule (40 mg total) by mouth once daily.  -     CBC auto differential; Future  -     Comprehensive metabolic panel; Future  -     Lipase; Future  -     H. PYLORI ANTIBODY, IGG; Future  -     Tissue transglutaminase, IgA; Future  -     IgA; Future    Cyst of stomach  -     US Soft Tissue Misc; Future    Other orders

## 2018-08-06 NOTE — TELEPHONE ENCOUNTER
----- Message from Liana Woodruff MA sent at 8/3/2018  3:43 PM CDT -----  Contact: self [phone]      ----- Message -----  From: Francisca Escobar  Sent: 8/3/2018  12:13 PM  To: [facility] Clinical Staff    Stomach pain and stomach swelling and stomach burning all in evening - is asking for appt with dr coley or dr padron - nothing available for me to book - please call patient at

## 2018-08-07 ENCOUNTER — TELEPHONE (OUTPATIENT)
Dept: INTERNAL MEDICINE | Facility: CLINIC | Age: 53
End: 2018-08-07

## 2018-08-07 DIAGNOSIS — R74.8 ELEVATED LIPASE: Primary | ICD-10-CM

## 2018-08-09 ENCOUNTER — TELEPHONE (OUTPATIENT)
Dept: INTERNAL MEDICINE | Facility: CLINIC | Age: 53
End: 2018-08-09

## 2018-08-09 NOTE — TELEPHONE ENCOUNTER
DOCUMENTATION ONLY:  Prior Authorization for Aimovig approved from 08/09/18 to 12/31/18    Co-pay: $8.35    Patient Assistance IS NOT required.    Forwarded to the clinical pharmacist for consult and shipment.    -ARR

## 2018-08-09 NOTE — TELEPHONE ENCOUNTER
Patient came in with her , discussed all labs  She will follow up with GI for further work up of abdominal lymph node, she will let us know if it enlarges or worsens

## 2018-08-13 ENCOUNTER — PATIENT MESSAGE (OUTPATIENT)
Dept: INTERNAL MEDICINE | Facility: CLINIC | Age: 53
End: 2018-08-13

## 2018-08-14 ENCOUNTER — TELEPHONE (OUTPATIENT)
Dept: INTERNAL MEDICINE | Facility: CLINIC | Age: 53
End: 2018-08-14

## 2018-08-14 DIAGNOSIS — R10.9 ABDOMINAL PAIN, UNSPECIFIED ABDOMINAL LOCATION: Primary | ICD-10-CM

## 2018-08-14 NOTE — TELEPHONE ENCOUNTER
Ideally she should be holding prilosec till GI follow up because stool study to confirm H pylori needs to be completed 2 weeks of tums/antacid and prilosec, if possible see if any sooner GI appt for patient? If not please schedule US abdomen

## 2018-08-14 NOTE — TELEPHONE ENCOUNTER
----- Message from Felicita Murillo sent at 8/13/2018  8:46 AM CDT -----            Name of Who is Calling:MEHDI ARVIZU [[id number]]      What is the request in detail: pt calling to get test results.. Pt states she needs to speak with Dr to follow up.. Please advise      Can the clinic reply by [facility]: no      What Number to [facility] Back [facility]: [phone]

## 2018-08-14 NOTE — TELEPHONE ENCOUNTER
Pt called stating she is still having pain to stomach area. She has bloating and pain under her rib cage, states the Prilosec is working a little. She is trying to hold out until she see GI doctor on 08/22/2018. Lov: 08/06/2018.    She was also requesting results of her H pylori antibody test. Please advise.  Guanako ROJAS

## 2018-08-16 ENCOUNTER — TELEPHONE (OUTPATIENT)
Dept: INTERNAL MEDICINE | Facility: CLINIC | Age: 53
End: 2018-08-16

## 2018-08-16 ENCOUNTER — HOSPITAL ENCOUNTER (OUTPATIENT)
Dept: RADIOLOGY | Facility: OTHER | Age: 53
Discharge: HOME | End: 2018-08-16
Attending: FAMILY MEDICINE
Payer: MEDICARE

## 2018-08-16 DIAGNOSIS — R10.9 ABDOMINAL PAIN, UNSPECIFIED ABDOMINAL LOCATION: ICD-10-CM

## 2018-08-16 PROCEDURE — 76700 US EXAM ABDOM COMPLETE: CPT | Mod: TC

## 2018-08-16 PROCEDURE — 76700 US EXAM ABDOM COMPLETE: CPT | Mod: 26,,, | Performed by: RADIOLOGY

## 2018-08-16 NOTE — TELEPHONE ENCOUNTER
----- Message from Nicole Villela sent at 8/16/2018  3:33 PM CDT -----  Contact: pt   Name of Who is Calling: MEHDI ARVIZU [[id number]]    What is the request in detail:Patient is requesting a call back in regards to her ultrasound results. Patients states she does not have access to the patient portal at the moment....Please contact to further discuss and advise      Can the clinic reply by [facility]:No     What Number to [facility]: [phone]

## 2018-08-16 NOTE — TELEPHONE ENCOUNTER
Informed pt that the doctor haven't gave her advice on the results just yet and once she does so some one will reach out to her.

## 2018-08-22 ENCOUNTER — OFFICE VISIT (OUTPATIENT)
Dept: GASTROENTEROLOGY | Facility: CLINIC | Age: 53
End: 2018-08-22
Payer: MEDICARE

## 2018-08-22 VITALS
HEIGHT: 62 IN | WEIGHT: 94.56 LBS | BODY MASS INDEX: 17.4 KG/M2 | DIASTOLIC BLOOD PRESSURE: 53 MMHG | HEART RATE: 72 BPM | SYSTOLIC BLOOD PRESSURE: 84 MMHG

## 2018-08-22 DIAGNOSIS — R14.0 ABDOMINAL BLOATING: ICD-10-CM

## 2018-08-22 DIAGNOSIS — R10.12 LUQ PAIN: Primary | ICD-10-CM

## 2018-08-22 PROCEDURE — 99999 PR PBB SHADOW E&M-EST. PATIENT-LVL IV: CPT | Mod: PBBFAC,,, | Performed by: PHYSICIAN ASSISTANT

## 2018-08-22 PROCEDURE — 99214 OFFICE O/P EST MOD 30 MIN: CPT | Mod: PBBFAC | Performed by: PHYSICIAN ASSISTANT

## 2018-08-22 PROCEDURE — 99213 OFFICE O/P EST LOW 20 MIN: CPT | Mod: S$PBB,,, | Performed by: PHYSICIAN ASSISTANT

## 2018-08-22 NOTE — PATIENT INSTRUCTIONS
1. Off all Antibiotics for 4 (Four) weeks before stool collection.       Make sure you repeat you lipase lab fasting  2. Off all Proton Pump Inhibitors medications for 2 (Two) weeks before collecting stool for H. Pylori Antigen:     Nexium (esomeprazole)   Prilosec (omeprazole)   Protonix (pantoprazole)   Prevacid (lansoprazole)   Aciphex (rabeprazole)   Dexilant (dexlansoprazole)     Zegerid     3. Off all H2 blockers medications for 2 (Two) weeks before collecting stool for H. Pylori Antigen:     Zantac (ranitidine)   Tagamet (cimetadine)   Axid (nizatidine)   Pepcid (famotidine)     4. Off Pepto-Bismol for 4 (four) weeks prior to collecting stool for H. Pylori Antigen.

## 2018-08-22 NOTE — PROGRESS NOTES
[facility] Consultation Note    Reason for Consult:  The primary encounter diagnosis was LUQ pain. A diagnosis of Abdominal bloating was also pertinent to this visit.    PCP:   Jaja Mckeon       Referring MD:  No referring provider defined for this encounter.    HPI:  This is a 53 y.o. female here for evaluation of abdominal pain.  She contacted the office 8/6/18 with complaints of stomach pain, swelling and burning   She did see her PCP and labs and ABD US were ordered.  Non-fasting lipase was mildly elevated - 65  ABD US - normal, no gallstones  She has a Hx of H. Pylori    Interval Hx  Went on trip in July to [place] and developed upper and lower abdominal pain and bloating  She also would get a sharp LUQ pain with lying down after meals  Increased her prilosec to 40mg daily for about 2 weeks  She stopped it 6 days ago, to test for H. Pylori stool and the pain resolved.  No longer feeling the sharp LUQ pain  She takes a probiotc daily    4/2017 EGD      - Monilial esophagitis.                        - LA Grade A reflux esophagitis.                        - Erythematous mucosa in the antrum.                        - Normal examined duodenum. Biopsied.   She was treated with diflucan x 2 weeks    ROS:  Constitutional: No fevers, chills, No weight loss  ENT: No allergies  CV: No chest pain  Pulm: No cough, No shortness of breath  Ophtho: No vision changes  GI: see HPI  Derm: No rash  Heme: No lymphadenopathy, No bruising  MSK: No arthritis  : No dysuria, No hematuria  Endo: No hot or cold intolerance  Neuro: No syncope, No seizure  Psych: No anxiety, No depression    Medical History:  has a past medical history of Abnormal coronary angiogram, Alcohol abuse, in remission, Anxiety, Bipolar affective, Depression, H. pylori infection, Headache(784.0), Hypothyroid, Memory loss, Osteopenia, and Seizures.    Surgical History:  has a past surgical history that includes Tubal ligation;  Bunionectomy; ESOPHAGOGASTRODUODENOSCOPY (EGD) (N/A, 4/12/2017); and COLONOSCOPY (N/A, 4/12/2017).    Family History: family history includes Anuerysm in her mother; Coronary artery disease in her father; Crohn's disease in her maternal aunt and other; Dementia in her maternal grandmother; Diabetes in her father and mother; Hypertension in her father; Kidney disease in her father; Stroke in her mother..     Social History:  reports that  has never smoked. she has never used smokeless tobacco. She reports that she does not drink alcohol or use drugs.    Review of patient's allergies indicates:  No Known Allergies    Current Outpatient Medications on File Prior to Visit   Medication Sig Dispense Refill    Ca-D3-mag#11-zinc-cupr-man-bor 600 mg calcium- 800 unit-50 mg Tab Take 1 tablet by mouth once daily.      cyanocobalamin, vitamin B-12, (VITAMIN B-12) 5,000 mcg Subl Place under the tongue once daily.        divalproex ER (DEPAKOTE ER) 500 MG Tb24 Take 1 tablet (500 mg total) by mouth once daily. 30 tablet 3    erenumab-aooe (AIMOVIG AUTOINJECTOR) 70 mg/mL AtIn Inject 140 mg into the skin every 28 days. 2 mL 5    levothyroxine (SYNTHROID) 25 MCG tablet Take 1 tablet (25 mcg total) by mouth once daily. 90 tablet 3    lurasidone (LATUDA) 20 mg Tab tablet Take 20 mg by mouth once daily.       magnesium oxide (MAG-OX) 400 mg tablet Take 1 tablet (400 mg total) by mouth every evening. 90 tablet 1    nitroGLYCERIN (NITROSTAT) 0.4 MG SL tablet Place 1 tablet (0.4 mg total) under the tongue every 5 (five) minutes as needed for Chest pain. 25 tablet 0    omeprazole (PRILOSEC) 20 MG capsule Take 1 capsule (20 mg total) by mouth once daily. 90 capsule 3    omeprazole (PRILOSEC) 40 MG capsule Take 1 capsule (40 mg total) by mouth once daily. 30 capsule 0    QUEtiapine (SEROQUEL) 100 MG Tab Take 200 mg by mouth once daily.       temazepam (RESTORIL) 22.5 MG capsule Take 30 mg by mouth nightly as needed for Insomnia.    "   traZODone (DESYREL) 150 MG tablet Take 150 mg by mouth every evening.       No current facility-administered medications on file prior to visit.          Objective Findings:    Vital Signs:  BP (!) 84/53   Pulse 72   Ht 5' 2" (1.575 m)   Wt 42.9 kg (94 lb 9.2 oz)   LMP 08/15/2018   BMI 17.30 kg/m²   Body mass index is 17.3 kg/m².    Physical Exam:  General Appearance: Well appearing in no acute distress  Head:   Normocephalic, without obvious abnormality  Eyes:    No scleral icterus  ENT: Neck supple, Lips, mucosa, and tongue normal  Lungs: CTA bilaterally in anterior and posterior fields, no wheezes, no crackles.  Heart:  Regular rate and rhythm, S1, S2 normal, no murmurs heard  Abdomen: Soft, non tender, non distended with positive bowel sounds in all four quadrants.   Extremities: no edema  Skin: No rash  Neurologic: AAO x 3      Labs:  Lab Results   Component Value Date    WBC 3.79 (L) 08/06/2018    HGB 12.5 08/06/2018    HCT 39.2 08/06/2018     08/06/2018    CHOL 139 05/24/2018    CHOL 139 05/24/2018    TRIG 44 05/24/2018    TRIG 44 05/24/2018    HDL 79 (H) 05/24/2018    HDL 79 (H) 05/24/2018    ALT 16 08/06/2018    AST 26 08/06/2018     08/06/2018    K 4.3 08/06/2018    CL 99 08/06/2018    CREATININE 0.9 08/06/2018    BUN 12 08/06/2018    CO2 28 08/06/2018    TSH 2.082 05/23/2018     ttg- neg    Imaging:    Endoscopy:    4/2017 colonoscopy - normal    4/2017 EGD      - Monilial esophagitis.                        - LA Grade A reflux esophagitis.                        - Erythematous mucosa in the antrum.                        - Normal examined duodenum. Biopsied.   She was treated with diflucan x 2 weeks  Assessment:  1. LUQ pain    2. Abdominal bloating       Hx of sharp LUQ after meals and abdominal bloating. Sx resolved about a week ago. She does have a Hx of H. Pylori. Will recheck stool Ag. Non-fasting lipase was mildly elevated. ABD US unrevealing.    Recommendations:  1. H. Pylori " Stool Ag off PPI x 2 weeks and Abx x 4 weeks  2. PCP will repeat lipase FASTING    Follow-up if symptoms worsen or fail to improve.      Order summary:  Orders Placed This Encounter    H. pylori antigen, stool         Thank you so much for allowing me to participate in the care of Gay Navarrete PA-C

## 2018-09-17 ENCOUNTER — LAB VISIT (OUTPATIENT)
Dept: LAB | Facility: OTHER | Age: 53
End: 2018-09-17
Attending: FAMILY MEDICINE
Payer: MEDICARE

## 2018-09-17 DIAGNOSIS — R74.8 ELEVATED LIPASE: ICD-10-CM

## 2018-09-17 LAB — LIPASE SERPL-CCNC: 65 U/L

## 2018-09-17 PROCEDURE — 36415 COLL VENOUS BLD VENIPUNCTURE: CPT

## 2018-09-17 PROCEDURE — 83690 ASSAY OF LIPASE: CPT

## 2018-09-19 ENCOUNTER — TELEPHONE (OUTPATIENT)
Dept: INTERNAL MEDICINE | Facility: CLINIC | Age: 53
End: 2018-09-19

## 2018-09-19 NOTE — TELEPHONE ENCOUNTER
----- Message from Chandrika Holland sent at 9/19/2018 10:13 AM CDT -----  Contact: Pt   Name of Who is Calling: MEHDI ARVIZU [[id number]]      What is the request in detail: Patient is requesting a callback from staff in regards to test results and information in regards to the medical records she received. Please contact to further discuss and advise      Can the clinic reply by [facility]: No       What Number [facility]: [phone]

## 2018-09-21 ENCOUNTER — TELEPHONE (OUTPATIENT)
Dept: GASTROENTEROLOGY | Facility: CLINIC | Age: 53
End: 2018-09-21

## 2018-09-21 RX ORDER — METRONIDAZOLE 250 MG/1
250 TABLET ORAL EVERY 6 HOURS
Qty: 56 TABLET | Refills: 0 | Status: SHIPPED | OUTPATIENT
Start: 2018-09-21 | End: 2018-10-05

## 2018-09-21 RX ORDER — DOXYCYCLINE 100 MG/1
100 CAPSULE ORAL EVERY 12 HOURS
Qty: 28 CAPSULE | Refills: 0 | Status: ON HOLD | OUTPATIENT
Start: 2018-09-21 | End: 2018-10-23

## 2018-09-21 NOTE — TELEPHONE ENCOUNTER
Spoke with patient in regards to results.    She verbalizes understanding,     Results mailed to patient.

## 2018-09-24 ENCOUNTER — TELEPHONE (OUTPATIENT)
Dept: GASTROENTEROLOGY | Facility: CLINIC | Age: 53
End: 2018-09-24

## 2018-09-24 NOTE — TELEPHONE ENCOUNTER
----- Message from Nicole Bryant sent at 9/24/2018  2:54 PM CDT -----  Contact: Self- [phone]  Landon- pt called with questions regarding her antibiotics- please contact pt at [phone]

## 2018-09-25 ENCOUNTER — TELEPHONE (OUTPATIENT)
Dept: GASTROENTEROLOGY | Facility: CLINIC | Age: 53
End: 2018-09-25

## 2018-09-25 DIAGNOSIS — R10.12 LUQ PAIN: ICD-10-CM

## 2018-09-25 DIAGNOSIS — R14.0 ABDOMINAL BLOATING: ICD-10-CM

## 2018-09-25 DIAGNOSIS — R74.8 ELEVATED LIPASE: Primary | ICD-10-CM

## 2018-09-25 RX ORDER — ONDANSETRON 4 MG/1
4 TABLET, FILM COATED ORAL EVERY 8 HOURS PRN
Qty: 40 TABLET | Refills: 0 | Status: SHIPPED | OUTPATIENT
Start: 2018-09-25 | End: 2018-11-01

## 2018-09-25 NOTE — TELEPHONE ENCOUNTER
Spoke with patient , she states that she is having trouble with the Flagyl. She states that she is still having stomach pain and bloating every time she eats, making it very difficult to take this medication every 6 hours. Also, she would like to know if she should be concerned about her lipase labs.

## 2018-09-25 NOTE — TELEPHONE ENCOUNTER
----- Message from Francisca Escobar sent at 9/24/2018  4:23 PM CDT -----  Contact: self - [phone]  Landon - returning your call - please call patient at  [phone]

## 2018-09-26 ENCOUNTER — TELEPHONE (OUTPATIENT)
Dept: GASTROENTEROLOGY | Facility: CLINIC | Age: 53
End: 2018-09-26

## 2018-09-26 NOTE — TELEPHONE ENCOUNTER
Spoke with patient in regards to medications.   Notified her that Zofran has been sent to pharmacy to help with tolerating antibiotics.    Also CT Scan scheduled and instructions verbally given to patient.    She verbalizes understanding.

## 2018-09-27 ENCOUNTER — HOSPITAL ENCOUNTER (OUTPATIENT)
Dept: RADIOLOGY | Facility: HOSPITAL | Age: 53
Discharge: HOME | End: 2018-09-27
Attending: PHYSICIAN ASSISTANT
Payer: MEDICARE

## 2018-09-27 DIAGNOSIS — R10.12 LUQ PAIN: ICD-10-CM

## 2018-09-27 DIAGNOSIS — R74.8 ELEVATED LIPASE: ICD-10-CM

## 2018-09-27 DIAGNOSIS — R14.0 ABDOMINAL BLOATING: ICD-10-CM

## 2018-09-27 PROCEDURE — 25500020 PHARM REV CODE 255: Performed by: PHYSICIAN ASSISTANT

## 2018-09-27 PROCEDURE — 74177 CT ABD & PELVIS W/CONTRAST: CPT | Mod: 26,,, | Performed by: RADIOLOGY

## 2018-09-27 PROCEDURE — 74177 CT ABD & PELVIS W/CONTRAST: CPT | Mod: TC

## 2018-09-27 RX ADMIN — IOHEXOL 75 ML: 350 INJECTION, SOLUTION INTRAVENOUS at 03:09

## 2018-09-28 ENCOUNTER — TELEPHONE (OUTPATIENT)
Dept: GASTROENTEROLOGY | Facility: CLINIC | Age: 53
End: 2018-09-28

## 2018-10-02 ENCOUNTER — TELEPHONE (OUTPATIENT)
Dept: INTERNAL MEDICINE | Facility: CLINIC | Age: 53
End: 2018-10-02

## 2018-10-02 DIAGNOSIS — Z00.00 ANNUAL PHYSICAL EXAM: Primary | ICD-10-CM

## 2018-10-02 DIAGNOSIS — F10.11 ALCOHOL ABUSE, IN REMISSION: ICD-10-CM

## 2018-10-02 NOTE — TELEPHONE ENCOUNTER
----- Message from Veronica Ordaz sent at 10/2/2018  9:56 AM CDT -----  Contact: pt            Name of Who is Calling: pt      What is the request in detail: pt is requesting to see a dietician. Please call pt      Can the clinic reply by [facility]: no      What Number [facility] Back [facility]: [phone]

## 2018-10-04 ENCOUNTER — TELEPHONE (OUTPATIENT)
Dept: GASTROENTEROLOGY | Facility: CLINIC | Age: 53
End: 2018-10-04

## 2018-10-04 NOTE — TELEPHONE ENCOUNTER
----- Message from Amy Deleon MA sent at 10/4/2018  9:17 AM CDT -----  Contact: Self- [phone]      ----- Message -----  From: Nicole Bryant  Sent: 10/4/2018   9:13 AM  To: Landon Navarrete- pt called to speak with Pricilla- had a ct scan a week and a half ago- a fecal impaction was found- almost finish the antibiotic and still not feeling well- needs advice- please contact pt [phone]

## 2018-10-05 ENCOUNTER — OFFICE VISIT (OUTPATIENT)
Dept: SPORTS MEDICINE | Facility: CLINIC | Age: 53
End: 2018-10-05
Payer: MEDICARE

## 2018-10-05 ENCOUNTER — TELEPHONE (OUTPATIENT)
Dept: GASTROENTEROLOGY | Facility: CLINIC | Age: 53
End: 2018-10-05

## 2018-10-05 VITALS
SYSTOLIC BLOOD PRESSURE: 90 MMHG | HEART RATE: 74 BPM | HEIGHT: 62 IN | BODY MASS INDEX: 18.4 KG/M2 | DIASTOLIC BLOOD PRESSURE: 59 MMHG | WEIGHT: 100 LBS

## 2018-10-05 DIAGNOSIS — M89.9 PUBIC BONE PAIN: Primary | ICD-10-CM

## 2018-10-05 DIAGNOSIS — M99.06 SOMATIC DYSFUNCTION OF LOWER EXTREMITY: ICD-10-CM

## 2018-10-05 DIAGNOSIS — M99.04 SACRAL REGION SOMATIC DYSFUNCTION: ICD-10-CM

## 2018-10-05 DIAGNOSIS — R10.12 LUQ PAIN: ICD-10-CM

## 2018-10-05 DIAGNOSIS — A04.8 H. PYLORI INFECTION: Primary | ICD-10-CM

## 2018-10-05 DIAGNOSIS — M99.03 SOMATIC DYSFUNCTION OF LUMBAR REGION: ICD-10-CM

## 2018-10-05 DIAGNOSIS — M76.892 ENTHESOPATHY OF LEFT HIP REGION: ICD-10-CM

## 2018-10-05 DIAGNOSIS — R14.0 ABDOMINAL BLOATING: ICD-10-CM

## 2018-10-05 DIAGNOSIS — M76.892 ADDUCTOR TENDINITIS OF LEFT HIP: ICD-10-CM

## 2018-10-05 DIAGNOSIS — M99.05 SOMATIC DYSFUNCTION OF PELVIC REGION: ICD-10-CM

## 2018-10-05 PROCEDURE — 98926 OSTEOPATH MANJ 3-4 REGIONS: CPT | Mod: PBBFAC,PO | Performed by: NEUROMUSCULOSKELETAL MEDICINE & OMM

## 2018-10-05 PROCEDURE — 98926 OSTEOPATH MANJ 3-4 REGIONS: CPT | Mod: S$PBB,,, | Performed by: NEUROMUSCULOSKELETAL MEDICINE & OMM

## 2018-10-05 PROCEDURE — 99204 OFFICE O/P NEW MOD 45 MIN: CPT | Mod: 25,S$PBB,, | Performed by: NEUROMUSCULOSKELETAL MEDICINE & OMM

## 2018-10-05 PROCEDURE — 99213 OFFICE O/P EST LOW 20 MIN: CPT | Mod: PBBFAC,PO | Performed by: NEUROMUSCULOSKELETAL MEDICINE & OMM

## 2018-10-05 PROCEDURE — 99999 PR PBB SHADOW E&M-EST. PATIENT-LVL III: CPT | Mod: PBBFAC,,, | Performed by: NEUROMUSCULOSKELETAL MEDICINE & OMM

## 2018-10-05 RX ORDER — SUCRALFATE 1 G/1
1 TABLET ORAL DAILY
Qty: 30 TABLET | Refills: 1 | Status: SHIPPED | OUTPATIENT
Start: 2018-10-05 | End: 2018-11-01

## 2018-10-05 NOTE — TELEPHONE ENCOUNTER
Spoke w radu pt. Saturday took mag citrate, have a good BM and feel cleaned out and this did help her bloating and pain. But these returned a few days later.     Taking miralax nightly  Feels incomplete emptying stool 2-3 times a day  Started Flax seed to increase fiber with tunde seeds new as well.    luq pain with meals  Almost finished H.pylori meds     Advise:  1.Stop magnesium (started in may) which may be  contributing to her stool overload  2. Order EGD to rule out gastric ulcers  3. carafate daily for possible gastritis  4. Increase miralax to twice daily

## 2018-10-05 NOTE — PROGRESS NOTES
Subjective:     Gay Gurrola     Chief Complaint   Patient presents with    Left Hip - Pain       HPI    Gay is a 53 y.o. female coming in today for left groin/leg pain that began 3 month(s) ago. Pt. describes the pain as a 10/10 sharp pain that does radiate. There was not a fall/injury/ or trauma associated with the onset of symptoms. The pain is better with epsom salt baths and worse with sitting and exercise. Pt. Denies any other musculoskeletal complaints at this time.     In February pt had piriformis syndrome on her left side after beginning a running routine that was treated with oral anti-inflammatories. After traveling to [place] in July pt begain having shooting pains down her posterior leg. Noticed this down her left leg after sitting for a long time while traveling. She is also having left groin pain. Pt is trying to increase her BMI but enjoys cardio so she struggles with this. She has been to the fitness center for wellness visits to learn about nutrition and physical activity. She has a lot of groin pain after exercise. She runs 5 miles 5 days a week for exercises. She is concerned that stopping her exercise routine stuart negatively affect her mental health, as this an important component of managing her bipolar disorder. She has not tried resting or PT for her pain. Pt. Denies any associated numbness/tinglnig/weakness associated with her pain.     Joint instability? no  Mechanical locking/clicking? no  Affecting ADL's? no  Affecting sleep? no    Occupation: housewife     Review of Systems   Constitutional: Negative for chills and fever.   HENT: Negative for hearing loss and tinnitus.    Eyes: Negative for blurred vision and photophobia.   Respiratory: Negative for cough and shortness of breath.    Cardiovascular: Negative for chest pain and leg swelling.   Gastrointestinal: Positive for abdominal pain, constipation and heartburn. Negative for nausea and vomiting.   Genitourinary:  Negative for dysuria and hematuria.   Musculoskeletal: Positive for joint pain and myalgias. Negative for back pain, falls and neck pain.   Skin: Negative for rash.   Neurological: Negative for dizziness, tingling, focal weakness, weakness and headaches.   Endo/Heme/Allergies: Does not bruise/bleed easily.   Psychiatric/Behavioral: Positive for depression. The patient is not nervous/anxious.        PAST MEDICAL HISTORY:   Past Medical History:   Diagnosis Date    Abnormal coronary angiogram     Alcohol abuse, in remission     Anxiety     Bipolar affective     since teenage years    Depression     H. pylori infection     Headache(784.0)     followed by Dr. Corona    Hypothyroid     Memory loss     Osteopenia     Seizures      PAST SURGICAL HISTORY:   Past Surgical History:   Procedure Laterality Date    BUNIONECTOMY      COLONOSCOPY N/A 4/12/2017    Procedure: COLONOSCOPY;  Surgeon: Estuardo Salazar MD;  [facility]  Service: Endoscopy;  Laterality: N/A;  PM prep    COLONOSCOPY N/A 4/12/2017    Performed by Estuardo Salazar MD at [facility] (4TH FLR)    ESOPHAGOGASTRODUODENOSCOPY (EGD) N/A 4/12/2017    Performed by Estuardo Salazar MD at [facility] (4TH FLR)    TUBAL LIGATION       FAMILY HISTORY:   Family History   Problem Relation Age of Onset    Stroke Mother     Diabetes Mother     Anuerysm Mother         brain    Dementia Maternal Grandmother     Hypertension Father     Coronary artery disease Father     Kidney disease Father     Diabetes Father     Crohn's disease Maternal Aunt     Crohn's disease Other     Melanoma Neg Hx     Psoriasis Neg Hx     Lupus Neg Hx     Colon cancer Neg Hx      SOCIAL HISTORY:   Social History     Socioeconomic History    Marital status:      Spouse name: Not on file    Number of children: Not on file    Years of education: Not on file    Highest education level: Not on file   Social Needs    Financial resource strain: Not on file    Food  insecurity - worry: Not on file    Food insecurity - inability: Not on file    Transportation needs - medical: Not on file    Transportation needs - non-medical: Not on file   Occupational History    Occupation: disability    Tobacco Use    Smoking status: Never Smoker    Smokeless tobacco: Never Used   Substance and Sexual Activity    Alcohol use: No     Comment: History of alcoholism in remission.    Drug use: No    Sexual activity: Not Currently   Other Topics Concern    Are you pregnant or think you may be? Not Asked    Breast-feeding Not Asked   Social History Narrative    Not on file       MEDICATIONS:   Current Outpatient Medications:     bismuth subsalicylate (BISMUTH) 262 mg Tab, Take 2 tablets by mouth every 6 (six) hours. for 14 days, Disp: 112 tablet, Rfl: 0    Ca-D3-mag#11-zinc-cupr-man-bor 600 mg calcium- 800 unit-50 mg Tab, Take 1 tablet by mouth once daily., Disp: , Rfl:     cyanocobalamin, vitamin B-12, (VITAMIN B-12) 5,000 mcg Subl, Place under the tongue once daily.  , Disp: , Rfl:     divalproex ER (DEPAKOTE ER) 500 MG Tb24, Take 1 tablet (500 mg total) by mouth once daily., Disp: 30 tablet, Rfl: 3    doxycycline (MONODOX) 100 MG capsule, Take 1 capsule (100 mg total) by mouth every 12 (twelve) hours. Take with food, Disp: 28 capsule, Rfl: 0    erenumab-aooe (AIMOVIG AUTOINJECTOR) 70 mg/mL AtIn, Inject 140 mg into the skin every 28 days., Disp: 2 mL, Rfl: 5    levothyroxine (SYNTHROID) 25 MCG tablet, Take 1 tablet (25 mcg total) by mouth once daily., Disp: 90 tablet, Rfl: 3    lurasidone (LATUDA) 20 mg Tab tablet, Take 20 mg by mouth once daily. , Disp: , Rfl:     magnesium oxide (MAG-OX) 400 mg tablet, Take 1 tablet (400 mg total) by mouth every evening., Disp: 90 tablet, Rfl: 1    metroNIDAZOLE (FLAGYL) 250 MG tablet, Take 1 tablet (250 mg total) by mouth every 6 (six) hours. Take with food. Avoid alcohol. for 14 days, Disp: 56 tablet, Rfl: 0    nitroGLYCERIN (NITROSTAT)  "0.4 MG SL tablet, Place 1 tablet (0.4 mg total) under the tongue every 5 (five) minutes as needed for Chest pain., Disp: 25 tablet, Rfl: 0    omeprazole (PRILOSEC) 20 MG capsule, Take 1 capsule (20 mg total) by mouth once daily., Disp: 90 capsule, Rfl: 3    omeprazole (PRILOSEC) 40 MG capsule, Take 1 capsule (40 mg total) by mouth once daily., Disp: 30 capsule, Rfl: 0    ondansetron (ZOFRAN) 4 MG tablet, Take 1 tablet (4 mg total) by mouth every 8 (eight) hours as needed for Nausea., Disp: 40 tablet, Rfl: 0    QUEtiapine (SEROQUEL) 100 MG Tab, Take 200 mg by mouth once daily. , Disp: , Rfl:     sucralfate (CARAFATE) 1 gram tablet, Take 1 tablet (1 g total) by mouth once daily. Separate from other medicines by 1hr, Disp: 30 tablet, Rfl: 1    temazepam (RESTORIL) 22.5 MG capsule, Take 30 mg by mouth nightly as needed for Insomnia., Disp: , Rfl:     traZODone (DESYREL) 150 MG tablet, Take 150 mg by mouth every evening., Disp: , Rfl:   ALLERGIES: Review of patient's allergies indicates:  No Known Allergies    IMAGIN. X-ray images of  bilateral hips (AP pelvis, L lateral, and R lateral views) taken 18 reviewed.   2. X-ray images were reviewed personally by me and then directly with patient.  3. FINDINGS: X-ray images obtained demonstrate no cortical irregularities, sclerosis, osteophyte formation, or subchonral cysts. There is no joint space narrowing. Slight bilateral acetabular over-coverage without any evidence of  Pincer lesion. No fracture or dislocation present.   4. IMPRESSION: No pathology or irregularities appreciated.       Objective:     VITAL SIGNS: BP (!) 90/59   Pulse 74   Ht 5' 2" (1.575 m)   Wt 45.4 kg (100 lb)   BMI 18.29 kg/m²    General    Nursing note and vitals reviewed.  Constitutional: She is oriented to person, place, and time. She appears well-developed and well-nourished.   HENT:   Head: Normocephalic and atraumatic.   no nasal discharge, no external ear redness or " discharge   Eyes:   EOM is full and smooth  No eye redness or discharge   Neck: Neck supple. No tracheal deviation present.   Cardiovascular: Normal rate.    2+ Radial pulse bilaterally  2+ Dorsalis Pedis pulse bilaterally  No LE edema appreciated   Pulmonary/Chest: Effort normal. No respiratory distress.   Abdominal: She exhibits no distension.   No rigidity   Neurological: She is alert and oriented to person, place, and time. She exhibits normal muscle tone. Coordination normal.   See details below   Psychiatric: She has a normal mood and affect. Her behavior is normal.               MUSCULOSKELETAL EXAM  HIP: left HIP  The affected hip is compared to the contralateral hip.    Observation:    There is no edema, erythema, or ecchymosis in the lumbosacral region.   No obvious pelvic obliquity while standing.    No thoracolumbar scoliosis observed.    No midline skin abnormalities.  No atrophy noted in the lower limbs.  Posture:  Upright  Gait: Non-antalgic with Neutral ankle mechanics and Neutral medial arch      ROM:   Passive hip flexion to 120° on left and 120° on right  Passive hip internal rotation to 45° on left and 45° on right (R pubic and ischial tuberosity pain)  Passive hip external rotation to 45° on left and 45° on right   Passive hip abduction to 45° on left and 45° on right    Tenderness To Palpation:  + tenderness at left inferior pubic ramus at obturator internus and adductor rené attachments  + tenderness at superior pubic tubercles bilaterally, + tenderness at inferior left pubic tubercle on left  No tenderness at the ASIS, AIIS, PSIS, PIIS, iliac crest, or ischial tuberosity.  No tenderness throughout the lumbar spine, iliolumbar region, or posterior pelvis.  No tenderness throughout the sacrum or piriformis  No tenderness over the greater trochanteric bursa or greater/lesser trochanters.  No tenderness at the glut attachments on the greater trochanter  No tenderness over proximal IT band or  hip flexor musculature.    Strength Testing (* = with pain):  Hip flexion - 5/5 on left and 5/5 on right  Hip extension - 5/5 on left and 5/5 on right  Hip adduction - 5/5 on left* and 5/5 on right (pubic bone pain on left)  Hip abduction - 5/5 on left* and 5/5 on right* (pubic bone pain bilaterally)  Knee flexion - 5/5 on left and 5/5 on right  Knee extension - 5/5 on left and 5/5 on right  Superior pubic tubercle pain with lower abdominal activation    Special Tests:  Seated straight leg raise - negative  Supine straight leg raise - negative  Hamstring flexibility symmetric    Log roll - negative  DESTINY - negative  FADIR - negative  Scour test - negative  No pain with posterior hip capsule compression    ASIS compression test - negative  SI drawer test - negative     Piriformis test (Bonnet's) - positive for inferior pubic rami pain  Quadriceps flexibility asymmetric, with R>L rectus femoris tightness  Chidi test - negative  Lebron compression test - negative    Leg lengths symmetric.   Bilateral psoas and anterior hip capsule tightness    Neurovascular Exam:  Normal gait without Trendelenburg or antalgia.  Hamstring/gluteal firing pattern normal and symmetric.  2+ femoral, DP, and PT pulses BL.  No skin changes, no abnormal hair distribution.  Sensation intact to light touch throughout the obturator and medial/lateral/posterior femoral cutaneous nerves.  2+/4 reflexes at L4 and S1 dermatomes  Capillary refill intact to <2 seconds in all lower limb digits.    TART (Tissue texture abnormality, Asymmetry,  Restriction of motion and/or Tenderness) changes:     Lumbar Spine   L1 Neutral   L2 Neutral   L3 ERS RIGHT   L4 ERS RIGHT   L5 FRS RIGHT       Pelvis:  · Innominate:Right anterior rotation  · Pubic bone:Right superior pubic shear    Sacrum:Left on Right sacral torsion     Lower Extremity: Right sacrotuberous ligament TTA, bilateral anterior hip capsule restriction    Key   F= Flexed   E = Extended   R = Rotated   S  = Sidebent   TTA = tissue texture abnormality       Assessment:      Encounter Diagnoses   Name Primary?    Pubic bone pain Yes    Enthesopathy of left hip region     Adductor tendinitis of left hip     Somatic dysfunction of lumbar region     Sacral region somatic dysfunction     Somatic dysfunction of pelvic region     Somatic dysfunction of lower extremity           Plan:     1. Left hip overuse injury to muscle attachments of the inferior pubic rami causing pubic and inferior groin pain. Pt. Instructed to stop running and speed walking for the next two weeks, replacing activity with HEP and meditation. Also recommend icing painful region up to 20 minutes at a time, prn for pain control and decrease inflammation. Other contributing factors include anterior hip capsule/psoas tightness which is likely inhibiting proper pelvic stability.   -  X-ray images of bilateral hips taken 2/27/18 (AP pelvis, left lateral hip, and right lateral hip views) showed no acute abnormalities and images were personally reviewed with patient.      2. OMT 3-4 regions. Oral consent obtained.  Reviewed benefits and potential side effects.   - OMT indicated today due to signs and symptoms as well as local and remote somatic dysfunction findings and their related neurokinetic, lymphatic, fascial and/or arteriovenous body connections.   - OMT techniques used: Myofascial Release, Muscle Energy and Balanced Ligamentous Tension   - Treatment was tolerated well. Improvement noted in segmental mobility post-treatment in dysfunctional regions. There were no adverse events and no complications immediately following treatment.     3. Pt. Given the following HEP:  A) Psoas lung stretch: 1-2 reps each side, twice daily  B) Piriformis stretch above 90 degrees: hold 30 sec-1 min, twice a day  C) Prone prop abdominal and anterior hip capsule stretch: hold 30 sec-1 min, twice a day  D)  Pelvic clock exercises given to do from the 6-12 o'clock  positions:10-15 reps, twice daily. Hand out of exercise also given.   E) Meditation with visualization hand-out  24963 HOME EXERCISE PROGRAM (HEP):  The patient was taught a homegoing physical therapy regimen as described above. The patient demonstrated understanding of the exercises and proper technique of their execution. This interaction took 15 minutes. Handout of exercises were also given.     4. Follow-up in 2 weeks for reevaluation    5. Patient agreeable to today's plan and all questions were answered

## 2018-10-11 ENCOUNTER — PATIENT MESSAGE (OUTPATIENT)
Dept: ORTHOPEDICS | Facility: CLINIC | Age: 53
End: 2018-10-11

## 2018-10-11 ENCOUNTER — TELEPHONE (OUTPATIENT)
Dept: INTERNAL MEDICINE | Facility: CLINIC | Age: 53
End: 2018-10-11

## 2018-10-11 DIAGNOSIS — Z87.898 HISTORY OF ABNORMAL WEIGHT LOSS: Primary | ICD-10-CM

## 2018-10-11 NOTE — TELEPHONE ENCOUNTER
----- Message from Chandrika Lino sent at 10/11/2018  1:12 PM CDT -----  Contact: pt   Name of Who is Calling: MEHDI ARVIZU [[id number]]      What is the request in detail: Patient states she needs a referral for a nutritionist on file. Please contact to further discuss and advise      Can the clinic reply by [facility]: No       What [facility]: [phone]

## 2018-10-11 NOTE — TELEPHONE ENCOUNTER
Pt does not have diagnosis that medicare will pay for for her to see nutritionist, making it impossible for me to order via Epic. If pt still interested we will need to figure something out

## 2018-10-12 ENCOUNTER — TELEPHONE (OUTPATIENT)
Dept: GASTROENTEROLOGY | Facility: CLINIC | Age: 53
End: 2018-10-12

## 2018-10-12 NOTE — TELEPHONE ENCOUNTER
Spoke to Ms. Gurrola, to inform her that she do not have diagnosis that medicare will pay for her to see a nutritionist.  Informed the patient to contact her GI doctor to see what they say.  If any further questions or concerns to call the office.    Danni Brumfield LPN

## 2018-10-12 NOTE — TELEPHONE ENCOUNTER
----- Message from Veronica Cline MA sent at 10/12/2018  4:32 PM CDT -----  Contact: self  [phone]  Needs Advice    Reason for call:  My PCP (Dr. Mckeon) referred me to you and due to the problems I am having with nutrition an I need a referral put in for nutrition so I can schedule with them        Communication Preference:  Phone# above    Additional Information:  Call when done so I can call and scheduled with the nutritionist

## 2018-10-15 NOTE — TELEPHONE ENCOUNTER
Pt said she has GI problem,abd pain, under weight, not able to eat, and not having complete bowel movement and bloating.     Pt stated she needs to know what she can do to solve this matter?    Pt stated sucralfate cause very bad stomach pains and she's not taking it anymore. Pt stated she only took med's once.    Pt stated she is scheduled for endo next Tuesday but she needs to know what to do about all the issues she's having ? ASAP

## 2018-10-16 ENCOUNTER — TELEPHONE (OUTPATIENT)
Dept: GASTROENTEROLOGY | Facility: CLINIC | Age: 53
End: 2018-10-16

## 2018-10-16 DIAGNOSIS — K59.04 CHRONIC IDIOPATHIC CONSTIPATION: Primary | ICD-10-CM

## 2018-10-16 NOTE — TELEPHONE ENCOUNTER
Having 2-3 BM daily soft  Incomplete BM  Significant abdominal bloating  Discussed anorectal manometry, she will think about it. # given to schedulers    She is also worried about her low BMI

## 2018-10-23 ENCOUNTER — ANESTHESIA (OUTPATIENT)
Dept: ENDOSCOPY | Facility: HOSPITAL | Age: 53
End: 2018-10-23
Payer: MEDICARE

## 2018-10-23 ENCOUNTER — HOSPITAL ENCOUNTER (OUTPATIENT)
Facility: HOSPITAL | Age: 53
Discharge: HOME | End: 2018-10-23
Attending: INTERNAL MEDICINE | Admitting: INTERNAL MEDICINE
Payer: MEDICARE

## 2018-10-23 ENCOUNTER — ANESTHESIA EVENT (OUTPATIENT)
Dept: ENDOSCOPY | Facility: HOSPITAL | Age: 53
End: 2018-10-23
Payer: MEDICARE

## 2018-10-23 ENCOUNTER — TELEPHONE (OUTPATIENT)
Dept: GASTROENTEROLOGY | Facility: CLINIC | Age: 53
End: 2018-10-23

## 2018-10-23 VITALS
DIASTOLIC BLOOD PRESSURE: 66 MMHG | WEIGHT: 98 LBS | RESPIRATION RATE: 18 BRPM | BODY MASS INDEX: 18.03 KG/M2 | SYSTOLIC BLOOD PRESSURE: 102 MMHG | HEART RATE: 73 BPM | HEIGHT: 62 IN | OXYGEN SATURATION: 99 % | TEMPERATURE: 98 F

## 2018-10-23 DIAGNOSIS — G89.18 LEFT UPPER QUADRANT ABDOMINAL PAIN FOLLOWING CHOLANGIOGRAM: Primary | ICD-10-CM

## 2018-10-23 DIAGNOSIS — R10.12 ABDOMINAL PAIN, LUQ: ICD-10-CM

## 2018-10-23 DIAGNOSIS — R10.12 LEFT UPPER QUADRANT ABDOMINAL PAIN FOLLOWING CHOLANGIOGRAM: Primary | ICD-10-CM

## 2018-10-23 LAB
B-HCG UR QL: NEGATIVE
CTP QC/QA: YES

## 2018-10-23 PROCEDURE — 25000003 PHARM REV CODE 250: Performed by: INTERNAL MEDICINE

## 2018-10-23 PROCEDURE — 88305 TISSUE EXAM BY PATHOLOGIST: CPT | Performed by: PATHOLOGY

## 2018-10-23 PROCEDURE — E9220 PRA ENDO ANESTHESIA: HCPCS | Mod: ,,, | Performed by: NURSE ANESTHETIST, CERTIFIED REGISTERED

## 2018-10-23 PROCEDURE — 81025 URINE PREGNANCY TEST: CPT | Performed by: INTERNAL MEDICINE

## 2018-10-23 PROCEDURE — 37000008 HC ANESTHESIA 1ST 15 MINUTES: Performed by: INTERNAL MEDICINE

## 2018-10-23 PROCEDURE — 43239 EGD BIOPSY SINGLE/MULTIPLE: CPT | Performed by: INTERNAL MEDICINE

## 2018-10-23 PROCEDURE — 37000009 HC ANESTHESIA EA ADD 15 MINS: Performed by: INTERNAL MEDICINE

## 2018-10-23 PROCEDURE — 88342 IMHCHEM/IMCYTCHM 1ST ANTB: CPT | Mod: 26,,, | Performed by: PATHOLOGY

## 2018-10-23 PROCEDURE — 63600175 PHARM REV CODE 636 W HCPCS: Performed by: NURSE ANESTHETIST, CERTIFIED REGISTERED

## 2018-10-23 PROCEDURE — 43239 EGD BIOPSY SINGLE/MULTIPLE: CPT | Mod: ,,, | Performed by: INTERNAL MEDICINE

## 2018-10-23 PROCEDURE — 27201012 HC FORCEPS, HOT/COLD, DISP: Performed by: INTERNAL MEDICINE

## 2018-10-23 PROCEDURE — 88305 TISSUE EXAM BY PATHOLOGIST: CPT | Mod: 26,,, | Performed by: PATHOLOGY

## 2018-10-23 RX ORDER — SODIUM CHLORIDE 0.9 % (FLUSH) 0.9 %
3 SYRINGE (ML) INJECTION
Status: DISCONTINUED | OUTPATIENT
Start: 2018-10-23 | End: 2018-10-23 | Stop reason: HOSPADM

## 2018-10-23 RX ORDER — SODIUM CHLORIDE 9 MG/ML
INJECTION, SOLUTION INTRAVENOUS CONTINUOUS
Status: DISCONTINUED | OUTPATIENT
Start: 2018-10-23 | End: 2018-10-23 | Stop reason: HOSPADM

## 2018-10-23 RX ORDER — PROPOFOL 10 MG/ML
VIAL (ML) INTRAVENOUS
Status: DISCONTINUED | OUTPATIENT
Start: 2018-10-23 | End: 2018-10-23

## 2018-10-23 RX ORDER — GLYCOPYRROLATE 0.2 MG/ML
INJECTION INTRAMUSCULAR; INTRAVENOUS
Status: DISCONTINUED | OUTPATIENT
Start: 2018-10-23 | End: 2018-10-23

## 2018-10-23 RX ORDER — LIDOCAINE HCL/PF 100 MG/5ML
SYRINGE (ML) INTRAVENOUS
Status: DISCONTINUED | OUTPATIENT
Start: 2018-10-23 | End: 2018-10-23

## 2018-10-23 RX ORDER — LAMOTRIGINE 100 MG/1
150 TABLET ORAL DAILY
COMMUNITY
End: 2020-10-27 | Stop reason: DRUGHIGH

## 2018-10-23 RX ADMIN — PROPOFOL 50 MG: 10 INJECTION, EMULSION INTRAVENOUS at 09:10

## 2018-10-23 RX ADMIN — Medication 50 MG: at 09:10

## 2018-10-23 RX ADMIN — SODIUM CHLORIDE: 0.9 INJECTION, SOLUTION INTRAVENOUS at 09:10

## 2018-10-23 RX ADMIN — GLYCOPYRROLATE 0.2 MG: 0.2 INJECTION INTRAMUSCULAR; INTRAVENOUS at 09:10

## 2018-10-23 NOTE — H&P
Short Stay Endoscopy History and Physical    PCP - Jaja Mckeon MD  Referring Physician - IVANNA MoralesC  [address] [place] [address]    Procedure - EGD  ASA - per anesthesia  Mallampati - per anesthesia  History of Anesthesia problems - no  Family history Anesthesia problems -  no   Plan of anesthesia - General    HPI  53 y.o. female  Reason for procedure: LUQ abdominal pain, history of H pylori and bloating      ROS:  Constitutional: No fevers, chills, No weight loss  CV: No chest pain  Pulm: No cough, No shortness of breath  GI: see HPI    Medical History:  has a past medical history of Abnormal coronary angiogram, Alcohol abuse, in remission, Anxiety, Bipolar affective, Depression, H. pylori infection, Headache(784.0), Hypothyroid, Memory loss, Osteopenia, and Seizures.    Surgical History:  has a past surgical history that includes Tubal ligation; Bunionectomy; ESOPHAGOGASTRODUODENOSCOPY (EGD) (N/A, 4/12/2017); and COLONOSCOPY (N/A, 4/12/2017).    Family History: family history includes Anuerysm in her mother; Coronary artery disease in her father; Crohn's disease in her maternal aunt and other; Dementia in her maternal grandmother; Diabetes in her father and mother; Hypertension in her father; Kidney disease in her father; Stroke in her mother.. Otherwise no colon cancer, inflammatory bowel disease, or GI malignancies.    Social History:  reports that  has never smoked. she has never used smokeless tobacco. She reports that she does not drink alcohol or use drugs.    Review of patient's allergies indicates:  No Known Allergies    Medications:   Medications Prior to Admission   Medication Sig Dispense Refill Last Dose    Ca-D3-mag#11-zinc-cupr-man-bor 600 mg calcium- 800 unit-50 mg Tab Take 1 tablet by mouth once daily.   10/22/2018 at Unknown time    cyanocobalamin, vitamin B-12, (VITAMIN B-12) 5,000 mcg Subl Place under the tongue once daily.     10/22/2018 at Unknown time     lamoTRIgine (LAMICTAL) 100 MG tablet Take 100 mg by mouth once daily.   10/23/2018 at 0400    levothyroxine (SYNTHROID) 25 MCG tablet Take 1 tablet (25 mcg total) by mouth once daily. 90 tablet 3 10/23/2018 at 0400    omeprazole (PRILOSEC) 20 MG capsule Take 1 capsule (20 mg total) by mouth once daily. 90 capsule 3 10/22/2018 at Unknown time    QUEtiapine (SEROQUEL) 100 MG Tab Take 200 mg by mouth once daily.    10/22/2018 at Unknown time    temazepam (RESTORIL) 22.5 MG capsule Take 30 mg by mouth nightly as needed for Insomnia.   10/22/2018 at Unknown time    traZODone (DESYREL) 150 MG tablet Take 150 mg by mouth every evening.   10/22/2018 at Unknown time    erenumab-aooe (AIMOVIG AUTOINJECTOR) 70 mg/mL AtIn Inject 140 mg into the skin every 28 days. 2 mL 5 Unknown at Unknown time    nitroGLYCERIN (NITROSTAT) 0.4 MG SL tablet Place 1 tablet (0.4 mg total) under the tongue every 5 (five) minutes as needed for Chest pain. 25 tablet 0 Taking    omeprazole (PRILOSEC) 40 MG capsule Take 1 capsule (40 mg total) by mouth once daily. 30 capsule 0 Taking    ondansetron (ZOFRAN) 4 MG tablet Take 1 tablet (4 mg total) by mouth every 8 (eight) hours as needed for Nausea. 40 tablet 0 Unknown at Unknown time    sucralfate (CARAFATE) 1 gram tablet Take 1 tablet (1 g total) by mouth once daily. Separate from other medicines by 1hr 30 tablet 1 Unknown at Unknown time    [DISCONTINUED] divalproex ER (DEPAKOTE ER) 500 MG Tb24 Take 1 tablet (500 mg total) by mouth once daily. 30 tablet 3 Taking    [DISCONTINUED] doxycycline (MONODOX) 100 MG capsule Take 1 capsule (100 mg total) by mouth every 12 (twelve) hours. Take with food 28 capsule 0 Taking    [DISCONTINUED] lurasidone (LATUDA) 20 mg Tab tablet Take 20 mg by mouth once daily.    Taking    [DISCONTINUED] magnesium oxide (MAG-OX) 400 mg tablet Take 1 tablet (400 mg total) by mouth every evening. 90 tablet 1 Taking       Physical Exam:    Vital Signs:   Vitals:     10/23/18 0848   BP: 107/60   Pulse: 69   Resp: 16   Temp: 97.5 °F (36.4 °C)       General Appearance: Well appearing in no acute distress  Abdomen: Soft, non tender, non distended with normal bowel sounds, no masses      Labs:  Lab Results   Component Value Date    WBC 3.79 (L) 08/06/2018    HGB 12.5 08/06/2018    HCT 39.2 08/06/2018     08/06/2018    CHOL 139 05/24/2018    CHOL 139 05/24/2018    TRIG 44 05/24/2018    TRIG 44 05/24/2018    HDL 79 (H) 05/24/2018    HDL 79 (H) 05/24/2018    ALT 16 08/06/2018    AST 26 08/06/2018     08/06/2018    K 4.3 08/06/2018    CL 99 08/06/2018    CREATININE 0.9 08/06/2018    BUN 12 08/06/2018    CO2 28 08/06/2018    TSH 2.082 05/23/2018       I have explained the risks and benefits of this endoscopic procedure to the patient including but not limited to bleeding, inflammation, infection, perforation, and death.      Irlanda Schwarz MD

## 2018-10-23 NOTE — [employer] PATIENT INSTRUCTIONS
Discharge Summary/Instructions after an Endoscopic Procedure  Patient Name: Gay Gurrola  Patient MRN: [id number]  Patient YOB: 1965 Tuesday, October 23, 2018  Irlanda Schwarz MD  RESTRICTIONS:  During your procedure today, you received medications for sedation.  These   medications may affect your judgment, balance and coordination.  Therefore,   for 24 hours, you have the following restrictions:   - DO NOT drive a car, operate machinery, make legal/financial decisions,   sign important papers or drink alcohol.    ACTIVITY:  Today: no heavy lifting, straining or running due to procedural   sedation/anesthesia.  The following day: return to full activity including work.  DIET:  Eat and drink normally unless instructed otherwise.     TREATMENT FOR COMMON SIDE EFFECTS:  - Mild abdominal pain, nausea, belching, bloating or excessive gas:  rest,   eat lightly and use a heating pad.  - Sore Throat: treat with throat lozenges and/or gargle with warm salt   water.  - Because air was used during the procedure, expelling large amounts of air   from your rectum or belching is normal.  - If a bowel prep was taken, you may not have a bowel movement for 1-3 days.    This is normal.  SYMPTOMS TO WATCH FOR AND REPORT TO YOUR PHYSICIAN:  1. Abdominal pain or bloating, other than gas cramps.  2. Chest pain.  3. Back pain.  4. Signs of infection such as: chills or fever occurring within 24 hours   after the procedure.  5. Rectal bleeding, which would show as bright red, maroon, or black stools.   (A tablespoon of blood from the rectum is not serious, especially if   hemorrhoids are present.)  6. Vomiting.  7. Weakness or dizziness.  GO DIRECTLY TO THE NEAREST EMERGENCY ROOM IF YOU HAVE ANY OF THE FOLLOWING:      Difficulty breathing              Chills and/or fever over 101 F   Persistent vomiting and/or vomiting blood   Severe abdominal pain   Severe chest pain   Black, tarry stools   Bleeding- more than one  tablespoon   Any other symptom or condition that you feel may need urgent attention  Your doctor recommends these additional instructions:  If any biopsies were taken, your doctors clinic will contact you in 1 to 2   weeks with any results.  - Discharge patient to home.   - Patient has a contact number available for emergencies.  The signs and   symptoms of potential delayed complications were discussed with the   patient.  Return to normal activities tomorrow.  Written discharge   instructions were provided to the patient.   - Resume previous diet.   - Continue present medications.   - Await pathology results.   - Return to referring physician.   For questions, problems or results please call your physician - Irlanda Schwarz MD at Work:  [phone].  [facility] [place], EMERGENCY ROOM PHONE NUMBER: [phone]  IF A COMPLICATION OR EMERGENCY SITUATION ARISES AND YOU ARE UNABLE TO REACH   YOUR PHYSICIAN - GO DIRECTLY TO THE EMERGENCY ROOM.  Irlanda Schwarz MD  10/23/2018 9:16:13 AM  This report has been verified and signed electronically.  [employer]

## 2018-10-23 NOTE — TRANSFER OF CARE
"Anesthesia Transfer of Care Note    Patient: Gay Gurrola    Procedure(s) Performed: Procedure(s) (LRB):  EGD (ESOPHAGOGASTRODUODENOSCOPY) (N/A)    Patient location: PACU    Anesthesia Type: general    Transport from OR: Transported from OR on room air with adequate spontaneous ventilation    Post pain: adequate analgesia    Post assessment: no apparent anesthetic complications and tolerated procedure well    Post vital signs: stable    Level of consciousness: sedated    Nausea/Vomiting: no nausea/vomiting    Complications: none    Transfer of care protocol was followed      Last vitals:   Visit Vitals  /60 (BP Location: Left arm, Patient Position: Sitting)   Pulse 69   Temp 36.4 °C (97.5 °F) (Temporal)   Resp 16   Ht 5' 2" (1.575 m)   Wt 44.5 kg (98 lb)   LMP 10/19/2018 (Exact Date)   SpO2 99%   Breastfeeding? No   BMI 17.92 kg/m²     "

## 2018-10-23 NOTE — ANESTHESIA PREPROCEDURE EVALUATION
10/23/2018  Gay Gurrola is a 53 y.o., female.    Anesthesia Evaluation    I have reviewed the Patient Summary Reports.     I have reviewed the Medications.     Review of Systems  Hematology/Oncology:  Hematology Normal   Oncology Normal     EENT/Dental:EENT/Dental Normal   Cardiovascular:  Cardiovascular Normal     Renal/:  Renal/ Normal     Hepatic/GI:  Hepatic/GI Normal    Musculoskeletal:  Musculoskeletal Normal    Endocrine:  Endocrine Normal    Dermatological:  Skin Normal    Psych:  Psychiatric Normal           Physical Exam  General:  Well nourished    Airway/Jaw/Neck:  Airway Findings: Mouth Opening: Normal Tongue: Normal  Mallampati: II  Improves to I with phonation.        Eyes/Ears/Nose:  EYES/EARS/NOSE FINDINGS: Normal   Dental:  DENTAL FINDINGS: Normal   Chest/Lungs:  Chest/Lungs Clear    Heart/Vascular:  Heart Findings: Normal Heart murmur: negative Vascular Findings: Normal    Abdomen:  Abdomen Findings: Normal    Musculoskeletal:  Musculoskeletal Findings: Normal   Skin:  Skin Findings: Normal    Mental Status:  Mental Status Findings: Normal        Anesthesia Plan  Type of Anesthesia, risks & benefits discussed:  Anesthesia Type:  general  Patient's Preference: General  Intra-op Monitoring Plan: standard ASA monitors  Intra-op Monitoring Plan Comments:   Post Op Pain Control Plan:   Post Op Pain Control Plan Comments:   Induction:   IV  Beta Blocker:  Patient is not currently on a Beta-Blocker (No further documentation required).       Informed Consent: Patient understands risks and agrees with Anesthesia plan.  Questions answered. Anesthesia consent signed with patient.  ASA Score: 2     Day of Surgery Review of History & Physical: I have interviewed and examined the patient. I have reviewed the patient's H&P dated:    H&P update referred to the surgeon.         Ready For  Surgery From Anesthesia Perspective.

## 2018-10-23 NOTE — TELEPHONE ENCOUNTER
Ma contact pt regarding r/s appt,     Ma isn't able to schedule on dietician schedule     Please advise contact  pt to schedule     Colleen Escalona       ----- Message from Nicole Bryant sent at 10/23/2018  2:55 PM CDT -----  Contact: Self- [phone]  Landon- pt called to discuss rescheduling her appt with the dietician- originally for Mon 10/29- please contact pt at [phone]

## 2018-10-23 NOTE — ANESTHESIA POSTPROCEDURE EVALUATION
"Anesthesia Post Evaluation    Patient: Gay Gurrola    Procedure(s) Performed: Procedure(s) (LRB):  EGD (ESOPHAGOGASTRODUODENOSCOPY) (N/A)    Final Anesthesia Type: general  Patient location during evaluation: PACU  Patient participation: Yes- Able to Participate  Level of consciousness: awake and alert  Post-procedure vital signs: reviewed and stable  Pain management: adequate  Airway patency: patent  PONV status at discharge: No PONV  Anesthetic complications: no      Cardiovascular status: blood pressure returned to baseline  Respiratory status: spontaneous ventilation and room air  Hydration status: euvolemic  Follow-up not needed.        Visit Vitals  /67 (BP Location: Left arm, Patient Position: Lying)   Pulse 77   Temp 36.7 °C (98.1 °F) (Temporal)   Resp 18   Ht 5' 2" (1.575 m)   Wt 44.5 kg (98 lb)   LMP 10/19/2018 (Exact Date)   SpO2 100%   Breastfeeding? No   BMI 17.92 kg/m²       Pain/Cat Score: Pain Assessment Performed: Yes (10/23/2018  9:24 AM)  Presence of Pain: non-verbal indicators absent (10/23/2018  9:24 AM)  Cat Score: 10 (10/23/2018  9:43 AM)        "

## 2018-10-25 ENCOUNTER — TELEPHONE (OUTPATIENT)
Dept: GASTROENTEROLOGY | Facility: CLINIC | Age: 53
End: 2018-10-25

## 2018-10-25 NOTE — TELEPHONE ENCOUNTER
Ma spoke to pt,    Pt stated she having serve pain and would like to get second opinion appt with Dr. Salazar     Pt scope was done 10/23 by Dr. Schwarz    Pt stated Dr. Schwarz recommend if she's still experiencing serve pain schedule appt with GI      Pt was offered next available pt declined    Pt is requesting sooner appt.       ----- Message from Nicole Bryant sent at 10/25/2018 10:50 AM CDT -----  Contact: Self- [phone]  Martin- pt called tp schedule a second opinion appt- had a scope 2 days ago but still having bowel changes and abdominal pain- offered next available pt declined- please contact pt at [phone]

## 2018-10-26 ENCOUNTER — TELEPHONE (OUTPATIENT)
Dept: GASTROENTEROLOGY | Facility: CLINIC | Age: 53
End: 2018-10-26

## 2018-10-26 NOTE — TELEPHONE ENCOUNTER
Ma spoke to pt,     Pt is reporting bowel change ( like little rocks )     Pt stated abd pains after eating and no complete bowels movements    Please advise     ----- Message from Nicole Bryant sent at 10/26/2018  8:38 AM CDT -----  Contact: Self- [phone]  Ray- pt is returning a call to speak with staff- would like to discuss her bowel changes- almost went to the ED last night- please contact pt at [phone]

## 2018-10-26 NOTE — TELEPHONE ENCOUNTER
The patient has expressed that my evaluation of her symptoms are inadequate. Which is why she requested to see Dr Salazar. I will deferred further evaluation and workup to Dr Salazar or GI on call.

## 2018-10-29 ENCOUNTER — NUTRITION (OUTPATIENT)
Dept: GASTROENTEROLOGY | Facility: CLINIC | Age: 53
End: 2018-10-29

## 2018-10-29 VITALS — HEIGHT: 61 IN | BODY MASS INDEX: 18.81 KG/M2 | WEIGHT: 99.63 LBS

## 2018-10-29 DIAGNOSIS — R63.4 WEIGHT LOSS: ICD-10-CM

## 2018-10-29 DIAGNOSIS — A04.8 H. PYLORI INFECTION: ICD-10-CM

## 2018-10-29 DIAGNOSIS — R10.12 LUQ PAIN: ICD-10-CM

## 2018-10-29 DIAGNOSIS — R14.0 ABDOMINAL BLOATING: ICD-10-CM

## 2018-10-29 DIAGNOSIS — F31.62 BIPOLAR DISORDER, CURRENT EPISODE MIXED, MODERATE: Primary | ICD-10-CM

## 2018-10-29 PROCEDURE — 99999 PR PBB SHADOW E&M-EST. PATIENT-LVL I: CPT | Mod: PBBFAC,,,

## 2018-10-29 NOTE — PROGRESS NOTES
Referring Physician:  Pricilla Navarrete PA-C     Type of Visit : an initial evaluation  Reason for Visit:  Patient recently saw  at [facility] for eval and was told her percentage body fat was 8% and further weight loss would not be advised ; yoga and stretching exercises with light weight training recommended over intense daily running 3-5 miles per day .  · Incomplete evacuation with bowel movement - resolved with taking bottle of mag ox suggested by pharmacist - symptoms now resolved   · Bloating - has used the FODMAP  diet in the past but reports recent change in eating habits and exercise patterns . Daily exercise triggers bm but notes during manic episodes, will exercise to excess triggering disruption in bowel function   · Has also been increasing intake of healthy foods .. 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 pylori infection     Headache(784.0)     followed by Dr. Corona    Hypothyroid     Memory loss     Osteopenia     Seizures        Previous Surgical History:  Past Surgical History:   Procedure Laterality Date    BUNIONECTOMY      COLONOSCOPY N/A 4/12/2017    Procedure: COLONOSCOPY;  Surgeon: Estuardo Salazar MD;  Location: [facility] ([address] [place] [address]);  Service: Endoscopy;  Laterality: N/A;  PM prep    COLONOSCOPY N/A 4/12/2017    Performed by Estuardo  MD Martin at [facility] (4TH FLR)    EGD (ESOPHAGOGASTRODUODENOSCOPY) N/A 10/23/2018    Performed by Irlanda Schwarz MD at [facility] ENDO (4TH FLR)    ESOPHAGOGASTRODUODENOSCOPY N/A 10/23/2018    Procedure: EGD (ESOPHAGOGASTRODUODENOSCOPY);  Surgeon: Irlanda Schwarz MD;  Location: [facility] (4TH FLR);  Service: Endoscopy;  Laterality: N/A;    ESOPHAGOGASTRODUODENOSCOPY (EGD) N/A 4/12/2017    Performed by Estuardo Salazar MD at [facility] (4TH FLR)    TUBAL LIGATION         Medication:    Current Outpatient Medications:     Ca-D3-mag#11-zinc-cupr-man-bor 600 mg calcium- 800 unit-50 mg Tab, Take 1 tablet by mouth once daily., Disp: , Rfl:     cyanocobalamin, vitamin B-12, (VITAMIN B-12) 5,000 mcg Subl, Place under the tongue once daily.  , Disp: , Rfl:     erenumab-aooe (AIMOVIG AUTOINJECTOR) 70 mg/mL AtIn, Inject 140 mg into the skin every 28 days., Disp: 2 mL, Rfl: 5    lamoTRIgine (LAMICTAL) 100 MG tablet, Take 100 mg by mouth once daily., Disp: , Rfl:     levothyroxine (SYNTHROID) 25 MCG tablet, Take 1 tablet (25 mcg total) by mouth once daily., Disp: 90 tablet, Rfl: 3    nitroGLYCERIN (NITROSTAT) 0.4 MG SL tablet, Place 1 tablet (0.4 mg total) under the tongue every 5 (five) minutes as needed for Chest pain., Disp: 25 tablet, Rfl: 0    omeprazole (PRILOSEC) 20 MG capsule, Take 1 capsule (20 mg total) by mouth once daily., Disp: 90 capsule, Rfl: 3    omeprazole (PRILOSEC) 40 MG capsule, Take 1 capsule (40 mg total) by mouth once daily., Disp: 30 capsule, Rfl: 0    ondansetron (ZOFRAN) 4 MG tablet, Take 1 tablet (4 mg total) by mouth every 8 (eight) hours as needed for Nausea., Disp: 40 tablet, Rfl: 0    QUEtiapine (SEROQUEL) 100 MG Tab, Take 200 mg by mouth once daily. , Disp: , Rfl:     sucralfate (CARAFATE) 1 gram tablet, Take 1 tablet (1 g total) by mouth once daily. Separate from other medicines by 1hr, Disp: 30 tablet, Rfl: 1    temazepam (RESTORIL) 22.5 MG capsule, Take 30 mg by mouth nightly as needed  for Insomnia., Disp: , Rfl:     traZODone (DESYREL) 150 MG tablet, Take 150 mg by mouth every evening., Disp: , Rfl:       Vitamin/Supplements/Herbs: Vit D3, Fish oil , ,Multivitamin, Turmeric, B12, Probiotics Garden of life protein powder , collagen     Potential interactions with food    Allergies:  Review of patient's allergies indicates:  No Known Allergies    Labs:Last   Glucose   Date Value Ref Range Status   08/06/2018 85 70 - 110 mg/dL Final   05/24/2018 76 70 - 110 mg/dL Final     BUN, Bld   Date Value Ref Range Status   08/06/2018 12 6 - 20 mg/dL Final   05/24/2018 13 6 - 20 mg/dL Final     Creatinine   Date Value Ref Range Status   08/06/2018 0.9 0.5 - 1.4 mg/dL Final   05/24/2018 0.9 0.5 - 1.4 mg/dL Final     Sodium   Date Value Ref Range Status   08/06/2018 139 136 - 145 mmol/L Final   05/24/2018 142 136 - 145 mmol/L Final     Potassium   Date Value Ref Range Status   08/06/2018 4.3 3.5 - 5.1 mmol/L Final   05/24/2018 4.4 3.5 - 5.1 mmol/L Final     Phosphorus   Date Value Ref Range Status   05/24/2018 4.0 2.7 - 4.5 mg/dL Final   10/31/2010 3.3 2.7 - 4.5 mg/dl Final     Calcium   Date Value Ref Range Status   08/06/2018 9.8 8.7 - 10.5 mg/dL Final   05/24/2018 8.7 8.7 - 10.5 mg/dL Final     No results found for: PREALBUMIN  Total Protein   Date Value Ref Range Status   08/06/2018 7.3 6.0 - 8.4 g/dL Final   05/23/2018 7.5 6.0 - 8.4 g/dL Final     Comment:     *Result may be interfered by visible hemolysis     Cholesterol   Date Value Ref Range Status   05/24/2018 139 120 - 199 mg/dL Final     Comment:     The National Cholesterol Education Program (NCEP) has set the  following guidelines (reference ranges) for Cholesterol:  Optimal.....................<200 mg/dL  Borderline High.............200-239 mg/dL  High........................> or = 240 mg/dL     05/24/2018 139 120 - 199 mg/dL Final     Comment:     The National Cholesterol Education Program (NCEP) has set the  following guidelines (reference  "ranges) for Cholesterol:  Optimal.....................<200 mg/dL  Borderline High.............200-239 mg/dL  High........................> or = 240 mg/dL       No results found for: HGBA1C  Hemoglobin   Date Value Ref Range Status   2018 12.5 12.0 - 16.0 g/dL Final   2018 12.5 12.0 - 16.0 g/dL Final     Hematocrit   Date Value Ref Range Status   2018 39.2 37.0 - 48.5 % Final   2018 38.8 37.0 - 48.5 % Final     Iron   Date Value Ref Range Status   2017 49 30 - 160 ug/dL Final   10/31/2010 48 30 - 160 mcg/dl Final     No components found for: FROLATE  Vit D, 25-Hydroxy   Date Value Ref Range Status   2017 69 30 - 96 ng/mL Final     Comment:     Vitamin D deficiency.........<10 ng/mL                              Vitamin D insufficiency......10-29 ng/mL       Vitamin D sufficiency........> or equal to 30 ng/mL  Vitamin D toxicity............>100 ng/mL     2016 64 30 - 96 ng/mL Final     Comment:     Vitamin D deficiency.........<10 ng/mL                              Vitamin D insufficiency......10-29 ng/mL       Vitamin D sufficiency........> or equal to 30 ng/mL  Vitamin D toxicity............>100 ng/mL       WBC   Date Value Ref Range Status   2018 3.79 (L) 3.90 - 12.70 K/uL Final   2018 4.04 3.90 - 12.70 K/uL Final       : 1965  Age: 53 y.o.    Anthropometrics    Estimated body mass index is 17.92 kg/m² as calculated from the following:    Height as of 10/23/18: 5' 2" (1.575 m).    Weight as of 10/23/18: 44.5 kg (98 lb).    For Adults 20 Years and Older:    BMI Weight Status   Below 18.5 Underweight   18.6-24.9 Normal/Healthy   25.0-29.9 Overweight   30.0 & Above Obese     Ideal Weight Range for Your Height: 5'2" = 104 - 135 lbs    Weight History:  Wt Readings from Last 12 Encounters:   10/23/18 44.5 kg (98 lb)   10/05/18 45.4 kg (100 lb)   18 42.9 kg (94 lb 9.2 oz)   18 44.5 kg (98 lb 1.7 oz)   18 45.5 kg (100 lb 5 oz)   18 44.2 kg " (97 lb 5.3 oz)   06/26/18 43.4 kg (95 lb 10.9 oz)   05/31/18 42.4 kg (93 lb 7.6 oz)   05/23/18 43.7 kg (96 lb 4.8 oz)   05/16/18 43.6 kg (96 lb 1.9 oz)   03/15/18 42.6 kg (94 lb)   02/27/18 41.8 kg (92 lb 2.4 oz)   ]      Weight Changes/Trend: lowest weight 93# age 52 years ; highest weight 105# age 7 years   has increased 6 pounds over last 8 months   Currently stable      Estimated Nutrition Needs:   Energy Needs:  (MSJ x  PAL)5978-3194 calories ( 1.2 activity factor)   Protein Needs: ( gm Protein/kg)50-60 grams   Fluid Needs:   (1 mL/calorie)1600 -1800cc     Nutrition Focused Physical Findings:     Body Fat Depletion: Loss of subcutaneous fat in orbital area       Nutrition History Reports iron supplement initiation 1 year ago for anemia was when constipation noted and again following H pylori treatment in October     Meal Pattern: meals per day  Breakfast:Smoothie with garden of life powder, almond milk ,seeds, fruit, vegetables - 20 oz     Lunch:eggs, toast,  Or sardines and salad or tuna red onions, chickpeas, feta cheese   Dinner: vegetables and chicken   Snacks:greek yogurt, nuts, cottage cheese,   Beverage Intake: water, tea, coffee     Food Intolerance: gas and bloating often associated with changes in exercise     Meal Preparation:home   Dining Out: [employer] [place], [employer] , [employer]     Dentition: Difficulty chewing or swallowing? No   Activity Level/Physical Limitations : highly physical activity 4-5 miles per day with weight training     Motivation to make Changes :   action - ready to set action plan and implement     Anticipated barriers to making changes and/ or expected compliance   Mood or behavioral changes will often trigger compulsiveness with diet and exercise       Nutrition Diagnosis  Nutrition Problem  Altered GI Function  Change in frequency and intensity of exercise   Related to (etiology):   H pylori treatment   Signs and Symptoms (as evidenced by):      Excessive fiber  intake and Inadequate fluid intake  pain and excess flatus  abdominal pain and incomplete evacuation with BM   the symptoms are not directly linked to stress, but occur against a highly stressful background.    Nutrition Diagnosis Status:   New      Recommendations/Interventions/Goals:  Discussed role of fiber and fluid and exercise on bowel function   Discussed imbalance or excessive use of exercise, fiber with inadequate fluid may cause bloating and irregularity in bowel pattern   Also provided and discussed fodmap diet and fermentable carbohydrates which may create bloating post treatment for H pylori   Provided two reputable nutrition resources for information [facility] [employer] with [employer]       Comprehension: of recommendations : Excellent       Monitoring : weight changes, bowel pattern    Routed to Thomas Navarrete   Consultation Time: 60  minutes.      Follow Up:as needed

## 2018-10-30 ENCOUNTER — TELEPHONE (OUTPATIENT)
Dept: ENDOSCOPY | Facility: HOSPITAL | Age: 53
End: 2018-10-30

## 2018-10-31 ENCOUNTER — OFFICE VISIT (OUTPATIENT)
Dept: ORTHOPEDICS | Facility: CLINIC | Age: 53
End: 2018-10-31
Payer: MEDICARE

## 2018-10-31 VITALS
BODY MASS INDEX: 18.31 KG/M2 | WEIGHT: 97 LBS | HEIGHT: 61 IN | DIASTOLIC BLOOD PRESSURE: 62 MMHG | SYSTOLIC BLOOD PRESSURE: 99 MMHG

## 2018-10-31 DIAGNOSIS — M99.08 SOMATIC DYSFUNCTION OF RIB CAGE REGION: ICD-10-CM

## 2018-10-31 DIAGNOSIS — M76.892 ENTHESOPATHY OF LEFT HIP REGION: Primary | ICD-10-CM

## 2018-10-31 DIAGNOSIS — M76.892 ADDUCTOR TENDINITIS OF LEFT HIP: ICD-10-CM

## 2018-10-31 DIAGNOSIS — M99.02 SOMATIC DYSFUNCTION OF THORACIC REGION: ICD-10-CM

## 2018-10-31 DIAGNOSIS — M99.04 SACRAL REGION SOMATIC DYSFUNCTION: ICD-10-CM

## 2018-10-31 DIAGNOSIS — M99.05 SOMATIC DYSFUNCTION OF PELVIC REGION: ICD-10-CM

## 2018-10-31 DIAGNOSIS — M99.03 SOMATIC DYSFUNCTION OF LUMBAR REGION: ICD-10-CM

## 2018-10-31 PROCEDURE — 97110 THERAPEUTIC EXERCISES: CPT | Mod: GP,,, | Performed by: NEUROMUSCULOSKELETAL MEDICINE & OMM

## 2018-10-31 PROCEDURE — 99213 OFFICE O/P EST LOW 20 MIN: CPT | Mod: 25,S$PBB,, | Performed by: NEUROMUSCULOSKELETAL MEDICINE & OMM

## 2018-10-31 PROCEDURE — 98927 OSTEOPATH MANJ 5-6 REGIONS: CPT | Mod: S$PBB,,, | Performed by: NEUROMUSCULOSKELETAL MEDICINE & OMM

## 2018-10-31 PROCEDURE — 99999 PR PBB SHADOW E&M-EST. PATIENT-LVL III: CPT | Mod: PBBFAC,,, | Performed by: NEUROMUSCULOSKELETAL MEDICINE & OMM

## 2018-10-31 PROCEDURE — 98927 OSTEOPATH MANJ 5-6 REGIONS: CPT | Mod: PBBFAC,PO | Performed by: NEUROMUSCULOSKELETAL MEDICINE & OMM

## 2018-10-31 PROCEDURE — 99213 OFFICE O/P EST LOW 20 MIN: CPT | Mod: PBBFAC,PO,25 | Performed by: NEUROMUSCULOSKELETAL MEDICINE & OMM

## 2018-10-31 NOTE — PROGRESS NOTES
Subjective:     Gay Gurrola    No chief complaint on file.      HPI    Gay is a 53 y.o. female coming in today for left hip pain. Since last visit the pain has Improved. Pt states she has been doing her stretches and has been resting from walking and she feels a lot better. She was able to do some walking this weekend and felt good. When she got home after walking she started having the shooting pain down her left posterior leg. The pain is better with rest, stretching and worse with walking. Pt. describes the pain as a 3/10 shooting pain that does radiate down the posterior left leg. There has not been any new a fall/injury/ or traumas since last visit.  Pt. denies any new musculoskeletal complaints at this time. She also notes she's dealing with constipation and upper left quadrant abdominal pain and is following up with GI for this.     Joint instability?  Mechanical locking/clicking?   Affecting ADL's?  Affecting sleep?    Review of Systems   Constitutional: Negative for chills and fever.   Musculoskeletal: Positive for joint pain and myalgias. Negative for back pain, falls and neck pain.   Neurological: Positive for weakness (left leg). Negative for dizziness, tingling, focal weakness and headaches.       PAST MEDICAL HISTORY:   Past Medical History:   Diagnosis Date    Abnormal coronary angiogram     Alcohol abuse, in remission     Anxiety     Bipolar affective     since teenage years    Depression     H. pylori infection     Headache(784.0)     followed by Dr. Corona    Hypothyroid     Memory loss     Osteopenia     Seizures      PAST SURGICAL HISTORY:   Past Surgical History:   Procedure Laterality Date    BUNIONECTOMY      COLONOSCOPY N/A 4/12/2017    Procedure: COLONOSCOPY;  Surgeon: Estuardo Salazar MD;  Location: [facility] ([address] [place] [address]);  Service: Endoscopy;  Laterality: N/A;  PM prep    COLONOSCOPY N/A 4/12/2017    Performed by Estuardo Salazar MD at [facility] (4TH FLR)    EGD  (ESOPHAGOGASTRODUODENOSCOPY) N/A 10/23/2018    Performed by Irlanda Schwarz [facility] at [facility] ENDO (4TH FLR)    ESOPHAGOGASTRODUODENOSCOPY N/A 10/23/2018    Procedure: EGD (ESOPHAGOGASTRODUODENOSCOPY);  Surgeon: Irlanda Schwarz MD;  Location: [facility] (4TH FLR);  Service: Endoscopy;  Laterality: N/A;    ESOPHAGOGASTRODUODENOSCOPY (EGD) N/A 4/12/2017    Performed by Estuardo Salazar MD at [facility] (4TH FLR)    TUBAL LIGATION         MEDICATIONS:   Current Outpatient Medications:     Ca-D3-mag#11-zinc-cupr-man-bor 600 mg calcium- 800 unit-50 mg Tab, Take 1 tablet by mouth once daily., Disp: , Rfl:     cyanocobalamin, vitamin B-12, (VITAMIN B-12) 5,000 mcg Subl, Place under the tongue once daily.  , Disp: , Rfl:     erenumab-aooe (AIMOVIG AUTOINJECTOR) 70 mg/mL AtIn, Inject 140 mg into the skin every 28 days., Disp: 2 mL, Rfl: 5    lamoTRIgine (LAMICTAL) 100 MG tablet, Take 100 mg by mouth once daily., Disp: , Rfl:     levothyroxine (SYNTHROID) 25 MCG tablet, Take 1 tablet (25 mcg total) by mouth once daily., Disp: 90 tablet, Rfl: 3    nitroGLYCERIN (NITROSTAT) 0.4 MG SL tablet, Place 1 tablet (0.4 mg total) under the tongue every 5 (five) minutes as needed for Chest pain., Disp: 25 tablet, Rfl: 0    omeprazole (PRILOSEC) 20 MG capsule, Take 1 capsule (20 mg total) by mouth once daily., Disp: 90 capsule, Rfl: 3    omeprazole (PRILOSEC) 40 MG capsule, Take 1 capsule (40 mg total) by mouth once daily., Disp: 30 capsule, Rfl: 0    ondansetron (ZOFRAN) 4 MG tablet, Take 1 tablet (4 mg total) by mouth every 8 (eight) hours as needed for Nausea., Disp: 40 tablet, Rfl: 0    QUEtiapine (SEROQUEL) 100 MG Tab, Take 200 mg by mouth once daily. , Disp: , Rfl:     sucralfate (CARAFATE) 1 gram tablet, Take 1 tablet (1 g total) by mouth once daily. Separate from other medicines by 1hr, Disp: 30 tablet, Rfl: 1    temazepam (RESTORIL) 22.5 MG capsule, Take 30 mg by mouth nightly as needed for Insomnia., Disp: , Rfl:      "traZODone (DESYREL) 150 MG tablet, Take 150 mg by mouth every evening., Disp: , Rfl:   ALLERGIES: Review of patient's allergies indicates:  No Known Allergies      Objective:     VITAL SIGNS: BP 99/62   Ht 5' 1" (1.549 m)   Wt 44 kg (97 lb 0 oz)   LMP 10/19/2018 (Exact Date)   BMI 18.33 kg/m²    General    Vitals reviewed.  Constitutional: She is oriented to person, place, and time. She appears well-developed and well-nourished.   Neurological: She is alert and oriented to person, place, and time.   Psychiatric: She has a normal mood and affect. Her behavior is normal.               MUSCULOSKELETAL EXAM  HIP: left HIP  The affected hip is compared to the contralateral hip.     Observation:    There is no edema, erythema, or ecchymosis in the lumbosacral region.   No obvious pelvic obliquity while standing.    No thoracolumbar scoliosis observed.    No midline skin abnormalities.  No atrophy noted in the lower limbs.  Posture:  Upright  Gait: Non-antalgic with Neutral ankle mechanics and Neutral medial arch        ROM:   Passive hip flexion to 120° on left and 120° on right  Passive hip internal rotation to 45° on left and 45° on right (R inferior pubic rami pain)  Passive hip external rotation to 45° on left and 45° on right   Passive hip abduction to 45° on left and 45° on right     Tenderness To Palpation:  + tenderness at left inferior pubic ramus at obturator internus and adductor rené attachments  No tenderness at superior pubic tubercles bilaterally, No tenderness at inferior pubic tubercle bilaerally  No tenderness at the ASIS, AIIS, PSIS, PIIS, iliac crest, or ischial tuberosity.  No tenderness throughout the lumbar spine, iliolumbar region, or posterior pelvis.  No tenderness throughout the sacrum or piriformis  No tenderness over the greater trochanteric bursa or greater/lesser trochanters.  No tenderness at the glut attachments on the greater trochanter  No tenderness over proximal IT band or hip " flexor musculature.     Strength Testing (* = with pain):  Hip flexion - 5/5 on left and 5/5 on right  Hip extension - 5/5 on left and 5/5 on right  Hip adduction - 5/5 on left*(at inferior pubic ramus) and 5/5 on right   Hip abduction - 5/5 on left and 5/5 on right  Knee flexion - 5/5 on left and 5/5 on right  Knee extension - 5/5 on left and 5/5 on right     Special Tests:  Seated straight leg raise - negative  Supine straight leg raise - negative  Hamstring flexibility decreased on the left compared to the right      DESTINY - negative  FADIR - negative  Scour test - negative  No pain with posterior hip capsule compression     ASIS compression test - negative  SI drawer test - negative      Chidi test - positive bilaterally  Lebron compression test - negative bilaterally     Leg lengths symmetric.   Bilateral psoas and anterior hip capsule tightness improved     Neurovascular Exam:  Normal gait without Trendelenburg or antalgia.  Hamstring/gluteal firing pattern normal and symmetric. Glut medius firing poor with torso rotation compensation and poor pelvic instability  No skin changes, no abnormal hair distribution.       TART (Tissue texture abnormality, Asymmetry,  Restriction of motion and/or Tenderness) changes:       Thoracic Spine   T1 Neutral   T2 Neutral   T3 Neutral   T4 Neutral   T5 Neutral   T6 Neutral   T7 Neutral   T8 Neutral   T9 Neutral   T10 NS-left,R-right   T11 NS-left,R-right   T12 NS-left,R-right     Rib cage: Rib 9 external torsion on right     Lumbar Spine   L1 NS-left,R-right   L2 Neutral   L3 Neutral   L4 Neutral   L5 FRS RIGHT       Pelvis:  · Innominate:Right anterior rotation  · Pubic bone:Right inferior pubic shear    Sacrum:Left on Right sacral torsion      Key   F= Flexed   E = Extended   R = Rotated   S = Sidebent   TTA = tissue texture abnormality       Assessment:      Encounter Diagnoses   Name Primary?    Enthesopathy of left hip region Yes    Adductor tendinitis of left hip      Somatic dysfunction of thoracic region     Somatic dysfunction of rib cage region     Somatic dysfunction of lumbar region     Sacral region somatic dysfunction     Somatic dysfunction of pelvic region           Plan:     1. Pubic pain resolved since last visit. Left hip pain improved. Left hip overuse injury to muscle attachments of the inferior pubic rami causing inferior groin pain. Compensatory IT band tightness also present on exam. Continue HEP (adding in pelvic stability and gluteal strengthening exercises) and continue avoiding running, can progress to gentle walking for 10-20 minutes at a time as long as pain isn't irritated.      2. OMT 5-6 regions. Oral consent obtained.  Reviewed benefits and potential side effects.   - OMT indicated today due to signs and symptoms as well as local and remote somatic dysfunction findings and their related neurokinetic, lymphatic, fascial and/or arteriovenous body connections.   - OMT techniques used: Myofascial Release, Muscle Energy and Still's technique  - Treatment was tolerated well. Improvement noted in segmental mobility post-treatment in dysfunctional regions. There were no adverse events and no complications immediately following treatment.      3. Pt. Given the following HEP:  Continue  A) Psoas lung stretch: 1-2 reps each side, twice daily  B) Piriformis stretch above 90 degrees: hold 30 sec-1 min, twice a day  C) Prone prop abdominal and anterior hip capsule stretch: hold 30 sec-1 min, twice a day  D)  Pelvic clock exercises given to do from the 6-12 o'clock positions:10-15 reps, twice daily.   Add:  E) Quadratus lumborum self-stretch on all fours: hold stretch for 30 seconds, repeating 2-3 times on each side. Do stretch twice daily. Hand-out also given.   F) Clam shell exercises bilaterally: hold leg in abducted and externally rotated position for 5-10 seconds, repeat 5-10 times  G) IT band foam rolling bilaterally    [address]89 HOME EXERCISE PROGRAM (HEP):   The patient was taught a homegoing physical therapy regimen as described above. The patient demonstrated understanding of the exercises and proper technique of their execution. This interaction took 15 minutes. Handout of exercises were also given.      4. Follow-up in 3-4 weeks for reevaluation     5. Patient agreeable to today's plan and all questions were answered

## 2018-10-31 NOTE — PATIENT INSTRUCTIONS
Clamshells:       Lay on your side, lift your top knee and slowly lower  Repeat 2-3x10 on each side      Foam rolling: IT bands

## 2018-11-01 ENCOUNTER — OFFICE VISIT (OUTPATIENT)
Dept: GASTROENTEROLOGY | Facility: CLINIC | Age: 53
End: 2018-11-01
Payer: MEDICARE

## 2018-11-01 VITALS
SYSTOLIC BLOOD PRESSURE: 95 MMHG | HEART RATE: 81 BPM | DIASTOLIC BLOOD PRESSURE: 58 MMHG | HEIGHT: 62 IN | BODY MASS INDEX: 18.25 KG/M2 | WEIGHT: 99.19 LBS

## 2018-11-01 DIAGNOSIS — R10.13 ABDOMINAL PAIN, EPIGASTRIC: Primary | ICD-10-CM

## 2018-11-01 DIAGNOSIS — R10.12 LUQ PAIN: ICD-10-CM

## 2018-11-01 DIAGNOSIS — Z82.49 FAMILY HISTORY OF BRAIN ANEURYSM: ICD-10-CM

## 2018-11-01 DIAGNOSIS — R14.0 ABDOMINAL BLOATING: ICD-10-CM

## 2018-11-01 PROCEDURE — 99213 OFFICE O/P EST LOW 20 MIN: CPT | Mod: PBBFAC | Performed by: INTERNAL MEDICINE

## 2018-11-01 PROCEDURE — 99999 PR PBB SHADOW E&M-EST. PATIENT-LVL III: CPT | Mod: PBBFAC,,, | Performed by: INTERNAL MEDICINE

## 2018-11-01 PROCEDURE — 99213 OFFICE O/P EST LOW 20 MIN: CPT | Mod: S$PBB,,, | Performed by: INTERNAL MEDICINE

## 2018-11-01 RX ORDER — LANOLIN ALCOHOL/MO/W.PET/CERES
400 CREAM (GRAM) TOPICAL NIGHTLY
Qty: 90 TABLET | Refills: 1 | Status: SHIPPED | OUTPATIENT
Start: 2018-11-01 | End: 2019-02-04

## 2018-11-01 RX ORDER — DICYCLOMINE HYDROCHLORIDE 20 MG/1
20 TABLET ORAL
Qty: 90 TABLET | Refills: 3 | Status: SHIPPED | OUTPATIENT
Start: 2018-11-01 | End: 2019-02-04

## 2018-11-01 RX ORDER — MULTIVITAMIN
1 TABLET ORAL DAILY
COMMUNITY

## 2018-11-01 RX ORDER — AMOXICILLIN 500 MG
1 CAPSULE ORAL DAILY
COMMUNITY

## 2018-11-01 NOTE — PROGRESS NOTES
[facility] Consultation Note    Reason for Consult:  The primary encounter diagnosis was Abdominal pain, epigastric. Diagnoses of Family history of brain aneurysm, Abdominal bloating, and LUQ pain were also pertinent to this visit.    PCP:   Jaja Mckeon       Referring MD:  No referring provider defined for this encounter.    HPI:  This is a 53 y.o. female here for evaluation of abdominal pain.  She contacted the office 8/6/18 with complaints of stomach pain, swelling and burning   She did see her PCP and labs and ABD US were ordered.  Non-fasting lipase was mildly elevated - 65  ABD US - normal, no gallstones  She has a Hx of H. Pylori    Interval Hx  Went on trip in July to [place] and developed upper and lower abdominal pain and bloating  Stool h pylori was positive, treated and EGD with biopsies was negative  Ultrasound and Ct scan both normal  No new meds, stopped Calcium/MVI  Added turmeric        ROS:  Constitutional: No fevers, chills, No weight loss  ENT: No allergies  CV: No chest pain  Pulm: No cough, No shortness of breath  Ophtho: No vision changes  GI: see HPI  Derm: No rash  Heme: No lymphadenopathy, No bruising  MSK: No arthritis  : No dysuria, No hematuria  Endo: No hot or cold intolerance  Neuro: No syncope, No seizure  Psych: No anxiety, No depression    Medical History:  has a past medical history of Abnormal coronary angiogram, Alcohol abuse, in remission, Anxiety, Bipolar affective, Depression, H. pylori infection, Headache(784.0), Hypothyroid, Memory loss, Osteopenia, and Seizures.    Surgical History:  has a past surgical history that includes Tubal ligation; Bunionectomy; EGD (ESOPHAGOGASTRODUODENOSCOPY) (N/A, 10/23/2018); ESOPHAGOGASTRODUODENOSCOPY (EGD) (N/A, 4/12/2017); and COLONOSCOPY (N/A, 4/12/2017).      Review of patient's allergies indicates:  No Known Allergies    Current Outpatient Medications on File Prior to Visit   Medication Sig Dispense  "Refill    Ca-D3-mag#11-zinc-cupr-man-bor 600 mg calcium- 800 unit-50 mg Tab Take 1 tablet by mouth once daily.      cyanocobalamin, vitamin B-12, (VITAMIN B-12) 5,000 mcg Subl Place under the tongue once daily.        fish oil-omega-3 fatty acids 300-1,000 mg capsule Take 1 capsule by mouth once daily.      lamoTRIgine (LAMICTAL) 100 MG tablet Take 100 mg by mouth once daily.      multivitamin (DAILY MULTI-VITAMIN) per tablet Take 1 tablet by mouth once daily.      omeprazole (PRILOSEC) 20 MG capsule Take 1 capsule (20 mg total) by mouth once daily. 90 capsule 3    QUEtiapine (SEROQUEL) 100 MG Tab Take 200 mg by mouth once daily.       temazepam (RESTORIL) 22.5 MG capsule Take 30 mg by mouth nightly as needed for Insomnia.      traZODone (DESYREL) 150 MG tablet Take 150 mg by mouth every evening.      TURMERIC ORAL Take by mouth.      levothyroxine (SYNTHROID) 25 MCG tablet Take 1 tablet (25 mcg total) by mouth once daily. 90 tablet 3    [DISCONTINUED] erenumab-aooe (AIMOVIG AUTOINJECTOR) 70 mg/mL AtIn Inject 140 mg into the skin every 28 days. 2 mL 5    [DISCONTINUED] nitroGLYCERIN (NITROSTAT) 0.4 MG SL tablet Place 1 tablet (0.4 mg total) under the tongue every 5 (five) minutes as needed for Chest pain. 25 tablet 0    [DISCONTINUED] omeprazole (PRILOSEC) 40 MG capsule Take 1 capsule (40 mg total) by mouth once daily. 30 capsule 0    [DISCONTINUED] ondansetron (ZOFRAN) 4 MG tablet Take 1 tablet (4 mg total) by mouth every 8 (eight) hours as needed for Nausea. 40 tablet 0    [DISCONTINUED] sucralfate (CARAFATE) 1 gram tablet Take 1 tablet (1 g total) by mouth once daily. Separate from other medicines by 1hr 30 tablet 1     No current facility-administered medications on file prior to visit.          Objective Findings:    Vital Signs:  BP (!) 95/58   Pulse 81   Ht 5' 2" (1.575 m)   Wt 45 kg (99 lb 3.3 oz)   LMP 10/19/2018 (Exact Date)   BMI 18.15 kg/m²   Body mass index is 18.15 " kg/m².    Physical Exam:  General Appearance: Well appearing in no acute distress  Head:   Normocephalic, without obvious abnormality  Eyes:    No scleral icterus  ENT: Neck supple, Lips, mucosa, and tongue normal  Lungs: CTA bilaterally in anterior and posterior fields, no wheezes, no crackles.  Heart:  Regular rate and rhythm, S1, S2 normal, no murmurs heard  Abdomen: Soft, non tender, non distended with positive bowel sounds in all four quadrants.   Extremities: no edema  Skin: No rash  Neurologic: AAO x 3      Labs:  Lab Results   Component Value Date    WBC 3.79 (L) 08/06/2018    HGB 12.5 08/06/2018    HCT 39.2 08/06/2018     08/06/2018    CHOL 139 05/24/2018    CHOL 139 05/24/2018    TRIG 44 05/24/2018    TRIG 44 05/24/2018    HDL 79 (H) 05/24/2018    HDL 79 (H) 05/24/2018    ALT 16 08/06/2018    AST 26 08/06/2018     08/06/2018    K 4.3 08/06/2018    CL 99 08/06/2018    CREATININE 0.9 08/06/2018    BUN 12 08/06/2018    CO2 28 08/06/2018    TSH 2.082 05/23/2018     ttg- neg    Imaging:    Endoscopy:    4/2017 colonoscopy - normal    4/2017 EGD      - Monilial esophagitis.                        - LA Grade A reflux esophagitis.                        - Erythematous mucosa in the antrum.                        - Normal examined duodenum. Biopsied.   She was treated with diflucan x 2 weeks  Assessment:  1. Abdominal pain, epigastric    2. Family history of brain aneurysm    3. Abdominal bloating    4. LUQ pain          Hx of sharp LUQ after meals and abdominal bloating. Sx resolved and then returned    Recommendations:  1. Check stools for parasites and giardia with bloating and history of ceviche  2. Bentyl prn  3. If not better can do course of xifaxan    No Follow-up on file.      Order summary:  Orders Placed This Encounter    Stool Exam-Ova,Cysts,Parasites    Stool Exam-Ova,Cysts,Parasites    Stool Exam-Ova,Cysts,Parasites    Giardia / Cryptosporidum, EIA    dicyclomine (BENTYL) 20 mg  tablet    magnesium oxide (MAG-OX) 400 mg (241.3 mg magnesium) tablet         Thank you so much for allowing me to participate in the care of Gay Salazar MD

## 2018-11-06 ENCOUNTER — LAB VISIT (OUTPATIENT)
Dept: LAB | Facility: HOSPITAL | Age: 53
End: 2018-11-06
Attending: INTERNAL MEDICINE
Payer: MEDICARE

## 2018-11-06 DIAGNOSIS — R10.13 ABDOMINAL PAIN, EPIGASTRIC: ICD-10-CM

## 2018-11-06 LAB
CRYPTOSP AG STL QL IA: NEGATIVE
G LAMBLIA AG STL QL IA: NEGATIVE

## 2018-11-06 PROCEDURE — 87329 GIARDIA AG IA: CPT

## 2018-11-06 PROCEDURE — 87328 CRYPTOSPORIDIUM AG IA: CPT

## 2018-11-06 PROCEDURE — 87209 SMEAR COMPLEX STAIN: CPT | Mod: 91

## 2018-11-07 LAB
O+P STL TRI STN: NORMAL

## 2018-11-08 ENCOUNTER — PATIENT MESSAGE (OUTPATIENT)
Dept: GASTROENTEROLOGY | Facility: CLINIC | Age: 53
End: 2018-11-08

## 2018-11-12 ENCOUNTER — TELEPHONE (OUTPATIENT)
Dept: GASTROENTEROLOGY | Facility: CLINIC | Age: 53
End: 2018-11-12

## 2018-11-12 NOTE — TELEPHONE ENCOUNTER
Ma spoke pt,    Pt is reporting Norma is working.    Pt wants to know what's next and would like to know stool results ?        ----- Message from Nicole Bryant sent at 11/12/2018 12:56 PM CST -----  Contact: Self- [phone]  Ray- pt called to speak Dashion- trying to determine if her stool sample results were in yet- also would like to give update on Dityclomine 20mg- please contact pt at [phone]

## 2018-11-12 NOTE — TELEPHONE ENCOUNTER
Ma spoke to pt,     Pt is reporting for the last 5 days she been constipation stool are very hard and small    Should pt be scheduled in Feb are as add on after procedures?

## 2018-11-12 NOTE — TELEPHONE ENCOUNTER
Ma spoke to pt,    Pt stated she spoke to her pharmacy and she was informed Bentyl causes constipation.    Pt stated she will try colace and she will keep us updated if the Bentyl continues to cause constipation. She will call back in about a week.     Pt stated she don't want to be impacted again.

## 2018-11-21 ENCOUNTER — OFFICE VISIT (OUTPATIENT)
Dept: ORTHOPEDICS | Facility: CLINIC | Age: 53
End: 2018-11-21
Payer: MEDICARE

## 2018-11-21 VITALS — HEIGHT: 62 IN | BODY MASS INDEX: 18.3 KG/M2 | WEIGHT: 99.44 LBS

## 2018-11-21 DIAGNOSIS — M79.10 MYALGIA: ICD-10-CM

## 2018-11-21 DIAGNOSIS — M99.04 SACRAL REGION SOMATIC DYSFUNCTION: ICD-10-CM

## 2018-11-21 DIAGNOSIS — M76.892 ADDUCTOR TENDINITIS OF LEFT HIP: ICD-10-CM

## 2018-11-21 DIAGNOSIS — M99.03 SOMATIC DYSFUNCTION OF LUMBAR REGION: ICD-10-CM

## 2018-11-21 DIAGNOSIS — M99.02 SOMATIC DYSFUNCTION OF THORACIC REGION: ICD-10-CM

## 2018-11-21 DIAGNOSIS — M76.892 ENTHESOPATHY OF LEFT HIP REGION: Primary | ICD-10-CM

## 2018-11-21 DIAGNOSIS — M99.05 SOMATIC DYSFUNCTION OF PELVIC REGION: ICD-10-CM

## 2018-11-21 DIAGNOSIS — M99.06 SOMATIC DYSFUNCTION OF LOWER EXTREMITY: ICD-10-CM

## 2018-11-21 PROCEDURE — 98927 OSTEOPATH MANJ 5-6 REGIONS: CPT | Mod: PBBFAC,PO | Performed by: NEUROMUSCULOSKELETAL MEDICINE & OMM

## 2018-11-21 PROCEDURE — 98927 OSTEOPATH MANJ 5-6 REGIONS: CPT | Mod: S$PBB,,, | Performed by: NEUROMUSCULOSKELETAL MEDICINE & OMM

## 2018-11-21 PROCEDURE — 99213 OFFICE O/P EST LOW 20 MIN: CPT | Mod: PBBFAC,PO | Performed by: NEUROMUSCULOSKELETAL MEDICINE & OMM

## 2018-11-21 PROCEDURE — 99214 OFFICE O/P EST MOD 30 MIN: CPT | Mod: S$PBB,25,, | Performed by: NEUROMUSCULOSKELETAL MEDICINE & OMM

## 2018-11-21 PROCEDURE — 99999 PR PBB SHADOW E&M-EST. PATIENT-LVL III: CPT | Mod: PBBFAC,,, | Performed by: NEUROMUSCULOSKELETAL MEDICINE & OMM

## 2018-11-21 RX ORDER — TIZANIDINE 4 MG/1
2 TABLET ORAL EVERY 6 HOURS PRN
Qty: 20 TABLET | Refills: 0 | Status: SHIPPED | OUTPATIENT
Start: 2018-11-21 | End: 2018-12-01

## 2018-11-21 RX ORDER — MELOXICAM 7.5 MG/1
7.5 TABLET ORAL DAILY
Qty: 30 TABLET | Refills: 1 | Status: SHIPPED | OUTPATIENT
Start: 2018-11-21 | End: 2019-02-04

## 2018-11-21 NOTE — PROGRESS NOTES
Subjective:     Gay Gurrola    Chief Complaint   Patient presents with    Follow-up     left hip pain       HPI    Gay is a 53 y.o. female coming in today for left hip pain. Since last visit the pain has Deteriorated. She continues to do her HEP but the pain returns every time she is active.  The pain is better with rest, stretching and worse with walking, being active. She does note that she doesn't have pain with running and jumping but walking is painful. She is doing a lot of yoga and stretching. Pt. describes the pain as a 8/10 shooting pain that does radiate down the posterior left leg. There has not been any new a fall/injury/ or traumas since last visit.  Pt denies any new musculoskeletal complaints at this time. She is very concerned that she is visiting her son next week and won't be able to walk around with him.     She is requesting a refill of meloxicam (MOBIC) and tiZANidine (ZANAFLEX), originally prescribed in February by Dr. Mcclendon. She does not take these medications regularly, but only for severe pain prn. She is going to be traveling to [place] over the holidays for the next 18 days and would like to have something incase her pain is exacerbated.     Joint instability? no  Mechanical locking/clicking? no  Affecting ADL's? no  Affecting sleep? no    Review of Systems   Constitutional: Negative for chills and fever.   Musculoskeletal: Positive for joint pain and myalgias. Negative for back pain, falls and neck pain.   Neurological: Positive for weakness (left leg). Negative for dizziness, tingling, focal weakness and headaches.       PAST MEDICAL HISTORY:   Past Medical History:   Diagnosis Date    Abnormal coronary angiogram     Alcohol abuse, in remission     Anxiety     Bipolar affective     since teenage years    Depression     H. pylori infection     Headache(784.0)     followed by Dr. Corona    Hypothyroid     Memory loss     Osteopenia     Seizures       PAST SURGICAL HISTORY:   Past Surgical History:   Procedure Laterality Date    BUNIONECTOMY      COLONOSCOPY N/A 4/12/2017    Procedure: COLONOSCOPY;  Surgeon: Estuardo Salazar MD;  Location: [facility] (4TH FLR);  Service: Endoscopy;  Laterality: N/A;  PM prep    COLONOSCOPY N/A 4/12/2017    Performed by Estuardo Salazar MD at [facility] (4TH FLR)    EGD (ESOPHAGOGASTRODUODENOSCOPY) N/A 10/23/2018    Performed by Irlanda Schwarz MD at [facility] (4TH FLR)    ESOPHAGOGASTRODUODENOSCOPY N/A 10/23/2018    Procedure: EGD (ESOPHAGOGASTRODUODENOSCOPY);  Surgeon: Irlanda Schwarz MD;  [facility]  Service: Endoscopy;  Laterality: N/A;    ESOPHAGOGASTRODUODENOSCOPY (EGD) N/A 4/12/2017    Performed by Estuardo Salazar MD at [facility] (4TH FLR)    TUBAL LIGATION         MEDICATIONS:   Current Outpatient Medications:     Ca-D3-mag#11-zinc-cupr-man-bor 600 mg calcium- 800 unit-50 mg Tab, Take 1 tablet by mouth once daily., Disp: , Rfl:     cyanocobalamin, vitamin B-12, (VITAMIN B-12) 5,000 mcg Subl, Place under the tongue once daily.  , Disp: , Rfl:     dicyclomine (BENTYL) 20 mg tablet, Take 1 tablet (20 mg total) by mouth before meals as needed (tid prn)., Disp: 90 tablet, Rfl: 3    fish oil-omega-3 fatty acids 300-1,000 mg capsule, Take 1 capsule by mouth once daily., Disp: , Rfl:     lamoTRIgine (LAMICTAL) 100 MG tablet, Take 100 mg by mouth once daily., Disp: , Rfl:     levothyroxine (SYNTHROID) 25 MCG tablet, Take 1 tablet (25 mcg total) by mouth once daily., Disp: 90 tablet, Rfl: 3    magnesium oxide (MAG-OX) 400 mg (241.3 mg magnesium) tablet, Take 1 tablet (400 mg total) by mouth every evening., Disp: 90 tablet, Rfl: 1    meloxicam (MOBIC) 7.5 MG tablet, Take 1 tablet (7.5 mg total) by mouth once daily. Take with food, Disp: 30 tablet, Rfl: 1    multivitamin (DAILY MULTI-VITAMIN) per tablet, Take 1 tablet by mouth once daily., Disp: , Rfl:     omeprazole (PRILOSEC) 20 MG capsule, Take 1 capsule (20  "mg total) by mouth once daily., Disp: 90 capsule, Rfl: 3    QUEtiapine (SEROQUEL) 100 MG Tab, Take 200 mg by mouth once daily. , Disp: , Rfl:     temazepam (RESTORIL) 22.5 MG capsule, Take 30 mg by mouth nightly as needed for Insomnia., Disp: , Rfl:     tiZANidine (ZANAFLEX) 4 MG tablet, Take 0.5 tablets (2 mg total) by mouth every 6 (six) hours as needed (qhs prn for muscle pain)., Disp: 20 tablet, Rfl: 0    traZODone (DESYREL) 150 MG tablet, Take 150 mg by mouth every evening., Disp: , Rfl:     TURMERIC ORAL, Take by mouth., Disp: , Rfl:   ALLERGIES: Review of patient's allergies indicates:  No Known Allergies      Objective:     VITAL SIGNS: Ht 5' 2" (1.575 m)   Wt 45.1 kg (99 lb 6.8 oz)   BMI 18.19 kg/m²    General    Vitals reviewed.  Constitutional: She is oriented to person, place, and time. She appears well-developed and well-nourished.   Neurological: She is alert and oriented to person, place, and time.   Psychiatric: She has a normal mood and affect. Her behavior is normal.               MUSCULOSKELETAL EXAM  HIP: left HIP  The affected hip is compared to the contralateral hip.     Observation:    There is no edema, erythema, or ecchymosis in the lumbosacral region.   No obvious pelvic obliquity while standing.    No thoracolumbar scoliosis observed.    No midline skin abnormalities.  No atrophy noted in the lower limbs.  Posture:  Upright  Gait: Non-antalgic with Neutral ankle mechanics and Neutral medial arch        ROM:   Passive hip flexion to 120° on left and 120° on right  Passive hip internal rotation to 45° on left and 45° on right (R inferior pubic rami pain)  Passive hip external rotation to 45° on left and 45° on right   Passive hip abduction to 45° on left and 45° on right     Tenderness To Palpation:  + tenderness at left inferior pubic ramus at obturator internus and adductor rené attachments  No tenderness at superior pubic tubercles bilaterally, No tenderness at inferior pubic " tubercle bilaerally  No tenderness at the ASIS, AIIS, PSIS, PIIS, iliac crest, or ischial tuberosity.  No tenderness throughout the lumbar spine, iliolumbar region, or posterior pelvis.  No tenderness throughout the sacrum or piriformis  No tenderness over the greater trochanteric bursa or greater/lesser trochanters.  No tenderness at the glut attachments on the greater trochanter  No tenderness over proximal IT band or hip flexor musculature.     Strength Testing (* = with pain):  Hip flexion - 5/5 on left and 5/5 on right  Hip extension - 5/5 on left and 5/5 on right  Hip adduction - 5/5 on left*(at inferior pubic ramus) and 5/5 on right   Hip abduction - 5/5 on left and 5/5 on right  Knee flexion - 5/5 on left and 5/5 on right  Knee extension - 5/5 on left and 5/5 on right     Special Tests:  Seated straight leg raise - negative  Supine straight leg raise - negative  Hamstring flexibility decreased on the left compared to the right      DESTINY - negative  FADIR - negative  Scour test - negative  No pain with posterior hip capsule compression     ASIS compression test - negative  SI drawer test - negative      Chidi test - positive bilaterally  Lebron compression test - negative bilaterally     Left short leg in supine, corrected following OMT to pelvis.    Bilateral psoas and anterior hip capsule tightness improved     Neurovascular Exam:  Normal gait without Trendelenburg or antalgia.  Hamstring/gluteal firing pattern normal and symmetric. Glut medius firing poor with torso rotation compensation and poor pelvic instability.   Stork test: left hip hiking with QL firing  No skin changes, no abnormal hair distribution.       TART (Tissue texture abnormality, Asymmetry,  Restriction of motion and/or Tenderness) changes:       Thoracic Spine   T1 Neutral   T2 Neutral   T3 Neutral   T4 Neutral   T5 Neutral   T6 Neutral   T7 Neutral   T8 Neutral   T9 Neutral   T10 Neutral   T11 FRS RIGHT   T12 FRS RIGHT     Rib cage: Rib  9 external torsion on right     Lumbar Spine   L1 Neutral   L2 Neutral   L3 Neutral   L4 FRS RIGHT   L5 FRS RIGHT       Pelvis:  · Innominate:Left superior shear  · Pubic bone:Left superior pubic shear    Sacrum:Left on Right sacral torsion    Lower extremity: Left adductor Herniated trigger point (HTP) fascial distortion       Key   F= Flexed   E = Extended   R = Rotated   S = Sidebent   TTA = tissue texture abnormality       Assessment:      Encounter Diagnoses   Name Primary?    Enthesopathy of left hip region Yes    Adductor tendinitis of left hip     Myalgia     Somatic dysfunction of thoracic region     Somatic dysfunction of lumbar region     Sacral region somatic dysfunction     Somatic dysfunction of pelvic region     Somatic dysfunction of lower extremity           Plan:     1. Left hip overuse injury to muscle attachments of the inferior pubic rami causing inferior groin pain. Compensatory IT band tightness and left QL firing also present. Pt.'s pain returned with prolonged walking and overstretching of hamstrings.  -  Continue HEP  - Referral to outpatient PT for neuromuscular retraining followng return from vacation  - Rx  for Mobic 7.5 mg qday prn for pain as well as Zanaflex 2mg prn for pain control  - Avoid any stretching of hamstring or any knee-to-chest exercises    2. OMT 5-6 regions. Oral consent obtained.  Reviewed benefits and potential side effects.   - OMT indicated today due to signs and symptoms as well as local and remote somatic dysfunction findings and their related neurokinetic, lymphatic, fascial and/or arteriovenous body connections.   - OMT techniques used: Myofascial Release, Muscle Energy and Still's technique  - Treatment was tolerated well. Improvement noted in segmental mobility post-treatment in dysfunctional regions. There were no adverse events and no complications immediately following treatment.      3. Reviewed with pt. the following HEP:    A) Psoas lung stretch:  1-2 reps each side, twice daily  B) Piriformis stretch above 90 degrees: hold 30 sec-1 min, twice a day  C) Prone prop abdominal and anterior hip capsule stretch: hold 30 sec-1 min, twice a day  D)  Pelvic clock exercises given to do from the 6-12 o'clock positions:10-15 reps, twice daily.   E) Quadratus lumborum self-stretch on all fours: hold stretch for 30 seconds, repeating 2-3 times on each side. Do stretch twice daily. Hand-out also given.   F) Clam shell exercises bilaterally: hold leg in abducted and externally rotated position for 5-10 seconds, repeat 5-10 times  G) IT band foam rolling bilaterally      4. Follow-up  for reevaluation following PT if pain remains unresolved or deteriorates     5. Patient agreeable to today's plan and all questions were answered

## 2018-11-23 RX ORDER — LEVOTHYROXINE SODIUM 25 UG/1
TABLET ORAL
Qty: 90 TABLET | Refills: 3 | Status: SHIPPED | OUTPATIENT
Start: 2018-11-23 | End: 2019-11-18 | Stop reason: SDUPTHER

## 2018-11-26 ENCOUNTER — TELEPHONE (OUTPATIENT)
Dept: ORTHOPEDICS | Facility: CLINIC | Age: 53
End: 2018-11-26

## 2018-11-26 NOTE — TELEPHONE ENCOUNTER
Returned pt's call regarding her pain. Her pain increased after walking at the mall for 45 minutes. She has having pain at both ischial tuberosities and a hard time taking a long step. She continues to stretch every morning, which provides temporary relief. She is taking the mobic and zanaflex and prescribed without relief. She is very concerned about taking this trip to visit her son in [place], as he lives on [address] and walks/bikes everywhere.    Advised pt that she needs to decrease her activity and may need to drive/[employer] more than she is used to while in [place]. She may also try Tylenol and a heating pad in addition to her prescriptions.     Discussed with pt that therapy will be the most beneficial treatment for her, as she needs to work on her muscular imbalances and neuromuscular patterning. Pt is scheduled for PT upon return from her trip.

## 2018-11-28 ENCOUNTER — TELEPHONE (OUTPATIENT)
Dept: ORTHOPEDICS | Facility: CLINIC | Age: 53
End: 2018-11-28

## 2018-12-17 ENCOUNTER — TELEPHONE (OUTPATIENT)
Dept: ORTHOPEDICS | Facility: CLINIC | Age: 53
End: 2018-12-17
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
## 2018-12-19 ENCOUNTER — OFFICE VISIT (OUTPATIENT)
Dept: ORTHOPEDICS | Facility: CLINIC | Age: 53
End: 2018-12-19
Payer: MEDICARE

## 2018-12-19 ENCOUNTER — HOSPITAL ENCOUNTER (OUTPATIENT)
Dept: RADIOLOGY | Facility: HOSPITAL | Age: 53
Discharge: HOME | End: 2018-12-19
Attending: NEUROMUSCULOSKELETAL MEDICINE & OMM
Payer: MEDICARE

## 2018-12-19 ENCOUNTER — PATIENT MESSAGE (OUTPATIENT)
Dept: ORTHOPEDICS | Facility: CLINIC | Age: 53
End: 2018-12-19

## 2018-12-19 VITALS
DIASTOLIC BLOOD PRESSURE: 70 MMHG | WEIGHT: 105 LBS | HEIGHT: 62 IN | SYSTOLIC BLOOD PRESSURE: 122 MMHG | BODY MASS INDEX: 19.32 KG/M2

## 2018-12-19 DIAGNOSIS — M79.10 MYALGIA: ICD-10-CM

## 2018-12-19 DIAGNOSIS — M25.562 PAIN OF MENISCUS OF LEFT KNEE: ICD-10-CM

## 2018-12-19 DIAGNOSIS — M25.562 ACUTE PAIN OF LEFT KNEE: ICD-10-CM
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
## 2018-12-19 DIAGNOSIS — M99.06 SOMATIC DYSFUNCTION OF LOWER EXTREMITY: ICD-10-CM

## 2018-12-19 DIAGNOSIS — M25.462 KNEE EFFUSION, LEFT: ICD-10-CM

## 2018-12-19 DIAGNOSIS — M54.50 ACUTE LEFT-SIDED LOW BACK PAIN WITHOUT SCIATICA: ICD-10-CM

## 2018-12-19 PROCEDURE — 98925 OSTEOPATH MANJ 1-2 REGIONS: CPT | Mod: PBBFAC,PO | Performed by: NEUROMUSCULOSKELETAL MEDICINE & OMM

## 2018-12-19 PROCEDURE — 99999 PR PBB SHADOW E&M-EST. PATIENT-LVL III: CPT | Mod: PBBFAC,,, | Performed by: NEUROMUSCULOSKELETAL MEDICINE & OMM

## 2018-12-19 PROCEDURE — 98925 OSTEOPATH MANJ 1-2 REGIONS: CPT | Mod: S$PBB,,, | Performed by: NEUROMUSCULOSKELETAL MEDICINE & OMM

## 2018-12-19 PROCEDURE — 73562 X-RAY EXAM OF KNEE 3: CPT | Mod: TC,PO,RT

## 2018-12-19 PROCEDURE — 99213 OFFICE O/P EST LOW 20 MIN: CPT | Mod: PBBFAC,25,PO | Performed by: NEUROMUSCULOSKELETAL MEDICINE & OMM

## 2018-12-19 PROCEDURE — 99214 OFFICE O/P EST MOD 30 MIN: CPT | Mod: 25,S$PBB,, | Performed by: NEUROMUSCULOSKELETAL MEDICINE & OMM

## 2018-12-19 PROCEDURE — 73562 X-RAY EXAM OF KNEE 3: CPT | Mod: 26,XS,RT, | Performed by: RADIOLOGY

## 2018-12-19 PROCEDURE — 73564 X-RAY EXAM KNEE 4 OR MORE: CPT | Mod: TC,PO,LT

## 2018-12-19 PROCEDURE — 73564 X-RAY EXAM KNEE 4 OR MORE: CPT | Mod: 26,LT,, | Performed by: RADIOLOGY

## 2018-12-19 RX ORDER — IBUPROFEN 600 MG/1
600 TABLET ORAL 3 TIMES DAILY
COMMUNITY
End: 2019-02-21
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
## 2018-12-26 ENCOUNTER — HOSPITAL ENCOUNTER (OUTPATIENT)
Dept: RADIOLOGY | Facility: HOSPITAL | Age: 53
Discharge: HOME | End: 2018-12-26
Attending: NEUROMUSCULOSKELETAL MEDICINE & OMM
Payer: MEDICARE

## 2018-12-26 DIAGNOSIS — M25.562 PAIN OF MENISCUS OF LEFT KNEE: ICD-10-CM

## 2018-12-26 DIAGNOSIS — M25.562 ACUTE PAIN OF LEFT KNEE: ICD-10-CM

## 2018-12-26 DIAGNOSIS — M25.462 KNEE EFFUSION, LEFT: ICD-10-CM

## 2018-12-26 PROCEDURE — 73721 MRI JNT OF LWR EXTRE W/O DYE: CPT | Mod: 26,LT,, | Performed by: RADIOLOGY

## 2018-12-26 PROCEDURE — 73721 MRI JNT OF LWR EXTRE W/O DYE: CPT | Mod: TC,LT

## 2018-12-27 ENCOUNTER — OFFICE VISIT (OUTPATIENT)
Dept: ORTHOPEDICS | Facility: CLINIC | Age: 53
End: 2018-12-27
Payer: MEDICARE

## 2018-12-27 ENCOUNTER — PES CALL (OUTPATIENT)
Dept: ADMINISTRATIVE | Facility: CLINIC | Age: 53
End: 2018-12-27

## 2018-12-27 VITALS
HEIGHT: 62 IN | DIASTOLIC BLOOD PRESSURE: 70 MMHG | WEIGHT: 104.5 LBS | SYSTOLIC BLOOD PRESSURE: 108 MMHG | TEMPERATURE: 98 F | BODY MASS INDEX: 19.23 KG/M2

## 2018-12-27 DIAGNOSIS — M76.72 PERONEAL TENDONITIS OF LEFT LOWER LEG: ICD-10-CM

## 2018-12-27 DIAGNOSIS — M54.50 ACUTE LEFT-SIDED LOW BACK PAIN WITHOUT SCIATICA: ICD-10-CM

## 2018-12-27 DIAGNOSIS — M99.06 SOMATIC DYSFUNCTION OF LOWER EXTREMITY: ICD-10-CM

## 2018-12-27 DIAGNOSIS — M62.830 MUSCLE SPASM OF BACK: ICD-10-CM

## 2018-12-27 DIAGNOSIS — M25.462 KNEE EFFUSION, LEFT: ICD-10-CM

## 2018-12-27 DIAGNOSIS — M79.10 MYALGIA: ICD-10-CM

## 2018-12-27 DIAGNOSIS — M76.892 ADDUCTOR TENDINITIS OF LEFT HIP: ICD-10-CM

## 2018-12-27 DIAGNOSIS — M76.892 ENTHESOPATHY OF LEFT HIP REGION: ICD-10-CM

## 2018-12-27 DIAGNOSIS — S83.005D DISLOCATION OF LEFT PATELLA, SUBSEQUENT ENCOUNTER: Primary | ICD-10-CM

## 2018-12-27 DIAGNOSIS — M99.03 SOMATIC DYSFUNCTION OF LUMBAR REGION: ICD-10-CM

## 2018-12-27 DIAGNOSIS — M99.04 SACRAL REGION SOMATIC DYSFUNCTION: ICD-10-CM

## 2018-12-27 DIAGNOSIS — M99.05 SOMATIC DYSFUNCTION OF PELVIC REGION: ICD-10-CM

## 2018-12-27 PROCEDURE — 98926 OSTEOPATH MANJ 3-4 REGIONS: CPT | Mod: S$PBB,,, | Performed by: NEUROMUSCULOSKELETAL MEDICINE & OMM

## 2018-12-27 PROCEDURE — 99999 PR PBB SHADOW E&M-EST. PATIENT-LVL III: CPT | Mod: PBBFAC,,, | Performed by: NEUROMUSCULOSKELETAL MEDICINE & OMM

## 2018-12-27 PROCEDURE — 99213 OFFICE O/P EST LOW 20 MIN: CPT | Mod: PBBFAC,PO | Performed by: NEUROMUSCULOSKELETAL MEDICINE & OMM

## 2018-12-27 PROCEDURE — 98926 OSTEOPATH MANJ 3-4 REGIONS: CPT | Mod: PBBFAC,PO | Performed by: NEUROMUSCULOSKELETAL MEDICINE & OMM

## 2018-12-27 PROCEDURE — 99214 OFFICE O/P EST MOD 30 MIN: CPT | Mod: 25,S$PBB,, | Performed by: NEUROMUSCULOSKELETAL MEDICINE & OMM

## 2018-12-27 RX ORDER — CYCLOBENZAPRINE HCL 5 MG
5 TABLET ORAL NIGHTLY
Qty: 10 TABLET | Refills: 0 | Status: SHIPPED | OUTPATIENT
Start: 2018-12-27 | End: 2019-01-06
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
## 2019-01-03 ENCOUNTER — CLINICAL SUPPORT (OUTPATIENT)
Dept: REHABILITATION | Facility: OTHER | Age: 54
End: 2019-01-03
Payer: MEDICARE

## 2019-01-03 DIAGNOSIS — M25.562 ACUTE PAIN OF LEFT KNEE: ICD-10-CM

## 2019-01-03 DIAGNOSIS — M54.50 CHRONIC LEFT-SIDED LOW BACK PAIN WITHOUT SCIATICA: ICD-10-CM

## 2019-01-03 DIAGNOSIS — G89.29 CHRONIC LEFT-SIDED LOW BACK PAIN WITHOUT SCIATICA: ICD-10-CM

## 2019-01-03 DIAGNOSIS — M25.552 LEFT HIP PAIN: ICD-10-CM

## 2019-01-03 PROCEDURE — 97161 PT EVAL LOW COMPLEX 20 MIN: CPT | Mod: PN | Performed by: PHYSICAL THERAPIST
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
## 2019-01-04 PROBLEM — M25.552 LEFT HIP PAIN: Status: ACTIVE | Noted: 2019-01-04

## 2019-01-04 PROBLEM — M54.50 CHRONIC LEFT-SIDED LOW BACK PAIN WITHOUT SCIATICA: Status: ACTIVE | Noted: 2019-01-04

## 2019-01-04 PROBLEM — M25.562 ACUTE PAIN OF LEFT KNEE: Status: ACTIVE | Noted: 2019-01-04

## 2019-01-04 PROBLEM — G89.29 CHRONIC LEFT-SIDED LOW BACK PAIN WITHOUT SCIATICA: Status: ACTIVE | Noted: 2019-01-04

## 2019-01-10 ENCOUNTER — CLINICAL SUPPORT (OUTPATIENT)
Dept: REHABILITATION | Facility: HOSPITAL | Age: 54
End: 2019-01-10
Payer: MEDICARE

## 2019-01-10 DIAGNOSIS — M25.562 ACUTE PAIN OF LEFT KNEE: ICD-10-CM

## 2019-01-10 DIAGNOSIS — M54.50 CHRONIC LEFT-SIDED LOW BACK PAIN WITHOUT SCIATICA: ICD-10-CM

## 2019-01-10 DIAGNOSIS — G89.29 CHRONIC LEFT-SIDED LOW BACK PAIN WITHOUT SCIATICA: ICD-10-CM

## 2019-01-10 DIAGNOSIS — M25.552 LEFT HIP PAIN: ICD-10-CM

## 2019-01-10 PROCEDURE — 97110 THERAPEUTIC EXERCISES: CPT | Mod: PO

## 2019-01-10 PROCEDURE — 97140 MANUAL THERAPY 1/> REGIONS: CPT | Mod: PO

## 2019-01-10 NOTE — PROGRESS NOTES
Physical Therapy Daily Treatment Note    Name: Gay Vega [facility]  Clinic Number: [id number]  Date of Treatment: 01/10/2019   Diagnosis:   Encounter Diagnoses   Name Primary?    Chronic left-sided low back pain without sciatica     Left hip pain     Acute pain of left knee        Physician: Kristyn Ramos DO    Physician Orders: PT Eval and Treat: 1)S/p left patellar dislocation - quad strengthening and controlled increased ROM 2) Chronic Left hip pain from overuse injury to muscle attachments of the inferior pubic rami causing inferior groin pain. Compensatory IT band tightness and left QL firing also present. Pt. Needs pelvic neuromuscular and strengthening. HEP.  Medical Diagnosis from Referral: S83.005D (ICD-10-CM) - Dislocation of left patella, subsequent encounter M25.462 (ICD-10-CM) - Knee effusion, left M54.5 (ICD-10-CM) - Acute left-sided low back pain without sciatica M76.892 (ICD-10-CM) - Enthesopathy of left hip region M76.892 (ICD-10-CM) - Adductor tendinitis of left hip   Evaluation Date: 1/3/2019  Authorization Period Expiration: 12/27/2019  Plan of Care Expiration: 3/29/2019  Visit # / Visits authorized: 1/ 1     Time In: 1200 PM  Time Out: 1256 PM  Total Billable Time: 46 minutes    Precautions:  Standard    Subjective     Gay reports that she feels better in the morning but she tends to have more pain in the evening and mostly in her back.  Patient reports that she feels as though her knee is healing well but she is still having significant low back pain with radiating pain into her L leg that was a chronic problem prior to incident.    Pain: 2/10  Location: left back , buttocks  and upper legs     Objective     Gay received moist hot pack to low back in supine pre tx for 10 minutes to promote blood flow and decreased stiffness.      Gay received therapeutic exercises to develop strength, endurance, flexibility and core stabilization for 18 minutes including:     Self  "release with piriformis using lacrosse ball 2' each side  Figure 4 piriformis stretch 3 x 30"  PPT 10 x 10"  PPT with adduction ball squeeze 10 x 10"  PPT with hip abduction magic [place] 10 x 10"      Gay received the following manual therapy techniques: Joint mobilizations and Soft tissue Mobilization were applied to the: low back/SI joint for 18 minutes.       Written Home Exercises Provided: see patient instructions    Pt educated on new exercises distributed. Pt demo good understanding of the education provided. Gay demonstrated good return demonstration of activities.     Assessment   Gay was able to tolerate all new therex added today.  Patient presents with soft tissue restriction in (B) piriformis as well as LLD L<R due to pelvic obliquity (L elevated ASIS) and decreased SI joint mobility bilaterally.  Patient responded well to manual techniques and reported decreased discomfort at end of session.  Gay is progressing well towards her goals.   Pt prognosis is Good.     Pt will continue to benefit from skilled PT intervention. Medical Necessity is demonstrated by:  Pain limits function of effected part for some activities, Unable to participate fully in daily activities, Requires skilled supervision to complete and progress HEP and Weakness.    New/Revised goals:  No new goals established at this time.      Plan       Continue with established Plan of Care towards PT goals.     Cari Alvarado, PT   "

## 2019-01-16 ENCOUNTER — CLINICAL SUPPORT (OUTPATIENT)
Dept: REHABILITATION | Facility: HOSPITAL | Age: 54
End: 2019-01-16
Payer: MEDICARE

## 2019-01-16 DIAGNOSIS — M25.562 ACUTE PAIN OF LEFT KNEE: ICD-10-CM

## 2019-01-16 DIAGNOSIS — G89.29 CHRONIC LEFT-SIDED LOW BACK PAIN WITHOUT SCIATICA: ICD-10-CM

## 2019-01-16 DIAGNOSIS — M25.552 LEFT HIP PAIN: ICD-10-CM

## 2019-01-16 DIAGNOSIS — M54.50 CHRONIC LEFT-SIDED LOW BACK PAIN WITHOUT SCIATICA: ICD-10-CM

## 2019-01-16 PROCEDURE — 97110 THERAPEUTIC EXERCISES: CPT | Mod: PO

## 2019-01-16 PROCEDURE — 97140 MANUAL THERAPY 1/> REGIONS: CPT | Mod: PO

## 2019-01-16 NOTE — PROGRESS NOTES
Physical Therapy Daily Treatment Note    Name: Gay Vega [place]  Clinic Number: [id number]  Date of Treatment: 01/16/2019   Diagnosis:   Encounter Diagnoses   Name Primary?    Chronic left-sided low back pain without sciatica     Left hip pain     Acute pain of left knee        Physician: Kristyn Ramos DO    Physician Orders: PT Eval and Treat: 1)S/p left patellar dislocation - quad strengthening and controlled increased ROM 2) Chronic Left hip pain from overuse injury to muscle attachments of the inferior pubic rami causing inferior groin pain. Compensatory IT band tightness and left QL firing also present. Pt. Needs pelvic neuromuscular and strengthening. HEP.  Medical Diagnosis from Referral: S83.005D (ICD-10-CM) - Dislocation of left patella, subsequent encounter M25.462 (ICD-10-CM) - Knee effusion, left M54.5 (ICD-10-CM) - Acute left-sided low back pain without sciatica M76.892 (ICD-10-CM) - Enthesopathy of left hip region M76.892 (ICD-10-CM) - Adductor tendinitis of left hip   Evaluation Date: 1/3/2019  Authorization Period Expiration: 12/27/2019  Plan of Care Expiration: 3/29/2019  Visit # / Visits authorized: 1/ 1     Time In: 0100 PM  Time Out: 0156 PM  Total Billable Time: 38 minutes    Precautions:  Standard    Subjective     Gay reports that she feels about the same when it comes to her buttock pain but that her low back pain has improved.  Patient also reports that her knee feels good for the most part when she is wearing her sleeve but she has some pain and soreness in the evening.  Pain: 2/10  Location: left back , buttocks  and upper legs     Objective     Gay received moist hot pack to low back in supine pre tx for 10 minutes to promote blood flow and decreased stiffness.      Gay received therapeutic exercises to develop strength, endurance, flexibility and core stabilization for 16 minutes including:     Self release with piriformis using lacrosse ball 2' each  "side-NP  Figure 4 piriformis stretch 3 x 30"  PPT 10 x 10"  PPT with adduction ball squeeze 10 x 10"  PPT with hip abduction magic [place] 10 x 10"      Gay received the following manual therapy techniques: Joint mobilizations and Soft tissue Mobilization were applied to the: low back/SI joint for 16 minutes.     Gay received dry needling to the lumbar spine region as well as (B) piriformis by Manjeet Champagne PT.        Written Home Exercises Provided: SAQ, LAQ, SLR flexion    Pt educated on dry needling and possible effects.  Pt demo good understanding of the education provided. Gay demonstrated good return demonstration of activities.     Assessment   Gay demonstrated decreased soft tissue restriction noted during manual techniques following dry needling.  Gay needed verbal cueing for correct performance of PPT without elevated the buttocks during exercise.    Gay is progressing well towards her goals.   Pt prognosis is Good.     Pt will continue to benefit from skilled PT intervention. Medical Necessity is demonstrated by:  Pain limits function of effected part for some activities, Unable to participate fully in daily activities, Requires skilled supervision to complete and progress HEP and Weakness.    New/Revised goals:  No new goals established at this time.      Plan       Continue with established Plan of Care towards PT goals.     Cari Alvarado, PT   "

## 2019-01-18 ENCOUNTER — CLINICAL SUPPORT (OUTPATIENT)
Dept: REHABILITATION | Facility: HOSPITAL | Age: 54
End: 2019-01-18
Payer: MEDICARE

## 2019-01-18 DIAGNOSIS — M54.50 CHRONIC LEFT-SIDED LOW BACK PAIN WITHOUT SCIATICA: ICD-10-CM

## 2019-01-18 DIAGNOSIS — G89.29 CHRONIC LEFT-SIDED LOW BACK PAIN WITHOUT SCIATICA: ICD-10-CM

## 2019-01-18 DIAGNOSIS — M25.552 LEFT HIP PAIN: ICD-10-CM

## 2019-01-18 DIAGNOSIS — M25.562 ACUTE PAIN OF LEFT KNEE: ICD-10-CM

## 2019-01-18 PROCEDURE — 97140 MANUAL THERAPY 1/> REGIONS: CPT | Mod: PO

## 2019-01-18 PROCEDURE — 97110 THERAPEUTIC EXERCISES: CPT | Mod: PO

## 2019-01-18 NOTE — PROGRESS NOTES
Physical Therapy Daily Treatment Note    Name: Gay Vega [place]  Clinic Number: [id number]  Date of Treatment: 01/18/2019   Diagnosis:   Encounter Diagnoses   Name Primary?    Chronic left-sided low back pain without sciatica     Left hip pain     Acute pain of left knee        Physician: Kristyn Ramos DO    Physician Orders: PT Eval and Treat: 1)S/p left patellar dislocation - quad strengthening and controlled increased ROM 2) Chronic Left hip pain from overuse injury to muscle attachments of the inferior pubic rami causing inferior groin pain. Compensatory IT band tightness and left QL firing also present. Pt. Needs pelvic neuromuscular and strengthening. HEP.  Medical Diagnosis from Referral: S83.005D (ICD-10-CM) - Dislocation of left patella, subsequent encounter M25.462 (ICD-10-CM) - Knee effusion, left M54.5 (ICD-10-CM) - Acute left-sided low back pain without sciatica M76.892 (ICD-10-CM) - Enthesopathy of left hip region M76.892 (ICD-10-CM) - Adductor tendinitis of left hip   Evaluation Date: 1/3/2019  Authorization Period Expiration: 12/27/2019  Plan of Care Expiration: 3/29/2019  Visit # / Visits authorized: 4/ 1     Time In: 1200 PM  Time Out: 1254 PM  Total Billable Time: 44 minutes    Precautions:  Standard    Subjective     Gay reported that she felt great for the rest of the day after dry needling but then had some throbbing in her buttock at night but it was less than it has been.  Patient reports that overall she feels better since the dry needling.  Pain: 1/10  Location: left back , buttocks  and upper legs     Objective     Gay received moist hot pack to low back in supine pre tx for 10 minutes to promote blood flow and decreased stiffness.      Gay received therapeutic exercises to develop strength, endurance, flexibility and core stabilization for 16 minutes including:     Self release with piriformis using lacrosse ball 2' each side-NP  Figure 4 piriformis stretch 3 x  "30"  PPT 10 x 10"  PPT with adduction ball squeeze 10 x 10"  PPT with hip abduction magic [place] 10 x 10"  Bridges with PPT 4 x 5  Clamshells OTB 2 x 10      Gay received the following manual therapy techniques: Joint mobilizations and Soft tissue Mobilization were applied to the: low back/SI joint for 16 minutes.     Gay received dry needling to the lumbar spine region as well as (B) piriformis by Manjeet Champagne PT. -NP      Written Home Exercises Provided: no new exercises added to HEP today    Pt educated on dry needling and possible effects.  Pt demo good understanding of the education provided. Gay demonstrated good return demonstration of activities.     Assessment   Gay was able to tolerate the initiation of bridges and clamshells without reports of pain.  Muscular fatigue noted with clamshells today.    Gay is progressing well towards her goals.   Pt prognosis is Good.     Pt will continue to benefit from skilled PT intervention. Medical Necessity is demonstrated by:  Pain limits function of effected part for some activities, Unable to participate fully in daily activities, Requires skilled supervision to complete and progress HEP and Weakness.    New/Revised goals:  No new goals established at this time.      Plan       Continue with established Plan of Care towards PT goals.     Cari Alvarado, PT   "

## 2019-01-21 ENCOUNTER — CLINICAL SUPPORT (OUTPATIENT)
Dept: REHABILITATION | Facility: HOSPITAL | Age: 54
End: 2019-01-21
Payer: MEDICARE

## 2019-01-21 DIAGNOSIS — M54.50 CHRONIC LEFT-SIDED LOW BACK PAIN WITHOUT SCIATICA: ICD-10-CM

## 2019-01-21 DIAGNOSIS — G89.29 CHRONIC LEFT-SIDED LOW BACK PAIN WITHOUT SCIATICA: ICD-10-CM

## 2019-01-21 DIAGNOSIS — M25.552 LEFT HIP PAIN: ICD-10-CM

## 2019-01-21 DIAGNOSIS — M25.562 ACUTE PAIN OF LEFT KNEE: ICD-10-CM

## 2019-01-21 PROCEDURE — 97140 MANUAL THERAPY 1/> REGIONS: CPT | Mod: PO

## 2019-01-21 PROCEDURE — 97110 THERAPEUTIC EXERCISES: CPT | Mod: PO

## 2019-01-21 NOTE — PROGRESS NOTES
Physical Therapy Daily Treatment Note    Name: Gay Vega Galileo  Clinic Number: [id number]  Date of Treatment: 01/21/2019   Diagnosis:   Encounter Diagnoses   Name Primary?    Chronic left-sided low back pain without sciatica     Left hip pain     Acute pain of left knee        Physician: Kristyn Ramos DO    Physician Orders: PT Eval and Treat: 1)S/p left patellar dislocation - quad strengthening and controlled increased ROM 2) Chronic Left hip pain from overuse injury to muscle attachments of the inferior pubic rami causing inferior groin pain. Compensatory IT band tightness and left QL firing also present. Pt. Needs pelvic neuromuscular and strengthening. HEP.  Medical Diagnosis from Referral: S83.005D (ICD-10-CM) - Dislocation of left patella, subsequent encounter M25.462 (ICD-10-CM) - Knee effusion, left M54.5 (ICD-10-CM) - Acute left-sided low back pain without sciatica M76.892 (ICD-10-CM) - Enthesopathy of left hip region M76.892 (ICD-10-CM) - Adductor tendinitis of left hip   Evaluation Date: 1/3/2019  Authorization Period Expiration: 12/27/2019  Plan of Care Expiration: 3/29/2019  Visit # / Visits authorized: 5/ 1     Time In: 0700 AM  Time Out: 0750 AM  Total Billable Time: 36 minutes    Precautions:  Standard    Subjective     Gay reported that she overall is feeling much better and no longer has and pain in her R buttock and low back but continues to have L buttock pain.  Patient notes that the pain in her buttock is a little bit more this morning secondary to not doing her stretches yet this morning.   Pain: 4/10  Location: left back , buttocks  and upper legs     Objective     Gay received moist hot pack to low back in supine pre tx for 10 minutes to promote blood flow and decreased stiffness.      Gay received therapeutic exercises to develop strength, endurance, flexibility and core stabilization for 20 minutes including:     Self release with piriformis using lacrosse ball  "2' each side-NP  Figure 4 piriformis stretch 3 x 30"  PPT 10 x 10"  PPT with adduction ball squeeze 10 x 10"  PPT with hip abduction magic [place] 10 x 10"  Bridges with PPT 4 x 5  Clamshells OTB 3 x 10  Reverse clamshells 3 x 10      Gay received the following manual therapy techniques: Joint mobilizations and Soft tissue Mobilization were applied to the: low back/SI joint for 16 minutes.     Gay received dry needling to the lumbar spine region as well as (B) piriformis by Manjeet Champagne, PT.      Written Home Exercises Provided: no new exercises added to HEP today    Pt educated on healing process of a patellar dislocation with possible MCL sprain and the pain associated with it.  Patient advised to keep activity to a minimum in terms of exercises outside of what has been instructed in PT.  Pt demo fair understanding of the education provided. Gay demonstrated good return demonstration of activities.     Assessment   Gay had relatively significant soft tissue restriction in the L piriformis today in comparison to last visit.  Patient again had to be educated on being slow with return to previous activity and the amount of exercising she is doing that has not been advised by physical therapist.  Patient required verbal cueing for correct performance of reverse clamshells.   Gay is progressing well towards her goals.   Pt prognosis is Good.     Pt will continue to benefit from skilled PT intervention. Medical Necessity is demonstrated by:  Pain limits function of effected part for some activities, Unable to participate fully in daily activities, Requires skilled supervision to complete and progress HEP and Weakness.    New/Revised goals:  No new goals established at this time.      Plan       Continue with established Plan of Care towards PT goals.     Cari Alvarado, PT   "

## 2019-01-23 ENCOUNTER — TELEPHONE (OUTPATIENT)
Dept: GASTROENTEROLOGY | Facility: CLINIC | Age: 54
End: 2019-01-23

## 2019-01-23 NOTE — TELEPHONE ENCOUNTER
Ma spoke to pt informed pt Dr. Salazar next opening is in April but can add on to day after producers.    Pt stated she's ok don't need appt as of now but would like for Dr. Salazar to know message below     Pt reporting constipation, stool ( little rocks ) bowels aren't  complete. Pt stated she's has been following a good diet in taking much water about 8 glasses a day.    Pt would like for Dr. Salazar to please advise alternative to help control constipation.     Pt was informed Dr. Salazar isn't in office. Dr. Salazar will reply upon return / pt stated  no rush to call her whenever can     ----- Message from Francisca Escobar sent at 1/23/2019  9:33 AM CST -----  Contact: self - [phone]  Martin    Needs Advice    Reason for call: calling to book f/u appt but  Nothing available for me to schedule        Communication Preference: [phone]    Additional Information:

## 2019-01-24 ENCOUNTER — CLINICAL SUPPORT (OUTPATIENT)
Dept: REHABILITATION | Facility: HOSPITAL | Age: 54
End: 2019-01-24
Payer: MEDICARE

## 2019-01-24 DIAGNOSIS — M25.552 LEFT HIP PAIN: ICD-10-CM

## 2019-01-24 DIAGNOSIS — M25.562 ACUTE PAIN OF LEFT KNEE: ICD-10-CM

## 2019-01-24 DIAGNOSIS — M54.50 CHRONIC LEFT-SIDED LOW BACK PAIN WITHOUT SCIATICA: ICD-10-CM

## 2019-01-24 DIAGNOSIS — G89.29 CHRONIC LEFT-SIDED LOW BACK PAIN WITHOUT SCIATICA: ICD-10-CM

## 2019-01-24 PROCEDURE — 97110 THERAPEUTIC EXERCISES: CPT | Mod: PO

## 2019-01-24 PROCEDURE — 97140 MANUAL THERAPY 1/> REGIONS: CPT | Mod: PO

## 2019-01-24 NOTE — PROGRESS NOTES
"Physical Therapy Daily Treatment Note    Name: Gay Vega Galileo  Clinic Number: [id number]  Date of Treatment: 01/24/2019   Diagnosis:   Encounter Diagnoses   Name Primary?    Chronic left-sided low back pain without sciatica     Left hip pain     Acute pain of left knee        Physician: Kristyn Ramos DO    Physician Orders: PT Eval and Treat: 1)S/p left patellar dislocation - quad strengthening and controlled increased ROM 2) Chronic Left hip pain from overuse injury to muscle attachments of the inferior pubic rami causing inferior groin pain. Compensatory IT band tightness and left QL firing also present. Pt. Needs pelvic neuromuscular and strengthening. HEP.  Medical Diagnosis from Referral: S83.005D (ICD-10-CM) - Dislocation of left patella, subsequent encounter M25.462 (ICD-10-CM) - Knee effusion, left M54.5 (ICD-10-CM) - Acute left-sided low back pain without sciatica M76.892 (ICD-10-CM) - Enthesopathy of left hip region M76.892 (ICD-10-CM) - Adductor tendinitis of left hip   Evaluation Date: 1/3/2019  Authorization Period Expiration: 12/27/2019  Plan of Care Expiration: 3/29/2019  Visit # / Visits authorized: 6/ 1     Time In: 0704 AM  Time Out: 0758 AM  Total Billable Time: 24 minutes    Precautions:  Standard    Subjective     Gay reports that her buttock pain has been much better since we started doing the needling.  Patient notes almost no pain in her buttocks at this point.  Patient does report an insidious onset of low back pain but states, "its more in the spine like in my bones not in the muscle of my lower back" for the last two days.  Pain: 3/10  Location: left back , buttocks  and upper legs     Objective     Gay received moist hot pack to low back in supine pre tx for 10 minutes to promote blood flow and decreased stiffness.      Gay received therapeutic exercises to develop strength, endurance, flexibility and core stabilization for 28 minutes including:     Self " "release with piriformis using lacrosse ball 2' each side-NP  Figure 4 piriformis stretch 3 x 30"  PPT 10 x 10"  PPT with adduction ball squeeze 10 x 10"  PPT with hip abduction magic [place] 10 x 10"  Bridges with PPT 2 x 10  Clamshells OTB 3 x 10  Reverse clamshells 3 x 10  Marching with PPT in table top 2 x 5      Gay received the following manual therapy techniques: Joint mobilizations and Soft tissue Mobilization were applied to the: low back/SI joint for 12 minutes.     Gay received dry needling to the lumbar spine region as well as (B) piriformis by Manjeet Champagne, PT.-NP      Written Home Exercises Provided: see patient instructions    Pt educated on frequency and longevity of dry needling.  Discussed with patient that dry needling cannot be only form of treatment and cannot be performed forever.  Pt demo fair understanding of the education provided. Gay demonstrated good return demonstration of activities.     Assessment   Gay required more verbal cueing for correct performance of exercises despite performing most of those exercises in previous sessions and instruction to perform them at home.  Some soft tissue restriction noted in L QL and L piriformis but overall improvement present in soft tissue mobility in comparison to initial session.    Gay is progressing well towards her goals.   Pt prognosis is Good.     Pt will continue to benefit from skilled PT intervention. Medical Necessity is demonstrated by:  Pain limits function of effected part for some activities, Unable to participate fully in daily activities, Requires skilled supervision to complete and progress HEP and Weakness.    New/Revised goals:  No new goals established at this time.      Plan       Continue with established Plan of Care towards PT goals.     Cari Alvarado, PT   "

## 2019-01-25 ENCOUNTER — TELEPHONE (OUTPATIENT)
Dept: GASTROENTEROLOGY | Facility: CLINIC | Age: 54
End: 2019-01-25

## 2019-01-25 NOTE — TELEPHONE ENCOUNTER
Ma contact pt to informed pt per Dr. Salazar message below:    See if she can start with trying miralax 1 capful each night to help with constipation and then contact us if not helping after 2 weeks or if she has already tried that     Pt didn't answer left detail message to return call

## 2019-01-25 NOTE — TELEPHONE ENCOUNTER
Ma spoke to pt informed pt per Dr. Salazar message below;    See if she can start with trying miralax 1 capful each night to help with constipation and then contact us if not helping after 2 weeks or if she has already tried that     Pt agreed to try miralax 1 capful each night and will contact us if it doesn't help with constipation      Pt verbalize understanding and thank Ma     ----- Message from Francisca Escobar sent at 1/25/2019  9:24 AM CST -----  Contact: self [phone]  Martin    Needs Advice    Reason for call: returning your call        Communication Preference: [phone]    Additional Information:

## 2019-01-28 ENCOUNTER — CLINICAL SUPPORT (OUTPATIENT)
Dept: REHABILITATION | Facility: HOSPITAL | Age: 54
End: 2019-01-28
Payer: MEDICARE

## 2019-01-28 DIAGNOSIS — M25.552 LEFT HIP PAIN: ICD-10-CM

## 2019-01-28 DIAGNOSIS — M54.50 CHRONIC LEFT-SIDED LOW BACK PAIN WITHOUT SCIATICA: ICD-10-CM

## 2019-01-28 DIAGNOSIS — G89.29 CHRONIC LEFT-SIDED LOW BACK PAIN WITHOUT SCIATICA: ICD-10-CM

## 2019-01-28 DIAGNOSIS — M25.562 ACUTE PAIN OF LEFT KNEE: ICD-10-CM

## 2019-01-28 PROCEDURE — 97140 MANUAL THERAPY 1/> REGIONS: CPT | Mod: PO

## 2019-01-28 PROCEDURE — 97110 THERAPEUTIC EXERCISES: CPT | Mod: PO

## 2019-01-28 NOTE — PROGRESS NOTES
"Physical Therapy Daily Treatment Note    Name: Gay Vega Galileo  Clinic Number: [id number]  Date of Treatment: 01/28/2019   Diagnosis:   Encounter Diagnoses   Name Primary?    Chronic left-sided low back pain without sciatica     Left hip pain     Acute pain of left knee        Physician: Kristyn Ramos DO    Physician Orders: PT Eval and Treat: 1)S/p left patellar dislocation - quad strengthening and controlled increased ROM 2) Chronic Left hip pain from overuse injury to muscle attachments of the inferior pubic rami causing inferior groin pain. Compensatory IT band tightness and left QL firing also present. Pt. Needs pelvic neuromuscular and strengthening. HEP.  Medical Diagnosis from Referral: S83.005D (ICD-10-CM) - Dislocation of left patella, subsequent encounter M25.462 (ICD-10-CM) - Knee effusion, left M54.5 (ICD-10-CM) - Acute left-sided low back pain without sciatica M76.892 (ICD-10-CM) - Enthesopathy of left hip region M76.892 (ICD-10-CM) - Adductor tendinitis of left hip   Evaluation Date: 1/3/2019  Authorization Period Expiration: 12/27/2019  Plan of Care Expiration: 3/29/2019  Visit # / Visits authorized: 7/ 1     Time In: 0700 AM  Time Out: 0752 AM  Total Billable Time: 52 minutes    Precautions:  Standard    Subjective     Gay came in with various new complaints today.  Patient notes now that she has been having L knee pain on the outside of the knee associated with walking over the last 3-4 days.  Patient also continues to report "bone" or "spine pain" along the lower back.  Patient notes overall her buttock pain has improved but she still has pain at night when she is sitting watching TV.  Patient notes no pain currently.   Pain: 0/10    Location: left back , buttocks  and upper legs     Objective     Gay received moist hot pack to low back in supine pre tx for 10 minutes to promote blood flow and decreased stiffness.-NP    Gay received therapeutic exercises to develop " "strength, endurance, flexibility and core stabilization for 36 minutes including:     Upright bike L1 10' for warmup  Self release with piriformis using lacrosse ball 2' each side-NP  Figure 4 piriformis stretch 3 x 30"  PPT 10 x 10"  PPT with adduction ball squeeze 10 x 10"  PPT with hip abduction magic [place] 10 x 10"  Bridges with PPT 4 x 5  Clamshells OTB 3 x 10  Reverse clamshells 3 x 10  Marching with PPT in table top 2 x 5  Hamstring stretch with strap 3 x 30" each side  ITB foam roller 2' L side only    Gay received the following manual therapy techniques: Joint mobilizations and Soft tissue Mobilization were applied to the: low back/SI joint for 16 minutes.     Gay received dry needling to the lumbar spine region as well as (B) piriformis by Manjeet Champagne, PT.      Written Home Exercises Provided: see patient instructions    Pt educated on performance of LE exercises without the brace to elicit more muscular recruitment.  Discussed with patient that dry needling cannot be only form of treatment and cannot be performed forever.  Pt demo fair understanding of the education provided. Gay demonstrated good return demonstration of activities.     Assessment   Gay able to tolerate all therex during tx session today.  Less tightness noted in the R buttock but continues to have pain and tightness in the L piriformis.  Patient again needed verbal cueing for performance of exercises that have been performed previously and also were issued to the HEP.  Gay is progressing well towards her goals.   Pt prognosis is Good.     Pt will continue to benefit from skilled PT intervention. Medical Necessity is demonstrated by:  Pain limits function of effected part for some activities, Unable to participate fully in daily activities, Requires skilled supervision to complete and progress HEP and Weakness.    New/Revised goals:  No new goals established at this time.      Plan       Continue with established " Plan of Care towards PT goals.     Cari Alvarado, PT

## 2019-01-31 ENCOUNTER — TELEPHONE (OUTPATIENT)
Dept: ORTHOPEDICS | Facility: CLINIC | Age: 54
End: 2019-01-31

## 2019-01-31 ENCOUNTER — CLINICAL SUPPORT (OUTPATIENT)
Dept: REHABILITATION | Facility: HOSPITAL | Age: 54
End: 2019-01-31
Payer: MEDICARE

## 2019-01-31 DIAGNOSIS — G89.29 CHRONIC LEFT-SIDED LOW BACK PAIN WITHOUT SCIATICA: ICD-10-CM

## 2019-01-31 DIAGNOSIS — M25.562 ACUTE PAIN OF LEFT KNEE: ICD-10-CM

## 2019-01-31 DIAGNOSIS — M54.50 CHRONIC LEFT-SIDED LOW BACK PAIN WITHOUT SCIATICA: ICD-10-CM

## 2019-01-31 DIAGNOSIS — M25.552 LEFT HIP PAIN: ICD-10-CM

## 2019-01-31 PROCEDURE — 97140 MANUAL THERAPY 1/> REGIONS: CPT | Mod: PO

## 2019-01-31 PROCEDURE — 97110 THERAPEUTIC EXERCISES: CPT | Mod: PO

## 2019-01-31 NOTE — PROGRESS NOTES
"Physical Therapy Daily Treatment Note    Name: Gay Vega Galileo  Clinic Number: [id number]  Date of Treatment: 01/31/2019   Diagnosis:   Encounter Diagnoses   Name Primary?    Chronic left-sided low back pain without sciatica     Left hip pain     Acute pain of left knee        Physician: Kristyn Ramos DO    Physician Orders: PT Eval and Treat: 1)S/p left patellar dislocation - quad strengthening and controlled increased ROM 2) Chronic Left hip pain from overuse injury to muscle attachments of the inferior pubic rami causing inferior groin pain. Compensatory IT band tightness and left QL firing also present. Pt. Needs pelvic neuromuscular and strengthening. HEP.  Medical Diagnosis from Referral: S83.005D (ICD-10-CM) - Dislocation of left patella, subsequent encounter M25.462 (ICD-10-CM) - Knee effusion, left M54.5 (ICD-10-CM) - Acute left-sided low back pain without sciatica M76.892 (ICD-10-CM) - Enthesopathy of left hip region M76.892 (ICD-10-CM) - Adductor tendinitis of left hip   Evaluation Date: 1/3/2019  Authorization Period Expiration: 12/27/2019  Plan of Care Expiration: 3/29/2019  Visit # / Visits authorized: 8/ 1     Time In: 0700 AM  Time Out: 0756 AM  Total Billable Time: 56 minutes  Entire session one on one    Precautions:  Standard    Subjective     Gay reported that her buttock pain has been feeling much better as well as her knee pain.  Patient reports that she has been doing supine "biking" movements and that has helped her knee.  Patient's one complaint today is that she is having some tailbone pain.    Pain: 0/10    Location: left back , buttocks  and upper legs     Objective     aGy received moist hot pack to low back in supine pre tx for 10 minutes to promote blood flow and decreased stiffness.-NP    Gay received therapeutic exercises to develop strength, endurance, flexibility and core stabilization for 42 minutes including:     Upright bike L3 10' for warmup  Self " "release with piriformis using lacrosse ball 2' each side-NP  Figure 4 piriformis stretch 3 x 30"  PPT 10 x 10"  PPT with adduction ball squeeze 10 x 10"  PPT with hip abduction magic [place] 10 x 10"  Bridges with PPT 4 x 5  Clamshells OTB 3 x 10  Reverse clamshells 3 x 10  Marching with PPT in table top 2 x 5  Hamstring stretch with strap 3 x 30" each side-NP secondary to patient not feeling stretch  ITB foam roller 2' L side only  Leg Press 30# with ball squeeze 3 x 10  Lateral walks OTB 2 laps (significant verbal cueing required)    Gay received the following manual therapy techniques: Joint mobilizations and Soft tissue Mobilization were applied to the: low back/SI joint for 14 minutes.     Gay received dry needling to the lumbar spine region as well as (B) piriformis by Manjeet Champagne, PT.      Written Home Exercises Provided: see patient instructions    Pt educated on performance of LE exercises without the brace to elicit more muscular recruitment.  Discussed with patient that dry needling cannot be only form of treatment and cannot be performed forever.  Pt demo fair understanding of the education provided. Gay demonstrated good return demonstration of activities.     Assessment   Gay required extensive cueing with foot position during lateral walks and still had difficulty maintaining position despite verbal cueing and visual feedback (performed in front of mirror).  Patient denied any pain with newly established exercises today.    Gay is progressing well towards her goals.   Pt prognosis is Good.     Pt will continue to benefit from skilled PT intervention. Medical Necessity is demonstrated by:  Pain limits function of effected part for some activities, Unable to participate fully in daily activities, Requires skilled supervision to complete and progress HEP and Weakness.    New/Revised goals:  No new goals established at this time.      Plan       Continue with established Plan of " Care towards PT goals.     Cari Alvarado, PT

## 2019-01-31 NOTE — TELEPHONE ENCOUNTER
Patient called due to her appointment being rescheduled without her consent. She initially was on Dr. Ramso's schedule for 2/6 and her appointment was changed to 2/14. I put her back on the schedule for 2/6 and apologized for any inconvenience.

## 2019-02-04 ENCOUNTER — OFFICE VISIT (OUTPATIENT)
Dept: INTERNAL MEDICINE | Facility: CLINIC | Age: 54
End: 2019-02-04
Attending: FAMILY MEDICINE
Payer: MEDICARE

## 2019-02-04 ENCOUNTER — HOSPITAL ENCOUNTER (OUTPATIENT)
Dept: RADIOLOGY | Facility: OTHER | Age: 54
Discharge: HOME | End: 2019-02-04
Attending: FAMILY MEDICINE
Payer: MEDICARE

## 2019-02-04 ENCOUNTER — CLINICAL SUPPORT (OUTPATIENT)
Dept: REHABILITATION | Facility: HOSPITAL | Age: 54
End: 2019-02-04
Payer: MEDICARE

## 2019-02-04 VITALS
BODY MASS INDEX: 18.33 KG/M2 | WEIGHT: 99.63 LBS | DIASTOLIC BLOOD PRESSURE: 78 MMHG | SYSTOLIC BLOOD PRESSURE: 102 MMHG | HEART RATE: 72 BPM | HEIGHT: 62 IN | OXYGEN SATURATION: 99 %

## 2019-02-04 DIAGNOSIS — M54.50 CHRONIC LEFT-SIDED LOW BACK PAIN WITHOUT SCIATICA: ICD-10-CM

## 2019-02-04 DIAGNOSIS — M25.552 LEFT HIP PAIN: ICD-10-CM

## 2019-02-04 DIAGNOSIS — K59.00 CONSTIPATION, UNSPECIFIED CONSTIPATION TYPE: Primary | ICD-10-CM

## 2019-02-04 DIAGNOSIS — M89.9 DISORDER OF BONE: ICD-10-CM

## 2019-02-04 DIAGNOSIS — F10.20 ALCOHOL DEPENDENCE, CONTINUOUS: ICD-10-CM

## 2019-02-04 DIAGNOSIS — M85.80 OSTEOPENIA, UNSPECIFIED LOCATION: ICD-10-CM

## 2019-02-04 DIAGNOSIS — F10.20 CONTINUOUS ALCOHOL DEPENDENCE: ICD-10-CM

## 2019-02-04 DIAGNOSIS — F31.9 BIPOLAR I DISORDER: ICD-10-CM

## 2019-02-04 DIAGNOSIS — R56.9 ALCOHOL RELATED SEIZURE: ICD-10-CM

## 2019-02-04 DIAGNOSIS — G89.29 CHRONIC LEFT-SIDED LOW BACK PAIN WITHOUT SCIATICA: ICD-10-CM

## 2019-02-04 DIAGNOSIS — M25.562 ACUTE PAIN OF LEFT KNEE: ICD-10-CM

## 2019-02-04 DIAGNOSIS — E03.9 HYPOTHYROIDISM, UNSPECIFIED TYPE: ICD-10-CM

## 2019-02-04 PROCEDURE — 97140 MANUAL THERAPY 1/> REGIONS: CPT | Mod: PO

## 2019-02-04 PROCEDURE — 99999 PR PBB SHADOW E&M-EST. PATIENT-LVL III: CPT | Mod: PBBFAC,,, | Performed by: FAMILY MEDICINE

## 2019-02-04 PROCEDURE — 77080 DXA BONE DENSITY AXIAL: CPT | Mod: 26,,, | Performed by: RADIOLOGY

## 2019-02-04 PROCEDURE — 77080 DXA BONE DENSITY AXIAL: CPT | Mod: TC

## 2019-02-04 PROCEDURE — 77080 DEXA BONE DENSITY SPINE HIP: ICD-10-PCS | Mod: 26,,, | Performed by: RADIOLOGY

## 2019-02-04 PROCEDURE — 99213 OFFICE O/P EST LOW 20 MIN: CPT | Mod: PBBFAC | Performed by: FAMILY MEDICINE

## 2019-02-04 PROCEDURE — 99999 PR PBB SHADOW E&M-EST. PATIENT-LVL III: ICD-10-PCS | Mod: PBBFAC,,, | Performed by: FAMILY MEDICINE

## 2019-02-04 PROCEDURE — 99214 PR OFFICE/OUTPT VISIT, EST, LEVL IV, 30-39 MIN: ICD-10-PCS | Mod: S$PBB,,, | Performed by: FAMILY MEDICINE

## 2019-02-04 PROCEDURE — 99214 OFFICE O/P EST MOD 30 MIN: CPT | Mod: S$PBB,,, | Performed by: FAMILY MEDICINE

## 2019-02-04 PROCEDURE — 97110 THERAPEUTIC EXERCISES: CPT | Mod: PO

## 2019-02-04 RX ORDER — VIT C/E/ZN/COPPR/LUTEIN/ZEAXAN 250MG-90MG
1000 CAPSULE ORAL DAILY
COMMUNITY

## 2019-02-04 NOTE — PROGRESS NOTES
"Subjective:      Patient ID: Gay Gurrola is a 53 y.o. female.    Chief Complaint: Annual Exam    HPI   Patient here today for annual exam. She reports abdominal pain has resolved in 3 months ago. She has a bowel movements 2-3 times a week and uses miralax as needed. 2 months ago while traveling she injured her left knee and piriformis better. She is completing PT for this and all this is now getting better. She did see Dr. Mehta for psychiatry and mood is great and well controlled. She reports her bloating is resolved.     Review of Systems   Constitutional: Negative for activity change, appetite change, chills, diaphoresis, fatigue, fever and unexpected weight change.   HENT: Negative for congestion, ear discharge, ear pain, hearing loss, postnasal drip, rhinorrhea, sinus pressure and sore throat.    Respiratory: Negative for cough, shortness of breath and wheezing.    Cardiovascular: Negative for chest pain.   Gastrointestinal: Positive for constipation. Negative for abdominal pain, diarrhea, nausea and vomiting.   Genitourinary: Negative for dysuria and frequency.   Musculoskeletal: Negative.    Psychiatric/Behavioral: Negative for suicidal ideas.     I personally reviewed Past Medical History, Past Surgical history,  Past Social History and Family History      Objective:   /78 (BP Location: Left arm, Patient Position: Sitting, BP Method: Small (Manual))   Pulse 72   Ht 5' 2" (1.575 m)   Wt 45.2 kg (99 lb 10.4 oz)   LMP 01/18/2019 (Exact Date)   SpO2 99%   BMI 18.23 kg/m²     Physical Exam   Constitutional: She is oriented to person, place, and time. She appears well-developed and well-nourished. No distress.   HENT:   Head: Normocephalic and atraumatic.   Right Ear: Hearing, tympanic membrane, external ear and ear canal normal.   Left Ear: Hearing, tympanic membrane, external ear and ear canal normal.   Nose: Nose normal.   Mouth/Throat: Uvula is midline and oropharynx is clear and " moist. No oropharyngeal exudate.   Eyes: Conjunctivae and EOM are normal. Pupils are equal, round, and reactive to light. Right eye exhibits no discharge. Left eye exhibits no discharge. No scleral icterus.   Neck: Normal range of motion. Neck supple.   Cardiovascular: Normal rate, regular rhythm, normal heart sounds and intact distal pulses. Exam reveals no gallop.   No murmur heard.  Pulmonary/Chest: Effort normal and breath sounds normal. No respiratory distress. She has no wheezes. She has no rales. She exhibits no tenderness.   Abdominal: Soft. Bowel sounds are normal. She exhibits no distension and no mass. There is no tenderness. There is no rebound and no guarding.   Neurological: She is alert and oriented to person, place, and time.   Skin: Skin is warm and dry.   Vitals reviewed.      1. Constipation, unspecified constipation type    2. Osteopenia, unspecified location    3. Disorder of bone     4. Hypothyroidism, unspecified type    5. Alcohol dependence, continuous    6. Continuous alcohol dependence    7. Bipolar I disorder    8. Alcohol related seizure        1. Working with GI on medication management, currently using miralax prn   2/3. Cont calcium/vitamin D, dxa scan   4. stable, cont current regimen, check TSH   5-9. stable, cont current regimen, follows with psychiatry         Orders Placed This Encounter   Procedures    DXA Bone Density Spine And Hip    CBC auto differential    Comprehensive metabolic panel    TSH    T4, free

## 2019-02-04 NOTE — PROGRESS NOTES
Subjective:      Patient ID: Gay Gurrola is a 53 y.o. female.    Chief Complaint: Annual Exam    HPI   Patient here today for annual exam.   Review of Systems  I personally reviewed Past Medical History, Past Surgical history,  Past Social History and Family History      Objective:   There were no vitals taken for this visit.    Physical Exam    No diagnosis found.    1. stable, cont current regimen   2. stable, cont current regimen   3. stable, cont current regimen   4. stable, cont current regimen   5. stable, cont current regimen   6. stable, cont current regimen   7. stable, cont current regimen   8. stable, cont current regimen   9. stable, cont current regimen   10. stable, cont current regimen     No orders of the defined types were placed in this encounter.

## 2019-02-06 ENCOUNTER — OFFICE VISIT (OUTPATIENT)
Dept: ORTHOPEDICS | Facility: CLINIC | Age: 54
End: 2019-02-06
Payer: MEDICARE

## 2019-02-06 VITALS — WEIGHT: 100 LBS | BODY MASS INDEX: 18.4 KG/M2 | HEIGHT: 62 IN

## 2019-02-06 DIAGNOSIS — S83.005D DISLOCATION OF LEFT PATELLA, SUBSEQUENT ENCOUNTER: Primary | ICD-10-CM

## 2019-02-06 DIAGNOSIS — M62.830 MUSCLE SPASM OF BACK: ICD-10-CM

## 2019-02-06 DIAGNOSIS — M79.10 MYALGIA: ICD-10-CM

## 2019-02-06 DIAGNOSIS — M99.05 SOMATIC DYSFUNCTION OF PELVIS REGION: ICD-10-CM

## 2019-02-06 DIAGNOSIS — M76.892 ADDUCTOR TENDINITIS OF LEFT HIP: ICD-10-CM

## 2019-02-06 DIAGNOSIS — M54.50 ACUTE LEFT-SIDED LOW BACK PAIN WITHOUT SCIATICA: ICD-10-CM

## 2019-02-06 PROCEDURE — 99999 PR PBB SHADOW E&M-EST. PATIENT-LVL II: ICD-10-PCS | Mod: PBBFAC,,, | Performed by: ORTHOPAEDIC SURGERY

## 2019-02-06 PROCEDURE — 98925 PR OSTEOPATHIC MANIP,1-2 BODY REGN: ICD-10-PCS | Mod: S$PBB,,, | Performed by: ORTHOPAEDIC SURGERY

## 2019-02-06 PROCEDURE — 98925 OSTEOPATH MANJ 1-2 REGIONS: CPT | Mod: S$PBB,,, | Performed by: ORTHOPAEDIC SURGERY

## 2019-02-06 PROCEDURE — 99213 OFFICE O/P EST LOW 20 MIN: CPT | Mod: 25,S$PBB,, | Performed by: ORTHOPAEDIC SURGERY

## 2019-02-06 PROCEDURE — 98925 OSTEOPATH MANJ 1-2 REGIONS: CPT | Mod: PBBFAC,PN | Performed by: ORTHOPAEDIC SURGERY

## 2019-02-06 PROCEDURE — 99213 PR OFFICE/OUTPT VISIT, EST, LEVL III, 20-29 MIN: ICD-10-PCS | Mod: 25,S$PBB,, | Performed by: ORTHOPAEDIC SURGERY

## 2019-02-06 PROCEDURE — 99212 OFFICE O/P EST SF 10 MIN: CPT | Mod: PBBFAC,25,PN | Performed by: ORTHOPAEDIC SURGERY

## 2019-02-06 PROCEDURE — 99999 PR PBB SHADOW E&M-EST. PATIENT-LVL II: CPT | Mod: PBBFAC,,, | Performed by: ORTHOPAEDIC SURGERY

## 2019-02-06 NOTE — PROGRESS NOTES
CC: Dislocation of left patella    53 y.o. Female presents today for follow up evaluation of her left patella pain.     Change since last visit: Patient states since starting physical therapy she has been getting great improvement in her symptoms. She has been doing some biking lately and is very pleased that this is not bothering her as wants did. She does report she is having some glute and low back pain which also seems to be getting better.  Attempted treatments: Patient is stretching and doing some exercising at home. She states she has not needed to wear her knee sleeve lately, which is a big improvement for her.  She went without her knee sleeve during a walk yesterday and did not have any pain or soreness during or afterwards.  At physical therapy, they are doing dry needling to her glute region and she is finding that this is helping her greatly.  She is currently going to physical therapy twice a week and would like to continue this as she is finding good improvement with therapy.  Pain score: Patient states she is no longer in any pain.   Affecting ADLs: Patient states she is able to do all of her ADLs and modifies her workouts.      REVIEW OF SYSTEMS:   Constitution: Patient denies fever or chills.  Eyes: Patient denies eye pain or vision changes.  CVS: Patient denies chest pain.  Lungs: Patient denies shortness of breath or cough.  Skin: Patient denies skin rash or itching.    Musculoskeletal: Patient denies recent falls. See HPI.  Psych: Patient denies any current anxiety or nervousness.    PAST MEDICAL HISTORY:   Past Medical History:   Diagnosis Date    Abnormal coronary angiogram     Alcohol abuse, in remission     Anxiety     Bipolar affective     since teenage years    Depression     H. pylori infection     Headache(784.0)     followed by Dr. Corona    Hypothyroid     Memory loss     Osteopenia     Seizures        MEDICATIONS:     Current Outpatient Medications:      "Ca-D3-mag#11-zinc-cupr-man-damian 600 mg calcium- 800 unit-50 mg Tab, Take 1 tablet by mouth once daily., Disp: , Rfl:     cholecalciferol, vitamin D3, (VITAMIN D3) 1,000 unit capsule, Take 1,000 Units by mouth once daily., Disp: , Rfl:     cyanocobalamin, vitamin B-12, (VITAMIN B-12) 5,000 mcg Subl, Place under the tongue once daily.  , Disp: , Rfl:     fish oil-omega-3 fatty acids 300-1,000 mg capsule, Take 1 capsule by mouth once daily., Disp: , Rfl:     ibuprofen (ADVIL,MOTRIN) 600 MG tablet, Take 600 mg by mouth 3 (three) times daily., Disp: , Rfl:     lamoTRIgine (LAMICTAL) 100 MG tablet, Take 100 mg by mouth once daily., Disp: , Rfl:     levothyroxine (SYNTHROID) 25 MCG tablet, TAKE ONE TABLET BY MOUTH ONCE DAILY, Disp: 90 tablet, Rfl: 3    multivitamin (DAILY MULTI-VITAMIN) per tablet, Take 1 tablet by mouth once daily., Disp: , Rfl:     omeprazole (PRILOSEC) 20 MG capsule, Take 1 capsule (20 mg total) by mouth once daily., Disp: 90 capsule, Rfl: 3    QUEtiapine (SEROQUEL) 100 MG Tab, Take 300 mg by mouth once daily. , Disp: , Rfl:     temazepam (RESTORIL) 22.5 MG capsule, Take 30 mg by mouth nightly as needed for Insomnia., Disp: , Rfl:     TURMERIC ORAL, Take by mouth., Disp: , Rfl:     ALLERGIES:   Review of patient's allergies indicates:  No Known Allergies     PHYSICAL EXAMINATION:  Ht 5' 2" (1.575 m)   Wt 45.4 kg (100 lb)   LMP 01/18/2019 (Exact Date)   BMI 18.29 kg/m²   Vitals signs and nursing note have been reviewed.  General: In no acute distress, well developed, well nourished, no diaphoresis  Eyes: EOM full and smooth, no eye redness or discharge  HENT: normocephalic and atraumatic, neck supple, trachea midline, no nasal discharge  Cardiovascular: no LE edema  Lungs: respirations non-labored, no conversational dyspnea   Neuro: AAOx3, CN2-12 grossly intact  Skin: No rashes, warm and dry  Psychiatric: cooperative, pleasant, mood and affect appropriate for age    MUSCULOSKELETAL " EXAM:    LEFT KNEE EXAMINATION   Affected side is compared to contralateral knee     Observation:  No edema, erythema, ecchymosis, or effusion noted.  No muscle atrophy of the thighs and calves noted.  No obvious bony deformities noted.   No patella krupa or baja noted.  No genu valgus/varum noted. No recurvatum noted.    No tibial internal/external torsion.            ROM:   Active extension to 0° bilaterally without hyperextension, lag, crepitus, or patellar J sign.   Active flexion to 135° bilaterally.    Posture:  Upright  Gait: Non-antalgic with Neutral ankle mechanics    TART (Tissue texture abnormality, Asymmetry,  Restriction of motion and/or Tenderness) changes:    Pelvis:  · Innominate:Left posterior rotation  · Pubic bone:Left superior pubic shear    Sacrum:Neutral    Key   F= Flexed   E = Extended   R = Rotated   S = Sidebent   TTA = tissue texture abnormality     Neurovascular Examination:   Normal gait without antalgia.  Sensation intact to light touch in the obturator, lateral/intermediate/medial/posterior femoral cutaneous, saphenous, and common peroneal nerves bilaterally.  Pulses intact at the DP and PT arteries bilaterally.    Capillary refill intact <2 seconds in all toes bilaterally.    ASSESSMENT:      ICD-10-CM ICD-9-CM   1. Dislocation of left patella, subsequent encounter S83.005D V54.89     836.3   2. Muscle spasm of back M62.830 724.8   3. Acute left-sided low back pain without sciatica M54.5 724.2   4. Myalgia M79.10 729.1   5. Adductor tendinitis of left hip M76.892 727.09   6. Somatic dysfunction of pelvis region M99.05 739.5       PLAN:  1-5.  Dislocation left patella/muscle spasm packs/left-sided low back pain/myalgias/adductor tendinitis of left hip Laura Rashid is doing much better after starting physical therapy.  She was last seen by Dr. Kristyn Ramos on 12/27/2018 and at this time it was decided to send her to physical therapy to help address her complaints and  deficiencies found on physical exam.  She is currently going twice weekly to PT and is finding great results from.  She has especially liking dry needling as she feels like this is helping her the most.  She is getting dry needling done once weekly at this time. Over the last day or 2 she has stopped wearing her knee brace as she feels fine without it during her walks.    - She asked about increasing her exercise activity at this time. I advised for her to get a little bit stronger first with continued physical therapy.  When she does want to get back into more intense activity, she needs to slowly build up her activities as tolerated.  She expressed understanding and is on board with this plan. I did advise her to do her exercise activities in controlled settings, specifically stationary bike and treadmill walking.  She enjoys doing both of these things and states that will be a problem to do them. If she gets pain while doing these activities, I recommend returning to the knee brace for a short period of time.    - Continue with physical therapy as prescribed.    - She has also found OMT helpful and she consents to treatment today. See below for further details.      6.  Somatic dysfunction of pelvis -     - OMT 1-2 regions. Oral consent obtained. Reviewed benefits and potential side effects. OMT indicated today due to signs and symptoms as well as local and remote somatic dysfunction findings and their related neurokinetic, lymphatic, fascial and/or arteriovenous body connections. OMT techniques used: Myofascial Release and Muscle Energy. Treatment was tolerated well. Improvement noted in segmental mobility post-treatment in dysfunctional regions. There were no adverse events and no complications immediately following treatment. Advised plenty of water to help alleviate soreness.      Future planning includes - follow-up with Dr. Kristyn Ramos at the end of physical therapy    All questions were answered to the  best of my ability and all concerns were addressed at this time.    Follow up after PT with Dr. Kristyn Ramos for above, or sooner with me if needed.

## 2019-02-07 ENCOUNTER — PATIENT MESSAGE (OUTPATIENT)
Dept: PRIMARY CARE CLINIC | Facility: CLINIC | Age: 54
End: 2019-02-07

## 2019-02-07 ENCOUNTER — TELEPHONE (OUTPATIENT)
Dept: PRIMARY CARE CLINIC | Facility: CLINIC | Age: 54
End: 2019-02-07

## 2019-02-07 NOTE — TELEPHONE ENCOUNTER
----- Message from Pam Long sent at 2/7/2019 11:52 AM CST -----  Contact: yane  Name of Who is Calling: yane      What is the request in detail: Patient is requesting a call back she wanted to see if her test results were ready       Can the clinic reply by [facility]: no      What Number to Call Back if not in [facility]: [phone]

## 2019-02-07 NOTE — PROGRESS NOTES
Physical Therapy Daily Treatment Note    Name: Gay Vega Galileo  Clinic Number: [id number]  Date of Treatment: 02/08/2019   Diagnosis:   Encounter Diagnoses   Name Primary?    Chronic left-sided low back pain without sciatica     Left hip pain     Acute pain of left knee        Physician: Kristyn Ramos DO    Physician Orders: PT Eval and Treat: 1)S/p left patellar dislocation - quad strengthening and controlled increased ROM 2) Chronic Left hip pain from overuse injury to muscle attachments of the inferior pubic rami causing inferior groin pain. Compensatory IT band tightness and left QL firing also present. Pt. Needs pelvic neuromuscular and strengthening. HEP.  Medical Diagnosis from Referral: S83.005D (ICD-10-CM) - Dislocation of left patella, subsequent encounter M25.462 (ICD-10-CM) - Knee effusion, left M54.5 (ICD-10-CM) - Acute left-sided low back pain without sciatica M76.892 (ICD-10-CM) - Enthesopathy of left hip region M76.892 (ICD-10-CM) - Adductor tendinitis of left hip   Evaluation Date: 1/3/2019  Authorization Period Expiration: 12/27/2019  Plan of Care Expiration: 3/8/2019  Visit # / Visits authorized: 10/ 1     Time In: 0859 AM  Time Out: 1008 AM  Total Billable Time: 44 minutes    Precautions:  Standard    Subjective     Gay reported that she is feeling much better and denies any pain when asked this morning.  Pain Now: 0/10 Pain Worst:  3/10  Location: left back , buttocks  and upper legs   Previous Level of Function: independent with ADL's, but limiting activity to be protective of knee, returned to driving today  Current Level of Function:  Unable to run, discomfort with prolonged walking, I in all ADLS    Objective     Observation: Patient presents with soft sleeve on L knee        Range of Motion:   Knee Left active Left Passive Right Active R passive   Flexion 135   135     Extension 0   0        Lumbar:              Flexion: WFL              Extension: WFL              Left  sidebending: WFL                       Right sidebending: WFL              Left rotation: WFL              Right rotation:WFL        Lower Extremity Strength  Right LE   Left LE     Ankle dorsiflexion: 5/5 Ankle dorsiflexion: 5/5   Ankle plantarflexion: 5/5 Ankle plantarflexion: 5/5   Knee extension: 5/5 Knee extension: 5/5   Knee flexion: 4+/5 Knee flexion: 4+/5   Hip flexion: 4/5 Hip flexion: 4+/5   Hip external rotation 5/5 Hip external rotation 5/5   Hip internal rotation 4/5 Hip internal rotation 4/5   Hip abduction:  4+/5 Hip abduction: 4+/5   Hip adduction: 5/5 Hip adduction: 5/5   Hip extension: 4/5 Hip extension 4-/5            Palpation: L knee: no TTP noted today. Patellar mobility WFL              L  Hip: tenderness to glut med at iliac crest and piriformis at sacral border              Hypertonicity noted in bilateral lumbar and thoracic paraspinals (-) TTP     Sensation: grossly intact to light touch     Flexibility:               Limited piriformis flexibility noted on the L                    Edema: none noted        CMS Impairment/Limitation/Restriction for FOTO Knee Survey     Therapist reviewed FOTO scores for Gay Gurrola on 1/3/2019.   FOTO documents entered into EPIC - see Media section.     Limitation Score: 70%  Category: Mobility     Current : CL = least 60% but < 80% impaired, limited or restricted  Goal: CK = at least 40% but < 60% impaired, limited or restricted  Discharge: n/a        CMS Impairment/Limitation/Restriction for FOTO Knee Survey     Therapist reviewed FOTO scores for Gay Gurrola on 2/8/2019.   FOTO documents entered into EPIC - see Media section.     Limitation Score: 44%  Category: Mobility     Current : CK = least 40% but < 60% impaired, limited or restricted  Goal: CK = at least 40% but < 60% impaired, limited or restricted  Discharge: n/a              Gay received moist hot pack to low back in supine pre tx for 10 minutes to promote blood  "flow and decreased stiffness.-NP    Gay received therapeutic exercises to develop strength, endurance, flexibility and core stabilization for 57 minutes including:     Upright bike L3 5' for warmup  Figure 4 piriformis stretch 3 x 30"-NP  PPT 10 x 10"-NP  PPT with adduction ball squeeze 10 x 10"-NP  PPT with hip abduction magic [place] 10 x 10"-NP  Bridges with PPT 4 x 5-NP  Clamshells OTB 3 x 10  Reverse clamshells 3 x 10 2#  Marching with PPT in table top 2 x 5-NP  ITB foam roller 2' L side only  Leg Press 30# with ball squeeze 3 x 10  Lateral walks OTB 2 laps (significant verbal and visual cueing required)  Shuttle glut kickbacks 1 black 3 x 10 each side  Wall squats with ball 2 x 10    **objective measures taken for purpose of updated POC    Gay received the following manual therapy techniques: Joint mobilizations and Soft tissue Mobilization were applied to the: low back/SI joint/piriformis for 12 minutes.     Gay received dry needling to the lumbar spine region as well as (B) piriformis by Manjeet Champagne, PT.-NP today      Written Home Exercises Provided: no new exercises today     Patient educated on easing back into walking/running with minimal pain.    Pt demo fair understanding of the education provided. Gay demonstrated good return demonstration of activities.     Assessment   Gay has made great gains since initiating PT treatment.  Patient demonstrates full ROM of the L knee as well as the low back.  Patient continues to have some weakness in the LEs particularly in the gluts.  Patient will continue to benefit from skilled PT intervention to address weakness of the glut and return to premorbid participation in activities.   Gay is progressing well towards her goals.   Pt prognosis is Good.     Pt will continue to benefit from skilled PT intervention. Medical Necessity is demonstrated by:  Pain limits function of effected part for some activities, Unable to participate fully in " daily activities, Requires skilled supervision to complete and progress HEP and Weakness.    New/Revised goals:   Short Term Goals (4 Weeks):   1. Pt will report 20% reduction in pain of the lumbar spine and L hip for ease with ADL's-MET  2. PT will demonstrate improved upright posture with minimal cuing for ease with functional positioning in home and community.-MET  3. Pt will demonstrate improved lumbar spine ROM in all directions by 10% for ease with bending activities.-MET  4. Pt will be able to ambulate 200 ft with no AD and without the presence of antalgic gait pattern-MET        Long Term Goals (12 Weeks):   1. Pt will report being independent with HEP for maintenance of improvements gained during therapy sessions-MET  2. PT will report 50% reduction of pain of the back and LE for ease with sitting.-MET  3. Pt will demonstrate trunk and extremity strength to >=4+/5 without the provocation of pain for ease with return to exercise-Not MET but progress made  4. Pt will demonstrate appropriate upright posture without external cueing for ease with housework.-Not MET but progress made  5. Pt to demonstrate improved functional ability with FOTO limitation <=50% disability.-MET  Updated goals (4 weeks) 3/8/2019  1.  Patient will report 0/10 at worst.  2.  Pt to demonstrate improved functional ability with FOTO limitation <=25%.        Plan       Continue with previously established POC with more intense strengthening exercises 2x/wk x 4 weeks.  Cari Alvarado, PT

## 2019-02-08 ENCOUNTER — CLINICAL SUPPORT (OUTPATIENT)
Dept: REHABILITATION | Facility: HOSPITAL | Age: 54
End: 2019-02-08
Payer: MEDICARE

## 2019-02-08 DIAGNOSIS — M25.562 ACUTE PAIN OF LEFT KNEE: ICD-10-CM

## 2019-02-08 DIAGNOSIS — M54.50 CHRONIC LEFT-SIDED LOW BACK PAIN WITHOUT SCIATICA: ICD-10-CM

## 2019-02-08 DIAGNOSIS — G89.29 CHRONIC LEFT-SIDED LOW BACK PAIN WITHOUT SCIATICA: ICD-10-CM

## 2019-02-08 DIAGNOSIS — M25.552 LEFT HIP PAIN: ICD-10-CM

## 2019-02-08 PROCEDURE — 97110 THERAPEUTIC EXERCISES: CPT | Mod: PO

## 2019-02-08 PROCEDURE — G8979 MOBILITY GOAL STATUS: HCPCS | Mod: CK,PO

## 2019-02-08 PROCEDURE — 97140 MANUAL THERAPY 1/> REGIONS: CPT | Mod: PO

## 2019-02-08 PROCEDURE — G8978 MOBILITY CURRENT STATUS: HCPCS | Mod: CK,PO

## 2019-02-08 NOTE — PLAN OF CARE
Physical Therapy Daily Treatment Note    Name: Gay Vega Galileo  Clinic Number: [id number]  Date of Treatment: 02/08/2019   Diagnosis:   Encounter Diagnoses   Name Primary?    Chronic left-sided low back pain without sciatica     Left hip pain     Acute pain of left knee        Physician: Kristyn Ramos DO    Physician Orders: PT Eval and Treat: 1)S/p left patellar dislocation - quad strengthening and controlled increased ROM 2) Chronic Left hip pain from overuse injury to muscle attachments of the inferior pubic rami causing inferior groin pain. Compensatory IT band tightness and left QL firing also present. Pt. Needs pelvic neuromuscular and strengthening. HEP.  Medical Diagnosis from Referral: S83.005D (ICD-10-CM) - Dislocation of left patella, subsequent encounter M25.462 (ICD-10-CM) - Knee effusion, left M54.5 (ICD-10-CM) - Acute left-sided low back pain without sciatica M76.892 (ICD-10-CM) - Enthesopathy of left hip region M76.892 (ICD-10-CM) - Adductor tendinitis of left hip   Evaluation Date: 1/3/2019  Authorization Period Expiration: 12/27/2019  Plan of Care Expiration: 3/8/2019  Visit # / Visits authorized: 10/ 1     Time In: 0859 AM  Time Out: 1008 AM  Total Billable Time: 44 minutes    Precautions:  Standard    Subjective     Gay reported that she is feeling much better and denies any pain when asked this morning.  Pain Now: 0/10 Pain Worst:  3/10  Location: left back , buttocks  and upper legs   Previous Level of Function: independent with ADL's, but limiting activity to be protective of knee, returned to driving today  Current Level of Function:  Unable to run, discomfort with prolonged walking, I in all ADLS    Objective     Observation: Patient presents with soft sleeve on L knee        Range of Motion:   Knee Left active Left Passive Right Active R passive   Flexion 135   135     Extension 0   0        Lumbar:              Flexion: WFL              Extension: WFL              Left  sidebending: WFL                       Right sidebending: WFL              Left rotation: WFL              Right rotation:WFL        Lower Extremity Strength  Right LE   Left LE     Ankle dorsiflexion: 5/5 Ankle dorsiflexion: 5/5   Ankle plantarflexion: 5/5 Ankle plantarflexion: 5/5   Knee extension: 5/5 Knee extension: 5/5   Knee flexion: 4+/5 Knee flexion: 4+/5   Hip flexion: 4/5 Hip flexion: 4+/5   Hip external rotation 5/5 Hip external rotation 5/5   Hip internal rotation 4/5 Hip internal rotation 4/5   Hip abduction:  4+/5 Hip abduction: 4+/5   Hip adduction: 5/5 Hip adduction: 5/5   Hip extension: 4/5 Hip extension 4-/5            Palpation: L knee: no TTP noted today. Patellar mobility WFL              L  Hip: tenderness to glut med at iliac crest and piriformis at sacral border              Hypertonicity noted in bilateral lumbar and thoracic paraspinals (-) TTP     Sensation: grossly intact to light touch     Flexibility:               Limited piriformis flexibility noted on the L                    Edema: none noted        CMS Impairment/Limitation/Restriction for FOTO Knee Survey     Therapist reviewed FOTO scores for Gay Gurrola on 1/3/2019.   FOTO documents entered into EPIC - see Media section.     Limitation Score: 70%  Category: Mobility     Current : CL = least 60% but < 80% impaired, limited or restricted  Goal: CK = at least 40% but < 60% impaired, limited or restricted  Discharge: n/a        CMS Impairment/Limitation/Restriction for FOTO Knee Survey     Therapist reviewed FOTO scores for Gay Gurrola on 2/8/2019.   FOTO documents entered into EPIC - see Media section.     Limitation Score: 44%  Category: Mobility     Current : CK = least 40% but < 60% impaired, limited or restricted  Goal: CK = at least 40% but < 60% impaired, limited or restricted  Discharge: n/a              Gay received moist hot pack to low back in supine pre tx for 10 minutes to promote blood  "flow and decreased stiffness.-NP    Gay received therapeutic exercises to develop strength, endurance, flexibility and core stabilization for 57 minutes including:     Upright bike L3 5' for warmup  Figure 4 piriformis stretch 3 x 30"-NP  PPT 10 x 10"-NP  PPT with adduction ball squeeze 10 x 10"-NP  PPT with hip abduction magic [place] 10 x 10"-NP  Bridges with PPT 4 x 5-NP  Clamshells OTB 3 x 10  Reverse clamshells 3 x 10 2#  Marching with PPT in table top 2 x 5-NP  ITB foam roller 2' L side only  Leg Press 30# with ball squeeze 3 x 10  Lateral walks OTB 2 laps (significant verbal and visual cueing required)  Shuttle glut kickbacks 1 black 3 x 10 each side  Wall squats with ball 2 x 10    **objective measures taken for purpose of updated POC    Gay received the following manual therapy techniques: Joint mobilizations and Soft tissue Mobilization were applied to the: low back/SI joint/piriformis for 12 minutes.     Gay received dry needling to the lumbar spine region as well as (B) piriformis by Manjeet Champagne, PT.-NP today      Written Home Exercises Provided: no new exercises today     Patient educated on easing back into walking/running with minimal pain.    Pt demo fair understanding of the education provided. Gay demonstrated good return demonstration of activities.     Assessment   Gay has made great gains since initiating PT treatment.  Patient demonstrates full ROM of the L knee as well as the low back.  Patient continues to have some weakness in the LEs particularly in the gluts.  Patient will continue to benefit from skilled PT intervention to address weakness of the glut and return to premorbid participation in activities.   Gay is progressing well towards her goals.   Pt prognosis is Good.     Pt will continue to benefit from skilled PT intervention. Medical Necessity is demonstrated by:  Pain limits function of effected part for some activities, Unable to participate fully in " daily activities, Requires skilled supervision to complete and progress HEP and Weakness.    New/Revised goals:   Short Term Goals (4 Weeks):   1. Pt will report 20% reduction in pain of the lumbar spine and L hip for ease with ADL's-MET  2. PT will demonstrate improved upright posture with minimal cuing for ease with functional positioning in home and community.-MET  3. Pt will demonstrate improved lumbar spine ROM in all directions by 10% for ease with bending activities.-MET  4. Pt will be able to ambulate 200 ft with no AD and without the presence of antalgic gait pattern-MET        Long Term Goals (12 Weeks):   1. Pt will report being independent with HEP for maintenance of improvements gained during therapy sessions-MET  2. PT will report 50% reduction of pain of the back and LE for ease with sitting.-MET  3. Pt will demonstrate trunk and extremity strength to >=4+/5 without the provocation of pain for ease with return to exercise-Not MET but progress made  4. Pt will demonstrate appropriate upright posture without external cueing for ease with housework.-Not MET but progress made  5. Pt to demonstrate improved functional ability with FOTO limitation <=50% disability.-MET  Updated goals (4 weeks) 3/8/2019  1.  Patient will report 0/10 at worst.  2.  Pt to demonstrate improved functional ability with FOTO limitation <=25%.        Plan       Continue with previously established POC with more intense strengthening exercises 2x/wk x 4 weeks.  Cari Alvarado, PT

## 2019-02-11 ENCOUNTER — CLINICAL SUPPORT (OUTPATIENT)
Dept: REHABILITATION | Facility: HOSPITAL | Age: 54
End: 2019-02-11
Payer: MEDICARE

## 2019-02-11 DIAGNOSIS — G89.29 CHRONIC LEFT-SIDED LOW BACK PAIN WITHOUT SCIATICA: ICD-10-CM

## 2019-02-11 DIAGNOSIS — M54.50 CHRONIC LEFT-SIDED LOW BACK PAIN WITHOUT SCIATICA: ICD-10-CM

## 2019-02-11 DIAGNOSIS — M25.562 ACUTE PAIN OF LEFT KNEE: ICD-10-CM

## 2019-02-11 DIAGNOSIS — M25.552 LEFT HIP PAIN: ICD-10-CM

## 2019-02-11 PROCEDURE — 97140 MANUAL THERAPY 1/> REGIONS: CPT | Mod: PO

## 2019-02-11 PROCEDURE — 97110 THERAPEUTIC EXERCISES: CPT | Mod: PO

## 2019-02-11 NOTE — PROGRESS NOTES
Physical Therapy Daily Treatment Note    Name: Gay Vega Galileo  Clinic Number: [id number]  Date of Treatment: 02/11/2019   Diagnosis:   Encounter Diagnoses   Name Primary?    Chronic left-sided low back pain without sciatica     Left hip pain     Acute pain of left knee        Physician: Kristyn Ramos DO    Physician Orders: PT Eval and Treat: 1)S/p left patellar dislocation - quad strengthening and controlled increased ROM 2) Chronic Left hip pain from overuse injury to muscle attachments of the inferior pubic rami causing inferior groin pain. Compensatory IT band tightness and left QL firing also present. Pt. Needs pelvic neuromuscular and strengthening. HEP.  Medical Diagnosis from Referral: S83.005D (ICD-10-CM) - Dislocation of left patella, subsequent encounter M25.462 (ICD-10-CM) - Knee effusion, left M54.5 (ICD-10-CM) - Acute left-sided low back pain without sciatica M76.892 (ICD-10-CM) - Enthesopathy of left hip region M76.892 (ICD-10-CM) - Adductor tendinitis of left hip   Evaluation Date: 1/3/2019  Authorization Period Expiration: 12/27/2019  Plan of Care Expiration: 3/8/2019  Visit # / Visits authorized: 10/ 1     Time In: 0754 AM  Time Out: 0900 AM  Total Billable Time: 36 minutes    Precautions:  Standard    Subjective     Gay reported that she is feeling much better and denies any pain when asked this morning.  Pain Now: 0/10 Pain Worst:  3/10  Location: left back , buttocks  and upper legs   Previous Level of Function: independent with ADL's, but limiting activity to be protective of knee, returned to driving today  Current Level of Function:  Unable to run, discomfort with prolonged walking, I in all ADLS    Objective     Gay received moist hot pack to low back in supine pre tx for 10 minutes to promote blood flow and decreased stiffness.-NP    Gay received therapeutic exercises to develop strength, endurance, flexibility and core stabilization for 48 minutes including:  "    Upright bike L3 10' for warmup  Figure 4 piriformis stretch 3 x 30"-D/C  PPT 10 x 10"  PPT with adduction ball squeeze 10 x 10"  PPT with hip abduction magic [place] 10 x 10"  Bridges with PPT 4 x 5  Clamshells OTB 3 x 10  Reverse clamshells 3 x 10 2#  Marching with PPT in table top 2 x 5  ITB foam roller 2' L side only  Leg Press 30# with ball squeeze 3 x 10  Lateral walks OTB 2 laps (significant verbal and visual cueing required)  Shuttle glut kickbacks 1 black 3 x 10 each side  Wall squats with ball 2 x 10      Gay received the following manual therapy techniques: Joint mobilizations and Soft tissue Mobilization were applied to the: low back/SI joint/piriformis for 12 minutes.     Gay received dry needling to the lumbar spine region as well as (B) piriformis by Manjeet Champagne, PT.-NP today      Written Home Exercises Provided: no new exercises today     Patient educated on easing back into walking/running with minimal pain.    Pt demo fair understanding of the education provided. Gay demonstrated good return demonstration of activities.     Assessment   Gay continues to require significant cueing for correct foot placement for lateral walks as well as with squats today.  Patient had minimal to moderate soft tissue restriction in the L piriformis today but no real soft tissue restriction in the L lumbar musculature.  Gay is progressing well towards her goals.   Pt prognosis is Good.     Pt will continue to benefit from skilled PT intervention. Medical Necessity is demonstrated by:  Pain limits function of effected part for some activities, Unable to participate fully in daily activities, Requires skilled supervision to complete and progress HEP and Weakness.    New/Revised goals:   Short Term Goals (4 Weeks):   1. Pt will report 20% reduction in pain of the lumbar spine and L hip for ease with ADL's-MET  2. PT will demonstrate improved upright posture with minimal cuing for ease with " functional positioning in home and community.-MET  3. Pt will demonstrate improved lumbar spine ROM in all directions by 10% for ease with bending activities.-MET  4. Pt will be able to ambulate 200 ft with no AD and without the presence of antalgic gait pattern-MET        Long Term Goals (12 Weeks):   1. Pt will report being independent with HEP for maintenance of improvements gained during therapy sessions-MET  2. PT will report 50% reduction of pain of the back and LE for ease with sitting.-MET  3. Pt will demonstrate trunk and extremity strength to >=4+/5 without the provocation of pain for ease with return to exercise-Not MET but progress made  4. Pt will demonstrate appropriate upright posture without external cueing for ease with housework.-Not MET but progress made  5. Pt to demonstrate improved functional ability with FOTO limitation <=50% disability.-MET  Updated goals (4 weeks) 3/8/2019  1.  Patient will report 0/10 at worst.  2.  Pt to demonstrate improved functional ability with FOTO limitation <=25%.        Plan     Continue PT as per plan of care.    Cari Alvarado, PT

## 2019-02-12 ENCOUNTER — TELEPHONE (OUTPATIENT)
Dept: INTERNAL MEDICINE | Facility: CLINIC | Age: 54
End: 2019-02-12

## 2019-02-12 NOTE — TELEPHONE ENCOUNTER
Spoke w/ pt and advised on labs that they were all normal. Pt requested lab results to be mailed to her.    ----- Message from Joyce Hinton sent at 2/12/2019  9:31 AM CST -----  Contact: pt   Name of Who is Calling: MEHDI ARVIZU [[id number]]       What is the request in detail: Patient is returning a call in regards to getting lab results....Please contact to discuss and further advise.       Can the clinic reply by [facility]: no       [facility] Back [facility]: [phone]

## 2019-02-14 ENCOUNTER — TELEPHONE (OUTPATIENT)
Dept: GASTROENTEROLOGY | Facility: CLINIC | Age: 54
End: 2019-02-14

## 2019-02-14 NOTE — TELEPHONE ENCOUNTER
Ma spoke to pt,    Pt is reporting still has chronic constipation and passing small hard stool once daily.    Pt stated she did finish miralax       ----- Message from Francisca Escobar sent at 2/14/2019  8:21 AM CST -----  Contact: self [phone]  Ray    Needs Advice    Reason for call: giving update on condition - still has chronic constipation        Communication Preference: [phone]    Additional Information: finished the miralax

## 2019-02-14 NOTE — TELEPHONE ENCOUNTER
Spoke to pt,    Pt stated she have already tried the miralax twice a ay recommended by Landon. She will wait until her with Dr. Salazar to discuss further

## 2019-02-15 ENCOUNTER — CLINICAL SUPPORT (OUTPATIENT)
Dept: REHABILITATION | Facility: HOSPITAL | Age: 54
End: 2019-02-15
Payer: MEDICARE

## 2019-02-15 PROCEDURE — 97110 THERAPEUTIC EXERCISES: CPT | Mod: PO

## 2019-02-15 PROCEDURE — 97140 MANUAL THERAPY 1/> REGIONS: CPT | Mod: PO

## 2019-02-15 NOTE — PROGRESS NOTES
"Physical Therapy Daily Treatment Note    Name: Gay Gurrola  Clinic Number: [id number]  Date of Treatment: 04/24/2019   Diagnosis:   No diagnosis found.    Physician: Kristyn Ramos DO    Physician Orders: PT Eval and Treat: 1)S/p left patellar dislocation - quad strengthening and controlled increased ROM 2) Chronic Left hip pain from overuse injury to muscle attachments of the inferior pubic rami causing inferior groin pain. Compensatory IT band tightness and left QL firing also present. Pt. Needs pelvic neuromuscular and strengthening. HEP.  Medical Diagnosis from Referral: S83.005D (ICD-10-CM) - Dislocation of left patella, subsequent encounter M25.462 (ICD-10-CM) - Knee effusion, left M54.5 (ICD-10-CM) - Acute left-sided low back pain without sciatica M76.892 (ICD-10-CM) - Enthesopathy of left hip region M76.892 (ICD-10-CM) - Adductor tendinitis of left hip   Evaluation Date: 1/3/2019  Authorization Period Expiration: 12/27/2019  Plan of Care Expiration: 3/8/2019  Visit # / Visits authorized: 11/ 1     Time In: 1200 PM  Time Out: 0100 PM  Total Billable Time: 50 minutes    Precautions:  Standard    Subjective     Gay no new complaints.  Pain Now: 1/10 Pain Worst:  3/10  Location: left back , buttocks  and upper legs   Previous Level of Function: independent with ADL's, but limiting activity to be protective of knee, returned to driving today  Current Level of Function:  Unable to run, discomfort with prolonged walking, I in all ADLS    Objective     Gay received moist hot pack to low back in supine pre tx for 10 minutes to promote blood flow and decreased stiffness.    Gay received therapeutic exercises to develop strength, endurance, flexibility and core stabilization for 40 minutes including:     Upright bike L3 10' for warmup  Figure 4 piriformis stretch 3 x 30"-D/C  PPT 10 x 10"  PPT with adduction ball squeeze 10 x 10"  PPT with hip abduction magic [place] 10 x 10"  Bridges with " PPT 4 x 5  Clamshells OTB 3 x 10  Reverse clamshells 3 x 10 2#  Marching with PPT in table top 2 x 5  ITB foam roller 2' L side only  Leg Press 30# with ball squeeze 3 x 10  Lateral walks OTB 2 laps (significant verbal and visual cueing required)  Shuttle glut kickbacks 1 black 3 x 10 each side  Wall squats with ball 2 x 10  Lunges with yellow pole support 2 x 10 each side      Gay received the following manual therapy techniques: Joint mobilizations and Soft tissue Mobilization were applied to the: low back/SI joint/piriformis for 10 minutes.     Gay received dry needling to the lumbar spine region as well as (B) piriformis by Manjeet Champagne PT.-NP today      Written Home Exercises Provided: no new exercises today     Patient educated on easing back into walking/running with minimal pain.    Pt demo fair understanding of the education provided. Gay demonstrated good return demonstration of activities.     Assessment   Gay was able to tolerate treatment session today without increased in pain or discomfort.  Minimal soft tissue restriction present in piriformis.  Gay is progressing well towards her goals.   Pt prognosis is Good.     Pt will continue to benefit from skilled PT intervention. Medical Necessity is demonstrated by:  Pain limits function of effected part for some activities, Unable to participate fully in daily activities, Requires skilled supervision to complete and progress HEP and Weakness.    New/Revised goals:   Short Term Goals (4 Weeks):   1. Pt will report 20% reduction in pain of the lumbar spine and L hip for ease with ADL's-MET  2. PT will demonstrate improved upright posture with minimal cuing for ease with functional positioning in home and community.-MET  3. Pt will demonstrate improved lumbar spine ROM in all directions by 10% for ease with bending activities.-MET  4. Pt will be able to ambulate 200 ft with no AD and without the presence of antalgic gait  pattern-MET        Long Term Goals (12 Weeks):   1. Pt will report being independent with HEP for maintenance of improvements gained during therapy sessions-MET  2. PT will report 50% reduction of pain of the back and LE for ease with sitting.-MET  3. Pt will demonstrate trunk and extremity strength to >=4+/5 without the provocation of pain for ease with return to exercise-Not MET but progress made  4. Pt will demonstrate appropriate upright posture without external cueing for ease with housework.-Not MET but progress made  5. Pt to demonstrate improved functional ability with FOTO limitation <=50% disability.-MET  Updated goals (4 weeks) 3/8/2019  1.  Patient will report 0/10 at worst.  2.  Pt to demonstrate improved functional ability with FOTO limitation <=25%.        Plan     Continue PT as per plan of care.    Cari Alvarado, PT

## 2019-02-21 ENCOUNTER — CLINICAL SUPPORT (OUTPATIENT)
Dept: REHABILITATION | Facility: HOSPITAL | Age: 54
End: 2019-02-21
Payer: MEDICARE

## 2019-02-21 ENCOUNTER — OFFICE VISIT (OUTPATIENT)
Dept: GASTROENTEROLOGY | Facility: CLINIC | Age: 54
End: 2019-02-21
Payer: MEDICARE

## 2019-02-21 VITALS
WEIGHT: 98.13 LBS | BODY MASS INDEX: 18.06 KG/M2 | SYSTOLIC BLOOD PRESSURE: 106 MMHG | DIASTOLIC BLOOD PRESSURE: 67 MMHG | HEART RATE: 91 BPM | HEIGHT: 62 IN

## 2019-02-21 DIAGNOSIS — M25.562 ACUTE PAIN OF LEFT KNEE: ICD-10-CM

## 2019-02-21 DIAGNOSIS — M54.50 CHRONIC LEFT-SIDED LOW BACK PAIN WITHOUT SCIATICA: ICD-10-CM

## 2019-02-21 DIAGNOSIS — K58.1 IRRITABLE BOWEL SYNDROME WITH CONSTIPATION: Primary | ICD-10-CM

## 2019-02-21 DIAGNOSIS — G89.29 CHRONIC LEFT-SIDED LOW BACK PAIN WITHOUT SCIATICA: ICD-10-CM

## 2019-02-21 DIAGNOSIS — M25.552 LEFT HIP PAIN: ICD-10-CM

## 2019-02-21 PROCEDURE — 99214 PR OFFICE/OUTPT VISIT, EST, LEVL IV, 30-39 MIN: ICD-10-PCS | Mod: S$PBB,,, | Performed by: INTERNAL MEDICINE

## 2019-02-21 PROCEDURE — 99999 PR PBB SHADOW E&M-EST. PATIENT-LVL III: CPT | Mod: PBBFAC,,, | Performed by: INTERNAL MEDICINE

## 2019-02-21 PROCEDURE — 97110 THERAPEUTIC EXERCISES: CPT | Mod: PO

## 2019-02-21 PROCEDURE — 99214 OFFICE O/P EST MOD 30 MIN: CPT | Mod: S$PBB,,, | Performed by: INTERNAL MEDICINE

## 2019-02-21 PROCEDURE — 99213 OFFICE O/P EST LOW 20 MIN: CPT | Mod: PBBFAC | Performed by: INTERNAL MEDICINE

## 2019-02-21 PROCEDURE — 99999 PR PBB SHADOW E&M-EST. PATIENT-LVL III: ICD-10-PCS | Mod: PBBFAC,,, | Performed by: INTERNAL MEDICINE

## 2019-02-21 NOTE — PATIENT INSTRUCTIONS
Sunfiber - 6g/scoop. Guar gum and soluble fiber   Search [facility] for tomorrow's nutrition SunFiber    Ofelia's Tummy Fiber - 6g/scoop. Angela [place] prebiotic and soluble fiber

## 2019-02-21 NOTE — PROGRESS NOTES
Physical Therapy Daily Treatment Note    Name: Gay Vega Galileo  Clinic Number: [id number]  Date of Treatment: 02/21/2019   Diagnosis:   Encounter Diagnoses   Name Primary?    Chronic left-sided low back pain without sciatica     Left hip pain     Acute pain of left knee        Physician: Kristyn Ramos DO    Physician Orders: PT Eval and Treat: 1)S/p left patellar dislocation - quad strengthening and controlled increased ROM 2) Chronic Left hip pain from overuse injury to muscle attachments of the inferior pubic rami causing inferior groin pain. Compensatory IT band tightness and left QL firing also present. Pt. Needs pelvic neuromuscular and strengthening. HEP.  Medical Diagnosis from Referral: S83.005D (ICD-10-CM) - Dislocation of left patella, subsequent encounter M25.462 (ICD-10-CM) - Knee effusion, left M54.5 (ICD-10-CM) - Acute left-sided low back pain without sciatica M76.892 (ICD-10-CM) - Enthesopathy of left hip region M76.892 (ICD-10-CM) - Adductor tendinitis of left hip   Evaluation Date: 1/3/2019  Authorization Period Expiration: 12/27/2019  Plan of Care Expiration: 3/8/2019  Visit # / Visits authorized: 12/ 1     Time In: 1202 PM  Time Out: 1255 AM  Total Billable Time: 25 minutes    Precautions:  Standard    Subjective     Gay reported that she has been having more knee pain recently that caused her to ache and limp a little bit last night (5/10).  Patient reported that her low back will hurt a little bit with she is limping and that she continues to have glut pain mostly at night that usually goes away with stretching.   Pain Now: 3/10 Pain Worst:  3/10  Location: left back , buttocks  and upper legs   Previous Level of Function: independent with ADL's, but limiting activity to be protective of knee, returned to driving today  Current Level of Function:  Unable to run, discomfort with prolonged walking, I in all ADLS    Objective     Gay received moist hot pack to low back in  "supine pre tx for 10 minutes to promote blood flow and decreased stiffness.-NP    Gay received therapeutic exercises to develop strength, endurance, flexibility and core stabilization for 50 minutes including:     Upright bike L3 10' for warmup  Figure 4 piriformis stretch 3 x 30"-D/C  PPT 10 x 10"-D/C  PPT with adduction ball squeeze 10 x 10"-D/C  PPT with hip abduction magic [place] 10 x 10"-D/C  Bridges with PPT 4 x 5-D/C  Clamshells GTB 3 x 10  Reverse clamshells 3 x 10 2#  Marching with PPT in table top 2 x 5-NP  ITB foam roller 2' L side only-NP  Leg Press 40# with ball squeeze 3 x 10  Lateral walks OTB 2 laps (significant verbal and visual cueing required)  Shuttle glut kickbacks 1 black 3 x 10 each side  Wall squats with ball 2 x 10  Split squats with yellow poles for support 2 x 10  Monster walks fwd/bwd 2 laps  Single leg squat on 18" box and airex (L leg only lifting and lowering)  Kneeling hip thrust with black band 3 x 10        Gay received the following manual therapy techniques: Joint mobilizations and Soft tissue Mobilization were applied to the: low back/SI joint/piriformis for 12 minutes.-NP    Gay received dry needling to the lumbar spine region as well as (B) piriformis by Manjeet Champagne, PT.      Written Home Exercises Provided: no new exercises today     Patient educated normalcy of pain with increased activity in the knee.  Pt demo fair understanding of the education provided. Gay demonstrated good return demonstration of activities.     Assessment   Gya able to tolerate initiating lunges and more strengthening exercises today without reports of increased pain or discomfort.  Muscular fatigue present at end of session with hip thrusting activity.   Gay is progressing well towards her goals.   Pt prognosis is Good.     Pt will continue to benefit from skilled PT intervention. Medical Necessity is demonstrated by:  Pain limits function of effected part for some " activities, Unable to participate fully in daily activities, Requires skilled supervision to complete and progress HEP and Weakness.    New/Revised goals:   Short Term Goals (4 Weeks):   1. Pt will report 20% reduction in pain of the lumbar spine and L hip for ease with ADL's-MET  2. PT will demonstrate improved upright posture with minimal cuing for ease with functional positioning in home and community.-MET  3. Pt will demonstrate improved lumbar spine ROM in all directions by 10% for ease with bending activities.-MET  4. Pt will be able to ambulate 200 ft with no AD and without the presence of antalgic gait pattern-MET        Long Term Goals (12 Weeks):   1. Pt will report being independent with HEP for maintenance of improvements gained during therapy sessions-MET  2. PT will report 50% reduction of pain of the back and LE for ease with sitting.-MET  3. Pt will demonstrate trunk and extremity strength to >=4+/5 without the provocation of pain for ease with return to exercise-Not MET but progress made  4. Pt will demonstrate appropriate upright posture without external cueing for ease with housework.-Not MET but progress made  5. Pt to demonstrate improved functional ability with FOTO limitation <=50% disability.-MET  Updated goals (4 weeks) 3/8/2019  1.  Patient will report 0/10 at worst.  2.  Pt to demonstrate improved functional ability with FOTO limitation <=25%.        Plan     Continue PT as per plan of care.    Cari Alvarado, PT

## 2019-02-21 NOTE — PROGRESS NOTES
[facility] Consultation Note    Reason for Consult:  The encounter diagnosis was Irritable bowel syndrome with constipation.    PCP:   Jaja Mckeon       Referring MD:  No referring provider defined for this encounter.    HPI:  This is a 53 y.o. female here for evaluation of abdominal pain.  She contacted the office 8/6/18 with complaints of stomach pain, swelling and burning   She did see her PCP and labs and ABD US were ordered.  Non-fasting lipase was mildly elevated - 65  ABD US - normal, no gallstones  She has a Hx of H. Pylori    Interval Hx 02/21/2019:  Trialed miralax qd to bid without improvement in constipation.  Her bowel movements have alternated between diarrhea and constipation.  She has tried many different diets without much improvement      ROS:  Constitutional: No fevers, chills, No weight loss  ENT: No allergies  CV: No chest pain  Pulm: No cough, No shortness of breath  Ophtho: No vision changes  GI: see HPI  Derm: No rash  Heme: No lymphadenopathy, No bruising  MSK: No arthritis  : No dysuria, No hematuria  Endo: No hot or cold intolerance  Neuro: No syncope, No seizure  Psych: No anxiety, No depression    Medical History:  has a past medical history of Abnormal coronary angiogram, Alcohol abuse, in remission, Anxiety, Bipolar affective, Depression, H. pylori infection, Headache(784.0), Hypothyroid, Memory loss, Osteopenia, and Seizures.    Surgical History:  has a past surgical history that includes Tubal ligation; Bunionectomy; Colonoscopy (N/A, 4/12/2017); and Esophagogastroduodenoscopy (N/A, 10/23/2018).      Review of patient's allergies indicates:  No Known Allergies    Current Outpatient Medications on File Prior to Visit   Medication Sig Dispense Refill    Ca-D3-mag#11-zinc-cupr-man-bor 600 mg calcium- 800 unit-50 mg Tab Take 1 tablet by mouth once daily.      cholecalciferol, vitamin D3, (VITAMIN D3) 1,000 unit capsule Take 1,000 Units by mouth once  "daily.      cyanocobalamin, vitamin B-12, (VITAMIN B-12) 5,000 mcg Subl Place under the tongue once daily.        fish oil-omega-3 fatty acids 300-1,000 mg capsule Take 1 capsule by mouth once daily.      lamoTRIgine (LAMICTAL) 100 MG tablet Take 100 mg by mouth once daily.      levothyroxine (SYNTHROID) 25 MCG tablet TAKE ONE TABLET BY MOUTH ONCE DAILY 90 tablet 3    QUEtiapine (SEROQUEL) 100 MG Tab Take 300 mg by mouth once daily.       temazepam (RESTORIL) 22.5 MG capsule Take 30 mg by mouth nightly as needed for Insomnia.      TURMERIC ORAL Take by mouth.      multivitamin (DAILY MULTI-VITAMIN) per tablet Take 1 tablet by mouth once daily.      [DISCONTINUED] ibuprofen (ADVIL,MOTRIN) 600 MG tablet Take 600 mg by mouth 3 (three) times daily.      [DISCONTINUED] omeprazole (PRILOSEC) 20 MG capsule Take 1 capsule (20 mg total) by mouth once daily. 90 capsule 3     No current facility-administered medications on file prior to visit.          Objective Findings:    Vital Signs:  /67   Pulse 91   Ht 5' 2" (1.575 m)   Wt 44.5 kg (98 lb 1.7 oz)   LMP 02/06/2019   BMI 17.94 kg/m²   Body mass index is 17.94 kg/m².    Physical Exam:  General Appearance: Well appearing in no acute distress  Head:   Normocephalic, without obvious abnormality  Eyes:    No scleral icterus  ENT: Neck supple, Lips, mucosa, and tongue normal  Lungs: CTA bilaterally in anterior and posterior fields, no wheezes, no crackles.  Heart:  Regular rate and rhythm, S1, S2 normal, no murmurs heard  Abdomen: Soft, non tender, non distended with positive bowel sounds in all four quadrants.   Extremities: no edema  Skin: No rash  Neurologic: AAO x 3      Labs:  Lab Results   Component Value Date    WBC 4.03 02/04/2019    HGB 13.1 02/04/2019    HCT 39.2 02/04/2019     02/04/2019    CHOL 139 05/24/2018    CHOL 139 05/24/2018    TRIG 44 05/24/2018    TRIG 44 05/24/2018    HDL 79 (H) 05/24/2018    HDL 79 (H) 05/24/2018    ALT 25 " 02/04/2019    AST 29 02/04/2019     02/04/2019    K 4.5 02/04/2019     02/04/2019    CREATININE 0.8 02/04/2019    BUN 12 02/04/2019    CO2 26 02/04/2019    TSH 2.611 02/04/2019     ttg- neg    Imaging:    Endoscopy:    4/2017 colonoscopy - normal    4/2017 EGD      - Monilial esophagitis.                        - LA Grade A reflux esophagitis.                        - Erythematous mucosa in the antrum.                        - Normal examined duodenum. Biopsied.   She was treated with diflucan x 2 weeks    Assessment:  1. Irritable bowel syndrome with constipation           Recommendations:  1. Linzess 72mcg  2. Soluble fiber supplement    No Follow-up on file.      Order summary:  Orders Placed This Encounter    linaclotide (LINZESS) 72 mcg Cap         Thank you so much for allowing me to participate in the care of Gay Salazar MD

## 2019-02-25 ENCOUNTER — CLINICAL SUPPORT (OUTPATIENT)
Dept: REHABILITATION | Facility: HOSPITAL | Age: 54
End: 2019-02-25
Payer: MEDICARE

## 2019-02-25 ENCOUNTER — TELEPHONE (OUTPATIENT)
Dept: GASTROENTEROLOGY | Facility: CLINIC | Age: 54
End: 2019-02-25

## 2019-02-25 DIAGNOSIS — G89.29 CHRONIC LEFT-SIDED LOW BACK PAIN WITHOUT SCIATICA: ICD-10-CM

## 2019-02-25 DIAGNOSIS — M54.50 CHRONIC LEFT-SIDED LOW BACK PAIN WITHOUT SCIATICA: ICD-10-CM

## 2019-02-25 DIAGNOSIS — M25.562 ACUTE PAIN OF LEFT KNEE: ICD-10-CM

## 2019-02-25 DIAGNOSIS — M25.552 LEFT HIP PAIN: ICD-10-CM

## 2019-02-25 PROCEDURE — 97110 THERAPEUTIC EXERCISES: CPT | Mod: PO

## 2019-02-25 NOTE — PROGRESS NOTES
Physical Therapy Daily Treatment Note    Name: Gay Vega Galileo  Clinic Number: [id number]  Date of Treatment: 02/25/2019   Diagnosis:   Encounter Diagnoses   Name Primary?    Chronic left-sided low back pain without sciatica     Left hip pain     Acute pain of left knee        Physician: Kristyn Ramos DO    Physician Orders: PT Eval and Treat: 1)S/p left patellar dislocation - quad strengthening and controlled increased ROM 2) Chronic Left hip pain from overuse injury to muscle attachments of the inferior pubic rami causing inferior groin pain. Compensatory IT band tightness and left QL firing also present. Pt. Needs pelvic neuromuscular and strengthening. HEP.  Medical Diagnosis from Referral: S83.005D (ICD-10-CM) - Dislocation of left patella, subsequent encounter M25.462 (ICD-10-CM) - Knee effusion, left M54.5 (ICD-10-CM) - Acute left-sided low back pain without sciatica M76.892 (ICD-10-CM) - Enthesopathy of left hip region M76.892 (ICD-10-CM) - Adductor tendinitis of left hip   Evaluation Date: 1/3/2019  Authorization Period Expiration: 12/27/2019  Plan of Care Expiration: 3/8/2019  Visit # / Visits authorized: 13/ 1     Time In: 0800 AM  Time Out: 0910 AM  Total Billable Time: 60 minutes    Precautions:  Standard    Subjective     Gay reported that she feels pretty good today and denies any pain currently.  Patient notes some soreness at the end of the day in her L knee.  Patient denied any significant increase in muscle soreness with new exercises last visit.    Pain Now: 0/10 Pain Worst:  3/10  Location: left back , buttocks  and upper legs   Previous Level of Function: independent with ADL's, but limiting activity to be protective of knee, returned to driving today  Current Level of Function:  Unable to run, discomfort with prolonged walking, I in all ADLS    Objective     Gay received moist hot pack to low back in supine pre tx for 10 minutes to promote blood flow and decreased  "stiffness.-NP    Gay received therapeutic exercises to develop strength, endurance, flexibility and core stabilization for 60 minutes including:     Upright bike L3 10' for warmup  Figure 4 piriformis stretch 3 x 30"-D/C  PPT 10 x 10"-D/C  PPT with adduction ball squeeze 10 x 10"-D/C  PPT with hip abduction magic Hopi 10 x 10"-D/C  Bridges with PPT 4 x 5-D/C  Clamshells GTB 3 x 10  Reverse clamshells 3 x 10 1#  Marching with PPT in table top 2 x 5-NP  ITB foam roller 2' L side only-NP  Leg Press 50# with ball squeeze 3 x 10  Lateral walks OTB 2 laps  Shuttle glut kickbacks 1 black 3 x 10 each side  Wall squats with ball 3 x 10  Split squats with yellow poles for support 3 x 10  Monster walks fwd/bwd 2 laps  Single leg squat on 18" box and airex (L leg only lifting and lowering)  Kneeling hip thrust with black band 3 x 10  S/L bridges 3 x 10      Gay received the following manual therapy techniques: Joint mobilizations and Soft tissue Mobilization were applied to the: low back/SI joint/piriformis for 12 minutes.-NP    Gay received dry needling to the lumbar spine region as well as (B) piriformis by Manjeet Champagne, PT.      Written Home Exercises Provided: no new exercises today     Patient educated normalcy of pain with increased activity in the knee.  Pt demo fair understanding of the education provided. Gay demonstrated good return demonstration of activities.     Assessment   Gay required intermittent verbal cueing for appropriate form and sequencing when performing single leg squats.  Improved tolerance and form for lunges demonstrated today.  Improved overall strength in gluts noted.    Gay is progressing well towards her goals.   Pt prognosis is Good.     Pt will continue to benefit from skilled PT intervention. Medical Necessity is demonstrated by:  Pain limits function of effected part for some activities, Unable to participate fully in daily activities, Requires skilled " supervision to complete and progress HEP and Weakness.    New/Revised goals:   Short Term Goals (4 Weeks):   1. Pt will report 20% reduction in pain of the lumbar spine and L hip for ease with ADL's-MET  2. PT will demonstrate improved upright posture with minimal cuing for ease with functional positioning in home and community.-MET  3. Pt will demonstrate improved lumbar spine ROM in all directions by 10% for ease with bending activities.-MET  4. Pt will be able to ambulate 200 ft with no AD and without the presence of antalgic gait pattern-MET        Long Term Goals (12 Weeks):   1. Pt will report being independent with HEP for maintenance of improvements gained during therapy sessions-MET  2. PT will report 50% reduction of pain of the back and LE for ease with sitting.-MET  3. Pt will demonstrate trunk and extremity strength to >=4+/5 without the provocation of pain for ease with return to exercise-Not MET but progress made  4. Pt will demonstrate appropriate upright posture without external cueing for ease with housework.-Not MET but progress made  5. Pt to demonstrate improved functional ability with FOTO limitation <=50% disability.-MET  Updated goals (4 weeks) 3/8/2019  1.  Patient will report 0/10 at worst.  2.  Pt to demonstrate improved functional ability with FOTO limitation <=25%.        Plan     Continue PT as per plan of care.    Cari Alvarado, PT

## 2019-02-25 NOTE — TELEPHONE ENCOUNTER
Ma contact pt no answer lvm to return call to be advised         ----- Message from Chandrika Ceja sent at 2/25/2019 11:06 AM CST -----  Contact: pt #[phone]  Needs Advice    Reason for call:Pt states that she;s been taking LINZESS and she's still constipated. She wants to know if dosage needs to be increase or what you recommend         Communication Preference:call    Additional Information:

## 2019-03-04 ENCOUNTER — TELEPHONE (OUTPATIENT)
Dept: GASTROENTEROLOGY | Facility: CLINIC | Age: 54
End: 2019-03-04

## 2019-03-04 DIAGNOSIS — K59.00 CONSTIPATION, UNSPECIFIED CONSTIPATION TYPE: Primary | ICD-10-CM

## 2019-03-04 NOTE — TELEPHONE ENCOUNTER
Ma spoke to pt,    Pt is reporting having trouble going, not having complete movements, and stool are still hard. Pt stated Linzess isn't really working she's still constipated. What does Dr. Salazar recommends?

## 2019-03-04 NOTE — TELEPHONE ENCOUNTER
Ma contact pt no answer lvm to return call     ----- Message from Nicole Bryant sent at 3/4/2019 10:55 AM CST -----  Contact: Self- [phone]  Martin- pt called to speak with staff- waiting on a return call from last week- still having constipation with hard form stool- please contact pt at [phone]

## 2019-03-04 NOTE — TELEPHONE ENCOUNTER
Ma spoke to pt informed pt per Dr. Salazar message below:    Let her know we started off at a low dose on the linzess so she can increase to 2 pills a day, and let's also get anabdominal x ray to make sure no blockages or backup     Pt verbalize understanding, x ray is scheduled on 3/6 pt confirmed appt and thank Ma

## 2019-03-06 ENCOUNTER — CLINICAL SUPPORT (OUTPATIENT)
Dept: REHABILITATION | Facility: HOSPITAL | Age: 54
End: 2019-03-06
Payer: MEDICARE

## 2019-03-06 ENCOUNTER — TELEPHONE (OUTPATIENT)
Dept: GASTROENTEROLOGY | Facility: CLINIC | Age: 54
End: 2019-03-06

## 2019-03-06 ENCOUNTER — HOSPITAL ENCOUNTER (OUTPATIENT)
Dept: RADIOLOGY | Facility: HOSPITAL | Age: 54
Discharge: HOME | End: 2019-03-06
Attending: INTERNAL MEDICINE
Payer: MEDICARE

## 2019-03-06 DIAGNOSIS — M54.50 CHRONIC LEFT-SIDED LOW BACK PAIN WITHOUT SCIATICA: ICD-10-CM

## 2019-03-06 DIAGNOSIS — K59.00 CONSTIPATION, UNSPECIFIED CONSTIPATION TYPE: ICD-10-CM

## 2019-03-06 DIAGNOSIS — G89.29 CHRONIC LEFT-SIDED LOW BACK PAIN WITHOUT SCIATICA: ICD-10-CM

## 2019-03-06 DIAGNOSIS — M25.562 ACUTE PAIN OF LEFT KNEE: ICD-10-CM

## 2019-03-06 DIAGNOSIS — M25.552 LEFT HIP PAIN: ICD-10-CM

## 2019-03-06 PROCEDURE — 97110 THERAPEUTIC EXERCISES: CPT | Mod: PO

## 2019-03-06 PROCEDURE — 74018 RADEX ABDOMEN 1 VIEW: CPT | Mod: TC

## 2019-03-06 PROCEDURE — 74018 XR ABDOMEN AP 1 VIEW: ICD-10-PCS | Mod: 26,,, | Performed by: RADIOLOGY

## 2019-03-06 PROCEDURE — 74018 RADEX ABDOMEN 1 VIEW: CPT | Mod: 26,,, | Performed by: RADIOLOGY

## 2019-03-06 NOTE — TELEPHONE ENCOUNTER
Ma spoke to pt informed pt that she's scheduled to have x ray on today across the street from the main campus     Pt verbalize understanding and thank Ma       ----- Message from Bindu Rodgers sent at 3/6/2019  8:11 AM CST -----  Contact: Pt:[phone]  .Needs Advice    Reason for call:Pt called and states she would like to know if she needs an x-ray or a CT Scan. The pt states she would like to know asap because her appointment is at 1:30pm today.        Communication Preference:Pt:[phone]    Additional Information:

## 2019-03-06 NOTE — PROGRESS NOTES
"Physical Therapy Daily Treatment Note    Name: Gay Vega Galileo  Clinic Number: [id number]  Date of Treatment: 03/06/2019   Diagnosis:   Encounter Diagnoses   Name Primary?    Chronic left-sided low back pain without sciatica     Left hip pain     Acute pain of left knee        Physician: Kristyn Ramos DO    Physician Orders: PT Eval and Treat: 1)S/p left patellar dislocation - quad strengthening and controlled increased ROM 2) Chronic Left hip pain from overuse injury to muscle attachments of the inferior pubic rami causing inferior groin pain. Compensatory IT band tightness and left QL firing also present. Pt. Needs pelvic neuromuscular and strengthening. HEP.  Medical Diagnosis from Referral: S83.005D (ICD-10-CM) - Dislocation of left patella, subsequent encounter M25.462 (ICD-10-CM) - Knee effusion, left M54.5 (ICD-10-CM) - Acute left-sided low back pain without sciatica M76.892 (ICD-10-CM) - Enthesopathy of left hip region M76.892 (ICD-10-CM) - Adductor tendinitis of left hip   Evaluation Date: 1/3/2019  Authorization Period Expiration: 12/27/2019  Plan of Care Expiration: 3/8/2019  Visit # / Visits authorized: 14/ 1     Time In: 1100 AM  Time Out: 1153 AM  Total Billable Time: 53 minutes    Precautions:  Standard    Subjective     Gay reported that she had a "really stressful day" some time last week and was driving for several hours.  Later that night that patient reports increased pain in her buttock and down her leg for a few hours but other than that has not had much difficulty or pain.  Pain Now: 0/10 Pain Worst:  3/10  Location: left back , buttocks  and upper legs   Previous Level of Function: independent with ADL's, but limiting activity to be protective of knee, returned to driving today  Current Level of Function:  Unable to run, discomfort with prolonged walking, I in all ADLS    Objective     Gay received moist hot pack to low back in supine pre tx for 10 minutes to promote " "blood flow and decreased stiffness.-NP    Gay received therapeutic exercises to develop strength, endurance, flexibility and core stabilization for 60 minutes including:     Upright bike L3 10' for warmup-NP  Elliptical 10'   Clamshells GTB 3 x 10  Reverse clamshells 3 x 10 2#  Marching with PPT in table top 2 x 5-D/C  ITB foam roller 2' L side only D/C  Leg Press 50# with ball squeeze 3 x 10  Lateral walks OTB 2 laps  Shuttle glut kickbacks 1 black 3 x 10 each side  Wall squats with ball 3 x 10  Split squats with yellow poles for support 3 x 10  Monster walks fwd/bwd 2 laps  Single leg squat on 18" box and airex (L leg only lifting and lowering)  Kneeling hip thrust with black band 3 x 10  S/L bridges 3 x 10      Gay received the following manual therapy techniques: Joint mobilizations and Soft tissue Mobilization were applied to the: low back/SI joint/piriformis for 12 minutes.-NP    Gay received dry needling to the lumbar spine region as well as (B) piriformis by Manjeet Champagne, ANA.-NP      Written Home Exercises Provided: no new exercises today     Patient educated normalcy of pain with increased activity in the knee.  Pt demo fair understanding of the education provided. Gay demonstrated good return demonstration of activities.     Assessment   Gay required verbal cueing for performance of most standing exercises.  Improved glut strength noted with S/L bridges today.   Gay is progressing well towards her goals.   Pt prognosis is Good.     Pt will continue to benefit from skilled PT intervention. Medical Necessity is demonstrated by:  Pain limits function of effected part for some activities, Unable to participate fully in daily activities, Requires skilled supervision to complete and progress HEP and Weakness.    New/Revised goals:   Short Term Goals (4 Weeks):   1. Pt will report 20% reduction in pain of the lumbar spine and L hip for ease with ADL's-MET  2. PT will demonstrate " improved upright posture with minimal cuing for ease with functional positioning in home and community.-MET  3. Pt will demonstrate improved lumbar spine ROM in all directions by 10% for ease with bending activities.-MET  4. Pt will be able to ambulate 200 ft with no AD and without the presence of antalgic gait pattern-MET        Long Term Goals (12 Weeks):   1. Pt will report being independent with HEP for maintenance of improvements gained during therapy sessions-MET  2. PT will report 50% reduction of pain of the back and LE for ease with sitting.-MET  3. Pt will demonstrate trunk and extremity strength to >=4+/5 without the provocation of pain for ease with return to exercise-Not MET but progress made  4. Pt will demonstrate appropriate upright posture without external cueing for ease with housework.-Not MET but progress made  5. Pt to demonstrate improved functional ability with FOTO limitation <=50% disability.-MET  Updated goals (4 weeks) 3/8/2019  1.  Patient will report 0/10 at worst.  2.  Pt to demonstrate improved functional ability with FOTO limitation <=25%.        Plan     Continue PT as per plan of care.    Cari Alvarado, PT

## 2019-03-07 ENCOUNTER — TELEPHONE (OUTPATIENT)
Dept: GASTROENTEROLOGY | Facility: CLINIC | Age: 54
End: 2019-03-07

## 2019-03-07 RX ORDER — SODIUM, POTASSIUM,MAG SULFATES 17.5-3.13G
SOLUTION, RECONSTITUTED, ORAL ORAL
Qty: 2 BOTTLE | Refills: 0 | Status: SHIPPED | OUTPATIENT
Start: 2019-03-07 | End: 2019-07-23

## 2019-03-07 NOTE — TELEPHONE ENCOUNTER
Spoke with patient  X ray shows full of stool  Discussed that I think this is part of IBS with constipation worse after recent infection from travel to [place] a few months ago  Will try Suprep bowel prep followed by Linzess  If not better can get anorectal mano and pelvic PT for outlet dysfunction workup

## 2019-03-08 ENCOUNTER — OFFICE VISIT (OUTPATIENT)
Dept: SPORTS MEDICINE | Facility: CLINIC | Age: 54
End: 2019-03-08
Payer: MEDICARE

## 2019-03-08 VITALS
DIASTOLIC BLOOD PRESSURE: 66 MMHG | WEIGHT: 98 LBS | BODY MASS INDEX: 18.03 KG/M2 | SYSTOLIC BLOOD PRESSURE: 100 MMHG | HEIGHT: 62 IN | HEART RATE: 102 BPM

## 2019-03-08 DIAGNOSIS — M76.892 ADDUCTOR TENDINITIS OF LEFT HIP: ICD-10-CM

## 2019-03-08 DIAGNOSIS — M99.05 SOMATIC DYSFUNCTION OF PELVIS REGION: ICD-10-CM

## 2019-03-08 DIAGNOSIS — M99.04 SOMATIC DYSFUNCTION OF SACRAL REGION: ICD-10-CM

## 2019-03-08 DIAGNOSIS — M79.10 MYALGIA: ICD-10-CM

## 2019-03-08 DIAGNOSIS — S83.005D DISLOCATION OF LEFT PATELLA, SUBSEQUENT ENCOUNTER: ICD-10-CM

## 2019-03-08 DIAGNOSIS — M53.3 SACRAL BACK PAIN: Primary | ICD-10-CM

## 2019-03-08 PROCEDURE — 98925 PR OSTEOPATHIC MANIP,1-2 BODY REGN: ICD-10-PCS | Mod: S$PBB,,, | Performed by: ORTHOPAEDIC SURGERY

## 2019-03-08 PROCEDURE — 98925 OSTEOPATH MANJ 1-2 REGIONS: CPT | Mod: PBBFAC,PO | Performed by: ORTHOPAEDIC SURGERY

## 2019-03-08 PROCEDURE — 99999 PR PBB SHADOW E&M-EST. PATIENT-LVL III: CPT | Mod: PBBFAC,,, | Performed by: ORTHOPAEDIC SURGERY

## 2019-03-08 PROCEDURE — 99213 OFFICE O/P EST LOW 20 MIN: CPT | Mod: PBBFAC,PO | Performed by: ORTHOPAEDIC SURGERY

## 2019-03-08 PROCEDURE — 99213 PR OFFICE/OUTPT VISIT, EST, LEVL III, 20-29 MIN: ICD-10-PCS | Mod: 25,S$PBB,, | Performed by: ORTHOPAEDIC SURGERY

## 2019-03-08 PROCEDURE — 98925 OSTEOPATH MANJ 1-2 REGIONS: CPT | Mod: S$PBB,,, | Performed by: ORTHOPAEDIC SURGERY

## 2019-03-08 PROCEDURE — 99213 OFFICE O/P EST LOW 20 MIN: CPT | Mod: 25,S$PBB,, | Performed by: ORTHOPAEDIC SURGERY

## 2019-03-08 PROCEDURE — 99999 PR PBB SHADOW E&M-EST. PATIENT-LVL III: ICD-10-PCS | Mod: PBBFAC,,, | Performed by: ORTHOPAEDIC SURGERY

## 2019-03-08 NOTE — PROGRESS NOTES
"CC: Dislocation of left patella, low back pain    Miss Gurrola is here today for follow up evaluation of her left patella and sacral pain. She reports she is walking 3-4 miles and states she has had no knee pain. Patient states if she is driving more than two hours she will still get shooting pains down her left leg, after getting out of the car to stretch she feels better and symptoms completely resolved. She is about to finish PT and states she has been doing dry needleing there and that has made her feel much better.  She continues to do her home exercises outside of therapy and admits to benefit from them. She is now having some sacral pain when sitting for too long. She reports it is more "bone" pain than muscular.     Recall from visit on 2/6/2019  53 y.o. Female presents today for follow up evaluation of her left patella pain.     Change since last visit: Patient states since starting physical therapy she has been getting great improvement in her symptoms. She has been doing some biking lately and is very pleased that this is not bothering her as wants did. She does report she is having some glute and low back pain which also seems to be getting better.  Attempted treatments: Patient is stretching and doing some exercising at home. She states she has not needed to wear her knee sleeve lately, which is a big improvement for her.  She went without her knee sleeve during a walk yesterday and did not have any pain or soreness during or afterwards.  At physical therapy, they are doing dry needling to her glute region and she is finding that this is helping her greatly.  She is currently going to physical therapy twice a week and would like to continue this as she is finding good improvement with therapy.  Pain score: Patient states she is no longer in any pain.   Affecting ADLs: Patient states she is able to do all of her ADLs and modifies her workouts.      REVIEW OF SYSTEMS:   Constitution: Patient denies fever " or chills.  Eyes: Patient denies eye pain or vision changes.  CVS: Patient denies chest pain.  Lungs: Patient denies shortness of breath or cough.  Skin: Patient denies skin rash or itching.    Musculoskeletal: Patient denies recent falls. See HPI.  Psych: Patient denies any current anxiety or nervousness.    PAST MEDICAL HISTORY:   Past Medical History:   Diagnosis Date    Abnormal coronary angiogram     Alcohol abuse, in remission     Anxiety     Bipolar affective     since teenage years    Depression     H. pylori infection     Headache(784.0)     followed by Dr. Corona    Hypothyroid     Memory loss     Osteopenia     Seizures        MEDICATIONS:     Current Outpatient Medications:     Ca-D3-mag#11-zinc-cupr-man-bor 600 mg calcium- 800 unit-50 mg Tab, Take 1 tablet by mouth once daily., Disp: , Rfl:     cholecalciferol, vitamin D3, (VITAMIN D3) 1,000 unit capsule, Take 1,000 Units by mouth once daily., Disp: , Rfl:     cyanocobalamin, vitamin B-12, (VITAMIN B-12) 5,000 mcg Subl, Place under the tongue once daily.  , Disp: , Rfl:     fish oil-omega-3 fatty acids 300-1,000 mg capsule, Take 1 capsule by mouth once daily., Disp: , Rfl:     lamoTRIgine (LAMICTAL) 100 MG tablet, Take 100 mg by mouth once daily., Disp: , Rfl:     levothyroxine (SYNTHROID) 25 MCG tablet, TAKE ONE TABLET BY MOUTH ONCE DAILY, Disp: 90 tablet, Rfl: 3    linaclotide (LINZESS) 72 mcg Cap, Take 1 capsule (72 mcg total) by mouth once daily., Disp: 90 capsule, Rfl: 3    multivitamin (DAILY MULTI-VITAMIN) per tablet, Take 1 tablet by mouth once daily., Disp: , Rfl:     QUEtiapine (SEROQUEL) 100 MG Tab, Take 300 mg by mouth once daily. , Disp: , Rfl:     sodium,potassium,mag sulfates (SUPREP BOWEL PREP KIT) 17.5-3.13-1.6 gram SolR, Take as directed in instructions, Disp: 2 Bottle, Rfl: 0    temazepam (RESTORIL) 22.5 MG capsule, Take 30 mg by mouth nightly as needed for Insomnia., Disp: , Rfl:     TURMERIC ORAL, Take by  "mouth., Disp: , Rfl:     ALLERGIES:   Review of patient's allergies indicates:  No Known Allergies     PHYSICAL EXAMINATION:  /66   Pulse 102   Ht 5' 2" (1.575 m)   Wt 44.5 kg (98 lb)   LMP 02/06/2019   BMI 17.92 kg/m²   Vitals signs and nursing note have been reviewed.  General: In no acute distress, well developed, well nourished, no diaphoresis  Eyes: EOM full and smooth, no eye redness or discharge  HENT: normocephalic and atraumatic, neck supple, trachea midline, no nasal discharge  Cardiovascular: no LE edema  Lungs: respirations non-labored, no conversational dyspnea   Neuro: AAOx3, CN2-12 grossly intact  Skin: No rashes, warm and dry  Psychiatric: cooperative, pleasant, mood and affect appropriate for age    MUSCULOSKELETAL EXAM:    LEFT KNEE EXAMINATION   Affected side is compared to contralateral knee     Observation:  No edema, erythema, ecchymosis, or effusion noted.  No muscle atrophy of the thighs and calves noted.  No obvious bony deformities noted.   No patella krupa or baja noted.  No genu valgus/varum noted. No recurvatum noted.    No tibial internal/external torsion.            ROM:   Active extension to 0° bilaterally without hyperextension, lag, crepitus, or patellar J sign.   Active flexion to 135° bilaterally.    Posture:  Upright  Gait: Non-antalgic with Neutral ankle mechanics    TART (Tissue texture abnormality, Asymmetry,  Restriction of motion and/or Tenderness) changes:    Pelvis:  · Innominate:Left anterior rotation  · Pubic bone:Neutral    Sacrum:Left on Left sacral torsion    Key   F= Flexed   E = Extended   R = Rotated   S = Sidebent   TTA = tissue texture abnormality     Neurovascular Examination:   Normal gait without antalgia.  Sensation intact to light touch in the obturator, lateral/intermediate/medial/posterior femoral cutaneous, saphenous, and common peroneal nerves bilaterally.  Pulses intact at the DP and PT arteries bilaterally.    Capillary refill intact <2 " seconds in all toes bilaterally.    ASSESSMENT:      ICD-10-CM ICD-9-CM   1. Sacral back pain M53.3 724.6   2. Dislocation of left patella, subsequent encounter S83.005D V54.89     836.3   3. Adductor tendinitis of left hip M76.892 727.09   4. Myalgia M79.10 729.1   5. Somatic dysfunction of pelvis region M99.05 739.5   6. Somatic dysfunction of sacral region M99.04 739.4       PLAN:  1-4.  Dislocation of left patella/adductor tendinitis of left hip/myalgia - improved; sacral back pain - new complaint    - Gay is doing much better with physical therapy.  She continues to improve with her home exercises and has been getting good benefit from dry needling done at physical therapy.  She is pleased with how she is doing and states she is going to continue to do her exercises at home.  She is walking for 3-4 miles at this time without pain, and is thinking about getting back to running.    - As for her sacral back pain, she states that she feels like she is sitting right on the bone.  I discussed a cushion donut pillow to use as she does not have much subcutaneous tissue to help pad the area. She is going to look into this.  In addition, she does have a sacral rotation that I discussed OMT is a treatment option for.  She consents to treatment.  See below for further details.    - She finishes PT on Monday of next week and feels like she is ready to do things on her own.      5-6.  Somatic dysfunction of pelvis, sacral regions -     - OMT 1-2 regions. Oral consent obtained. Reviewed benefits and potential side effects. OMT indicated today due to signs and symptoms as well as local and remote somatic dysfunction findings and their related neurokinetic, lymphatic, fascial and/or arteriovenous body connections. OMT techniques used: Myofascial Release, Muscle Energy and Articulatory. Treatment was tolerated well. Improvement noted in segmental mobility post-treatment in dysfunctional regions. There were no adverse  events and no complications immediately following treatment. Advised plenty of water to help alleviate soreness.      Future planning includes - follow-up as needed    All questions were answered to the best of my ability and all concerns were addressed at this time.    Follow up as needed, especially if symptoms or pain returns.

## 2019-03-11 ENCOUNTER — TELEPHONE (OUTPATIENT)
Dept: GASTROENTEROLOGY | Facility: CLINIC | Age: 54
End: 2019-03-11

## 2019-03-11 DIAGNOSIS — K59.00 CONSTIPATION, UNSPECIFIED CONSTIPATION TYPE: Primary | ICD-10-CM

## 2019-03-11 NOTE — TELEPHONE ENCOUNTER
Ma spoke to pt,     Pt is reporting food from the Wednesday was in her stool on Saturday and after prep first and second bowel was like diarrhea and three and forth bowel was watery light yellow color also.     Pt stated should she get another abd x ray?       ----- Message from Carmen Sanford sent at 3/11/2019 10:57 AM CDT -----  Contact: Pt   Type:  Needs Medical Advice    Who Called: Pt   Symptoms (please be specific): Prep for appt.   Would the patient rather a call back or a response via [facility]? Call back  Best Call Back Number: [phone].

## 2019-03-11 NOTE — TELEPHONE ENCOUNTER
Ma spoke to pt informed pt per Dr. Salazar message below:    Ok to check an x ray to make sure everything was flushed out with bowel prep     Pt verbalize understanding, confirmed an x ray appt on 3/12 and thank Ma

## 2019-03-12 ENCOUNTER — TELEPHONE (OUTPATIENT)
Dept: GASTROENTEROLOGY | Facility: CLINIC | Age: 54
End: 2019-03-12

## 2019-03-12 ENCOUNTER — HOSPITAL ENCOUNTER (OUTPATIENT)
Dept: RADIOLOGY | Facility: HOSPITAL | Age: 54
Discharge: HOME | End: 2019-03-12
Attending: INTERNAL MEDICINE
Payer: MEDICARE

## 2019-03-12 DIAGNOSIS — K59.02 CONSTIPATION DUE TO OUTLET DYSFUNCTION: Primary | ICD-10-CM

## 2019-03-12 DIAGNOSIS — K59.00 CONSTIPATION, UNSPECIFIED CONSTIPATION TYPE: ICD-10-CM

## 2019-03-12 PROCEDURE — 74018 XR ABDOMEN AP 1 VIEW: ICD-10-PCS | Mod: 26,,, | Performed by: RADIOLOGY

## 2019-03-12 PROCEDURE — 74018 RADEX ABDOMEN 1 VIEW: CPT | Mod: TC

## 2019-03-12 PROCEDURE — 74018 RADEX ABDOMEN 1 VIEW: CPT | Mod: 26,,, | Performed by: RADIOLOGY

## 2019-03-12 NOTE — TELEPHONE ENCOUNTER
Still constipated after bowel prep  X ray still shows stool  Will try suppository and enema and will get a defecogram

## 2019-03-13 ENCOUNTER — TELEPHONE (OUTPATIENT)
Dept: GASTROENTEROLOGY | Facility: CLINIC | Age: 54
End: 2019-03-13

## 2019-03-13 NOTE — TELEPHONE ENCOUNTER
Spoke with patient.     Defcogram scheduled and instructions reviewed with patient. She verbalized understanding.

## 2019-03-18 ENCOUNTER — TELEPHONE (OUTPATIENT)
Dept: RADIOLOGY | Facility: HOSPITAL | Age: 54
End: 2019-03-18

## 2019-03-18 ENCOUNTER — CLINICAL SUPPORT (OUTPATIENT)
Dept: REHABILITATION | Facility: HOSPITAL | Age: 54
End: 2019-03-18
Payer: MEDICARE

## 2019-03-18 DIAGNOSIS — G89.29 CHRONIC LEFT-SIDED LOW BACK PAIN WITHOUT SCIATICA: ICD-10-CM

## 2019-03-18 DIAGNOSIS — M25.552 LEFT HIP PAIN: ICD-10-CM

## 2019-03-18 DIAGNOSIS — M25.562 ACUTE PAIN OF LEFT KNEE: ICD-10-CM

## 2019-03-18 DIAGNOSIS — M54.50 CHRONIC LEFT-SIDED LOW BACK PAIN WITHOUT SCIATICA: ICD-10-CM

## 2019-03-18 PROCEDURE — G8980 MOBILITY D/C STATUS: HCPCS | Mod: CI,PO

## 2019-03-18 PROCEDURE — G8979 MOBILITY GOAL STATUS: HCPCS | Mod: CK,PO

## 2019-03-18 PROCEDURE — 97110 THERAPEUTIC EXERCISES: CPT | Mod: PO

## 2019-03-18 NOTE — PROGRESS NOTES
Physical Therapy Daily Treatment Note    Name: Gay Vega [facility]  Clinic Number: [id number]  Date of Treatment: 03/18/2019   Diagnosis:   Encounter Diagnoses   Name Primary?    Chronic left-sided low back pain without sciatica     Left hip pain     Acute pain of left knee        Physician: Kristyn Ramos DO    Physician Orders: PT Eval and Treat: 1)S/p left patellar dislocation - quad strengthening and controlled increased ROM 2) Chronic Left hip pain from overuse injury to muscle attachments of the inferior pubic rami causing inferior groin pain. Compensatory IT band tightness and left QL firing also present. Pt. Needs pelvic neuromuscular and strengthening. HEP.  Medical Diagnosis from Referral: S83.005D (ICD-10-CM) - Dislocation of left patella, subsequent encounter M25.462 (ICD-10-CM) - Knee effusion, left M54.5 (ICD-10-CM) - Acute left-sided low back pain without sciatica M76.892 (ICD-10-CM) - Enthesopathy of left hip region M76.892 (ICD-10-CM) - Adductor tendinitis of left hip   Evaluation Date: 1/3/2019  Authorization Period Expiration: 12/27/2019  Plan of Care Expiration: 3/8/2019  Visit # / Visits authorized: 14/ 1     Time In: 0702 AM  Time Out: 0722 AM  Total Billable Time: 22 minutes    Precautions:  Standard    Subjective     Gay reported that she was unable to come to her last appointment secondary to GI issues.  Pain Now: 0/10 Pain Worst:  1/10  Location: left back , buttocks  and upper legs   Previous Level of Function:  Unable to run, discomfort with prolonged walking, I in all ADLS  Current Level of Function:  Able to run for 30 minutes, no pain with prolonged walking, I in ADLs    Objective     Observation: Unremarkable        Range of Motion:   Knee Left active Left Passive Right Active R passive   Flexion WFL   WFL     Extension 0   0        Lumbar:              Flexion: WFL              Extension: WFL              Left sidebending: WFL                       Right sidebending:  WFL              Left rotation: WFL              Right rotation:WFL        Lower Extremity Strength  Right LE   Left LE     Ankle dorsiflexion: 5/5 Ankle dorsiflexion: 5/5   Ankle plantarflexion: 5/5 Ankle plantarflexion: 5/5   Knee extension: 5/5 Knee extension: 5/5   Knee flexion: 4+/5 Knee flexion: 4+/5   Hip flexion: 4+/5 Hip flexion: 4+/5   Hip external rotation 5/5 Hip external rotation 5/5   Hip internal rotation 4/5 Hip internal rotation 4/5   Hip abduction:  4+/5 Hip abduction: 4+/5   Hip adduction: 5/5 Hip adduction: 5/5   Hip extension: 4+/5 Hip extension 4+/5            Palpation:               Hypertonicity noted in bilateral lumbar and thoracic paraspinals (-) TTP     Sensation: grossly intact to light touch     Flexibility:               WFL                   Edema: none noted        CMS Impairment/Limitation/Restriction for FOTO Knee Survey     Therapist reviewed FOTO scores for Gay Gurrola on 1/3/2019.   FOTO documents entered into EPIC - see Media section.     Limitation Score: 70%  Category: Mobility     Current : CL = least 60% but < 80% impaired, limited or restricted  Goal: CK = at least 40% but < 60% impaired, limited or restricted  Discharge: n/a        CMS Impairment/Limitation/Restriction for FOTO Knee Survey     Therapist reviewed FOTO scores for Gay Gurrola on 2/8/2019.   FOTO documents entered into EPIC - see Media section.     Limitation Score: 44%  Category: Mobility     Current : CK = least 40% but < 60% impaired, limited or restricted  Goal: CK = at least 40% but < 60% impaired, limited or restricted  Discharge: n/a          CMS Impairment/Limitation/Restriction for FOTO Knee Survey     Therapist reviewed FOTO scores for Gay Gurrola on 3/18/2019.   FOTO documents entered into EPIC - see Media section.     Limitation Score: 17%  Category: Mobility     Goal: CK = at least 40% but < 60% impaired, limited or restricted  Discharge: CI= atleast 1%  "but <20% impaired, limited, restricted          Gay received moist hot pack to low back in supine pre tx for 10 minutes to promote blood flow and decreased stiffness.-NP    Gay received therapeutic exercises to develop strength, endurance, flexibility and core stabilization for 20 minutes including:     No exercises performed:  Upright bike L3 5' for warmup  Figure 4 piriformis stretch 3 x 30"-NP  PPT 10 x 10"-NP  PPT with adduction ball squeeze 10 x 10"-NP  PPT with hip abduction magic [place] 10 x 10"-NP  Bridges with PPT 4 x 5-NP  Clamshells OTB 3 x 10  Reverse clamshells 3 x 10 2#  Marching with PPT in table top 2 x 5-NP  ITB foam roller 2' L side only  Leg Press 30# with ball squeeze 3 x 10  Lateral walks OTB 2 laps (significant verbal and visual cueing required)  Shuttle glut kickbacks 1 black 3 x 10 each side  Wall squats with ball 2 x 10    **objective measures taken for purpose of updated discharge    Gay received the following manual therapy techniques: Joint mobilizations and Soft tissue Mobilization were applied to the: low back/SI joint/piriformis for 12 minutes.-NP    Gay received dry needling to the lumbar spine region as well as (B) piriformis by Manjeet Champagne, PT.-NP today      Written Home Exercises Provided: no new exercises today     Patient educated on being mindful of body positions with returning to yoga in terms of movements to avoid and movements to modify.   Pt demo fair understanding of the education provided. Gay demonstrated good return demonstration of activities.     Assessment   Gay has made significant improvements since last progress note.  Patient notes very minimal pain that occurs intermittently and does not note any difficulty or restrictions with ADLs.  Patient also demonstrates improved strength of the LEs and not longer has to wear a brace on the knee.  Patient will be discharged at this time secondary to attainment of almost all goals and good " understanding of HEP.    Pt prognosis is Good.       New/Revised goals:   Short Term Goals (4 Weeks):   1. Pt will report 20% reduction in pain of the lumbar spine and L hip for ease with ADL's-MET  2. PT will demonstrate improved upright posture with minimal cuing for ease with functional positioning in home and community.-MET  3. Pt will demonstrate improved lumbar spine ROM in all directions by 10% for ease with bending activities.-MET  4. Pt will be able to ambulate 200 ft with no AD and without the presence of antalgic gait pattern-MET        Long Term Goals (12 Weeks):   1. Pt will report being independent with HEP for maintenance of improvements gained during therapy sessions-MET  2. PT will report 50% reduction of pain of the back and LE for ease with sitting.-MET  3. Pt will demonstrate trunk and extremity strength to >=4+/5 without the provocation of pain for ease with return to exercise-MET  4. Pt will demonstrate appropriate upright posture without external cueing for ease with housework.-MET  5. Pt to demonstrate improved functional ability with FOTO limitation <=50% disability.-MET  Updated goals (4 weeks) 3/8/2019  1.  Patient will report 0/10 at worst.-Not MET but progress made  2.  Pt to demonstrate improved functional ability with FOTO limitation <=25%.-MET          Plan       Patient will be discharged at this time.      Cari Alvarado, PT

## 2019-03-19 ENCOUNTER — HOSPITAL ENCOUNTER (OUTPATIENT)
Dept: RADIOLOGY | Facility: HOSPITAL | Age: 54
Discharge: HOME | End: 2019-03-19
Attending: INTERNAL MEDICINE
Payer: MEDICARE

## 2019-03-19 DIAGNOSIS — K59.02 CONSTIPATION DUE TO OUTLET DYSFUNCTION: ICD-10-CM

## 2019-03-19 PROCEDURE — 74270 X-RAY XM COLON 1CNTRST STD: CPT | Mod: 26,,, | Performed by: RADIOLOGY

## 2019-03-19 PROCEDURE — 74270 X-RAY XM COLON 1CNTRST STD: CPT | Mod: TC

## 2019-03-19 PROCEDURE — 74270 FL DEFECOGRAM: ICD-10-PCS | Mod: 26,,, | Performed by: RADIOLOGY

## 2019-03-20 ENCOUNTER — TELEPHONE (OUTPATIENT)
Dept: GASTROENTEROLOGY | Facility: CLINIC | Age: 54
End: 2019-03-20

## 2019-03-20 NOTE — TELEPHONE ENCOUNTER
Ma spoke to pt informed pt per Dr. Salazar message below:    Please let her know that her defecography x ray did come back normal and it looks like she is able to squeeze her rectal muscles normally. If she is still having issues with not moving her bowel after doing the enema we would have to think about repeating the colonoscopy     Pt verbalize understanding and thanks Ma

## 2019-03-21 ENCOUNTER — TELEPHONE (OUTPATIENT)
Dept: GASTROENTEROLOGY | Facility: CLINIC | Age: 54
End: 2019-03-21

## 2019-03-21 NOTE — TELEPHONE ENCOUNTER
Spoke to pt.  Will continue miralax and up linzess Rx  She is asymptomatic but has not had BM in nearly a week despite bowel preps, enemas  Normal defecography

## 2019-04-04 ENCOUNTER — TELEPHONE (OUTPATIENT)
Dept: GASTROENTEROLOGY | Facility: CLINIC | Age: 54
End: 2019-04-04

## 2019-04-04 NOTE — TELEPHONE ENCOUNTER
Ma contact pt to schedule appt. appt scheduled letter will be mailed to pt home. Ma left detail message to return call if pt has any further questions

## 2019-04-04 NOTE — TELEPHONE ENCOUNTER
Ma spoke to pt,    Pt reporting she is doing well now and want to thank Dr. Salazar very much!    Pt would like to know when will Dr. Salazar like to see her for a follow up ?      Dr. Salazar -  Can offer here July appt with you

## 2019-04-05 ENCOUNTER — TELEPHONE (OUTPATIENT)
Dept: GASTROENTEROLOGY | Facility: CLINIC | Age: 54
End: 2019-04-05

## 2019-04-05 NOTE — TELEPHONE ENCOUNTER
Ma spoke to pt informed pt of appt scheduled with Dr. Salazar on 7/23 @ 11:30 am     Pt verbalize understanding, confirmed appt and thank Ma

## 2019-04-23 NOTE — PROGRESS NOTES
"Physical Therapy Daily Treatment Note    Name: Gay Vega [facility]  Clinic Number: [id number]  Date of Treatment: 02/04/2019   Diagnosis:   Encounter Diagnoses   Name Primary?    Chronic left-sided low back pain without sciatica     Left hip pain     Acute pain of left knee        Physician: Kristyn Ramos DO    Physician Orders: PT Eval and Treat: 1)S/p left patellar dislocation - quad strengthening and controlled increased ROM 2) Chronic Left hip pain from overuse injury to muscle attachments of the inferior pubic rami causing inferior groin pain. Compensatory IT band tightness and left QL firing also present. Pt. Needs pelvic neuromuscular and strengthening. HEP.  Medical Diagnosis from Referral: S83.005D (ICD-10-CM) - Dislocation of left patella, subsequent encounter M25.462 (ICD-10-CM) - Knee effusion, left M54.5 (ICD-10-CM) - Acute left-sided low back pain without sciatica M76.892 (ICD-10-CM) - Enthesopathy of left hip region M76.892 (ICD-10-CM) - Adductor tendinitis of left hip   Evaluation Date: 1/3/2019  Authorization Period Expiration: 12/27/2019  Plan of Care Expiration: 3/29/201  Visit # / Visits authorized: 9/ 1     Time In: 0700 AM  Time Out: 0752 AM  Total Billable Time: 24 minutes    Precautions:  Standard    Subjective     Gay reported that she is feeling much better and denies any pain when asked this morning.  Pain: 0/10    Location: left back , buttocks  and upper legs     Objective     Gay received moist hot pack to low back in supine pre tx for 10 minutes to promote blood flow and decreased stiffness.-NP    Gay received therapeutic exercises to develop strength, endurance, flexibility and core stabilization for 42 minutes including:     Upright bike L3 10' for warmup  Self release with piriformis using lacrosse ball 2' each side-D/C  Figure 4 piriformis stretch 3 x 30"  PPT 10 x 10"  PPT with adduction ball squeeze 10 x 10"  PPT with hip abduction magic [place] 10 x " "10"  Bridges with PPT 4 x 5  Clamshells OTB 3 x 10  Reverse clamshells 3 x 10  Marching with PPT in table top 2 x 5  Hamstring stretch with strap 3 x 30" each side-D/C  ITB foam roller 2' L side only  Leg Press 30# with ball squeeze 3 x 10  Lateral walks OTB 2 laps (significant verbal and visual cueing required)    Gay received the following manual therapy techniques: Joint mobilizations and Soft tissue Mobilization were applied to the: low back/SI joint/piriformis for 12 minutes.     Gay received dry needling to the lumbar spine region as well as (B) piriformis by Manjeet Champagne, PT.      Written Home Exercises Provided: no new exercises today    Pt educated on importance of performing HEP at home now and after d/c from PT to help maintain progress made in PT.  Patient also educated on importance of not pushing too far with other exercises when feeling better.  Pt demo fair understanding of the education provided. Gay demonstrated good return demonstration of activities.     Assessment   Gay again needed verbal cueing for exercises that were previously established and also part of patient's HEP.  Gay continues to have difficulty with lateral walks and has difficulty with maintaining toes facing forward with that exercise.  Minimal soft tissue restriction noted in L piriformis but much improved since evaluation.  Patient continues to demonstrate increased hypertonicity of the lumbar paraspinals even in a relaxed position.    Gay is progressing well towards her goals.   Pt prognosis is Good.     Pt will continue to benefit from skilled PT intervention. Medical Necessity is demonstrated by:  Pain limits function of effected part for some activities, Unable to participate fully in daily activities, Requires skilled supervision to complete and progress HEP and Weakness.    New/Revised goals:  No new goals established at this time.      Plan       Continue with established Plan of Care towards " PT goals.     Cari Alvarado, PT    Statement Selected

## 2019-06-14 DIAGNOSIS — Z12.39 BREAST CANCER SCREENING: ICD-10-CM

## 2019-07-22 NOTE — PROGRESS NOTES
[facility] Consultation Note    Reason for Consult:  The encounter diagnosis was Constipation, unspecified constipation type.    PCP:   Jaja Mckeon       Referring MD:  No referring provider defined for this encounter.    HPI:  This is a 54 y.o. female here for evaluation of abdominal pain.  She contacted the office 8/6/18 with complaints of stomach pain, swelling and burning   She did see her PCP and labs and ABD US were ordered.  Non-fasting lipase was mildly elevated - 65  ABD US - normal, no gallstones  She has a Hx of H. Pylori    Interval Hx 07/23/2019:  Doing a fleets enema + dulcolax every 2 weeks in addition to the linzess and dark coffee and magnesium oxide and this regimen seems to keep her on track with her bowels  Doing [employer] probiotic and feels better on it.  She is active, running. Getting fiber in ehr diet through tunde seeds, smoothies, etc      ROS:  Constitutional: No fevers, chills, No weight loss  ENT: No allergies  CV: No chest pain  Pulm: No cough, No shortness of breath  Ophtho: No vision changes  GI: see HPI  Derm: No rash  Heme: No lymphadenopathy, No bruising  MSK: No arthritis  : No dysuria, No hematuria  Endo: No hot or cold intolerance  Neuro: No syncope, No seizure  Psych: No anxiety, No depression    Medical History:  has a past medical history of Abnormal coronary angiogram, Alcohol abuse, in remission, Anxiety, Bipolar affective, Chronic idiopathic constipation, Depression, H. pylori infection, Headache(784.0), Hypothyroid, Memory loss, Osteopenia, and Seizures.    Surgical History:  has a past surgical history that includes Tubal ligation; Bunionectomy; Colonoscopy (N/A, 4/12/2017); and Esophagogastroduodenoscopy (N/A, 10/23/2018).      Review of patient's allergies indicates:  No Known Allergies    Current Outpatient Medications on File Prior to Visit   Medication Sig Dispense Refill    Ca-D3-mag#11-zinc-cupr-man-bor 600 mg calcium- 800  "unit-50 mg Tab Take 1 tablet by mouth once daily.      cholecalciferol, vitamin D3, (VITAMIN D3) 1,000 unit capsule Take 1,000 Units by mouth once daily.      cyanocobalamin, vitamin B-12, (VITAMIN B-12) 5,000 mcg Subl Place under the tongue once daily.        fish oil-omega-3 fatty acids 300-1,000 mg capsule Take 1 capsule by mouth once daily.      lamoTRIgine (LAMICTAL) 100 MG tablet Take 100 mg by mouth once daily.      levothyroxine (SYNTHROID) 25 MCG tablet TAKE ONE TABLET BY MOUTH ONCE DAILY 90 tablet 3    multivitamin (DAILY MULTI-VITAMIN) per tablet Take 1 tablet by mouth once daily.      QUEtiapine (SEROQUEL) 100 MG Tab Take 300 mg by mouth once daily.       temazepam (RESTORIL) 22.5 MG capsule Take 30 mg by mouth nightly as needed for Insomnia.      TURMERIC ORAL Take by mouth.      [DISCONTINUED] linaclotide (LINZESS) 145 mcg Cap capsule Take 1 capsule (145 mcg total) by mouth once daily. 90 capsule 3    [DISCONTINUED] sodium,potassium,mag sulfates (SUPREP BOWEL PREP KIT) 17.5-3.13-1.6 gram SolR Take as directed in instructions 2 Bottle 0     No current facility-administered medications on file prior to visit.          Objective Findings:    Vital Signs:  /60   Pulse 79   Ht 5' 2" (1.575 m)   Wt 46.2 kg (101 lb 13.6 oz)   LMP 07/05/2019   BMI 18.63 kg/m²   Body mass index is 18.63 kg/m².    Physical Exam:  General Appearance: Well appearing in no acute distress  Head:   Normocephalic, without obvious abnormality  Eyes:    No scleral icterus  ENT: Neck supple, Lips, mucosa, and tongue normal  Lungs: CTA bilaterally in anterior and posterior fields, no wheezes, no crackles.  Heart:  Regular rate and rhythm, S1, S2 normal, no murmurs heard  Abdomen: Soft, non tender, non distended with positive bowel sounds in all four quadrants.   Extremities: no edema  Skin: No rash  Neurologic: AAO x 3      Labs:  Lab Results   Component Value Date    WBC 4.03 02/04/2019    HGB 13.1 02/04/2019    HCT " 39.2 02/04/2019     02/04/2019    CHOL 139 05/24/2018    CHOL 139 05/24/2018    TRIG 44 05/24/2018    TRIG 44 05/24/2018    HDL 79 (H) 05/24/2018    HDL 79 (H) 05/24/2018    ALT 25 02/04/2019    AST 29 02/04/2019     02/04/2019    K 4.5 02/04/2019     02/04/2019    CREATININE 0.8 02/04/2019    BUN 12 02/04/2019    CO2 26 02/04/2019    TSH 2.611 02/04/2019     ttg- neg    Imaging:  3/19 Defecogram - Normal neutral, straining, and lifting anorectal angles as above.  Normal defecation with no significant residual.  Small anterior rectocele.    Endoscopy:    4/2017 colonoscopy - normal    4/2017 EGD              - Monilial esophagitis.                        - LA Grade A reflux esophagitis.                        - Erythematous mucosa in the antrum.                        - Normal examined duodenum. Biopsied.   She was treated with diflucan x 2 weeks    Assessment:  1. 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 Linzess 290 mcg increase to see if we can wean off enemas  2. Soluble fiber she is doing through the diet.    Follow up in about 6 months (around 1/23/2020).      Order summary:  Orders Placed This Encounter    linaCLOtide (LINZESS) 290 mcg Cap capsule         Thank you so much for allowing me to participate in the care of Gay Salazar MD

## 2019-07-23 ENCOUNTER — OFFICE VISIT (OUTPATIENT)
Dept: GASTROENTEROLOGY | Facility: CLINIC | Age: 54
End: 2019-07-23
Payer: MEDICARE

## 2019-07-23 VITALS
SYSTOLIC BLOOD PRESSURE: 102 MMHG | DIASTOLIC BLOOD PRESSURE: 60 MMHG | HEIGHT: 62 IN | HEART RATE: 79 BPM | WEIGHT: 101.88 LBS | BODY MASS INDEX: 18.75 KG/M2

## 2019-07-23 DIAGNOSIS — K59.00 CONSTIPATION, UNSPECIFIED CONSTIPATION TYPE: Primary | ICD-10-CM

## 2019-07-23 PROCEDURE — 99214 PR OFFICE/OUTPT VISIT, EST, LEVL IV, 30-39 MIN: ICD-10-PCS | Mod: S$PBB,,, | Performed by: INTERNAL MEDICINE

## 2019-07-23 PROCEDURE — 99213 OFFICE O/P EST LOW 20 MIN: CPT | Mod: PBBFAC | Performed by: INTERNAL MEDICINE

## 2019-07-23 PROCEDURE — 99999 PR PBB SHADOW E&M-EST. PATIENT-LVL III: CPT | Mod: PBBFAC,,, | Performed by: INTERNAL MEDICINE

## 2019-07-23 PROCEDURE — 99214 OFFICE O/P EST MOD 30 MIN: CPT | Mod: S$PBB,,, | Performed by: INTERNAL MEDICINE

## 2019-07-23 PROCEDURE — 99999 PR PBB SHADOW E&M-EST. PATIENT-LVL III: ICD-10-PCS | Mod: PBBFAC,,, | Performed by: INTERNAL MEDICINE

## 2019-11-06 ENCOUNTER — TELEPHONE (OUTPATIENT)
Dept: INTERNAL MEDICINE | Facility: CLINIC | Age: 54
End: 2019-11-06

## 2019-11-06 NOTE — TELEPHONE ENCOUNTER
----- Message from Pam Long sent at 11/6/2019 10:36 AM CST -----  Contact: MEHDI ARVIZU   Name of Who is Calling: MEHDI ARVIZU       What is the request in detail: Patient is requesting a call back concerning having issues with a cough issue she states it is worse at night and she needs to know what should she do       Can the clinic reply by [facility]: no      What Number to Call Back if not in [place]: [phone]

## 2019-11-07 ENCOUNTER — OFFICE VISIT (OUTPATIENT)
Dept: INTERNAL MEDICINE | Facility: CLINIC | Age: 54
End: 2019-11-07
Attending: FAMILY MEDICINE
Payer: COMMERCIAL

## 2019-11-07 ENCOUNTER — TELEPHONE (OUTPATIENT)
Dept: INTERNAL MEDICINE | Facility: CLINIC | Age: 54
End: 2019-11-07

## 2019-11-07 VITALS
HEIGHT: 62 IN | HEART RATE: 70 BPM | DIASTOLIC BLOOD PRESSURE: 70 MMHG | OXYGEN SATURATION: 99 % | WEIGHT: 100.06 LBS | SYSTOLIC BLOOD PRESSURE: 118 MMHG | BODY MASS INDEX: 18.41 KG/M2

## 2019-11-07 DIAGNOSIS — J06.9 VIRAL UPPER RESPIRATORY TRACT INFECTION: Primary | ICD-10-CM

## 2019-11-07 DIAGNOSIS — E03.9 HYPOTHYROIDISM, UNSPECIFIED TYPE: ICD-10-CM

## 2019-11-07 PROCEDURE — 99214 OFFICE O/P EST MOD 30 MIN: CPT | Mod: S$GLB,,, | Performed by: FAMILY MEDICINE

## 2019-11-07 PROCEDURE — 99999 PR PBB SHADOW E&M-EST. PATIENT-LVL III: ICD-10-PCS | Mod: PBBFAC,,, | Performed by: FAMILY MEDICINE

## 2019-11-07 PROCEDURE — 99213 OFFICE O/P EST LOW 20 MIN: CPT | Mod: PBBFAC | Performed by: FAMILY MEDICINE

## 2019-11-07 PROCEDURE — 99214 PR OFFICE/OUTPT VISIT, EST, LEVL IV, 30-39 MIN: ICD-10-PCS | Mod: S$GLB,,, | Performed by: FAMILY MEDICINE

## 2019-11-07 PROCEDURE — 99999 PR PBB SHADOW E&M-EST. PATIENT-LVL III: CPT | Mod: PBBFAC,,, | Performed by: FAMILY MEDICINE

## 2019-11-07 RX ORDER — HYDROXYZINE PAMOATE 25 MG/1
25 CAPSULE ORAL EVERY 6 HOURS PRN
Qty: 25 CAPSULE | Refills: 0 | Status: SHIPPED | OUTPATIENT
Start: 2019-11-07 | End: 2020-02-14

## 2019-11-07 RX ORDER — BENZONATATE 200 MG/1
200 CAPSULE ORAL 2 TIMES DAILY PRN
Qty: 20 CAPSULE | Refills: 0 | Status: SHIPPED | OUTPATIENT
Start: 2019-11-07 | End: 2019-11-14

## 2019-11-07 NOTE — PROGRESS NOTES
"CHIEF COMPLAINT: Several days of sinus congestion and pressure     HISTORY OF PRESENT ILLNESS: The patient presents with 10 days of sinus congestion and pressure.  The patient is having postnasal drip, cough, and nausea, without vomiting.  The patient denies fevers, chills, diarrhea.  The patient has no sick contact.    She has a long history of hypothyroidism.    She has bipolar type 1    The patient is otherwise healthy.    REVIEW OF SYSTEMS:  GENERAL: No fever, chills, fatigability or weight loss.  SKIN: No rashes, itching or changes in color or texture of skin.  HEAD: No recent head trauma.  EYES: Visual acuity fine. No photophobia, ocular pain or diplopia.  EARS: Denies ear pain, discharge or vertigo.  NOSE: No loss of smell, no epistaxis.  The patient does have some postnasal drip.  MOUTH & THROAT: No hoarseness or change in voice. No excessive gum bleeding.  NODES: Denies swollen glands.  CHEST: Denies DOMINGUEZ, cyanosis, wheezing, and sputum production.  CARDIOVASCULAR: Denies chest pain, PND, orthopnea or reduced exercise tolerance.  ABDOMEN: Appetite fine. No weight loss. Denies diarrhea, abdominal pain, hematemesis or blood in stool.  URINARY: No flank pain, dysuria or hematuria.  PERIPHERAL VASCULAR: No claudication or cyanosis.  MUSCULOSKELETAL: No joint stiffness or swelling. Denies back pain.  NEUROLOGIC: No history of seizures, paralysis, alteration of gait or coordination.    SOCIAL HISTORY: Unchanged since recent PCP note    PHYSICAL EXAMINATION:   Blood pressure 118/70, pulse 70, height 5' 2" (1.575 m), weight 45.4 kg (100 lb 1.4 oz), SpO2 99 %.    APPEARANCE: Well nourished, well developed, in no acute distress.    HEAD: Normocephalic, atraumatic.  EYES: PERRL. EOMI.  Conjunctivae without injection and  anicteric  EARS: TM's intact. Light reflex normal. No retraction or perforation.    NOSE: Mucosa pink. Airway clear.  MOUTH & THROAT: No tonsillar enlargement. No pharyngeal erythema or exudate. No " stridor.  NECK: Supple.   NODES: No cervical, axillary or inguinal lymph node enlargement.  CHEST: Lungs clear to auscultation.  No retractions are noted.  No rales or rhonchi are present.  CARDIOVASCULAR: Normal S1, S2. No rubs, murmurs or gallops.  ABDOMEN: Bowel sounds normal. Not distended. Soft. No tenderness or masses.  No ascites is noted.  MUSCULOSKELETAL:  There is no clubbing, cyanosis, or edema of the extremities x4.  There is full range of motion of the lumbar spine.  There is full range of motion of the extremities x4.  There is no deformity noted.    NEUROLOGIC:       Normal speech development.      Hearing normal.      Normal gait.      Motor and sensory exams grossly normal  PSYCHIATRIC: Patient is alert and oriented x3.  Thought processes are all normal.  There is no homicidality.  There is no suicidality.  There is no evidence of psychosis.    LABORATORY/RADIOLOGY: Chart reviewed.    ASSESSMENT:   Acute sinusitis  Hypothyroidism  Bipolar disorder    PLAN:  Hydroxyzine and Tessalon Perles  Continue other medications    Patient to call with failure to improve

## 2019-11-07 NOTE — TELEPHONE ENCOUNTER
----- Message from Carriejayrojulián Douglas sent at 11/7/2019  3:29 PM CST -----  Contact: pt   Name of Who is Calling: MEHDI ARVIZU [[id number]]    What is the request in detail: MEHDI ARVIZU [[id number]] is requesting a call back regards to medication ........................Please contact to further discuss and advise      Can the clinic reply by [facility]: No      What Number to Call Back [facility] not [facility]:  [phone] (home)

## 2019-11-07 NOTE — TELEPHONE ENCOUNTER
Pt states that she went to pharmacy to  prescriptions and vistaril not covered by ins. Pt would like MD recommendations on medication that could help with sinus and congestion.    MD recommends 1-2 Benadryl 25 mg tabs 2-3 times daily. Informed pt that medication may cause drowsiness. Pt verbalized understanding.

## 2019-11-15 ENCOUNTER — PATIENT OUTREACH (OUTPATIENT)
Dept: ADMINISTRATIVE | Facility: OTHER | Age: 54
End: 2019-11-15

## 2019-11-19 ENCOUNTER — OFFICE VISIT (OUTPATIENT)
Dept: PODIATRY | Facility: CLINIC | Age: 54
End: 2019-11-19
Payer: MEDICARE

## 2019-11-19 VITALS
HEIGHT: 62 IN | DIASTOLIC BLOOD PRESSURE: 76 MMHG | SYSTOLIC BLOOD PRESSURE: 110 MMHG | BODY MASS INDEX: 18.41 KG/M2 | WEIGHT: 100.06 LBS

## 2019-11-19 DIAGNOSIS — M79.671 PAIN IN BOTH FEET: Primary | ICD-10-CM

## 2019-11-19 DIAGNOSIS — M20.42 HAMMERTOES OF BOTH FEET: ICD-10-CM

## 2019-11-19 DIAGNOSIS — M20.41 HAMMERTOES OF BOTH FEET: ICD-10-CM

## 2019-11-19 DIAGNOSIS — M79.672 PAIN IN BOTH FEET: Primary | ICD-10-CM

## 2019-11-19 DIAGNOSIS — L84 CORN OR CALLUS: ICD-10-CM

## 2019-11-19 PROCEDURE — 99203 OFFICE O/P NEW LOW 30 MIN: CPT | Mod: S$PBB,,, | Performed by: PODIATRIST

## 2019-11-19 PROCEDURE — 99203 PR OFFICE/OUTPT VISIT, NEW, LEVL III, 30-44 MIN: ICD-10-PCS | Mod: S$PBB,,, | Performed by: PODIATRIST

## 2019-11-19 PROCEDURE — 99212 OFFICE O/P EST SF 10 MIN: CPT | Mod: PBBFAC,PO | Performed by: PODIATRIST

## 2019-11-19 PROCEDURE — 99999 PR PBB SHADOW E&M-EST. PATIENT-LVL II: CPT | Mod: PBBFAC,,, | Performed by: PODIATRIST

## 2019-11-19 PROCEDURE — 99999 PR PBB SHADOW E&M-EST. PATIENT-LVL II: ICD-10-PCS | Mod: PBBFAC,,, | Performed by: PODIATRIST

## 2019-11-19 RX ORDER — LEVOTHYROXINE SODIUM 25 UG/1
TABLET ORAL
Qty: 90 TABLET | Refills: 3 | Status: SHIPPED | OUTPATIENT
Start: 2019-11-19 | End: 2020-10-27 | Stop reason: SDUPTHER

## 2019-11-21 ENCOUNTER — PATIENT OUTREACH (OUTPATIENT)
Dept: ADMINISTRATIVE | Facility: OTHER | Age: 54
End: 2019-11-21

## 2019-11-22 ENCOUNTER — OFFICE VISIT (OUTPATIENT)
Dept: SPORTS MEDICINE | Facility: CLINIC | Age: 54
End: 2019-11-22
Payer: MEDICARE

## 2019-11-22 VITALS
HEART RATE: 85 BPM | BODY MASS INDEX: 18.4 KG/M2 | WEIGHT: 100 LBS | TEMPERATURE: 98 F | DIASTOLIC BLOOD PRESSURE: 61 MMHG | HEIGHT: 62 IN | SYSTOLIC BLOOD PRESSURE: 105 MMHG

## 2019-11-22 DIAGNOSIS — M70.72 ISCHIAL BURSITIS OF LEFT SIDE: ICD-10-CM

## 2019-11-22 DIAGNOSIS — M79.10 MYALGIA: ICD-10-CM

## 2019-11-22 DIAGNOSIS — M99.05 SOMATIC DYSFUNCTION OF PELVIC REGION: ICD-10-CM

## 2019-11-22 DIAGNOSIS — M76.892 HAMSTRING TENDONITIS OF LEFT THIGH: Primary | ICD-10-CM

## 2019-11-22 DIAGNOSIS — M99.06 SOMATIC DYSFUNCTION OF LOWER EXTREMITY: ICD-10-CM

## 2019-11-22 DIAGNOSIS — M99.03 SOMATIC DYSFUNCTION OF LUMBAR REGION: ICD-10-CM

## 2019-11-22 PROCEDURE — 98926 OSTEOPATH MANJ 3-4 REGIONS: CPT | Mod: PBBFAC | Performed by: NEUROMUSCULOSKELETAL MEDICINE & OMM

## 2019-11-22 PROCEDURE — 98926 PR OSTEOPATHIC MANIP,3-4 BODY REGN: ICD-10-PCS | Mod: S$PBB,,, | Performed by: NEUROMUSCULOSKELETAL MEDICINE & OMM

## 2019-11-22 PROCEDURE — 98926 OSTEOPATH MANJ 3-4 REGIONS: CPT | Mod: S$PBB,,, | Performed by: NEUROMUSCULOSKELETAL MEDICINE & OMM

## 2019-11-22 PROCEDURE — 99999 PR PBB SHADOW E&M-EST. PATIENT-LVL III: ICD-10-PCS | Mod: PBBFAC,,, | Performed by: NEUROMUSCULOSKELETAL MEDICINE & OMM

## 2019-11-22 PROCEDURE — 99213 OFFICE O/P EST LOW 20 MIN: CPT | Mod: PBBFAC | Performed by: NEUROMUSCULOSKELETAL MEDICINE & OMM

## 2019-11-22 PROCEDURE — 99214 PR OFFICE/OUTPT VISIT, EST, LEVL IV, 30-39 MIN: ICD-10-PCS | Mod: 25,S$PBB,, | Performed by: NEUROMUSCULOSKELETAL MEDICINE & OMM

## 2019-11-22 PROCEDURE — 99999 PR PBB SHADOW E&M-EST. PATIENT-LVL III: CPT | Mod: PBBFAC,,, | Performed by: NEUROMUSCULOSKELETAL MEDICINE & OMM

## 2019-11-22 PROCEDURE — 99214 OFFICE O/P EST MOD 30 MIN: CPT | Mod: 25,S$PBB,, | Performed by: NEUROMUSCULOSKELETAL MEDICINE & OMM

## 2019-11-22 NOTE — PROGRESS NOTES
Subjective:     Gay Gurrola    Chief Complaint   Patient presents with    Left Lower Leg - Pain         Pain   Associated symptoms include myalgias. Pertinent negatives include no chills, fever (pt felt feverish yesterday, did not check temp), headaches, neck pain or weakness.     Gay Gurrola, a 54 y.o. female, is here for a follow up evaluation of low left postierior hip pain and hamstring pain. Patient went to physical therapy for LE strengthening and stretching @ [facility] for 14 visits D/C in 03.2019. She visited Dr. Sachin Ramos 03.2019 and he performed OMT to her sacral region which she did well with. She is about 95% better at this time but continues to have radiating pain down her left LE, starting at her sit bone and going down her posterior thigh. Reports that prolonged sitting makes it worse. Occasionally the pain will go past her knee into her left lower, lateral leg.       Recall from visit on 12.27.2018  Gay is a 54 y.o. female coming in today for left knee pain. She is here for her MRI results. Since last visit the pain has remained unchanged. Pt. describes the pain as a 4/10 dull aching pain that does not radiate.  Pt has noticed swelling in her ankle toward the end of the day. She has been wearing a compression sleeve around her knee but d/c this, thinking it was causing the swelling in her ankle. Pt is taking ibuprofen prn. Not taking the mobic. Pt. Also notes increased left lower back spasms over the last 2 days. There has been a new fall/injury/ or traumas since last visit.  Pt denies any new musculoskeletal complaints, but her left buttock and mid back pain have worsened.     Joint instability? no  Mechanical locking/clicking? no  Affecting ADL's? no  Affecting sleep? no    Review of Systems   Constitutional: Negative for chills and fever (pt felt feverish yesterday, did not check temp).   Musculoskeletal: Positive for myalgias. Negative for back pain  (mid back), falls, joint pain (left buttock) and neck pain.   Neurological: Negative for dizziness, tingling, focal weakness, weakness and headaches.       PAST MEDICAL HISTORY:   Past Medical History:   Diagnosis Date    Abnormal coronary angiogram     Alcohol abuse, in remission     Anxiety     Bipolar affective     since teenage years    Chronic idiopathic constipation     Depression     H. pylori infection     Headache(784.0)     followed by Dr. Corona    Hypothyroid     Memory loss     Osteopenia     Seizures      PAST SURGICAL HISTORY:   Past Surgical History:   Procedure Laterality Date    BUNIONECTOMY      COLONOSCOPY N/A 4/12/2017    Procedure: COLONOSCOPY;  Surgeon: Estuardo Salazar MD;  [facility]  Service: Endoscopy;  Laterality: N/A;  PM prep    ESOPHAGOGASTRODUODENOSCOPY N/A 10/23/2018    Procedure: EGD (ESOPHAGOGASTRODUODENOSCOPY);  Surgeon: Irlanda Schwarz MD;  [facility]  Service: Endoscopy;  Laterality: N/A;    TUBAL LIGATION         MEDICATIONS:   Current Outpatient Medications:     Ca-D3-mag#11-zinc-cupr-man-bor 600 mg calcium- 800 unit-50 mg Tab, Take 1 tablet by mouth once daily., Disp: , Rfl:     cholecalciferol, vitamin D3, (VITAMIN D3) 1,000 unit capsule, Take 1,000 Units by mouth once daily., Disp: , Rfl:     cyanocobalamin, vitamin B-12, (VITAMIN B-12) 5,000 mcg Subl, Place under the tongue once daily.  , Disp: , Rfl:     fish oil-omega-3 fatty acids 300-1,000 mg capsule, Take 1 capsule by mouth once daily., Disp: , Rfl:     lamoTRIgine (LAMICTAL) 100 MG tablet, Take 100 mg by mouth once daily., Disp: , Rfl:     levothyroxine (SYNTHROID) 25 MCG tablet, TAKE 1 TABLET BY MOUTH ONCE DAILY, Disp: 90 tablet, Rfl: 3    linaCLOtide (LINZESS) 290 mcg Cap capsule, Take 1 capsule (290 mcg total) by mouth once daily., Disp: 90 capsule, Rfl: 3    multivitamin (DAILY MULTI-VITAMIN) per tablet, Take 1 tablet by mouth once daily., Disp: , Rfl:  "    QUEtiapine (SEROQUEL) 100 MG Tab, Take 300 mg by mouth once daily. , Disp: , Rfl:     temazepam (RESTORIL) 22.5 MG capsule, Take 30 mg by mouth nightly as needed for Insomnia., Disp: , Rfl:     TURMERIC ORAL, Take by mouth., Disp: , Rfl:     hydrOXYzine pamoate (VISTARIL) 25 MG Cap, Take 1 capsule (25 mg total) by mouth every 6 (six) hours as needed (cough and congestion). (Patient not taking: Reported on 11/22/2019), Disp: 25 capsule, Rfl: 0  ALLERGIES: Review of patient's allergies indicates:  No Known Allergies      Objective:     VITAL SIGNS: /61   Pulse 85   Temp 97.7 °F (36.5 °C) (Oral)   Ht 5' 2" (1.575 m)   Wt 45.4 kg (100 lb)   BMI 18.29 kg/m²    General    Vitals reviewed.  Constitutional: She is oriented to person, place, and time. She appears well-developed and well-nourished.   Neurological: She is alert and oriented to person, place, and time.   Psychiatric: She has a normal mood and affect. Her behavior is normal.               MUSCULOSKELETAL EXAM    HIP: left HIP  The affected hip is compared to the contralateral hip.    Observation:    There is no edema, erythema, or ecchymosis in the lumbosacral region.   There is no Trendelenburg sign on either side  No obvious pelvic obliquity while standing.    No thoracolumbar scoliosis observed.    No midline skin abnormalities.  No atrophy noted in the lower limbs.    ROM (* = with pain):  Passive hip flexion to 120° on left and 120° on right  Passive hip internal rotation to 45° on left and 45° on right  Passive hip external rotation to 45° on left and 45° on right   Passive hip abduction to 45° on left and 45° on right  All motions above are without pain.    Tenderness To Palpation:  No tenderness at the ASIS, AIIS, PSIS, PIIS, iliac crest, pubic bones  No tenderness throughout the lumbar spine, iliolumbar region, or posterior pelvis.  No tenderness throughout the sacrum or piriformis  No tenderness over the greater trochanteric bursa or " greater/lesser trochanters.  No tenderness at the glut attachments on the greater trochanter  No tenderness over proximal IT band or hip flexor musculature.  + tenderness at left ischial tuberosity   + tenderness at left ischial bursa  + tenderness at hamstring conjoint tendon  No distal hamstring tenderness      Strength Testing (* = with pain):  Hip flexion - 5/5 on left and 5/5 on right  Hip extension - 5/5 on left and 5/5 on right  Hip adduction - 5/5 on left and 5/5 on right  Hip abduction - 5/5 on left and 5/5 on right  Knee flexion - 5/5 on left and 5/5 on right  Knee extension - 5/5 on left and 5/5 on right  Glutaeus medius - 5/5 on left and 5/5 on right    Special Tests:  Standing Trendelenburg test - negative    Seated straight leg raise - negative  Supine straight leg raise - negative  Hamstring flexibility symmetric, + pain with left hamstring testing    DESTINY - negative  FADIR - negative  Scour test - negative  No pain with posterior hip capsule compression    ASIS compression test - negative  SI drawer test - negative   Thigh thrust test - negative     Piriformis test (Bonnet's) - negative  Quadriceps flexibility symmetric.  Chidi test - negative  Lebron compression test - negative    Leg lengths symmetric.     Neurovascular Exam:  Normal gait without Trendelenburg or antalgia.  2+ femoral, DP, and PT pulses BL.  No skin changes, no abnormal hair distribution.  Sensation intact to light touch throughout the obturator and medial/lateral/posterior femoral cutaneous nerves.  2+/4 reflexes at L4 and S1 dermatomes  Capillary refill intact to <2 seconds in all lower limb digits.      TART (Tissue texture abnormality, Asymmetry,  Restriction of motion and/or Tenderness) changes:     Lumbar Spine   L1 Neutral   L2 Neutral   L3 Neutral   L4 Neutral   L5 FRS LEFT     Pelvis:  · Innominate:Right anterior rotation  · Pubic bone:Right inferior pubic shear    Sacrum:Neutral     Lower extremity: Left ischial tuberosity  and proximal hamstring Herniated trigger point (HTP) fascial distortions     Key   F= Flexed   E = Extended   R = Rotated   S = Sidebent   TTA = tissue texture abnormality       Assessment:      Encounter Diagnoses   Name Primary?    Hamstring tendonitis of left thigh Yes    Ischial bursitis of left side     Myalgia     Somatic dysfunction of lumbar region     Somatic dysfunction of pelvic region     Somatic dysfunction of lower extremity           Plan:   1. 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   - Continue Ice up to 20 minutes at a time and OTC NSAID or Tylenol prn for pain control  - Consider left ischial bursa injection is pain persists with relative rest  - X-ray images of  bilateral hips (AP pelvis, L lateral, and R lateral views) taken 2/27/18 reviewed: No pathology or irregularities appreciated.    2. OMT 3-4 regions. Oral consent obtained.  Reviewed benefits and potential side effects.   - OMT indicated today due to signs and symptoms as well as local and remote somatic dysfunction findings and their related neurokinetic, lymphatic, fascial and/or arteriovenous body connections.   - OMT techniques used: Myofascial Release, Muscle Energy and Fascial Distortion Model   - Treatment was tolerated well. Improvement noted in segmental mobility post-treatment in dysfunctional regions. There were no adverse events and no complications immediately following treatment.     3. Reviewed with pt. the following HEP:  A) Continue PT HEP  B) Continue pelvic clock exercises  to do from the 6-12 o'clock positions:10-15 reps, twice daily.     The patient was taught a homegoing physical therapy regimen as described above. The patient demonstrated  understanding of the exercises and proper technique of their execution.     4. Follow-up as needed if pain deteriorates or new issue arises    5. Patient agreeable to today's plan and all questions were answered    This note is dictated using the M*Modal Fluency Direct word recognition program. There are word recognition mistakes that are occasionally missed on review.

## 2019-11-24 NOTE — PROGRESS NOTES
Subjective:      Patient ID: Gay Gurrola is a 54 y.o. female.    Chief Complaint: Nail Problem (toenails falling off , ov 2/25/19 Dr Ramos) and Hammer Toe (right foot second toe)    Gay is a 54 y.o. female who presents to the podiatry clinic  with complaint of  right foot pain. Onset of the symptoms was several years ago. Precipitating event: none known. Current symptoms include: ability to bear weight, but with some pain. Aggravating factors: any weight bearing. Symptoms have progressed to a point and plateaued. Patient has had no prior foot problems. Evaluation to date: none. Treatment to date: none. Patients rates pain 4/10 on pain scale. Reports h/o right foot bunion surgery.         Review of Systems   Constitution: Negative for chills, diaphoresis and fever.   Cardiovascular: Negative for claudication, cyanosis, leg swelling and syncope.   Respiratory: Negative for cough and shortness of breath.    Skin: Positive for color change, nail changes and suspicious lesions.   Musculoskeletal: Negative for falls, joint pain, muscle cramps and muscle weakness.   Gastrointestinal: Negative for diarrhea, nausea and vomiting.   Neurological: Negative for disturbances in coordination, numbness, paresthesias, sensory change, tremors and weakness.   Psychiatric/Behavioral: Negative for altered mental status.           Objective:      Physical Exam   Constitutional: She appears well-developed. She is cooperative.   Oriented to time, place, and person.   Cardiovascular:   DP and PT pulses are palpable bilaterally. 3 sec capillary refill time and toes and feet are warm to touch proximally .  There is  hair growth on the feet and toes b/l. There is no edema b/l. No spider veins or varicosities present b/l.      Musculoskeletal:   Equinus noted b/l ankles with < 10 deg DF noted. MMT 5/5 in DF/PF/Inv/Ev resistance with no reproduction of pain in any direction. Passive range of motion of ankle and pedal joints  is painless b/l.    Decreased stride, station of gait.  apropulsive toe off.  Increased angle and base of gait.      Patient has hammertoes of digits 2-5 bilateral partially reducible without symptom today.     Visible and palpable bunion without pain at dorsomedial 1st metatarsal head right and left.  Hallux abducted right and left partially reducible, tracks laterally without being track bound.  No ecchymosis, erythema, edema, or cardinal signs infection or signs of trauma same foot.       Feet:   Right Foot:   Skin Integrity: Negative for callus or dry skin.   Left Foot:   Skin Integrity: Negative for callus or dry skin.   Lymphadenopathy:   Negative lymphadenopathy bilateral popliteal fossa and tarsal tunnel.   Neurological: She is alert.   Light touch, proprioception, and sharp/dull sensation are all intact bilaterally. Protective threshold with the Enloe-Wienstein monofilament is intact bilaterally.    Skin:   No open lesions, lacerations or wounds noted.Interdigital spaces clean, dry and intact b/l. No erythema noted to b/l foot.    Nails 1-5 B/L with mild discoloration.     Focal hyperkeratotic lesion consisting entirely of hyperkeratotic tissue without open skin, drainage, pus, fluctuance, malodor, or signs of infection: Right plantar forefoot.      Psychiatric: She has a normal mood and affect.             Assessment:       Encounter Diagnoses   Name Primary?    Pain in both feet Yes    Hammertoes of both feet     Corn or callus          Plan:       Gay was seen today for nail problem and hammer toe.    Diagnoses and all orders for this visit:    Pain in both feet  -     X-Ray Foot Complete Bilateral; Future    Hammertoes of both feet    Corn or callus      I counseled the patient on her conditions, their implications and medical management.    Conservative treatments for hammertoe discussed include padding, hammertoe crest  Pads, extra-depth shoes; Surgical treatments discussed, including  arthroplasty and generic post-op course. Patient opting to pursue surgical option after months of conservative care. Xrays ordered.     With the patient's verbal consent a sterile #15 scalpel was used to trim the hyperkeratotic lesion described above. She tolerated the procedure well without complication. Discussed treatment options for painful plantar warts to include OTC salicylic acid, liquid nitrogen and surgical excision in detail.     Weightbearing B/L foot xray to assess underlying deformity and for underlying osseous pathology.     RTC PRN

## 2019-11-26 ENCOUNTER — TELEPHONE (OUTPATIENT)
Dept: GASTROENTEROLOGY | Facility: CLINIC | Age: 54
End: 2019-11-26

## 2019-11-26 NOTE — TELEPHONE ENCOUNTER
MA spoke to pt offer pt appt with Dr. Salazar on 2/14/20 at 11:30 am.    Pt confirmed apt and thank MA       ----- Message from Gay Ortiz sent at 11/26/2019  3:49 PM CST -----  Contact: Pt#[phone]  Calling in regards to scheduling 6 month follow up appointment, no appts available. Patient not having problems, and have enough medication. Please call

## 2019-12-03 ENCOUNTER — OFFICE VISIT (OUTPATIENT)
Dept: DERMATOLOGY | Facility: CLINIC | Age: 54
End: 2019-12-03
Payer: MEDICARE

## 2019-12-03 DIAGNOSIS — L57.0 AK (ACTINIC KERATOSIS): Primary | ICD-10-CM

## 2019-12-03 DIAGNOSIS — D22.9 ATYPICAL NEVUS: ICD-10-CM

## 2019-12-03 DIAGNOSIS — L81.9 DYSCHROMIA: ICD-10-CM

## 2019-12-03 DIAGNOSIS — D22.9 MULTIPLE BENIGN NEVI: ICD-10-CM

## 2019-12-03 DIAGNOSIS — L81.4 LENTIGO: ICD-10-CM

## 2019-12-03 DIAGNOSIS — Z12.83 SCREENING FOR MALIGNANT NEOPLASM OF SKIN: ICD-10-CM

## 2019-12-03 PROCEDURE — 17000 DESTRUCT PREMALG LESION: CPT | Mod: PBBFAC | Performed by: DERMATOLOGY

## 2019-12-03 PROCEDURE — 99999 PR PBB SHADOW E&M-EST. PATIENT-LVL II: ICD-10-PCS | Mod: PBBFAC,,, | Performed by: DERMATOLOGY

## 2019-12-03 PROCEDURE — 17000 PR DESTRUCTION(LASER SURGERY,CRYOSURGERY,CHEMOSURGERY),PREMALIGNANT LESIONS,FIRST LESION: ICD-10-PCS | Mod: S$GLB,,, | Performed by: DERMATOLOGY

## 2019-12-03 PROCEDURE — 99203 PR OFFICE/OUTPT VISIT, NEW, LEVL III, 30-44 MIN: ICD-10-PCS | Mod: 25,S$GLB,, | Performed by: DERMATOLOGY

## 2019-12-03 PROCEDURE — 17003 DESTRUCT PREMALG LES 2-14: CPT | Mod: S$GLB,,, | Performed by: DERMATOLOGY

## 2019-12-03 PROCEDURE — 99203 OFFICE O/P NEW LOW 30 MIN: CPT | Mod: 25,S$GLB,, | Performed by: DERMATOLOGY

## 2019-12-03 PROCEDURE — 17003 DESTRUCT PREMALG LES 2-14: CPT | Mod: PBBFAC | Performed by: DERMATOLOGY

## 2019-12-03 PROCEDURE — 99999 PR PBB SHADOW E&M-EST. PATIENT-LVL II: CPT | Mod: PBBFAC,,, | Performed by: DERMATOLOGY

## 2019-12-03 PROCEDURE — 17000 DESTRUCT PREMALG LESION: CPT | Mod: S$GLB,,, | Performed by: DERMATOLOGY

## 2019-12-03 PROCEDURE — 17003 DESTRUCTION, PREMALIGNANT LESIONS; SECOND THROUGH 14 LESIONS: ICD-10-PCS | Mod: S$GLB,,, | Performed by: DERMATOLOGY

## 2019-12-03 PROCEDURE — 99212 OFFICE O/P EST SF 10 MIN: CPT | Mod: PBBFAC | Performed by: DERMATOLOGY

## 2019-12-03 NOTE — PROGRESS NOTES
Subjective:       Patient ID:  Gay Gurrola is a 54 y.o. female who presents for   Chief Complaint   Patient presents with    Skin Check     ubse     Patient is here today for a mole check. yes  Pt has a history of sun exposure in the past. yes  Pt recalls several blistering sunburns in the past- no  Pt has history of tanning bed use- yes  Pt has  had moles removed in the past- no  Pt has history of melanoma in first degree relatives-  no        Review of Systems   Constitutional: Negative for fever, chills and fatigue.   Skin: Positive for daily sunscreen use. Negative for recent sunburn and wears hat.   Hematologic/Lymphatic: Does not bruise/bleed easily.        Objective:    Physical Exam   Constitutional: She appears well-developed and well-nourished. No distress.   Neurological: She is alert and oriented to person, place, and time. She is not disoriented.   Psychiatric: She has a normal mood and affect.   Skin:   Areas Examined (abnormalities noted in diagram):   Head / Face Inspection Performed  Neck Inspection Performed  Chest / Axilla Inspection Performed  Back Inspection Performed  RUE Inspected  LUE Inspection Performed                   Diagram Legend     Erythematous scaling macule/papule c/w actinic keratosis       Vascular papule c/w angioma      Pigmented verrucoid papule/plaque c/w seborrheic keratosis      Yellow umbilicated papule c/w sebaceous hyperplasia      Irregularly shaped tan macule c/w lentigo     1-2 mm smooth white papules consistent with Milia      Movable subcutaneous cyst with punctum c/w epidermal inclusion cyst      Subcutaneous movable cyst c/w pilar cyst      Firm pink to brown papule c/w dermatofibroma      Pedunculated fleshy papule(s) c/w skin tag(s)      Evenly pigmented macule c/w junctional nevus     Mildly variegated pigmented, slightly irregular-bordered macule c/w mildly atypical nevus      Flesh colored to evenly pigmented papule c/w intradermal nevus        Pink pearly papule/plaque c/w basal cell carcinoma      Erythematous hyperkeratotic cursted plaque c/w SCC      Surgical scar with no sign of skin cancer recurrence      Open and closed comedones      Inflammatory papules and pustules      Verrucoid papule consistent consistent with wart     Erythematous eczematous patches and plaques     Dystrophic onycholytic nail with subungual debris c/w onychomycosis     Umbilicated papule    Erythematous-base heme-crusted tan verrucoid plaque consistent with inflamed seborrheic keratosis     Erythematous Silvery Scaling Plaque c/w Psoriasis     See annotation      Assessment / Plan:        AK (actinic keratosis)  Cryosurgery Procedure Note    Verbal consent from the patient is obtained including, but not limited to, risk of hypopigmentation/hyperpigmentation, scar, recurrence of lesion. The patient is aware of the precancerous quality and need for treatment of these lesions. Liquid nitrogen cryosurgery is applied to the 2 actinic keratoses, as detailed in the physical exam, to produce a freeze injury. The patient is aware that blisters may form and is instructed on wound care with gentle cleansing and use of vaseline ointment to keep moist until healed. The patient is supplied a handout on cryosurgery and is instructed to call if lesions do not completely resolve.      Multiple benign nevi  UBSE body skin examination performed today including at least 6 points as noted in physical examination. No lesions suspicious for malignancy noted.  Reassurance provided.  Instructed patient to observe lesion(s) for changes and follow up in clinic if changes are noted. Discussed ABCDE's of moles and brochure provided.      Lentigo  This is a benign hyperpigmented sun induced lesion. Daily sun protection will reduce the number of new lesions. Treatment of these benign lesions are considered cosmetic.    Atypical nevus  Patient with a mildly atypical nevi. Instructed patient to observe  lesion(s) for changes and follow up in clinic if changes are noted. Discussed ABCDE's of moles and brochure provided.    Dyschromia  - counseled patient this is a long-term problem and multiple peels/creams will need to be used before we will see resolution and may only see some lightening affect but to continue to work on what she is doing. Sun protection is her best friend, and wearing hats, avoiding outdoors, even wearing hats in the car will help to prevent darkening.   - will prescribe  Hydroquinone 8%, Tretinoin 0.1%, Kojic Acid 1%, Niacinamide4%, Fluocinolone 0.025% Cream for her to use after she finishes compounded med as above- this [facility] to her  -Use Anti-oxidant serum q am, and q pm  - Use Sunscreen SPF 50 or above to face q am  - will schedule appt in 3 mo for Markos Acid peel         Screening for malignant neoplasm of skin  Patient instructed in importance in daily sun protection of at least spf 30. Sun avoidance and topical protection discussed.     Use TASC for outdoor activity, wear sun protective shirts and hats    Recommend sunscreen for daily use on face and neck.    Patient encouraged to wear hat for all outdoor exposure.     Spent 30 minutes in coordination of care and/or consultation with patient discussing diagnosis, treatment options, risks and benefits of each. All questions answered.                 No follow-ups on file.

## 2020-02-12 NOTE — PROGRESS NOTES
[facility] Consultation Note    Reason for Consult:  The encounter diagnosis was Constipation, unspecified constipation type.    PCP:   Jaja Mckeon       Referring MD:  No referring provider defined for this encounter.    HPI:  This is a 54 y.o. female here for evaluation of abdominal pain.  She contacted the office 8/6/18 with complaints of stomach pain, swelling and burning   She did see her PCP and labs and ABD US were ordered.  Non-fasting lipase was mildly elevated - 65  ABD US - normal, no gallstones  She has a Hx of H. Pylori    Interval Hx 02/14/2020:  Stopped the fleets enema + dulcolax every 2 weeks, but now just taking the linzess and dark coffee   Doing [employer] women's brain 90 billion probiotic and feels better on it.  Did half marathon last weekend      ROS:  Constitutional: No fevers, chills, No weight loss  ENT: No allergies  CV: No chest pain  Pulm: No cough, No shortness of breath  Ophtho: No vision changes  GI: see HPI  Derm: No rash  Heme: No lymphadenopathy, No bruising  MSK: No arthritis  : No dysuria, No hematuria  Endo: No hot or cold intolerance  Neuro: No syncope, No seizure  Psych: No anxiety, No depression    Medical History:  has a past medical history of Abnormal coronary angiogram, Alcohol abuse, in remission, Anxiety, Bipolar affective, Chronic idiopathic constipation, Depression, H. pylori infection, Headache(784.0), Hypothyroid, Memory loss, Osteopenia, and Seizures.    Surgical History:  has a past surgical history that includes Tubal ligation; Bunionectomy; Colonoscopy (N/A, 4/12/2017); and Esophagogastroduodenoscopy (N/A, 10/23/2018).      Review of patient's allergies indicates:  No Known Allergies    Current Outpatient Medications on File Prior to Visit   Medication Sig Dispense Refill    Ca-D3-mag#11-zinc-cupr-man-bor 600 mg calcium- 800 unit-50 mg Tab Take 1 tablet by mouth once daily.      cholecalciferol, vitamin D3, (VITAMIN D3)  "1,000 unit capsule Take 1,000 Units by mouth once daily.      cyanocobalamin, vitamin B-12, (VITAMIN B-12) 5,000 mcg Subl Place under the tongue once daily.       fish oil-omega-3 fatty acids 300-1,000 mg capsule Take 1 capsule by mouth once daily.      lamoTRIgine (LAMICTAL) 100 MG tablet Take 100 mg by mouth once daily.      levothyroxine (SYNTHROID) 25 MCG tablet TAKE 1 TABLET BY MOUTH ONCE DAILY 90 tablet 3    linaCLOtide (LINZESS) 290 mcg Cap capsule Take 1 capsule (290 mcg total) by mouth once daily. 90 capsule 3    multivitamin (DAILY MULTI-VITAMIN) per tablet Take 1 tablet by mouth once daily.      QUEtiapine (SEROQUEL) 100 MG Tab Take 300 mg by mouth once daily.       TURMERIC ORAL Take by mouth.      [DISCONTINUED] hydrOXYzine pamoate (VISTARIL) 25 MG Cap Take 1 capsule (25 mg total) by mouth every 6 (six) hours as needed (cough and congestion). 25 capsule 0    [DISCONTINUED] temazepam (RESTORIL) 22.5 MG capsule Take 30 mg by mouth nightly as needed for Insomnia.       No current facility-administered medications on file prior to visit.          Objective Findings:    Vital Signs:  /74 (BP Location: Left arm, Patient Position: Sitting, BP Method: Medium (Automatic))   Pulse 75   Ht 5' 2" (1.575 m)   Wt 46.5 kg (102 lb 8.2 oz)   LMP 01/23/2020   BMI 18.75 kg/m²   Body mass index is 18.75 kg/m².    Physical Exam:  General Appearance: Well appearing in no acute distress  Head:   Normocephalic, without obvious abnormality  Eyes:    No scleral icterus  ENT: Neck supple, Lips, mucosa, and tongue normal  Lungs: CTA bilaterally in anterior and posterior fields, no wheezes, no crackles.  Heart:  Regular rate and rhythm, S1, S2 normal, no murmurs heard  Abdomen: Soft, non tender, non distended with positive bowel sounds in all four quadrants.   Extremities: no edema  Skin: No rash  Neurologic: AAO x 3      Labs:  Lab Results   Component Value Date    WBC 4.03 02/04/2019    HGB 13.1 02/04/2019    " HCT 39.2 02/04/2019     02/04/2019    CHOL 139 05/24/2018    CHOL 139 05/24/2018    TRIG 44 05/24/2018    TRIG 44 05/24/2018    HDL 79 (H) 05/24/2018    HDL 79 (H) 05/24/2018    ALT 25 02/04/2019    AST 29 02/04/2019     02/04/2019    K 4.5 02/04/2019     02/04/2019    CREATININE 0.8 02/04/2019    BUN 12 02/04/2019    CO2 26 02/04/2019    TSH 2.611 02/04/2019     ttg- neg    Imaging:  3/19 Defecogram - Normal neutral, straining, and lifting anorectal angles as above.  Normal defecation with no significant residual.  Small anterior rectocele.    Endoscopy:    4/2017 colonoscopy - normal    4/2017 EGD              - Monilial esophagitis.                        - LA Grade A reflux esophagitis.                        - Erythematous mucosa in the antrum.                        - Normal examined duodenum. Biopsied.   She was treated with diflucan x 2 weeks    Assessment:  1. 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 Linzess 290 mcg continue for now, may be abl to wean down dose in future  2. Soluble fiber she is doing through the diet.  3. Continue probiotic    Follow up in about 1 year (around 2/14/2021).      Order summary:         Thank you so much for allowing me to participate in the care of Gay Salazar MD

## 2020-02-13 ENCOUNTER — PATIENT OUTREACH (OUTPATIENT)
Dept: ADMINISTRATIVE | Facility: OTHER | Age: 55
End: 2020-02-13

## 2020-02-14 ENCOUNTER — OFFICE VISIT (OUTPATIENT)
Dept: GASTROENTEROLOGY | Facility: CLINIC | Age: 55
End: 2020-02-14
Payer: MEDICARE

## 2020-02-14 VITALS
HEART RATE: 75 BPM | WEIGHT: 102.5 LBS | SYSTOLIC BLOOD PRESSURE: 122 MMHG | HEIGHT: 62 IN | DIASTOLIC BLOOD PRESSURE: 74 MMHG | BODY MASS INDEX: 18.86 KG/M2

## 2020-02-14 DIAGNOSIS — K59.00 CONSTIPATION, UNSPECIFIED CONSTIPATION TYPE: Primary | ICD-10-CM

## 2020-02-14 PROCEDURE — 99214 OFFICE O/P EST MOD 30 MIN: CPT | Mod: PBBFAC | Performed by: INTERNAL MEDICINE

## 2020-02-14 PROCEDURE — 99999 PR PBB SHADOW E&M-EST. PATIENT-LVL IV: ICD-10-PCS | Mod: PBBFAC,,, | Performed by: INTERNAL MEDICINE

## 2020-02-14 PROCEDURE — 99999 PR PBB SHADOW E&M-EST. PATIENT-LVL IV: CPT | Mod: PBBFAC,,, | Performed by: INTERNAL MEDICINE

## 2020-02-14 PROCEDURE — 99214 PR OFFICE/OUTPT VISIT, EST, LEVL IV, 30-39 MIN: ICD-10-PCS | Mod: S$PBB,,, | Performed by: INTERNAL MEDICINE

## 2020-02-14 PROCEDURE — 99214 OFFICE O/P EST MOD 30 MIN: CPT | Mod: S$PBB,,, | Performed by: INTERNAL MEDICINE

## 2020-02-14 NOTE — PATIENT INSTRUCTIONS
[email or web address]  [phone]    Dr Percy Alejo  [facility] medicine [facility]

## 2020-02-17 ENCOUNTER — TELEPHONE (OUTPATIENT)
Dept: GASTROENTEROLOGY | Facility: CLINIC | Age: 55
End: 2020-02-17

## 2020-02-17 ENCOUNTER — PATIENT OUTREACH (OUTPATIENT)
Dept: ADMINISTRATIVE | Facility: HOSPITAL | Age: 55
End: 2020-02-17

## 2020-02-17 NOTE — TELEPHONE ENCOUNTER
Pt aware and understanding of Dr. Salazar's message below:             Info is listed in her AVS under instructions:   Dr Ronda romero is functional internal medicine doctor here   [email or web address]   [phone]       Dr Percy Alejo is   Functional gastroenterologist at [facility]

## 2020-02-17 NOTE — TELEPHONE ENCOUNTER
----- Message from Davina Reyna MA sent at 2/17/2020 10:41 AM CST -----  Contact: pt: [phone]      ----- Message -----  From: Karen Wang  Sent: 2/17/2020  10:08 AM CST  To: Martin Marte Staff    Pt is calling to give Dr. Salazar the name of the probiotics that she take which is [employer] ([employer] care), also would like to know the name of the functional GI specialist Dr. Salazar mentioned to her        Please contact pt: [phone]

## 2020-02-21 ENCOUNTER — TELEPHONE (OUTPATIENT)
Dept: INTERNAL MEDICINE | Facility: CLINIC | Age: 55
End: 2020-02-21

## 2020-02-21 NOTE — TELEPHONE ENCOUNTER
"Informed pt that the covering provider would like for her to know " She can try Benadryl or similar allergy medication for the itching.  Otherwise, it sounds like we should offer her an appointment to get evaluated in clinic."  Pt verbalized understanding and stated she will give the benadryl and try and if no improvement she will give the office a call to schedule a appt.  "

## 2020-02-21 NOTE — TELEPHONE ENCOUNTER
----- Message from Yue Mcclelland sent at 2/21/2020  8:22 AM CST -----  Contact: MEHDI ARVIZU [[id number]]  Type:  Patient Returning Call    Who Called: MEHDI ARVIZU [[id number]]    Who Left Message for Patient: Caitlin Watt    Does the patient know what this is regarding?:Y    Best Call Back Number:[phone]    Additional Information:

## 2020-02-21 NOTE — TELEPHONE ENCOUNTER
----- Message from Lay Garcia sent at 2/21/2020  7:59 AM CST -----  Contact: pt     Name of Who is Calling:MEHDI ARVIZU [[id number]]    What is the request in detail: Pt would like a call back regarding ear itching in left , Please contact to further discuss and advise    Can the clinic reply by [facility]: No    What [facility]: [phone]

## 2020-02-21 NOTE — TELEPHONE ENCOUNTER
She can try Benadryl or similar allergy medication for the itching.  Otherwise, it sounds like we should offer her an appointment to get evaluated in clinic. Thanks.

## 2020-02-21 NOTE — TELEPHONE ENCOUNTER
Pt stated for the last three nights she have been experiencing some severe itching in her left ear which prevents her from being able to sleep at night. Stated she has no fever or discharge from left ear. She was wondering if her ear pods could be causing the itching and what is recommended for the itching. Message routed to provider

## 2020-02-28 ENCOUNTER — PATIENT OUTREACH (OUTPATIENT)
Dept: ADMINISTRATIVE | Facility: HOSPITAL | Age: 55
End: 2020-02-28

## 2020-05-29 ENCOUNTER — TELEPHONE (OUTPATIENT)
Dept: INTERNAL MEDICINE | Facility: CLINIC | Age: 55
End: 2020-05-29

## 2020-05-29 ENCOUNTER — OFFICE VISIT (OUTPATIENT)
Dept: INTERNAL MEDICINE | Facility: CLINIC | Age: 55
End: 2020-05-29
Payer: MEDICARE

## 2020-05-29 VITALS
SYSTOLIC BLOOD PRESSURE: 100 MMHG | OXYGEN SATURATION: 99 % | TEMPERATURE: 98 F | BODY MASS INDEX: 18.46 KG/M2 | HEART RATE: 70 BPM | WEIGHT: 100.31 LBS | HEIGHT: 62 IN | DIASTOLIC BLOOD PRESSURE: 52 MMHG

## 2020-05-29 DIAGNOSIS — R09.81 NASAL CONGESTION: ICD-10-CM

## 2020-05-29 DIAGNOSIS — H92.02 LEFT EAR PAIN: Primary | ICD-10-CM

## 2020-05-29 DIAGNOSIS — L29.9 PRURITUS: ICD-10-CM

## 2020-05-29 DIAGNOSIS — R09.82 POST-NASAL DRIP: ICD-10-CM

## 2020-05-29 DIAGNOSIS — H60.502 ACUTE OTITIS EXTERNA OF LEFT EAR, UNSPECIFIED TYPE: Primary | ICD-10-CM

## 2020-05-29 PROCEDURE — 99999 PR PBB SHADOW E&M-EST. PATIENT-LVL III: CPT | Mod: PBBFAC,,, | Performed by: FAMILY MEDICINE

## 2020-05-29 PROCEDURE — 99213 OFFICE O/P EST LOW 20 MIN: CPT | Mod: S$PBB,,, | Performed by: FAMILY MEDICINE

## 2020-05-29 PROCEDURE — 99213 PR OFFICE/OUTPT VISIT, EST, LEVL III, 20-29 MIN: ICD-10-PCS | Mod: S$PBB,,, | Performed by: FAMILY MEDICINE

## 2020-05-29 PROCEDURE — 99213 OFFICE O/P EST LOW 20 MIN: CPT | Mod: PBBFAC | Performed by: FAMILY MEDICINE

## 2020-05-29 PROCEDURE — 99999 PR PBB SHADOW E&M-EST. PATIENT-LVL III: ICD-10-PCS | Mod: PBBFAC,,, | Performed by: FAMILY MEDICINE

## 2020-05-29 RX ORDER — NEOMYCIN SULFATE, POLYMYXIN B SULFATE AND HYDROCORTISONE 10; 3.5; 1 MG/ML; MG/ML; [USP'U]/ML
4 SUSPENSION/ DROPS AURICULAR (OTIC) 4 TIMES DAILY
Qty: 10 ML | Refills: 0 | Status: SHIPPED | OUTPATIENT
Start: 2020-05-29 | End: 2020-06-05

## 2020-05-29 RX ORDER — TEMAZEPAM 30 MG/1
CAPSULE ORAL
COMMUNITY
Start: 2020-05-21 | End: 2021-06-14 | Stop reason: SDUPTHER

## 2020-05-29 RX ORDER — HYDROXYZINE PAMOATE 25 MG/1
25 CAPSULE ORAL EVERY 8 HOURS PRN
Qty: 30 CAPSULE | Refills: 0 | Status: SHIPPED | OUTPATIENT
Start: 2020-05-29 | End: 2020-10-27

## 2020-05-29 RX ORDER — NEOMYCIN SULFATE, POLYMYXIN B SULFATE AND HYDROCORTISONE 10; 3.5; 1 MG/ML; MG/ML; [USP'U]/ML
3 SUSPENSION/ DROPS AURICULAR (OTIC) 4 TIMES DAILY
Qty: 10 ML | Refills: 0 | Status: SHIPPED | OUTPATIENT
Start: 2020-05-29 | End: 2020-05-29 | Stop reason: ALTCHOICE

## 2020-05-29 RX ORDER — CIPROFLOXACIN AND DEXAMETHASONE 3; 1 MG/ML; MG/ML
4 SUSPENSION/ DROPS AURICULAR (OTIC) 2 TIMES DAILY
Qty: 7.5 ML | Refills: 0 | Status: SHIPPED | OUTPATIENT
Start: 2020-05-29 | End: 2020-06-05

## 2020-05-29 NOTE — TELEPHONE ENCOUNTER
----- Message from Yadi Mcclelland, Patient Care Assistant sent at 5/29/2020 10:33 AM CDT -----  Contact: MEHDI ARVIZU [[id number]]  Type:  Patient Returning Call    Who Called: MEHDI ARVIZU [[id number]]    Who Left Message for Patient: Lay Arnold    Does the patient know what this is regarding?:Yes    Best Call Back Number:[phone]    Additional Information:   No

## 2020-05-29 NOTE — TELEPHONE ENCOUNTER
----- Message from Madisyn Cade sent at 5/29/2020  9:40 AM CDT -----  Contact: MEHDI ARVIZU [[id number]]  Name of Who is Calling: MEHDI ARVIZU [[id number]]    What is the request in detail: patient would like to speak with Dr. Menendez nurse regarding symptoms she is experiencing. Please call        Can the clinic reply by [facility]: no      What Number to Call Back if not in [facility]: [phone] (home)

## 2020-05-29 NOTE — TELEPHONE ENCOUNTER
Spoke to Pt and she complains of   Left ear ache no fever, tender to touch, no discharge  Ear ache starts mostly in evening  Tylenol no relief, ibbuprofen to relieve the ache  As day progresses ear ache worst  Dr Mckeon rec benadryl and later she took claritin for nasal discharge  Pharmacist told her to stop ibuoprofren due to covid news  She states she Just got out of shower  And left ear is red    CC:  Inflammed ear canal, ache, and pain at night, sever itching  Pharmacist rec to ask PCP for anbx ear drops    Pt denies appt to leave the house to go to ER, UC or a same day appt  Later wants to est care with PCP, Dr. Menendez

## 2020-05-29 NOTE — TELEPHONE ENCOUNTER
Please call patient to notify her that Ciprodex ear drops were not covered by her insurance so I sent in Cortisporin (neomycin-polymyxin-hydrocortisone) ear drops instead.

## 2020-05-29 NOTE — PROGRESS NOTES
Subjective:      Patient ID: Gay Gurrola is a 54 y.o. female.    Chief Complaint: Ear Fullness (tender to touch x3 days )      HPI:  Gay Gurrola is a 54 year old female with alcohol abuse in remission, bipolar 1 disorder, chronic migraines, depression, history of seizure, hypothyroidism, osteopenia, and sleep apnea who presents to clinic today for a same day appointment complaining of ear fullness/tenderness.    Patient is new to me; PCP is Dr. Jaja Mckeon    Patient endorses left ear fullness and tenderness for the past 3-5 days.  Endorses associated itching.  States about 2 months ago she had similar symptoms described as an ear ache and was told it may be sinus related as she had a post-nasal drip at that time.  Was recommended to take OTC anti-inflammatories and allergy medications.  Today states her left ear itching is severe causing difficulty sleeping.  Ear ache worse in the middle of the night.  Has taken ibuprofen twice over the two days.  Also taking 24 Hr Claritin.  Endorses associated post-nasal drip.  Denies associated hearing changes, otorrhea, fevers/chills.  Endorses some associated left facial/left jaw pain.  States she has noticed her ear to be red as well.  Aching sensation described as a pulsing sensation.  Symptoms better in the morning, worse when active later in the day and at night.  First day had an associated headache.  Tried Tylenol without significant relief.    Per chart review it appears Cortisporin drops were called in by Dr. He today but patient states she was unaware of this.      Past Medical History:   Diagnosis Date    Abnormal coronary angiogram     Alcohol abuse, in remission     Anxiety     Bipolar affective     since teenage years    Chronic idiopathic constipation     Depression     H. pylori infection     Headache(784.0)     followed by Dr. Corona    Hypothyroid     Memory loss     Osteopenia     Seizures        Past Surgical  History:   Procedure Laterality Date    BUNIONECTOMY      COLONOSCOPY N/A 4/12/2017    Procedure: COLONOSCOPY;  Surgeon: Estuardo Salazar MD;  [facility]  Service: Endoscopy;  Laterality: N/A;  PM prep    ESOPHAGOGASTRODUODENOSCOPY N/A 10/23/2018    Procedure: EGD (ESOPHAGOGASTRODUODENOSCOPY);  Surgeon: Irlanda Schwarz MD;  [facility]  Service: Endoscopy;  Laterality: N/A;    TUBAL LIGATION         Family History   Problem Relation Age of Onset    Stroke Mother     Diabetes Mother     Anuerysm Mother         brain    Dementia Maternal Grandmother     Hypertension Father     Coronary artery disease Father     Kidney disease Father     Diabetes Father     Crohn's disease Maternal Aunt     Crohn's disease Other     Melanoma Neg Hx     Psoriasis Neg Hx     Lupus Neg Hx     Colon cancer Neg Hx     Esophageal cancer Neg Hx        Social History     Socioeconomic History    Marital status:      Spouse name: Not on file    Number of children: Not on file    Years of education: Not on file    Highest education level: Not on file   Occupational History    Occupation: disability    Social Needs    Financial resource strain: Not on file    Food insecurity:     Worry: Not on file     Inability: Not on file    Transportation needs:     Medical: Not on file     Non-medical: Not on file   Tobacco Use    Smoking status: Never Smoker    Smokeless tobacco: Never Used   Substance and Sexual Activity    Alcohol use: No     Comment: History of alcoholism in remission.    Drug use: No    Sexual activity: Not Currently   Lifestyle    Physical activity:     Days per week: Not on file     Minutes per session: Not on file    Stress: Not on file   Relationships    Social connections:     Talks on phone: Not on file     Gets together: Not on file     Attends Yazidism service: Not on file     Active member of club or organization: Not on file     Attends meetings of  "clubs or organizations: Not on file     Relationship status: Not on file   Other Topics Concern    Are you pregnant or think you may be? Not Asked    Breast-feeding Not Asked   Social History Narrative    Not on file       Review of Systems   Constitutional: Negative for chills, fatigue and fever.   HENT: Positive for congestion, ear pain, postnasal drip, rhinorrhea and sore throat. Negative for ear discharge, hearing loss, nosebleeds and trouble swallowing.    Eyes: Negative for pain and visual disturbance.   Respiratory: Negative for cough, shortness of breath and wheezing.    Cardiovascular: Negative for chest pain and palpitations.   Gastrointestinal: Negative for abdominal distention, abdominal pain, constipation, diarrhea, nausea and vomiting.   Genitourinary: Negative for decreased urine volume, difficulty urinating, dysuria, hematuria and urgency.   Musculoskeletal: Negative for arthralgias, back pain and myalgias.   Skin: Negative for color change and rash.   Neurological: Negative for dizziness, tremors, weakness, light-headedness, numbness and headaches.   Psychiatric/Behavioral: Negative for agitation, behavioral problems and confusion. The patient is not nervous/anxious.      Objective:     Vitals:    05/29/20 1411   BP: (!) 100/52   BP Location: Left arm   Patient Position: Sitting   BP Method: Small (Manual)   Pulse: 70   Temp: 98.3 °F (36.8 °C)   TempSrc: Oral   SpO2: 99%   Weight: 45.5 kg (100 lb 5 oz)   Height: 5' 2" (1.575 m)       Physical Exam   Constitutional: She appears well-developed and well-nourished.   HENT:   Head: Normocephalic and atraumatic.   Right Ear: Hearing, tympanic membrane, external ear and ear canal normal.   Left Ear: Hearing and external ear normal. There is tenderness. No drainage or swelling.   Nose: Nose normal.   Mouth/Throat: Oropharynx is clear and moist.   Mildly erythematous left pinna, erythematous and inflamed left external auditory canal   Eyes: Pupils are " equal, round, and reactive to light. Conjunctivae are normal.   Neck: Neck supple. No tracheal deviation present.   Cardiovascular: Normal rate, regular rhythm and normal heart sounds. Exam reveals no gallop and no friction rub.   No murmur heard.  Pulmonary/Chest: Effort normal and breath sounds normal. No respiratory distress. She has no wheezes. She has no rales.   Abdominal: Soft. Bowel sounds are normal. She exhibits no distension. There is no tenderness. There is no rebound and no guarding.   Musculoskeletal: She exhibits no deformity.   Neurological: She is alert.   Skin: Skin is warm and dry.   Psychiatric: She has a normal mood and affect. Her behavior is normal.   Nursing note and vitals reviewed.     Assessment:      1. Acute otitis externa of left ear, unspecified type    2. Pruritus    3. Post-nasal drip    4. Nasal congestion      Plan:   Gay was seen today for ear fullness.    Diagnoses and all orders for this visit:    Acute otitis externa of left ear, unspecified type  -     Start ciprofloxacin-dexamethasone 0.3-0.1% (CIPRODEX) 0.3-0.1 % DrpS; Place 4 drops into the left ear 2 (two) times daily for 7 days.  If no improvement or for any worsening then recommended follow up appointment for further evaluation and management.    Addendum:  Received call from patient's pharmacy that Ciprodex drops are not covered.  Canceled that Rx and sent in Cortisporin instead.    Pruritus  -     Start hydrOXYzine pamoate (VISTARIL) 25 MG Cap; Take 1 capsule (25 mg total) by mouth every 8 (eight) hours as needed (itching).  Counseled patient on potential adverse effects.    Post-nasal drip; Nasal congestion        -     Recommended OTC Flonase PRN

## 2020-06-01 ENCOUNTER — TELEPHONE (OUTPATIENT)
Dept: INTERNAL MEDICINE | Facility: CLINIC | Age: 55
End: 2020-06-01

## 2020-08-03 ENCOUNTER — TELEPHONE (OUTPATIENT)
Dept: GASTROENTEROLOGY | Facility: CLINIC | Age: 55
End: 2020-08-03

## 2020-09-01 ENCOUNTER — OFFICE VISIT (OUTPATIENT)
Dept: SPORTS MEDICINE | Facility: CLINIC | Age: 55
End: 2020-09-01
Payer: MEDICARE

## 2020-09-01 ENCOUNTER — HOSPITAL ENCOUNTER (OUTPATIENT)
Dept: RADIOLOGY | Facility: HOSPITAL | Age: 55
Discharge: HOME | End: 2020-09-01
Attending: ORTHOPAEDIC SURGERY
Payer: MEDICARE

## 2020-09-01 VITALS
DIASTOLIC BLOOD PRESSURE: 66 MMHG | WEIGHT: 101 LBS | SYSTOLIC BLOOD PRESSURE: 104 MMHG | BODY MASS INDEX: 18.47 KG/M2 | HEART RATE: 79 BPM

## 2020-09-01 DIAGNOSIS — S83.005S CLOSED PATELLAR DISLOCATION, LEFT, SEQUELA: ICD-10-CM

## 2020-09-01 DIAGNOSIS — M76.892 HAMSTRING TENDONITIS OF LEFT THIGH: ICD-10-CM

## 2020-09-01 DIAGNOSIS — M99.06 SOMATIC DYSFUNCTION OF LOWER EXTREMITY: ICD-10-CM

## 2020-09-01 DIAGNOSIS — M99.03 SOMATIC DYSFUNCTION OF LUMBAR REGION: ICD-10-CM

## 2020-09-01 DIAGNOSIS — G89.29 CHRONIC PAIN OF LEFT KNEE: Primary | ICD-10-CM

## 2020-09-01 DIAGNOSIS — M25.562 CHRONIC PAIN OF LEFT KNEE: Primary | ICD-10-CM

## 2020-09-01 DIAGNOSIS — M76.32 IT BAND SYNDROME, LEFT: ICD-10-CM

## 2020-09-01 DIAGNOSIS — M25.562 LEFT KNEE PAIN, UNSPECIFIED CHRONICITY: ICD-10-CM

## 2020-09-01 DIAGNOSIS — M99.04 SOMATIC DYSFUNCTION OF SACRAL REGION: ICD-10-CM

## 2020-09-01 DIAGNOSIS — M99.05 SOMATIC DYSFUNCTION OF PELVIS REGION: ICD-10-CM

## 2020-09-01 PROCEDURE — 98926 OSTEOPATH MANJ 3-4 REGIONS: CPT | Mod: PBBFAC | Performed by: ORTHOPAEDIC SURGERY

## 2020-09-01 PROCEDURE — 98926 PR OSTEOPATHIC MANIP,3-4 BODY REGN: ICD-10-PCS | Mod: S$PBB,,, | Performed by: ORTHOPAEDIC SURGERY

## 2020-09-01 PROCEDURE — 98926 OSTEOPATH MANJ 3-4 REGIONS: CPT | Mod: S$PBB,,, | Performed by: ORTHOPAEDIC SURGERY

## 2020-09-01 PROCEDURE — 99213 OFFICE O/P EST LOW 20 MIN: CPT | Mod: PBBFAC | Performed by: ORTHOPAEDIC SURGERY

## 2020-09-01 PROCEDURE — 99214 PR OFFICE/OUTPT VISIT, EST, LEVL IV, 30-39 MIN: ICD-10-PCS | Mod: 25,S$PBB,, | Performed by: ORTHOPAEDIC SURGERY

## 2020-09-01 PROCEDURE — 99214 OFFICE O/P EST MOD 30 MIN: CPT | Mod: 25,S$PBB,, | Performed by: ORTHOPAEDIC SURGERY

## 2020-09-01 PROCEDURE — 99999 PR PBB SHADOW E&M-EST. PATIENT-LVL III: ICD-10-PCS | Mod: PBBFAC,,, | Performed by: ORTHOPAEDIC SURGERY

## 2020-09-01 PROCEDURE — 99999 PR PBB SHADOW E&M-EST. PATIENT-LVL III: CPT | Mod: PBBFAC,,, | Performed by: ORTHOPAEDIC SURGERY

## 2020-09-01 NOTE — PROGRESS NOTES
CC: left knee pain    55 y.o. Female presents today for evaluation of her left knee pain. Patient admits to left knee pain for the past eight weeks without new mechanism of injury. Recall patient has a history of patellar dislocation 12/13/2018. When asked where she hurts she gestures to the lateral aspect of her right knee and states she has been unable to run and exercise at the level she would like to. She also notes left posterior hip pain without mechanism of injury that she has had OMT for in the past which she appreciated benefit with.   How long: Patient admits to increasing left knee pain over the past 8 weeks.   What makes it better: Patient has been unable to find anything to improve her symptoms.   What makes it worse: Patient admits to increased pain with exercise.   Does it radiate: Denies radiating pain.   Attempted treatments: Patient states she has been taking tumeric for this problem. She also admits to doing an HEP that was previously prescribed to her. She denies topical medications. She denies physical therapy. Denies history of knee injection or surgery.   Pain score: 4/10  Any mechanical symptoms: Patient denies to mechanical symptoms.   Feelings of instability: Admits to feelings of instability.   Affect on ADLs: Denies this problem affecting her ability to perform ADLs.     REVIEW OF SYSTEMS:   Constitution: Patient denies fever, chills, night sweats, and weight changes.  Eyes: Patient denies eye pain or vision changes.  HENT: Patient denies headache, ear pain, sore throat, or nasal discharge.  CVS: Patient denies chest pain.  Lungs: Patient denies shortness of breath or cough.  Abd: Patient denies stomach pain, nausea, or vomiting.  Skin: Patient denies skin rash or itching.    Hematologic/Lymphatic: Patient denies easy bruising.   Musculoskeletal: Patient denies recent falls. See HPI.  Psych: Patient denies any current anxiety or nervousness.     PAST MEDICAL HISTORY:   Past Medical  History:   Diagnosis Date    Abnormal coronary angiogram     Alcohol abuse, in remission     Anxiety     Bipolar affective     since teenage years    Chronic idiopathic constipation     Depression     H. pylori infection     Headache(784.0)     followed by Dr. Corona    Hypothyroid     Memory loss     Osteopenia     Seizures        PAST SURGICAL HISTORY:   Past Surgical History:   Procedure Laterality Date    BUNIONECTOMY      COLONOSCOPY N/A 4/12/2017    Procedure: COLONOSCOPY;  Surgeon: Estuardo Salazar MD;  Location: [facility] ([address] [place] [address]);  Service: Endoscopy;  Laterality: N/A;  PM prep    ESOPHAGOGASTRODUODENOSCOPY N/A 10/23/2018    Procedure: EGD (ESOPHAGOGASTRODUODENOSCOPY);  Surgeon: Irlanda Schwarz MD;  Location: [facility] ([address] [place] [address]);  Service: Endoscopy;  Laterality: N/A;    TUBAL LIGATION         FAMILY HISTORY:   Family History   Problem Relation Age of Onset    Stroke Mother     Diabetes Mother     Anuerysm Mother         brain    Dementia Maternal Grandmother     Hypertension Father     Coronary artery disease Father     Kidney disease Father     Diabetes Father     Crohn's disease Maternal Aunt     Crohn's disease Other     Melanoma Neg Hx     Psoriasis Neg Hx     Lupus Neg Hx     Colon cancer Neg Hx     Esophageal cancer Neg Hx        SOCIAL HISTORY:   Social History     Socioeconomic History    Marital status:      Spouse name: Not on file    Number of children: Not on file    Years of education: Not on file    Highest education level: Not on file   Occupational History    Occupation: disability    Social Needs    Financial resource strain: Not on file    Food insecurity     Worry: Not on file     Inability: Not on file    Transportation needs     Medical: Not on file     Non-medical: Not on file   Tobacco Use    Smoking status: Never Smoker    Smokeless tobacco: Never Used   Substance and Sexual Activity    Alcohol use: No     Comment: History of alcoholism  in remission.    Drug use: No    Sexual activity: Not Currently   Lifestyle    Physical activity     Days per week: Not on file     Minutes per session: Not on file    Stress: Not on file   Relationships    Social connections     Talks on phone: Not on file     Gets together: Not on file     Attends [employer] service: Not on file     Active member of club or organization: Not on file     Attends meetings of clubs or organizations: Not on file     Relationship status: Not on file   Other Topics Concern    Are you pregnant or think you may be? Not Asked    Breast-feeding Not Asked   Social History Narrative    Not on file       MEDICATIONS:     Current Outpatient Medications:     Ca-D3-mag#11-zinc-cupr-man-bor 600 mg calcium- 800 unit-50 mg Tab, Take 1 tablet by mouth once daily., Disp: , Rfl:     cholecalciferol, vitamin D3, (VITAMIN D3) 1,000 unit capsule, Take 1,000 Units by mouth once daily., Disp: , Rfl:     cyanocobalamin, vitamin B-12, (VITAMIN B-12) 5,000 mcg Subl, Place under the tongue once daily. , Disp: , Rfl:     fish oil-omega-3 fatty acids 300-1,000 mg capsule, Take 1 capsule by mouth once daily., Disp: , Rfl:     lamoTRIgine (LAMICTAL) 100 MG tablet, Take 150 mg by mouth once daily. , Disp: , Rfl:     linaCLOtide (LINZESS) 290 mcg Cap capsule, Take 1 capsule (290 mcg total) by mouth once daily., Disp: 90 capsule, Rfl: 3    multivitamin (DAILY MULTI-VITAMIN) per tablet, Take 1 tablet by mouth once daily., Disp: , Rfl:     QUEtiapine (SEROQUEL) 100 MG Tab, Take 300 mg by mouth once daily. , Disp: , Rfl:     TURMERIC ORAL, Take by mouth., Disp: , Rfl:     hydrOXYzine pamoate (VISTARIL) 25 MG Cap, Take 1 capsule (25 mg total) by mouth every 8 (eight) hours as needed (itching). (Patient not taking: Reported on 9/1/2020), Disp: 30 capsule, Rfl: 0    levothyroxine (SYNTHROID) 25 MCG tablet, TAKE 1 TABLET BY MOUTH ONCE DAILY (Patient not taking: Reported on 9/1/2020), Disp: 90 tablet, Rfl:  3    temazepam (RESTORIL) 30 mg capsule, , Disp: , Rfl:     ALLERGIES:   Review of patient's allergies indicates:  No Known Allergies     PHYSICAL EXAMINATION:  /66   Pulse 79   Wt 45.8 kg (101 lb)   BMI 18.47 kg/m²   Vitals signs and nursing note have been reviewed.  General: In no acute distress, well developed, well nourished, no diaphoresis  Eyes: EOM full and smooth, no eye redness or discharge  HENT: normocephalic and atraumatic, neck supple, trachea midline, no nasal discharge, no external ear redness or discharge  Cardiovascular: 2+ and symmetric DP pulses bilaterally, no LE edema  Lungs: respirations non-labored, no conversational dyspnea   Abd: non-distended, no rigidity  MSK: no amputation or deformity, no swelling of extremities  Neuro: AAOx3, CN2-12 grossly intact  Skin: No rashes, warm and dry  Psychiatric: cooperative, pleasant, mood and affect appropriate for age    MUSCULOSKELETAL EXAM:    LEFT KNEE EXAMINATION   Affected side is compared to contralateral knee     Observation:  No edema, erythema, ecchymosis, or effusion noted.  No muscle atrophy of the thighs and calves noted.  No obvious bony deformities noted.   No genu valgus/varum noted. No recurvatum noted.    No tibial internal/external torsion.      Tenderness:  Patella - none    Lateral joint line - none  Quad tendon - none   Medial joint line - none  Patellar tendon - none   Medial plica - none  Tibial tubercle - none   Lateral plica - none  Pes anserine - none   MCL prox - none  Distal ITB - none   MCL distal - none  MFC - none    LCL prox - none  LFC - none    LCL distal - none  Tibia - none    Fibula - none    No obvious bursae, plicae, popliteal cysts, or tendon derangement palpated.          ROM:   Active extension to 0° on left without hyperextension, lag, crepitus, or patellar J sign.   Active extension to 0° on right without hyperextension, lag, crepitus, or patellar J sign.   Active flexion to 135° on left and 135° on  right.    Strength: (bilaterally)  Knee Flexion - 5/5 on left and 5/5 on left  Knee Extension - 5/5 on left and 5/5 on left  Hip Flexion - 5/5 on left and 5/5 on left  Hip Extension - 5/5 on left and 5/5 on left  Ankle dorsiflexion - 5/5 on left and 5/5 on left  Ankle Plantarflexion - 5/5 on left and 5/5 on left    Patellofemoral Exam:  Patellar ballottement - negative  Bulge sign - negative  Patellar grind - negative  Increased patellar laxity with medial and lateral translation on left  + apprehension with medial and lateral patellar translation.     IT band exam:   left IT band tightness appreciated to palpation   positive Noble test   positive Obers test    Posture:  Upright  Gait: Non-antalgic with Neutral ankle mechanics     TART (Tissue texture abnormality, Asymmetry,  Restriction of motion and/or Tenderness) changes:     Cervical Spine  Thoracic Spine  Lumbar Spine   C1  T1 Neutral L1 Neutral   C2  T2 Neutral L2 Neutral   C3  T3 Neutral L3 Neutral   C4  T4 Neutral L4 FRSL   C5  T5 Neutral L5 FRSL   C6  T6 Neutral     C7  T7 Neutral       T8 Neutral       T9 Neutral       T10 Neutral       T11 Neutral       T12 Neutral       Pelvis:  · Innominate:Left anterior rotation  · Pubic bone:Left interior pubic shear   · Fascial herniated trigger point at the lateral aspect of the left ischium    Sacrum:Right on Left sacral torsion     Lower extremity:  Myofascial restriction left lateral thigh  Fascial continuum distortion at the lateral left knee in the distal IT band      Key   F= Flexed   E = Extended   R = Rotated   S = Sidebent   TTA = tissue texture abnormality       Neurovascular Examination:   Normal gait without antalgia.  Sensation intact to light touch in the obturator, lateral/intermediate/medial/posterior femoral cutaneous, saphenous, and common peroneal nerves bilaterally.   Pulses intact at the DP and PT arteries bilaterally.    Capillary refill intact <2 seconds in all toes  bilaterally.      ASSESSMENT:      ICD-10-CM ICD-9-CM   1. Chronic pain of left knee  M25.562 719.46    G89.29 338.29   2. Hamstring tendonitis of left thigh  M76.892 727.09   3. Closed patellar dislocation, left, sequela  S83.005S 905.6   4. It band syndrome, left  M76.32 728.89   5. Somatic dysfunction of lumbar region  M99.03 739.3   6. Somatic dysfunction of pelvis region  M99.05 739.5   7. Somatic dysfunction of sacral region  M99.04 739.4   8. Somatic dysfunction of lower extremity  M99.06 739.6         PLAN:  1-4.   Chronic left knee pain / hamstring tendinitis/history of patellar dislocations/IT band syndrome - improved then deteriorated    - Gay admits to increasing left knee pain for the past eight weeks.  She points to the lateral aspect of her knee when asked where it hurts. She also points with 3 fingers to her left ischium at the hamstring attachment and states that in the past Dr. Kristyn Rmaos was able to treat this with direct pressure and her pain completely improved.    - Based on her description of pain/body language and somatic dysfunction identified on exam, I discussed osteopathic manipulation as a treatment option today. She consents to evaluation and treatment. See below.     - HEP for IT band syndrome prescribed today. Handout provided, explained, and exercises were demonstrated as needed. Encouraged to do daily.    - Her exam findings are very consistent with the complaints that she has.  I am hopeful that by addressing the pelvic dysfunction this will help to optimize her exercises and she will appreciate pain improvement.      5-8.  Somatic dysfunction of lumbar, pelvis, sacral, lower extremity regions    - OMT 3-4 regions. Oral consent obtained. Reviewed benefits and potential side effects. OMT indicated today due to signs and symptoms as well as local and remote somatic dysfunction findings and their related neurokinetic, lymphatic, fascial and/or arteriovenous body connections.  OMT techniques used: Soft Tissue, Myofascial Release, Muscle Energy and Fascial Distortion Model. Treatment was tolerated well. Improvement noted in segmental mobility post-treatment in dysfunctional regions. There were no adverse events and no complications immediately following treatment. Advised plenty of water to help alleviate soreness.      Future planning includes -  If not improving, consider ischial bursa injection. possibly more OMT if helpful and if indicated, possibly physical therapy     All questions were answered to the best of my ability and all concerns were addressed at this time.    Follow up in 3 weeks for above, or sooner if needed.      This note is dictated using the M*Modal Fluency Direct word recognition program. There are word recognition mistakes that are occasionally missed on review.

## 2020-09-01 NOTE — PROGRESS NOTES
CC: left knee pain    55 y.o. Female presents today for evaluation of her left knee pain. 2018 torn ACL - patient states she has not been able to run for the past 8 weeks // patient has been doing HEP //   How long:  What makes it better:  What makes it worse:  Does it radiate:  Attempted treatments: tumeric   Pain score:   Any mechanical symptoms: Patient denies to mechanical symptoms.   Feelings of instability:  Affect on ADLs:    REVIEW OF SYSTEMS:   Constitution: Patient denies fever, chills, night sweats, and weight changes.  Eyes: Patient denies eye pain or vision changes.  HENT: Patient denies headache, ear pain, sore throat, or nasal discharge.  CVS: Patient denies chest pain.  Lungs: Patient denies shortness of breath or cough.  Abd: Patient denies stomach pain, nausea, or vomiting.  Skin: Patient denies skin rash or itching.    Hematologic/Lymphatic: Patient denies easy bruising.   Musculoskeletal: Patient denies recent falls. See HPI.  Psych: Patient denies any current anxiety or nervousness.     PAST MEDICAL HISTORY:   Past Medical History:   Diagnosis Date    Abnormal coronary angiogram     Alcohol abuse, in remission     Anxiety     Bipolar affective     since teenage years    Chronic idiopathic constipation     Depression     H. pylori infection     Headache(784.0)     followed by Dr. Corona    Hypothyroid     Memory loss     Osteopenia     Seizures        PAST SURGICAL HISTORY:   Past Surgical History:   Procedure Laterality Date    BUNIONECTOMY      COLONOSCOPY N/A 4/12/2017    Procedure: COLONOSCOPY;  Surgeon: Estuadro Salazar MD;  Location: [address]);  Service: Endoscopy;  Laterality: N/A;  PM prep    ESOPHAGOGASTRODUODENOSCOPY N/A 10/23/2018    Procedure: EGD (ESOPHAGOGASTRODUODENOSCOPY);  Surgeon: Irlanda Schwarz MD;  Location: [address]);  Service: Endoscopy;  Laterality: N/A;    TUBAL LIGATION         FAMILY HISTORY:   Family History   Problem Relation Age of  Onset    Stroke Mother     Diabetes Mother     Anuerysm Mother         brain    Dementia Maternal Grandmother     Hypertension Father     Coronary artery disease Father     Kidney disease Father     Diabetes Father     Crohn's disease Maternal Aunt     Crohn's disease Other     Melanoma Neg Hx     Psoriasis Neg Hx     Lupus Neg Hx     Colon cancer Neg Hx     Esophageal cancer Neg Hx        SOCIAL HISTORY:   Social History     Socioeconomic History    Marital status:      Spouse name: Not on file    Number of children: Not on file    Years of education: Not on file    Highest education level: Not on file   Occupational History    Occupation: disability    Social Needs    Financial resource strain: Not on file    Food insecurity     Worry: Not on file     Inability: Not on file    Transportation needs     Medical: Not on file     Non-medical: Not on file   Tobacco Use    Smoking status: Never Smoker    Smokeless tobacco: Never Used   Substance and Sexual Activity    Alcohol use: No     Comment: History of alcoholism in remission.    Drug use: No    Sexual activity: Not Currently   Lifestyle    Physical activity     Days per week: Not on file     Minutes per session: Not on file    Stress: Not on file   Relationships    Social connections     Talks on phone: Not on file     Gets together: Not on file     Attends [employer] service: Not on file     Active member of club or organization: Not on file     Attends meetings of clubs or organizations: Not on file     Relationship status: Not on file   Other Topics Concern    Are you pregnant or think you may be? Not Asked    Breast-feeding Not Asked   Social History Narrative    Not on file       MEDICATIONS:     Current Outpatient Medications:     Ca-D3-mag#11-zinc-cupr-man-bor 600 mg calcium- 800 unit-50 mg Tab, Take 1 tablet by mouth once daily., Disp: , Rfl:     cholecalciferol, vitamin D3, (VITAMIN D3) 1,000 unit capsule, Take  1,000 Units by mouth once daily., Disp: , Rfl:     cyanocobalamin, vitamin B-12, (VITAMIN B-12) 5,000 mcg Subl, Place under the tongue once daily. , Disp: , Rfl:     fish oil-omega-3 fatty acids 300-1,000 mg capsule, Take 1 capsule by mouth once daily., Disp: , Rfl:     lamoTRIgine (LAMICTAL) 100 MG tablet, Take 150 mg by mouth once daily. , Disp: , Rfl:     linaCLOtide (LINZESS) 290 mcg Cap capsule, Take 1 capsule (290 mcg total) by mouth once daily., Disp: 90 capsule, Rfl: 3    multivitamin (DAILY MULTI-VITAMIN) per tablet, Take 1 tablet by mouth once daily., Disp: , Rfl:     QUEtiapine (SEROQUEL) 100 MG Tab, Take 300 mg by mouth once daily. , Disp: , Rfl:     TURMERIC ORAL, Take by mouth., Disp: , Rfl:     hydrOXYzine pamoate (VISTARIL) 25 MG Cap, Take 1 capsule (25 mg total) by mouth every 8 (eight) hours as needed (itching). (Patient not taking: Reported on 9/1/2020), Disp: 30 capsule, Rfl: 0    levothyroxine (SYNTHROID) 25 MCG tablet, TAKE 1 TABLET BY MOUTH ONCE DAILY (Patient not taking: Reported on 9/1/2020), Disp: 90 tablet, Rfl: 3    temazepam (RESTORIL) 30 mg capsule, , Disp: , Rfl:     ALLERGIES:   Review of patient's allergies indicates:  No Known Allergies     PHYSICAL EXAMINATION:  /66   Pulse 79   Wt 45.8 kg (101 lb)   BMI 18.47 kg/m²   Vitals signs and nursing note have been reviewed.  General: In no acute distress, well developed, well nourished, no diaphoresis  Eyes: EOM full and smooth, no eye redness or discharge  HENT: normocephalic and atraumatic, neck supple, trachea midline, no nasal discharge, no external ear redness or discharge  Cardiovascular: 2+ and symmetric DP pulses bilaterally, no LE edema  Lungs: respirations non-labored, no conversational dyspnea   Abd: non-distended, no rigidity  MSK: no amputation or deformity, no swelling of extremities  Neuro: AAOx3, CN2-12 grossly intact  Skin: No rashes, warm and dry  Psychiatric: cooperative, pleasant, mood and affect  appropriate for age    MUSCULOSKELETAL EXAM:    LEFT KNEE EXAMINATION   Affected side is compared to contralateral knee     Observation:  No edema, erythema, ecchymosis, or effusion noted.  No muscle atrophy of the thighs and calves noted.  No obvious bony deformities noted.   No genu valgus/varum noted. No recurvatum noted.    No tibial internal/external torsion.    No pes planus/cavus.    Tenderness:  Patella - none    Lateral joint line - none  Quad tendon - none   Medial joint line - none  Patellar tendon - none   Medial plica - none  Tibial tubercle - none   Lateral plica - none  Pes anserine - none   MCL prox - none  Distal ITB - none   MCL distal - none  MFC - none    LCL prox - none  LFC - none    LCL distal - none  Tibia - none    Fibula - none    No obvious bursae, plicae, popliteal cysts, or tendon derangement palpated.          ROM:   Active extension to 0° on left without hyperextension, lag, crepitus, or patellar J sign.   Active extension to 0° on right without hyperextension, lag, crepitus, or patellar J sign. Active flexion to 135° on left and 135° on right.    Strength: (bilaterally)  Knee Flexion - 5/5 on left and 5/5 on right  Knee Extension - 5/5 on left and 5/5 on right  Hip Flexion - 5/5 on left and 5/5 on right  Hip Extension - 5/5 on left and 5/5 on right  Ankle dorsiflexion - 5/5 on left and 5/5 on right  Ankle Plantarflexion - 5/5 on left and 5/5 on right    Patellofemoral Exam:  Patellar ballottement - negative  Bulge sign - negative  Patellar grind - negative  No patellar laxity with medial and lateral translation   No apprehension with medial and lateral patellar translation.     Meniscus Testing:     No pain with terminal extension and flexion.  Yinkas test - negative   Thesaly test - negative  Bounce home test - negative    Ligament Testing:  Lachman's test - negative  No laxity with anterior drawer.  No laxity with posterior drawer.    No posterior sag sign.   Prone dial testing  - negative  No laxity with varus testing at 0 and 30 degrees.  No laxity with valgus testing at 0 and 30 degrees.    IT Band Testing:  Chidis test - negative   Noble Compression test - negative    Neurovascular Examination:   Normal gait without antalgia.  Sensation intact to light touch in the obturator, lateral/intermediate/medial/posterior femoral cutaneous, saphenous, and common peroneal nerves bilaterally.  DTRs: 2+/4 reflexes at L4 and S1 dermatomes.    Pulses intact at the DP and PT arteries bilaterally.    Capillary refill intact <2 seconds in all toes bilaterally.      IMAGIN. X-ray ordered due to left knee pain   2. X-ray images were reviewed personally by me and then directly with patient.  3. FINDINGS: X-ray images obtained demonstrate no cortical irregularities, sclerosis, osteophyte formation, or subchonral cysts. There is no joint space narrowing.  4. IMPRESSION: No pathology or irregularities appreciated.       ASSESSMENT:      ICD-10-CM ICD-9-CM   1. Left knee pain, unspecified chronicity  M25.562 719.46         PLAN:  ***    Future planning includes -     All questions were answered to the best of my ability and all concerns were addressed at this time.    Follow up in *** weeks for above, or sooner if needed.      This note is dictated using the M*Modal Fluency Direct word recognition program. There are word recognition mistakes that are occasionally missed on review.

## 2020-09-16 ENCOUNTER — TELEPHONE (OUTPATIENT)
Dept: SPORTS MEDICINE | Facility: CLINIC | Age: 55
End: 2020-09-16

## 2020-09-16 NOTE — TELEPHONE ENCOUNTER
Returned patient phone call regarding upcoming appointment. Patient states she is not feeling improvement at the ischial bursa and would like to make sure we are away of the possible injection. I confirmed with the patient this information is in her note and I will make a note in her chart. Understanding and agreement was expressed by the patient.    Lay Gill MS, OTC  Clinical Assistant to Dr. Sachin Ramos

## 2020-09-22 ENCOUNTER — OFFICE VISIT (OUTPATIENT)
Dept: SPORTS MEDICINE | Facility: CLINIC | Age: 55
End: 2020-09-22
Payer: MEDICARE

## 2020-09-22 VITALS
DIASTOLIC BLOOD PRESSURE: 62 MMHG | HEART RATE: 76 BPM | HEIGHT: 62 IN | WEIGHT: 103 LBS | SYSTOLIC BLOOD PRESSURE: 97 MMHG | BODY MASS INDEX: 18.95 KG/M2

## 2020-09-22 DIAGNOSIS — M25.562 CHRONIC PAIN OF LEFT KNEE: ICD-10-CM

## 2020-09-22 DIAGNOSIS — M76.32 IT BAND SYNDROME, LEFT: ICD-10-CM

## 2020-09-22 DIAGNOSIS — S83.005S CLOSED PATELLAR DISLOCATION, LEFT, SEQUELA: ICD-10-CM

## 2020-09-22 DIAGNOSIS — M76.892 HAMSTRING TENDONITIS OF LEFT THIGH: ICD-10-CM

## 2020-09-22 DIAGNOSIS — M70.72 ISCHIAL BURSITIS OF LEFT SIDE: Primary | ICD-10-CM

## 2020-09-22 DIAGNOSIS — G89.29 CHRONIC PAIN OF LEFT KNEE: ICD-10-CM

## 2020-09-22 PROCEDURE — 99999 PR PBB SHADOW E&M-EST. PATIENT-LVL III: ICD-10-PCS | Mod: PBBFAC,,, | Performed by: ORTHOPAEDIC SURGERY

## 2020-09-22 PROCEDURE — 20611 PR DRAIN/ASP/INJECT MAJOR JOINT/BURSA W/US GUIDANCE: ICD-10-PCS | Mod: S$PBB,LT,, | Performed by: ORTHOPAEDIC SURGERY

## 2020-09-22 PROCEDURE — 20611 DRAIN/INJ JOINT/BURSA W/US: CPT | Mod: PBBFAC,LT | Performed by: ORTHOPAEDIC SURGERY

## 2020-09-22 PROCEDURE — 20611 DRAIN/INJ JOINT/BURSA W/US: CPT | Mod: S$PBB,LT,, | Performed by: ORTHOPAEDIC SURGERY

## 2020-09-22 PROCEDURE — 99213 OFFICE O/P EST LOW 20 MIN: CPT | Mod: PBBFAC | Performed by: ORTHOPAEDIC SURGERY

## 2020-09-22 PROCEDURE — 99214 PR OFFICE/OUTPT VISIT, EST, LEVL IV, 30-39 MIN: ICD-10-PCS | Mod: 25,S$PBB,, | Performed by: ORTHOPAEDIC SURGERY

## 2020-09-22 PROCEDURE — 96372 THER/PROPH/DIAG INJ SC/IM: CPT | Mod: PBBFAC | Performed by: ORTHOPAEDIC SURGERY

## 2020-09-22 PROCEDURE — 99999 PR PBB SHADOW E&M-EST. PATIENT-LVL III: CPT | Mod: PBBFAC,,, | Performed by: ORTHOPAEDIC SURGERY

## 2020-09-22 PROCEDURE — 99214 OFFICE O/P EST MOD 30 MIN: CPT | Mod: 25,S$PBB,, | Performed by: ORTHOPAEDIC SURGERY

## 2020-09-22 RX ORDER — TRIAMCINOLONE ACETONIDE 40 MG/ML
40 INJECTION, SUSPENSION INTRA-ARTICULAR; INTRAMUSCULAR
Status: DISCONTINUED | OUTPATIENT
Start: 2020-09-22 | End: 2021-06-14

## 2020-10-19 ENCOUNTER — TELEPHONE (OUTPATIENT)
Dept: GASTROENTEROLOGY | Facility: CLINIC | Age: 55
End: 2020-10-19

## 2020-10-19 NOTE — TELEPHONE ENCOUNTER
MA contact pt regarding message below. Pt didn't answer left detail message to return call to our office.     ----- Message from Kacey Chaves sent at 10/19/2020  9:21 AM CDT -----  Pt call want to know if Dr. Salazar could recommend her a Primary Care Physician. Call back # [phone]

## 2020-10-21 ENCOUNTER — TELEPHONE (OUTPATIENT)
Dept: GASTROENTEROLOGY | Facility: CLINIC | Age: 55
End: 2020-10-21

## 2020-10-21 NOTE — TELEPHONE ENCOUNTER
[facility] and she can research different pcp to see who best fit her.     Pt verbalize understanding and thank MA     ----- Message from Emily Garcia sent at 10/21/2020  9:53 AM CDT -----  Contact: Pt @ [phone]  Pt's returning call to [place] regarding PCP's recommended. Pt states both providers were not accepting new Pt's at the moment. Pt would like to know if possible could Dr. Salazar notify the providers to see if either would accept her as a new Pt. Please follow up with Pt for further assistance.        Please contact Pt @ [phone]

## 2020-10-21 NOTE — TELEPHONE ENCOUNTER
MA spoke to pt,     Pt would like to know what pcp do he recommend     MA provided name of pcp Dr. Bedoya and Dr. Santos to pt with the phone number [phone] to schedule appointment.     Pt verbalize understanding and thank MA     ----- Message from Kacey Chaves sent at 10/21/2020  8:16 AM CDT -----  Gay Gurrola calling regarding  a miss call from [facility]   [phone]

## 2020-10-27 ENCOUNTER — TELEPHONE (OUTPATIENT)
Dept: SPORTS MEDICINE | Facility: CLINIC | Age: 55
End: 2020-10-27

## 2020-10-27 ENCOUNTER — TELEPHONE (OUTPATIENT)
Dept: INTERNAL MEDICINE | Facility: CLINIC | Age: 55
End: 2020-10-27

## 2020-10-27 ENCOUNTER — HOSPITAL ENCOUNTER (OUTPATIENT)
Dept: RADIOLOGY | Facility: HOSPITAL | Age: 55
Discharge: HOME | End: 2020-10-27
Attending: FAMILY MEDICINE
Payer: MEDICARE

## 2020-10-27 ENCOUNTER — OFFICE VISIT (OUTPATIENT)
Dept: INTERNAL MEDICINE | Facility: CLINIC | Age: 55
End: 2020-10-27
Payer: MEDICARE

## 2020-10-27 VITALS
SYSTOLIC BLOOD PRESSURE: 98 MMHG | DIASTOLIC BLOOD PRESSURE: 70 MMHG | WEIGHT: 100.06 LBS | OXYGEN SATURATION: 99 % | BODY MASS INDEX: 18.41 KG/M2 | HEIGHT: 62 IN | TEMPERATURE: 98 F | HEART RATE: 66 BPM

## 2020-10-27 DIAGNOSIS — F31.9 BIPOLAR I DISORDER: ICD-10-CM

## 2020-10-27 DIAGNOSIS — Z81.8 FAMILY HISTORY OF DEMENTIA: ICD-10-CM

## 2020-10-27 DIAGNOSIS — F32.A DEPRESSION, UNSPECIFIED DEPRESSION TYPE: Chronic | ICD-10-CM

## 2020-10-27 DIAGNOSIS — Z79.899 MEDICATION MANAGEMENT: ICD-10-CM

## 2020-10-27 DIAGNOSIS — R10.12 LUQ ABDOMINAL PAIN: ICD-10-CM

## 2020-10-27 DIAGNOSIS — M85.80 OSTEOPENIA, UNSPECIFIED LOCATION: ICD-10-CM

## 2020-10-27 DIAGNOSIS — F10.11 ALCOHOL ABUSE, IN REMISSION: ICD-10-CM

## 2020-10-27 DIAGNOSIS — Z00.00 ANNUAL PHYSICAL EXAM: Primary | ICD-10-CM

## 2020-10-27 DIAGNOSIS — M70.72 ISCHIAL BURSITIS OF LEFT SIDE: Primary | ICD-10-CM

## 2020-10-27 DIAGNOSIS — Z92.89 HISTORY OF POSITIVE PPD: ICD-10-CM

## 2020-10-27 DIAGNOSIS — R74.8 ELEVATED LIPASE: ICD-10-CM

## 2020-10-27 DIAGNOSIS — E78.89 ELEVATED HDL: ICD-10-CM

## 2020-10-27 DIAGNOSIS — Z79.899 OTHER LONG TERM (CURRENT) DRUG THERAPY: ICD-10-CM

## 2020-10-27 DIAGNOSIS — E03.9 HYPOTHYROIDISM, UNSPECIFIED TYPE: ICD-10-CM

## 2020-10-27 PROCEDURE — 99396 PREV VISIT EST AGE 40-64: CPT | Mod: S$PBB,,, | Performed by: FAMILY MEDICINE

## 2020-10-27 PROCEDURE — 99999 PR PBB SHADOW E&M-EST. PATIENT-LVL V: CPT | Mod: PBBFAC,,, | Performed by: FAMILY MEDICINE

## 2020-10-27 PROCEDURE — 71046 XR CHEST PA AND LATERAL: ICD-10-PCS | Mod: 26,,, | Performed by: RADIOLOGY

## 2020-10-27 PROCEDURE — 99999 PR PBB SHADOW E&M-EST. PATIENT-LVL V: ICD-10-PCS | Mod: PBBFAC,,, | Performed by: FAMILY MEDICINE

## 2020-10-27 PROCEDURE — 71046 X-RAY EXAM CHEST 2 VIEWS: CPT | Mod: TC

## 2020-10-27 PROCEDURE — 99215 OFFICE O/P EST HI 40 MIN: CPT | Mod: PBBFAC,25 | Performed by: FAMILY MEDICINE

## 2020-10-27 PROCEDURE — 71046 X-RAY EXAM CHEST 2 VIEWS: CPT | Mod: 26,,, | Performed by: RADIOLOGY

## 2020-10-27 PROCEDURE — 99396 PR PREVENTIVE VISIT,EST,40-64: ICD-10-PCS | Mod: S$PBB,,, | Performed by: FAMILY MEDICINE

## 2020-10-27 RX ORDER — LAMOTRIGINE 150 MG/1
150 TABLET ORAL 2 TIMES DAILY
COMMUNITY
Start: 2020-10-23 | End: 2021-06-14 | Stop reason: SDUPTHER

## 2020-10-27 RX ORDER — QUETIAPINE FUMARATE 300 MG/1
300 TABLET, FILM COATED ORAL NIGHTLY
COMMUNITY
Start: 2020-10-21 | End: 2021-06-14 | Stop reason: SDUPTHER

## 2020-10-27 RX ORDER — LEVOTHYROXINE SODIUM 25 UG/1
25 TABLET ORAL DAILY
Qty: 90 TABLET | Refills: 3 | Status: SHIPPED | OUTPATIENT
Start: 2020-10-27 | End: 2021-09-29 | Stop reason: SDUPTHER

## 2020-10-27 NOTE — TELEPHONE ENCOUNTER
----- Message from Alber Kelsey MD sent at 10/27/2020  2:16 PM CDT -----  Please notify patient of the following:    Chest X-ray was normal, no signs of active TB noted.

## 2020-10-27 NOTE — PROGRESS NOTES
Subjective:      Patient ID: Gay Gurrola is a 55 y.o. female.    Chief Complaint: Establish Care      HPI:  Gay Gurrola is a 55 year old female with alcohol abuse in remission, bipolar 1 disorder, chronic migraines, depression, history of seizure, hypothyroidism, osteopenia, and sleep apnea who presents to clinic today to establish care.    States her mother passed at an early age from a neurodegenerative disease.  States her mother also had a cerebral hemorrhage prior to her death.  States her maternal aunts and uncles have had Alzheimer's as well.  States her psychiatrist recommended she enquire about genetic testing for this.  States she is concerned about her potential to develop a neurodegenerative disease.    Endorses history of positive TB skin test in 1996, enquires about screening CXR.  Completed INH therapy at that time.  Denies associated symptoms currently.    Bipolar 1 disorder:  Prescribed Lamictal 150 mg by mouth twice daily, Seroquel 300 mg by mouth daily.  Sees psychiatrist--Dr. Houston.  Symptoms stable.    Depression:  Prescribed Seroquel 300 mg by mouth daily.  Symptoms stable.    Hx of seizure:  Prescribed Lamictal 150 mg by mouth twice daily.  Related to alcohol issues in the past.  Denies recent seizure activity.  Was previously on Depakote.    Hypothyroidism:  Prescribed levothyroxine 25 mcg by mouth daily.   TSH/FT4 normal 2/4/19.  Has been out of levothyroxine since 8/15/20, now noticing brittle nails and some hair loss.      IBS-C:  Prescribed Linzess 290 mcg by mouth daily.  States she is trying to wean herself off of Linzess.  Endorses chronic constipation.  States she developed severe ABD pain and constipation after a beach trip in the past.  States she then developed IBS-C.  Endorses a persistent aching sensation to her periumbilical region particularly when she is sitting drinking tea/coffee.  States it feels like a knife. States she has seen GI for this and work  up was unrevealing.  Had CT ABD/pelvis 18 showin. No CT evidence of chronic pancreatitis. 2. Probable hepatic steatosis. 3. Constipation.  Takes probiotics.      Osteopenia:  Noted on DEXA 19.  Takes supplemental vitamin D 1000 units daily.    Health Care Maintenance:  Influenza vaccination:  Refused  Last tetanus booster:  17  Shingles vaccination:  Refused  Last mammogram:  20  Last pap smear:  19  Colon cancer screening:  Last colonoscopy 17; repeat 10 years  Hepatitis C screening:  Amenable      Past Medical History:   Diagnosis Date    Abnormal coronary angiogram     Alcohol abuse, in remission     Alcohol related seizure 2012    Anxiety     Bipolar affective     since teenage years    Chronic idiopathic constipation     Depression     H. pylori infection     Headache(784.0)     followed by Dr. Corona    Hypothyroid     Memory loss     Osteopenia     Seizures        Past Surgical History:   Procedure Laterality Date    BUNIONECTOMY      COLONOSCOPY N/A 2017    Procedure: COLONOSCOPY;  Surgeon: Estuardo Salazar MD;  [facility]  Service: Endoscopy;  Laterality: N/A;  PM prep    ESOPHAGOGASTRODUODENOSCOPY N/A 10/23/2018    Procedure: EGD (ESOPHAGOGASTRODUODENOSCOPY);  Surgeon: Irlanda Schwarz MD;  [facility]  Service: Endoscopy;  Laterality: N/A;    TUBAL LIGATION         Family History   Problem Relation Age of Onset    Stroke Mother     Diabetes Mother     Anuerysm Mother         brain    Dementia Maternal Grandmother     Hypertension Father     Coronary artery disease Father     Kidney disease Father     Diabetes Father     Dementia Sister 59    Crohn's disease Maternal Aunt     Dementia Maternal Aunt     Crohn's disease Other     Melanoma Neg Hx     Psoriasis Neg Hx     Lupus Neg Hx     Colon cancer Neg Hx     Esophageal cancer Neg Hx        Social History     Socioeconomic History    Marital  status:      Spouse name: Not on file    Number of children: Not on file    Years of education: Not on file    Highest education level: Not on file   Occupational History    Occupation: disability    Social Needs    Financial resource strain: Not on file    Food insecurity     Worry: Not on file     Inability: Not on file    Transportation needs     Medical: Not on file     Non-medical: Not on file   Tobacco Use    Smoking status: Never Smoker    Smokeless tobacco: Never Used   Substance and Sexual Activity    Alcohol use: No     Comment: History of alcoholism in remission.    Drug use: No    Sexual activity: Not Currently   Lifestyle    Physical activity     Days per week: Not on file     Minutes per session: Not on file    Stress: Not on file   Relationships    Social connections     Talks on phone: Not on file     Gets together: Not on file     Attends L[employer] service: Not on file     Active member of club or organization: Not on file     Attends meetings of clubs or organizations: Not on file     Relationship status: Not on file   Other Topics Concern    Are you pregnant or think you may be? Not Asked    Breast-feeding Not Asked   Social History Narrative    Not on file       Review of Systems   Constitutional: Negative for chills, fatigue and fever.   HENT: Negative for congestion, hearing loss, nosebleeds, rhinorrhea, sore throat and trouble swallowing.    Eyes: Negative for pain and visual disturbance.   Respiratory: Negative for cough, shortness of breath and wheezing.    Cardiovascular: Negative for chest pain and palpitations.   Gastrointestinal: Positive for abdominal pain and constipation. Negative for abdominal distention, diarrhea, nausea and vomiting.   Genitourinary: Negative for decreased urine volume, difficulty urinating, dysuria, hematuria and urgency.   Musculoskeletal: Negative for arthralgias, back pain and myalgias.   Skin: Negative for color change and rash.         "+ Brittle nails, hair loss.   Neurological: Negative for dizziness, tremors, weakness, light-headedness, numbness and headaches.   Psychiatric/Behavioral: Negative for agitation, behavioral problems and confusion. The patient is not nervous/anxious.      Objective:     Vitals:    10/27/20 1157   BP: 98/70   BP Location: Right arm   Patient Position: Sitting   BP Method: Small (Manual)   Pulse: 66   Temp: 98 °F (36.7 °C)   TempSrc: Oral   SpO2: 99%   Weight: 45.4 kg (100 lb 1.4 oz)   Height: 5' 2" (1.575 m)       Physical Exam  Vitals signs and nursing note reviewed.   Constitutional:       Appearance: She is well-developed.   HENT:      Head: Normocephalic and atraumatic.      Right Ear: External ear normal.      Left Ear: External ear normal.      Nose: Nose normal.   Eyes:      Conjunctiva/sclera: Conjunctivae normal.   Neck:      Musculoskeletal: Neck supple.      Trachea: No tracheal deviation.   Cardiovascular:      Rate and Rhythm: Normal rate and regular rhythm.      Heart sounds: Normal heart sounds. No murmur. No friction rub. No gallop.    Pulmonary:      Effort: Pulmonary effort is normal. No respiratory distress.      Breath sounds: Normal breath sounds. No wheezing or rales.   Abdominal:      General: Bowel sounds are normal. There is no distension.      Palpations: Abdomen is soft.      Tenderness: There is abdominal tenderness in the left upper quadrant. There is no guarding or rebound.   Skin:     General: Skin is warm and dry.   Neurological:      Mental Status: She is alert.   Psychiatric:         Behavior: Behavior normal.        Assessment:      1. Annual physical exam    2. Alcohol abuse, in remission    3. Bipolar I disorder    4. Depression, unspecified depression type    5. Elevated HDL    6. Elevated lipase    7. Family history of dementia    8. History of positive PPD    9. Hypothyroidism, unspecified type    10. LUQ abdominal pain    11. Medication management    12. Other long term " (current) drug therapy    13. Osteopenia, unspecified location      Plan:   Gay was seen today for establish care.    Diagnoses and all orders for this visit:    Annual physical exam  -     Comprehensive Metabolic Panel; Future  -     CBC auto differential; Future  -     TSH; Future  -     Lipid Panel; Future  -     Vitamin D; Future  -     T4, Free; Future    Alcohol abuse, in remission        -     Reviewed    Bipolar I disorder        -     Stable, continue current regimen and regular follow up with psychiatry.    Depression, unspecified depression type        -     Stable, continue current regimen and regular follow up with psychiatry.    Elevated HDL  -     Lipid Panel; Future    Elevated lipase  -     Lipase; Future; noted to 65 9/17/18, recheck if persistent consider GI follow up for 2nd opinion    Family history of dementia  -     Ambulatory referral/consult to Neuropsychology; Future for memory assessment    History of positive PPD  -     X-Ray Chest PA And Lateral; Future    Hypothyroidism, unspecified type  -     TSH; Future  -     T4, Free; Future  -     Recommended she complete labs then restart levothyroxine (SYNTHROID) 25 MCG tablet; Take 1 tablet (25 mcg total) by mouth once daily.    LUQ abdominal pain  -     US Abdomen Complete; Future    Medication management  -     CBC auto differential; Future  -     Vitamin B12; Future  -     VITAMIN C; Future    Other long term (current) drug therapy  -     Vitamin D; Future    Osteopenia, unspecified location        -     Consider repeat DEXA next year

## 2020-10-27 NOTE — TELEPHONE ENCOUNTER
Returned patient phone call regarding lack of improvement following injection. She reports her pain is generally manageable throughout the day, but she continues to suffer from morning and night pain. She is interested in a possible diagnostic test to evaluate for further injury. I expressed I would communicate this to Dr. Ramos and will get back with her shortly. Understanding was expressed by the patient.     Lay Gill MS, OTC  Clinical Assistant to Dr. Sachin Ramos

## 2020-10-29 ENCOUNTER — TELEPHONE (OUTPATIENT)
Dept: INTERNAL MEDICINE | Facility: CLINIC | Age: 55
End: 2020-10-29

## 2020-10-29 ENCOUNTER — TELEPHONE (OUTPATIENT)
Dept: SPORTS MEDICINE | Facility: CLINIC | Age: 55
End: 2020-10-29

## 2020-10-29 DIAGNOSIS — M25.552 ISCHIAL PAIN, LEFT: ICD-10-CM

## 2020-10-29 DIAGNOSIS — R10.2 PELVIC PAIN: Primary | ICD-10-CM

## 2020-10-29 DIAGNOSIS — S76.312S HAMSTRING MUSCLE STRAIN, LEFT, SEQUELA: ICD-10-CM

## 2020-10-29 NOTE — TELEPHONE ENCOUNTER
----- Message from Charan Sherman sent at 10/29/2020 10:04 AM CDT -----  Regarding: [phone]  Gay is requesting a callback from the nurse regarding the blood test results she had; she also would like to speak to the doctor about additional concerns she has.    Callback number: [phone]     No

## 2020-10-29 NOTE — TELEPHONE ENCOUNTER
Left  for patient returning call regarding MRI order.    Lay Gill MS, OTC  Clinical Assistant to Dr. Sachin Ramos

## 2020-10-30 ENCOUNTER — TELEPHONE (OUTPATIENT)
Dept: INTERNAL MEDICINE | Facility: CLINIC | Age: 55
End: 2020-10-30

## 2020-10-30 ENCOUNTER — HOSPITAL ENCOUNTER (OUTPATIENT)
Dept: RADIOLOGY | Facility: HOSPITAL | Age: 55
Discharge: HOME | End: 2020-10-30
Attending: FAMILY MEDICINE
Payer: MEDICARE

## 2020-10-30 ENCOUNTER — TELEPHONE (OUTPATIENT)
Dept: SPORTS MEDICINE | Facility: CLINIC | Age: 55
End: 2020-10-30

## 2020-10-30 DIAGNOSIS — R10.12 LUQ ABDOMINAL PAIN: ICD-10-CM

## 2020-10-30 PROCEDURE — 76700 US ABDOMEN COMPLETE: ICD-10-PCS | Mod: 26,,, | Performed by: INTERNAL MEDICINE

## 2020-10-30 PROCEDURE — 76700 US EXAM ABDOM COMPLETE: CPT | Mod: 26,,, | Performed by: INTERNAL MEDICINE

## 2020-10-30 PROCEDURE — 76700 US EXAM ABDOM COMPLETE: CPT | Mod: TC

## 2020-10-30 NOTE — TELEPHONE ENCOUNTER
Returned patient phone call to schedule MRI pelvis. Patient expressed understanding of her appointment date, time and location.    Lay Gill MS, OTC  Clinical Assistant to Dr. Sachin Ramos

## 2020-10-30 NOTE — TELEPHONE ENCOUNTER
----- Message from Natividad Villeda sent at 10/30/2020  9:52 AM CDT -----  Contact: [phone]  Would like to get medical advice.    Comments: carolina would like the provider to know that she has done the ultrasound and would like a call when he has both results

## 2020-10-30 NOTE — TELEPHONE ENCOUNTER
"----- Message from Alber Kelsey MD sent at 10/30/2020 10:49 AM CDT -----  Please notify patient of the following:    Labs reviewed.  HDL or "good" cholesterol is elevated at 93; other cholesterol values normal though her total cholesterol did increase a bit to 194 from 139 previously.  I recommend a low cholesterol diet and daily aerobic exercise; this can be rechecked in 1 year with routine annual labs.  Vitamin B12 is elevated but this is not clinically concerning, may not need to take as much supplemental vitamin B12 if she is taking supplement but this does not require any work up.  Alkaline phosphatase level is low; this is non-specific and of little clinical concern, can continue to monitor with annual labs.  Otherwise labs normal including normal lipase, normal thyroid function, normal blood counts, normal vitamin D.  Vitamin C level pending.  ABD US was normal, no abnormalities were noted.    "

## 2020-10-30 NOTE — PROGRESS NOTES
Patient reports lack of improvement following ischial bursa CSI and pain most severe at night and first thing in the morning. As there has been minimal to no improvement from injections, OMT, HEP, an MRI has been ordered.

## 2020-10-30 NOTE — TELEPHONE ENCOUNTER
Informed pt of lab results. Pt verbalized understanding.  Results will be mailed to pt as she requested.

## 2020-11-02 ENCOUNTER — TELEPHONE (OUTPATIENT)
Dept: INTERNAL MEDICINE | Facility: CLINIC | Age: 55
End: 2020-11-02

## 2020-11-02 ENCOUNTER — OFFICE VISIT (OUTPATIENT)
Dept: NEUROLOGY | Facility: CLINIC | Age: 55
End: 2020-11-02
Payer: COMMERCIAL

## 2020-11-02 DIAGNOSIS — F31.9 BIPOLAR I DISORDER: Primary | ICD-10-CM

## 2020-11-02 DIAGNOSIS — Z82.0 FAMILY HISTORY OF ALZHEIMER'S DISEASE: ICD-10-CM

## 2020-11-02 DIAGNOSIS — F10.11 ALCOHOL ABUSE, IN REMISSION: ICD-10-CM

## 2020-11-02 DIAGNOSIS — F32.A DEPRESSION, UNSPECIFIED DEPRESSION TYPE: Chronic | ICD-10-CM

## 2020-11-02 PROCEDURE — 99499 UNLISTED E&M SERVICE: CPT | Mod: 95,,, | Performed by: PSYCHIATRY & NEUROLOGY

## 2020-11-02 PROCEDURE — 90791 PSYCH DIAGNOSTIC EVALUATION: CPT | Mod: 95,,, | Performed by: PSYCHIATRY & NEUROLOGY

## 2020-11-02 PROCEDURE — 90791 PR PSYCHIATRIC DIAGNOSTIC EVALUATION: ICD-10-PCS | Mod: 95,,, | Performed by: PSYCHIATRY & NEUROLOGY

## 2020-11-02 PROCEDURE — 99499 NO LOS: ICD-10-PCS | Mod: 95,,, | Performed by: PSYCHIATRY & NEUROLOGY

## 2020-11-02 NOTE — PROGRESS NOTES
NEUROPSYCHOLOGY CONSULT  Clinical Interview    Referral Information  Name: Gay Gurrola MRN: [id number]  Age: 55 y.o.    : 1965  Race: White    Handedness: Right  Gender: female   Education: 14 years         Referring Provider:   Alber Kelsey MD  Referral Reason/Medical Necessity: Ms. Gurrola has a history of bipolar disorder (poorly controlled), remote ETOH abuse, and family history of dementia. Neuropsychological evaluation was requested to assess current cognitive functioning, aid in differential diagnosis, and provide treatment recommendations.    Consent: The patient expressed an understanding of the purpose of the evaluation and consented to all procedures.  Procedures/Billing: Please see billing table at the end of this report.  Telemedicine:   Established Patient - Audio Only Telehealth Visit  The patient location is: [place]  The chief complaint leading to consultation is: Z81.8 (ICD-10-CM) - Family history of dementia  Visit type: Virtual visit with audio only (telephone)  Total time spent with patient: 69 min  The reason for the audio only service rather than synchronous audio and video virtual visit was related to technical difficulties or patient preference/necessity.  Each patient to whom I provide medical services by telemedicine is:  (1) informed of the relationship between the physician and patient and the respective role of any other health care provider with respect to management of the patient; and (2) notified that they may decline to receive medical services by telemedicine and may withdraw from such care at any time. Patient verbally consented to receive this service via voice-only telephone call.  Consent/Emergency Plan: The patient expressed an understanding of the purpose of the evaluation and consented to all procedures. I informed the patient of limits to confidentiality and discussed an emergency plan.    ASSESSMENT    Results from the interview indicate the  following diagnoses and treatment plan recommendations. This was discussed with patient/family on 11/2/20. The patient can follow a treatment plan without help from family.    Ms. Caceres is a 55 year old woman with a history of bipolar disorder, remote alcohol dependence (sober since 2017), and a strong family history of dementia presenting with concerns for memory changes over the last year. She admits to high stress levels since the pandemic began, which has caused her to feel more cognitively foggy. Most recent available neuroimaging was normal in 2010.  She remains IADL independent and is a good historian today. Discussed that while I have lower suspicion for an emerging neurodegenerative condition, we can do testing to get a baseline given her family history. Also discussed that we want to make sure her mental health is stable, as this can cloud interpretation. At this point, I suspect her subjective complaints are driven by psychiatric factors and medication side effects. Low suspicion for contribution from hx of heavy alcohol use. Discussed brain health recommendations, stress reduction, compensatory strategies.     Problem List Items Addressed This Visit        Psychiatric    Depression (Chronic)    Alcohol abuse, in remission    Bipolar I disorder - Primary      Other Visit Diagnoses     Family history of Alzheimer's disease              PLAN     1. Ms. Gurrola is scheduled for testing on 12/1/20. Full report to follow.   2. Continue MH treatment, encourage follow up with psychotherapy referral   3. Continue to maintain abstinence from alcohol         Thank you for allowing me to participate in Ms. Gurrola's care.  If you have any questions, please contact me.    Noa Hung Psy.D.  Licensed Clinical Neuropsychologist  [facility] - Department of Neurology          SUBJECTIVE:     HISTORY OF PRESENT ILLNESS   Pt reports that cognitively she feels like she is doing ok. She admits she has been  "very stressed during the pandemic and has had two "breakdowns" due to overwhelming stress and anxiety. Following the most recent breakdown, she has felt more cognitively foggy. This has been going on for about six weeks and has left her feeling "drained." States she has been trying to take care of herself, get good sleep, exercise, etc. She is trying to be mindful of her cognitive and emotional changes. She does note that in her late 30's and 40's she endured several traumatic events (death of her mother, divorce) and was heavily medicated at the time, which impacted her thinking. She is currently happy with her psychiatrist, still follows up monthly. She feels like focusing on her physical health has been helpful for stabilizing her mental health. She reports that she has a strong family history of neurodegenerative diseases on her mother's side (mother and multiple maternal uncles with optic nerve degeneration, AD, PD), typically diagnosed in mid- to late 60's. Her elder sister (age 67) is diagnosed with middle staged Alzheimer's disease, suspect onset around age 60.     Cognitive Sxs:  · Attention: Loses her train of thought, forgets why she walked into a room. Takes her up to a minute to remember.   · Mental Speed: Endorsed slowed thinking  · Memory: Has become more forgetful, misplacing things  · Language: Endorsed word finding  · Visuospatial/Perceptual: Not getting lost   · Executive Functioning: No trouble with organization or planning. Does feel somewhat more impulsive, her emotions tend to swing.     Onset/Course: Ms. Gurrola's symptoms were gradual in onset about a year ago. She has noticed mild, steady declines over time, with a more pronounced drop after her breakdowns. Her symptoms are worse with a bad night's sleep. No waxing/waning of cognitive status.    Neuropsychiatric Sxs:  Pt is currently diagnosed with bipolar I disorder. Followed by psychiatry. She states she has not had a manic episode for " "years. She has had more recent episodes where she feels like she has a lot of energy but without other abdiel symptoms. This occurs about every 5 months. She has a history of heavy alcohol use and has completed multiple IOP and COMFORT treatment programs. She has been sober since 2017. She has had depressive episodes in the past, but states she has not had a full blown depressive episode for several years. Recently, she reports having two "breakdowns" which she describes as "being in flight." States she was "upset" and "drained" arguing with her family, felt as though her family was ganging up on her. Notes that her stress response was very high. She felt exhausted, this is starting to improve. She was recently referred to a psychologist for therapy to help her cope. She is diligent about self-care with respect to diet and exercise.     · Self-Described Mood: "not bad. Calm"  · Depressed Mood: Denied depressed mood. Endorses anhedonia.   · Anxiety: Denied    · Panic Attacks: Denied    · Stress: Pandemic, family conflict   · SI/HI: Denied   · Psychiatric Hospitalization: Denied. She has completed IOP at [facility] for COMFORT   · Neurovegetative:  · Sleep: normal, improved with good sleep hygiene habits. She uses a sleep tracker.    · Total Hours/Night: 8   · Pattern:falls asleep easily and has interrupted sleep   · MALIA: No  · Nightmares: Endorsed nightmares during the pandemic  · Acting out dreams: Denied  · Daytime Naps: Denied  · Appetite: Has been unable to gain weight despite eating a high caloric, healthy diet. She does do yoga and runs regularly.  · Energy: normal   · Delusional/Paranoid Thinking: Denied  · Hallucinations: Denied  · Impulsive/Compulsive Behaviors: Denied  · Disinhibition: Denied  · Apathy: Denied  · Irritability/Agitation: Endorsed, stated her fuse us short     Physical  · Motor:  Denied abnormalities    · Gait:  Unremarkable. Pt ambulates independently.   · Sensory: No change to taste, smell, hearing " "or vision. She wears reading glasses.   · Pain: Ms. Gurrola described current pain at a 3 on a scale of 1-10, with 10 being worst.     Current Functioning (I/ADLs):  · ADLs: Independent   · IADLs:  · Finances: Independent   · Medication Mgmt:Independent   · Driving: Independent   · Household Mgmt: Independent       RELEVANT HISTORY:  Prior Testing:  None    Neurologic History  · Seizures: Endorsed on one occasion (2012)  related to drinking while on [place]. None since then.   · Stroke: Denied  · TBI: Denied.   · Movement Disorder: Denied  · Falls: Denied  · CNS infection: Denied  · Brain tumor: Denied  · Other neurologic history: Endorsed migraines, suspected stress and insomnia-related   · Dysautonomia: Denied   · Referral Diagnosis: Z81.8 (ICD-10-CM) - Family history of dementia    Neurodiagnostics:   Head CT 11/1/10: "Normal non-contrast CT of the brain"   MRI Brain 11/1/10: "Normal bran MRI examination"    Substance Use History:  Social History     Tobacco Use    Smoking status: Never Smoker    Smokeless tobacco: Never Used   Substance and Sexual Activity    Alcohol use: No     Comment: History of alcoholism in remission since 2017  Prior, was drinking about 6 to 8 beers per day. IOP txt x2    Drug use: No    Sexual activity: Not Currently   · Caffeine          1 cup coffee QAM    Medical history  Patient Active Problem List   Diagnosis    Depression    Alcohol abuse, in remission    Memory loss    Attention or concentration deficit    Bipolar I disorder    Hypothyroid    Osteopenia    Insomnia    Other chest pain    Chronic migraine without aura without status migrainosus, not intractable    Abdominal pain, LUQ     Past Medical History:   Diagnosis Date    Abnormal coronary angiogram     Alcohol abuse, in remission     Alcohol related seizure 7/24/2012    Anxiety     Bipolar affective     since teenage years    Chronic idiopathic constipation     Depression     H. pylori infection  "    Headache(784.0)     followed by Dr. Corona    Hypothyroid     Memory loss     Osteopenia     Seizures      Past Surgical History:   Procedure Laterality Date    BUNIONECTOMY      COLONOSCOPY N/A 4/12/2017    Procedure: COLONOSCOPY;  Surgeon: Estuardo Salazar MD;  [facility]  Service: Endoscopy;  Laterality: N/A;  PM prep    ESOPHAGOGASTRODUODENOSCOPY N/A 10/23/2018    Procedure: EGD (ESOPHAGOGASTRODUODENOSCOPY);  Surgeon: Irlanda Schwarz MD;  [facility]  Service: Endoscopy;  Laterality: N/A;    TUBAL LIGATION         Pertinent Labs:  Lab Results   Component Value Date    [id number] 1257 (H) 10/27/2020     No results found for: RPR  Lab Results   Component Value Date    FOLATE 4.6 11/01/2010     Lab Results   Component Value Date    TSH 1.850 10/27/2020     Lab Results   Component Value Date    CALCIUM 9.1 10/27/2020    PHOS 4.0 05/24/2018      No results found for: LABA1C, HGBA1C  Lab Results   Component Value Date    [id number] Negative 03/20/2017     Lab Results   Component Value Date    CREATININE 0.7 10/27/2020    BUN 14 10/27/2020     10/27/2020    K 3.8 10/27/2020     10/27/2020    CO2 26 10/27/2020      Lab Results   Component Value Date    ALT 24 10/27/2020    AST 27 10/27/2020    GGT 43 09/11/2014    ALKPHOS 48 (L) 10/27/2020    BILITOT 0.7 10/27/2020            Current Outpatient Medications:     Ca-D3-mag#11-zinc-cupr-man-bor 600 mg calcium- 800 unit-50 mg Tab, Take 1 tablet by mouth once daily., Disp: , Rfl:     cholecalciferol, vitamin D3, (VITAMIN D3) 1,000 unit capsule, Take 1,000 Units by mouth once daily., Disp: , Rfl:     cyanocobalamin, vitamin B-12, (VITAMIN B-12) 5,000 mcg Subl, Place under the tongue once daily. , Disp: , Rfl:     fish oil-omega-3 fatty acids 300-1,000 mg capsule, Take 1 capsule by mouth once daily., Disp: , Rfl:     lamoTRIgine (LAMICTAL) 150 MG Tab, Take 150 mg by mouth 2 (two) times daily., Disp: , Rfl:      "levothyroxine (SYNTHROID) 25 MCG tablet, Take 1 tablet (25 mcg total) by mouth once daily., Disp: 90 tablet, Rfl: 3    linaCLOtide (LINZESS) 290 mcg Cap capsule, Take 1 capsule (290 mcg total) by mouth once daily., Disp: 90 capsule, Rfl: 3    multivitamin (DAILY MULTI-VITAMIN) per tablet, Take 1 tablet by mouth once daily., Disp: , Rfl:     QUEtiapine (SEROQUEL) 300 MG Tab, Take 300 mg by mouth nightly., Disp: , Rfl:     temazepam (RESTORIL) 30 mg capsule, , Disp: , Rfl:     TURMERIC ORAL, Take by mouth., Disp: , Rfl:     Current Facility-Administered Medications:     triamcinolone acetonide injection 40 mg, 40 mg, INTRABURSAL, 1 time in Clinic/HOD, Sachin Ramos DO    Family History:  Family History   Problem Relation Age of Onset    Stroke Mother     Diabetes Mother     Anuerysm Mother         brain    Dementia Maternal Grandmother     Hypertension Father     Coronary artery disease Father     Kidney disease Father     Diabetes Father     Dementia Sister 59    Crohn's disease Maternal Aunt     Dementia Maternal Aunt     Crohn's disease Other     Melanoma Neg Hx     Psoriasis Neg Hx     Lupus Neg Hx     Colon cancer Neg Hx     Esophageal cancer Neg Hx      Family Neurologic History: Strong family history of neurodegenerative conditions  Family Psychiatric History: Negative for heritable risk factors     Developmental/Academic Hx:  Developmental: Born and raised in [place], moved to [place] at age 12. First language is [place]ian. States they were well off, she was in a [employer] school from age 6 to 8 where she learned some English but otherwise became fluent after moving to the . She is still bilingual but prefers English. Product of a normal pregnancy and delivery. No developmental delays. No history of abuse. Denies neglect, but always had nannies while mom worked two jobs. "I pretty much raised myself."  Academic:  · Learning Difficulties: Did well. Very studious. She was held " back for two years after moving to the  due to limited English proficiency, but was later able to skip two grades and caught up. Graduated on time.   · Attention Difficulties: Denied  · Behavioral Difficulties: Denied  · Educational Attainment: 14. 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 Later went to [employer] and earned an AA in nuclear medicine.     Social/Occupational Hx:     Occupational Hx:  · Occupational Status: Unemployed since 2017 after mother , breakdown. She has worked periodically in unrelated jobs  · Primary Occupation(s): Medical assistant        Social:  · Resides: , son (age 28)  · Current Relationship/Family Status:  30+ years. Two adult sons  · Primary Source of Support: Oldest son. Worries that she does not have many friends.   · Daily Activities: Gardening, exercising, meditating, cooking.     MENTAL STATUS AND BEHAVIORAL OBSERVATIONS:  Appearance:  Not observed (telephone visit)  Behavior:   alert, calm, cooperative, rapport easily established and Appropriate interpersonal skills. Good interpretation of nonverbal cues.   Orientation:   Fully oriented  Sensory:   Hearing and vision adequate for virtual/telephone visit  Gait:   Not observed (virtual/telephone visit)   Psychomotor:  Not observed (telephone visit)  Speech:  Fluent and spontaneous. Normal volume, rate, pitch, tone, and prosody.  Language:  Receptive and expressive language appear intact. Comprehends conversational speech., No evidence of word-finding difficulties in conversational speech.   Mood:   euthymic  Affect:   normal and mood-congruent  Thought Process: goal directed, linear and within normal limits  Thought Content: WNL, Denied current SI/HI. and No evidence of psychotic symptoms.  Memory:  Recent and remote appear grossly intact  Attn/Concentration:  Grossly intact  Fund of Knowledge: Broadly WNL  Judgment/Insight: Grossly intact      BILLING INFORMATION:  Billing/Services Summary           Psychiatric Diagnostic Interview Base Code (82166)  Total Units: 1    Face-to-face Total Time: 69 min.         Neurobehavioral Status Exam Base Code (87482)   Total Units: 0 Add-on (58356)  Total Units: 0   Face-to-face 0 min.    Record Review, Integration, Report Generation 0 min.     Total Time: 0 min.    DOS is the date of the evaluation unless specified    This service was not originating from a related E/M service provided within the previous 7 days nor will  to an E/M service or procedure within the next 24 hours or my soonest available appointment.  Prevailing standard of care was able to be met in this audio-only visit.

## 2020-11-02 NOTE — PATIENT INSTRUCTIONS
Summary of the evidence on modifiable risk factors for cognitive decline and dementia             From: Gregg, Homer Westfall, Abdulaziz, Gretchen & Diana (2015). Summary of the evidence on modifiable risk factors for cognitive decline and dementia: A population-based perspective. Alzheimer's & Dementia, 11, 202-368.    *Studies suggest small or moderate alcohol consumption (no more than 1 drink per day) by older individuals may decrease the risk of cognitive decline and dementia. The evidence is not strong enough, however, to suggest those who do not drink should start drinking, especially when weighed against the potential negative effects of excessive alcohol consumption, such as an increased risk of falls among older adults. Research also generally suggests that red wine may be the best form of alcohol for cognitive functioning, in part, due to its antioxidant effects. All alcohol use is cautioned, though, as alcohol could cause harmful effects and interfere with medications. A physician and/or pharmacist should be consulted before alcohol is consumed.          Behaviors to Promote Brain Health       Exercise regularly - Exercise has many known benefits, and it appears that regular physical activity benefits the brain. Multiple research studies show that people who are physically active are less likely to experience a decline in their mental function and have a lower risk of developing Alzheimer's disease. We believe these benefits are a result of increased blood flow to your brain during exercise. It also tends to counter some of the natural reduction in brain connections that occur during aging, in effect reversing some of the problems. Aim to exercise several times per week for 30-60 minutes. You can walk, swim, play tennis or any other moderate aerobic activity that increases your heart rate.    Eat a Mediterranean diet - Your diet plays a large role in your brain health. Consider following a Mediterranean  diet, which emphasizes plant-based foods, whole grains, fish and healthy fats, such as olive oil. It incorporates much less red meat and salt than a typical American diet. Studies show people who closely follow a Mediterranean diet are less likely to have Alzheimer's disease than people who don't follow the diet. Omega fatty acids found in extra-virgin olive oil and other healthy fats are vital for your cells to function correctly, appear to decrease your risk of coronary artery disease, and increase mental focus and slow cognitive decline in older adults.       Stay mentally active - Your brain is similar to a muscle -- you need to use it or you lose it. There are many things that you can do to keep your brain in shape, such as doing crossword puzzles or Sudoku, reading, playing cards or putting together a jigsaw puzzle. Consider it cross-training your brain. So incorporate different activities to increase the effectiveness.     Stay socially involved - Social interaction helps garrett off depression and stress, both of which can contribute to memory loss. Look for opportunities to connect with loved ones, friends and others, especially if you live alone. There is research that links solitary confinement to brain atrophy, so remaining socially active may have the opposite effect and strengthen the health of your brain.    Get plenty of sleep - Sleep plays an important role in your brain health. There are some theories that sleep helps clear abnormal proteins in your brain and consolidates memories, which boosts your overall memory and brain health. It is important that you try to get seven to eight consecutive hours of sleep per night, not fragmented sleep of two- or three-hour increments. Consecutive sleep gives your brain the time to consolidate and store your memories effectively.         Stress Management Techniques    Take a break from the stressor. It may seem difficult to get away from a big work project, a  crying baby or a growing credit card bill. But when you give yourself permission to step away from it, you let yourself have time to do something else, which can help you have a new perspective or practice techniques to feel less overwhelmed. Its important to not avoid your stress (those bills have to be paid sometime), but even just 20-minutes to take care of yourself is helpful.    Exercise. The research keeps growing -- exercise benefits your mind just as well as your body. We keep hearing about the long-term benefits of a regular exercise routine. But even a 20-minute walk, run, swim or dance session in the midst of a stressful time can give an immediate effect that can last for several hours.    Smile and laugh. Our brains are interconnected with our emotions and facial expressions. When people are stressed, they often hold a lot of the stress in their face. So laughs or smiles can help relieve some of that tension and improve the situation.    Get social support. Call a friend, send an email. When you share your concerns or feelings with another person, it does help relieve stress. But its important that the person whom you talk to is someone whom you trust and whom you feel can understand and validate you. If your family is a stressor, for example, it may not alleviate your stress if you share your works woes with one of them.    Meditate. Meditation and mindful prayer help the mind and body to relax and focus. Mindfulness can help people see new perspectives, develop self-compassion and forgiveness. When practicing a form of mindfulness, people can release emotions that may have been causing the body physical stress. Much like exercise, research has shown that even meditating briefly can reap immediate benefits.    Source: https://www.apa.org/topics/manage-stress          Your Healthy Brain - Strategies to help with attention and focus    Type of Attention Problem Cognitive Strategy   Auditory Attention   Establish eye contact and attention to the speaker to better remember instructions/directions.   Repeat back what the speaker has said or have a notepad and write down.    If you zone out, then politely say so and have them repeat what they said.    Classes often move fast and rarely will professors adjust their style to accommodate all the different preferences and needs of students. We encourage investing in a recording device that allows you to go back and re-listen to a lecture if you have missed something when taking notes.    Reduce noise that you find distracting. It may also be helpful to have some ambient noise (e.g., white noise, calm music, fan) if you can't reduce or eliminate noise.    Reduce auditory distractions. This might include turning off your phone so you aren't tempted to look at text messages when studying or listening.      Visual Attention  Reduce visual distractions. For instance, stick to one page at a time and don't have multiple pages or screens up. Make a list of common distracters and have those in mind so you can prepare ahead of time.    Use your  to your advantage by enlarging fonts, simin, italicizing, and coloring, material.    Use visual cues to help you zero in on the material. Highlight and underline.    Be mindful that electronic devices (e.g., computers, tablets) are very prone to distractibility when attempting to read because you can be tempted to surf online, click a hyperlink, and so forth. Think about whether you prefer printed or electronic material and use that.    Our eyes can become exhausted for a while when engaging in visually based tasks. Take breaks to rest your eyes. Search online for eye relaxation exercises or talk with an optometrist.      Orienting Yourself/Monitoring/Planning  Set up a plan for the day, week, and semester/month. This includes the types of classes scheduled (e.g., matching difficult and easy classes), times  of day you schedule them (e.g., attempt to schedule classes at a time when most attentive, try to have some breaks between classes), which teacher/professor (e.g., some are harder than others), and types of classes (e.g., try to schedule classes with three, 50-minute lectures rather than one 3-hour class).    plan meals, exercise, and relaxation time into the schedule. This is called pacing whereby you have some strategies to manage fatigue, hunger, and restlessness that impact attention.    Use active mental strategies to avoid attentional lapses. For instance, frequently ask: (1) What I am currently doing? (2) What was I doing before this? (3) What do I need to do next? You'll likely need an external cue for some time (e.g., alarm on phone, visual reminder) to integrate this into your day.       Sustained Attention  Have realistic expectations and plan out what you can do in a given hour, day, and weeklong period. This planning involves writing down what you need to do and chunking it accordingly. For instance, you would make a list of all the steps involved in a given task, think about the time involved in each step, and plan what you can do in a given time period before your attention wanes.    Check off tasks as you do them. This feels good and helps keep you on track.    Avoid procrastination as this requires more sustained attention as you know the deadline is looming.    Don't push yourself so hard that you get frustrated and give up. It is also important to be practical about what you can and cannot do in a given time period. Don't be hard on yourself if you need more time or more breaks. The point is find what works for you and do that so you optimize performance.    Pay attention to your own vocational and personal interests in a task. It may be that you have more sustained attention for some tasks and not for others.    Set up structure so you aren't tempted to immediately distract  yourself. This may mean unplugging the television, turning off the computer, and/or going somewhere (e.g., coffee shop, library) to limit distractions.      Divided Attention/Multi-tasking  Most people have limited divided attention and research has shown that we are less effective when we are doing several things at once (e.g., checking email, reading online, & watching television). Try to methodically engage in one task at a time to limit problems with divided attention.    Set up some organizers so you can move from one separate task to another.    Have visual cues to keep you on track when you have a lot to do. This will re-orient you and remind you. For instance you may be doing something, get distracted, but then see your notepad that has your next task.    External Strategies  Modifying the physical space to improve attention. You may want to think about how your house, office, or the classroom impact your attention. If you can't modify something, then think about how other means. For instance, you may not be able to make your house quiet if you have children, but getting some ear plugs or ambient noise may improve this. Or, post a do not disturb sign when you need some space for studying or work.    Get organized to reduce distractibility! Develop files at home, on your computer, and even with your email.    Social support is important and many people have or have had attention problems. Talk with your family, coworkers, classmates about what they can do to help you. They may have even developed their own strategies that prove useful to you.    If you can afford to, then think about electronic devices. They have alarms to remind you along with voice recorders. Keep a planner with you or use your phone planner       Your Healthy Brain - Strategies to help with learning and memory      Type of Memory Issue      Cognitive Strategy   Attention-based trouble  Remember that inattention and lack of focus  are major culprits to forgetting information so be sure and practice paying attention for adequate learning of information. Patients will rely on passive attention to remember something (e.g., benny, uh-huh approach) and find they cannot recall it later. We recommend the following to improve attention, which may aid in later recall:    Look at the person as they are speaking to you, Paraphrase as they are speaking   Write down important pieces of information    Ask them to repeat if you zone out.    Simplify and reduce information that you need to focus on during conversation.    Have visual cues to remind you if you need to do something later.   Speed-based trouble  Using multiple modalities (e.g., listening, writing notes, asking questions, recording, follow-up meetings with faculty/bosses, discussion boards online) to learn new information also can be helpful and is likely to allow additional time for processing, thus improving memory for the material.    Learning new information  Simplify - Use easier words and shorter sentences. Break down into steps.   Restate - Put information into your own words.  Does it make sense? This allows you and others to test for understanding.   Link - If possible, associate new information with something you already know.   Organize - Group items into meaningful categories.  You can organize by time, location, color, shape, size, function, and even age.  Be creative.    Break it up - Don't try to take in too much at one time. Concentrate for a few minutes, then move on to something else. You may learn more in short sessions than one long one.   Mnemonics (pronounced noo-mon-ics) are strategies whereby you form acronyms ( = Cereal, Oranges, Pizza) that stand for something. This may be useful at times when you need a cue or prompt to help you remember something. Word associations can also be helpful (Yari works in the [facility] [employer] [facility]).     Storing  information for later recall  Rehearse - Immediately after seeing/hearing something, try to recall it.  Wait a few minutes, then check again.  Gradually lengthen the time between rehearsals. Initially, you might rehearse the same thing every minute, then 15-minutes, then every few hours.   Repetition of learned material is critical to ensure storage of information to be learned.    Self-test at home to ensure learning. Just because something was remembered once doesn't mean it will be remembered after it was first learned.    Have friends or family periodically re-quiz you multiple times in a study session.     Recalling Information  Jog your memory - Lose something?  Think back to when you last had it.  What did you do next?  And after that?  Mentally walk yourself through each activity that followed.  Prodding your memory this way may enable you to recall the location of the missing item.   Pair something you always remember (keys, purse, phone) with something you may forget (grocery list, bill you need to pay).   Get organized - Have fixed locations for all important papers, key phone numbers, medications, keys, wallet, glasses, tools, etc.   Develop routines - Routines can anchor memories so they do not drift away.      External Strategies  Have memos, timers, calendar notes, etc.  in visible, appropriate places so you can use them for recalling information.   Invest in a smart phone and learn to use its reminder functions. This can be a way to interact with your children or grandchildren in a fun way.   Meet with a counselor to analyze and revise personal organization strategies and develop more effective memory cues and planning strategies. This recommendation is designed to help you develop a new skill set for personal organization based.    Maintaining a regular daily schedule may be beneficial. Keeping a calendar and notepad or alarms via a smart phone can alert you to the day's activities.    School-based learning  Read the information and underline, then go back over and talk through what you just read. Break it up in paragraph bits so you keep what you have to learn to a minimum. Don't just rely on passively reading something.   Take notes in the form of an outline; Use note cards over and over   Create mnemonics (example: Please Excuse My Dear Aundiane Azar to remember mathematics order or operations)   Create acronyms (example: PEMDAS to remember math order of operations)   Utilize self-talk as you read through the material   Create broad headings of material you need to know and then talk or write down what you have learned from memory   Teach the material to a friend, parent, or sibling without notes.   Summarize facts in a table or flow charts for visual encoding   Memory Hygiene  Engage in regular exercise, which increases alertness and arousal, which can improve attention and focus.    Get a good night's sleep, as sleep is necessary for memory consolidation. Caffeine intake in the afternoon/evening, stuffing oneself at supper, and using technology close to bedtime can decrease the quality of restful sleep throughout the night. Additionally, bedtime and wake-up times should be consistent every night and morning so the body becomes used to a single sleep/awake routine.   Eat healthy foods and balanced meals. It is notable that research indicates certain nutrients may aid in brain function, such as B vitamins (especially B6, B12, and folic acid), antioxidants (such as vitamins C and E, and beta carotene), and Omega-3 fatty acids. Talk with your physician or nutritionist about what's best.   Prospective Memory (remembering to remember to do something)  We recommend having visual cues (e.g., pill boxes) and alarms (e.g., phone alarm) set to remind you to do something in advance. It's important that loved ones and caregivers understand this difficulty for you. Daily lists of what you  "need to do can also be helpful.       Sleep Hygiene:      Avoid watching TV, eating, and discussing emotional issues in bed. The bed should be used for sleep and sex only. If not, we can associate the bed with other activities and it often becomes difficult to fall asleep.   Minimize noise, light, and temperature extremes during sleep with ear plugs, window blinds, or an electric blanket or air conditioner. Even the slightest nighttime noises or luminescent lights can disrupt the quality of your sleep. Try to keep your bedroom at a comfortable temperature -- not too hot (above 75 degrees) or too cold (below 54 degrees).   Try to go to bed and wake up at the same time every day. A strict routine can help encourage stability in your circadian rhythm. Get out of bed even if you're still tired - sleeping in much later will make it harder to fall asleep the next night, and the cycle will continue. You may need to run a "sleep deficit" for a day to help you fall asleep more easily.     Do not watch TV in bed, and do not fall asleep to TV. Many people say leaving the TV on helps them fall asleep. However, the flickering "blue light" released by screens  is absorbed even through closed eyelids, and suppresses melatonin release in your brain. This can cause us to linger in the lightest stages of sleep, and miss out on more restorative, deeper sleep. A better option might be a white noise machine or manisha without any light.    Try not to drink fluids after 8 p.m. This may reduce awakenings due to urination.   Avoid naps, but if you do nap, make it no more than about 25 minutes about eight hours after you awake. But if you have problems falling asleep, then no naps for you.   Do not expose your self to bright light if you need to get up at night. Use a small night-light instead. Blue light can be particularly detrimental, including the light from your cellphone, TV, or computer screen.   Nicotine is a stimulant and " "should be avoided particularly near bedtime and upon night awakenings. Having a smoke before bed, although it may feel relaxing, is actually putting a stimulant into your bloodstream.   Caffeine is also a stimulant and is present in coffee (100-200 mg), soda (50-75 mg), tea (50-75 mg), and various over-the-counter medications. Caffeine should be discontinued at least four to six hours before bedtime. If you consume large amounts of caffeine and you cut your self off too quickly, beware; you may get headaches that could keep you awake. Sometimes people feel they are "immune" to the effects of caffeine, and that a warm cup of coffee before bed actually helps them relax. But similar to nicotine, despite that it may feel relaxing in the moment, you are still consuming a potent stimulant, and it will act on your nervous system throughout the night causing restless and disrupted sleep even if you don't notice feeling jittery at bedtime.    Avoid alcohol in the evenings. Although alcohol is a depressant and may help you fall asleep, the subsequent metabolism that clears it from your body when you are sleeping causes a withdrawal syndrome. This withdrawal causes awakenings and is often associated with nightmares and sweats.   A light snack may be sleep-inducing, but a heavy meal too close to bedtime interferes with sleep. Stay away from protein and stick to carbohydrates or dairy products. Milk contains the amino acid L-tryptophan, which has been shown in research to help people go to sleep. So milk and cookies or crackers (without chocolate) may be useful and taste good as well.   Be active during the day. Get regular exercise, help burn off excess energy, and promote more sound sleep at night.    Try to get outside into the sunlight at least once per day (with sunscreen, of course!). Your circadian rhythm is primarily regulated by the light coming through your eyes, so making sure it's fully dark at night and making " "sure you get some sunlight during the day can reinforce your circadian rhythm.    Use mindfulness or meditation strategies to quiet a busy mind. Many people report having difficulty sleeping due to being unable "turn off" their minds. Practicing mindfulness exercises before bed - like Progressive Muscle Relaxation or a body scan - can help promote relaxation and aid in anxiety reduction. Apps such as Uskape and Calm can be useful, and there are many freely available mindfulness videos on YouTube. If anxiety continues to interfere with your sleep, seeing a behavioral health professional to learn anxiety reduction strategies can be helpful.    If you are still struggling with sleep, or struggling to implement any of these tips, behavioral health professionals who are specially trained in sleep medicine can be helpful. Modalities such as Cognitive Behavioral Therapy for Insomnia (CBT-I) can be particularly useful.     "

## 2020-11-02 NOTE — TELEPHONE ENCOUNTER
----- Message from Alber Kelsey MD sent at 11/2/2020  5:11 PM CST -----  Please notify patient of the following:    Vitamin C level returned within normal limits.

## 2020-11-04 ENCOUNTER — HOSPITAL ENCOUNTER (OUTPATIENT)
Dept: RADIOLOGY | Facility: OTHER | Age: 55
Discharge: HOME | End: 2020-11-04
Attending: ORTHOPAEDIC SURGERY
Payer: MEDICARE

## 2020-11-04 DIAGNOSIS — M25.552 ISCHIAL PAIN, LEFT: ICD-10-CM

## 2020-11-04 DIAGNOSIS — R10.2 PELVIC PAIN: ICD-10-CM

## 2020-11-04 DIAGNOSIS — S76.312S HAMSTRING MUSCLE STRAIN, LEFT, SEQUELA: ICD-10-CM

## 2020-11-04 PROCEDURE — 72195 MRI PELVIS WITHOUT CONTRAST: ICD-10-PCS | Mod: 26,,, | Performed by: RADIOLOGY

## 2020-11-04 PROCEDURE — 72195 MRI PELVIS W/O DYE: CPT | Mod: 26,,, | Performed by: RADIOLOGY

## 2020-11-04 PROCEDURE — 72195 MRI PELVIS W/O DYE: CPT | Mod: TC

## 2020-11-06 ENCOUNTER — TELEPHONE (OUTPATIENT)
Dept: INTERNAL MEDICINE | Facility: CLINIC | Age: 55
End: 2020-11-06

## 2020-11-06 DIAGNOSIS — S76.312A PARTIAL TEAR OF LEFT HAMSTRING: ICD-10-CM

## 2020-11-06 DIAGNOSIS — M76.892 HAMSTRING TENDONITIS OF LEFT THIGH: Primary | ICD-10-CM

## 2020-11-06 NOTE — TELEPHONE ENCOUNTER
Spoke with patient regarding results. She is concerned about the tear in her hamstring and inability to run or speed walk. Suggested biking or swimming as alternative activities for her. Discussed anti-inflammatories/PT/PRP options. Pt requests refill of meloxicam and referral to [facility].     Brigida Montoya  Clinical Assistant

## 2020-11-06 NOTE — TELEPHONE ENCOUNTER
She was already contacted by nursing by phone to discuss results and nursing was to mail results to patient as well; please call patient to enquire what she specifically wants and notify me

## 2020-11-06 NOTE — TELEPHONE ENCOUNTER
----- Message from Danielle Martinez sent at 11/6/2020  3:28 PM CST -----  Contact: Johnnie [phone]--  Test Results    Type of Test:--Labs--    Date of Test:--10/27/20--    Communication Preference:Johnnie [phone]--    Additional Information:Pt would like to see if the the results for her labs can be sent to her on Monday morning because she out the office today. Please call to advise.

## 2020-11-06 NOTE — TELEPHONE ENCOUNTER
MA contacted patient who wanted to again discuss her lab results and wanted them mailed to her stating nursing did not explain things or mail her results to her.  Patient also requested to speak with me about some concerns.  Stated she was seeing sports medicine and had an MRI showing a tear in her hamstring which has been excruciating but she is concerned because some of her vitamin levels are lower than she expected, she has a hard time gaining weight, and has noticed her stool sometimes sticks to the toilet.  Notified patient her vitamin levels are normal and actually elevated for B12.      States she did some googling and found where these symptoms may be related to fat malabsorption and enquires if she is on the right path.  Again stated she is concerned that she is not absorbing nutrients which she relates to a previous infection in the remote past when she went to the beach.  Notified patient that fat malabsorption typically causes foul smelling, floating, light colored stools.  Enquired if she would like to see a nutritionist to discuss diet plan; states she has already done that.  Recommended she schedule appointment for further evaluation and management of her complaints could consider testing for fat malabsorption at that time or could refer to GI; states she already has a GI.  Recommended f/u appt if her symptoms persist or worsen.  Patient demonstrated her understanding, thanked me for my time; all questions answered.

## 2020-11-06 NOTE — TELEPHONE ENCOUNTER
----- Message from Kacey Chaves sent at 11/6/2020  9:43 AM CST -----  Gay Gurrola calling regarding results (message) for MRI, it was done 11/4/20. she stated she is going out of town so she need a call back today with results   [phone]

## 2020-11-09 DIAGNOSIS — M67.80 TENDONOSIS: ICD-10-CM

## 2020-11-09 DIAGNOSIS — M25.552 ISCHIAL PAIN, LEFT: Primary | ICD-10-CM

## 2020-11-09 DIAGNOSIS — S76.312S HAMSTRING MUSCLE STRAIN, LEFT, SEQUELA: ICD-10-CM

## 2020-11-09 RX ORDER — MELOXICAM 7.5 MG/1
7.5 TABLET ORAL DAILY
Qty: 30 TABLET | Refills: 0 | Status: SHIPPED | OUTPATIENT
Start: 2020-11-09 | End: 2021-06-14

## 2020-11-09 NOTE — TELEPHONE ENCOUNTER
----- Message from Karen Wang sent at 11/9/2020  8:01 AM CST -----  Contact: pt  Rx Refill/Request     Is this a Refill or New Rx:  refill     Rx Name and Strength:  linaCLOtide (LINZESS) 290 mcg     Preferred Pharmacy with phone number:  [facility] [address] [place] [address] [address] [phone] (Phone)  [phone] (Fax)    Communication Preference: pt: [phone]    Additional Information: pt state she is out of medication and is leaving to go out of town tomorrow morning

## 2020-12-29 ENCOUNTER — INITIAL CONSULT (OUTPATIENT)
Dept: NEUROLOGY | Facility: CLINIC | Age: 55
End: 2020-12-29
Payer: MEDICARE

## 2020-12-29 DIAGNOSIS — Z81.8 FAMILY HISTORY OF DEMENTIA: ICD-10-CM

## 2020-12-29 DIAGNOSIS — F10.11 ALCOHOL ABUSE, IN REMISSION: ICD-10-CM

## 2020-12-29 DIAGNOSIS — F31.9 BIPOLAR I DISORDER: Primary | ICD-10-CM

## 2020-12-29 PROCEDURE — 96139 PSYCL/NRPSYC TST TECH EA: CPT | Mod: ,,, | Performed by: PSYCHIATRY & NEUROLOGY

## 2020-12-29 PROCEDURE — 96133 NRPSYC TST EVAL PHYS/QHP EA: CPT | Mod: ,,, | Performed by: PSYCHIATRY & NEUROLOGY

## 2020-12-29 PROCEDURE — 96132 PR NEUROPSYCHOLOGIC TEST EVAL SVCS, 1ST HR: ICD-10-PCS | Mod: ,,, | Performed by: PSYCHIATRY & NEUROLOGY

## 2020-12-29 PROCEDURE — 96133 PR NEUROPSYCHOLOGIC TEST EVAL SVCS, EA ADDTL HR: ICD-10-PCS | Mod: ,,, | Performed by: PSYCHIATRY & NEUROLOGY

## 2020-12-29 PROCEDURE — 99499 UNLISTED E&M SERVICE: CPT | Mod: S$PBB,,, | Performed by: PSYCHIATRY & NEUROLOGY

## 2020-12-29 PROCEDURE — 96139 PR PSYCH/NEUROPSYCH TEST ADMIN/SCORING, BY TECH, 2+ TESTS, EA ADDTL 30 MIN: ICD-10-PCS | Mod: ,,, | Performed by: PSYCHIATRY & NEUROLOGY

## 2020-12-29 PROCEDURE — 96138 PSYCL/NRPSYC TECH 1ST: CPT | Mod: ,,, | Performed by: PSYCHIATRY & NEUROLOGY

## 2020-12-29 PROCEDURE — 96132 NRPSYC TST EVAL PHYS/QHP 1ST: CPT | Mod: ,,, | Performed by: PSYCHIATRY & NEUROLOGY

## 2020-12-29 PROCEDURE — 99499 NO LOS: ICD-10-PCS | Mod: S$PBB,,, | Performed by: PSYCHIATRY & NEUROLOGY

## 2020-12-29 PROCEDURE — 96138 PR PSYCH/NEUROPSYCH TEST ADMIN/SCORING, BY TECH, 2+ TESTS, 1ST 30 MIN: ICD-10-PCS | Mod: ,,, | Performed by: PSYCHIATRY & NEUROLOGY

## 2021-01-20 ENCOUNTER — OFFICE VISIT (OUTPATIENT)
Dept: NEUROLOGY | Facility: CLINIC | Age: 56
End: 2021-01-20
Payer: COMMERCIAL

## 2021-01-20 DIAGNOSIS — F32.A DEPRESSION, UNSPECIFIED DEPRESSION TYPE: ICD-10-CM

## 2021-01-20 DIAGNOSIS — F10.11 ALCOHOL ABUSE, IN REMISSION: ICD-10-CM

## 2021-01-20 DIAGNOSIS — F31.9 BIPOLAR I DISORDER: Primary | ICD-10-CM

## 2021-01-20 DIAGNOSIS — Z81.8 FAMILY HISTORY OF DEMENTIA: ICD-10-CM

## 2021-01-20 PROCEDURE — 99499 NO LOS: ICD-10-PCS | Mod: 95,,, | Performed by: PSYCHIATRY & NEUROLOGY

## 2021-01-20 PROCEDURE — 99499 UNLISTED E&M SERVICE: CPT | Mod: 95,,, | Performed by: PSYCHIATRY & NEUROLOGY

## 2021-02-18 ENCOUNTER — PATIENT OUTREACH (OUTPATIENT)
Dept: ADMINISTRATIVE | Facility: OTHER | Age: 56
End: 2021-02-18

## 2021-02-22 ENCOUNTER — OFFICE VISIT (OUTPATIENT)
Dept: SPORTS MEDICINE | Facility: CLINIC | Age: 56
End: 2021-02-22
Payer: MEDICARE

## 2021-02-22 VITALS
SYSTOLIC BLOOD PRESSURE: 104 MMHG | HEIGHT: 62 IN | DIASTOLIC BLOOD PRESSURE: 68 MMHG | WEIGHT: 106 LBS | BODY MASS INDEX: 19.51 KG/M2 | HEART RATE: 68 BPM

## 2021-02-22 DIAGNOSIS — S76.302D LEFT HAMSTRING INJURY, SUBSEQUENT ENCOUNTER: Primary | ICD-10-CM

## 2021-02-22 PROBLEM — S76.302A LEFT HAMSTRING INJURY: Status: ACTIVE | Noted: 2021-02-22

## 2021-02-22 PROCEDURE — 99213 PR OFFICE/OUTPT VISIT, EST, LEVL III, 20-29 MIN: ICD-10-PCS | Mod: S$PBB,,, | Performed by: ORTHOPAEDIC SURGERY

## 2021-02-22 PROCEDURE — 99213 OFFICE O/P EST LOW 20 MIN: CPT | Mod: S$PBB,,, | Performed by: ORTHOPAEDIC SURGERY

## 2021-02-22 PROCEDURE — 99213 OFFICE O/P EST LOW 20 MIN: CPT | Mod: PBBFAC | Performed by: ORTHOPAEDIC SURGERY

## 2021-02-22 PROCEDURE — 99999 PR PBB SHADOW E&M-EST. PATIENT-LVL III: CPT | Mod: PBBFAC,,, | Performed by: ORTHOPAEDIC SURGERY

## 2021-02-22 PROCEDURE — 99999 PR PBB SHADOW E&M-EST. PATIENT-LVL III: ICD-10-PCS | Mod: PBBFAC,,, | Performed by: ORTHOPAEDIC SURGERY

## 2021-02-25 ENCOUNTER — TELEPHONE (OUTPATIENT)
Dept: SPORTS MEDICINE | Facility: CLINIC | Age: 56
End: 2021-02-25

## 2021-02-26 DIAGNOSIS — S76.302D LEFT HAMSTRING INJURY, SUBSEQUENT ENCOUNTER: Primary | ICD-10-CM

## 2021-03-01 ENCOUNTER — CLINICAL SUPPORT (OUTPATIENT)
Dept: REHABILITATION | Facility: HOSPITAL | Age: 56
End: 2021-03-01
Payer: MEDICARE

## 2021-03-01 DIAGNOSIS — S76.302D LEFT HAMSTRING INJURY, SUBSEQUENT ENCOUNTER: ICD-10-CM

## 2021-03-01 DIAGNOSIS — R52 PAIN: ICD-10-CM

## 2021-03-01 PROCEDURE — 97140 MANUAL THERAPY 1/> REGIONS: CPT | Performed by: PHYSICAL THERAPIST

## 2021-03-01 PROCEDURE — 97161 PT EVAL LOW COMPLEX 20 MIN: CPT | Performed by: PHYSICAL THERAPIST

## 2021-03-03 ENCOUNTER — CLINICAL SUPPORT (OUTPATIENT)
Dept: REHABILITATION | Facility: HOSPITAL | Age: 56
End: 2021-03-03
Payer: MEDICARE

## 2021-03-03 DIAGNOSIS — R52 PAIN: ICD-10-CM

## 2021-03-03 PROCEDURE — 97140 MANUAL THERAPY 1/> REGIONS: CPT | Performed by: PHYSICAL THERAPIST

## 2021-03-11 ENCOUNTER — CLINICAL SUPPORT (OUTPATIENT)
Dept: REHABILITATION | Facility: HOSPITAL | Age: 56
End: 2021-03-11
Payer: MEDICARE

## 2021-03-11 DIAGNOSIS — R52 PAIN: ICD-10-CM

## 2021-03-11 PROCEDURE — 97140 MANUAL THERAPY 1/> REGIONS: CPT | Performed by: PHYSICAL THERAPIST

## 2021-03-12 ENCOUNTER — CLINICAL SUPPORT (OUTPATIENT)
Dept: REHABILITATION | Facility: HOSPITAL | Age: 56
End: 2021-03-12
Payer: MEDICARE

## 2021-03-12 DIAGNOSIS — R52 PAIN: ICD-10-CM

## 2021-03-12 PROCEDURE — 97110 THERAPEUTIC EXERCISES: CPT | Performed by: PHYSICAL THERAPIST

## 2021-03-12 PROCEDURE — 97140 MANUAL THERAPY 1/> REGIONS: CPT | Performed by: PHYSICAL THERAPIST

## 2021-03-16 ENCOUNTER — CLINICAL SUPPORT (OUTPATIENT)
Dept: REHABILITATION | Facility: HOSPITAL | Age: 56
End: 2021-03-16
Payer: MEDICARE

## 2021-03-16 DIAGNOSIS — R52 PAIN: ICD-10-CM

## 2021-03-16 PROCEDURE — 97140 MANUAL THERAPY 1/> REGIONS: CPT | Performed by: PHYSICAL THERAPIST

## 2021-03-16 PROCEDURE — 97110 THERAPEUTIC EXERCISES: CPT | Performed by: PHYSICAL THERAPIST

## 2021-03-19 ENCOUNTER — CLINICAL SUPPORT (OUTPATIENT)
Dept: REHABILITATION | Facility: HOSPITAL | Age: 56
End: 2021-03-19
Payer: MEDICARE

## 2021-03-19 DIAGNOSIS — R52 PAIN: ICD-10-CM

## 2021-03-19 PROCEDURE — 97110 THERAPEUTIC EXERCISES: CPT | Performed by: PHYSICAL THERAPIST

## 2021-03-19 PROCEDURE — 97140 MANUAL THERAPY 1/> REGIONS: CPT | Performed by: PHYSICAL THERAPIST

## 2021-03-22 ENCOUNTER — PATIENT OUTREACH (OUTPATIENT)
Dept: ADMINISTRATIVE | Facility: OTHER | Age: 56
End: 2021-03-22

## 2021-03-24 ENCOUNTER — OFFICE VISIT (OUTPATIENT)
Dept: GASTROENTEROLOGY | Facility: CLINIC | Age: 56
End: 2021-03-24
Payer: MEDICARE

## 2021-03-24 ENCOUNTER — CLINICAL SUPPORT (OUTPATIENT)
Dept: REHABILITATION | Facility: HOSPITAL | Age: 56
End: 2021-03-24
Payer: MEDICARE

## 2021-03-24 VITALS
HEART RATE: 70 BPM | BODY MASS INDEX: 19.53 KG/M2 | DIASTOLIC BLOOD PRESSURE: 71 MMHG | HEIGHT: 62 IN | WEIGHT: 106.13 LBS | SYSTOLIC BLOOD PRESSURE: 110 MMHG

## 2021-03-24 DIAGNOSIS — K59.00 CONSTIPATION, UNSPECIFIED CONSTIPATION TYPE: Primary | ICD-10-CM

## 2021-03-24 DIAGNOSIS — Z80.0 FAMILY HISTORY OF COLON CANCER: ICD-10-CM

## 2021-03-24 DIAGNOSIS — R52 PAIN: ICD-10-CM

## 2021-03-24 PROCEDURE — 97110 THERAPEUTIC EXERCISES: CPT | Performed by: PHYSICAL THERAPIST

## 2021-03-24 PROCEDURE — 99213 PR OFFICE/OUTPT VISIT, EST, LEVL III, 20-29 MIN: ICD-10-PCS | Mod: S$PBB,,, | Performed by: INTERNAL MEDICINE

## 2021-03-24 PROCEDURE — 99213 OFFICE O/P EST LOW 20 MIN: CPT | Mod: PBBFAC | Performed by: INTERNAL MEDICINE

## 2021-03-24 PROCEDURE — 97140 MANUAL THERAPY 1/> REGIONS: CPT | Performed by: PHYSICAL THERAPIST

## 2021-03-24 PROCEDURE — 99999 PR PBB SHADOW E&M-EST. PATIENT-LVL III: ICD-10-PCS | Mod: PBBFAC,,, | Performed by: INTERNAL MEDICINE

## 2021-03-24 PROCEDURE — 99213 OFFICE O/P EST LOW 20 MIN: CPT | Mod: S$PBB,,, | Performed by: INTERNAL MEDICINE

## 2021-03-24 PROCEDURE — 99999 PR PBB SHADOW E&M-EST. PATIENT-LVL III: CPT | Mod: PBBFAC,,, | Performed by: INTERNAL MEDICINE

## 2021-03-26 ENCOUNTER — CLINICAL SUPPORT (OUTPATIENT)
Dept: REHABILITATION | Facility: HOSPITAL | Age: 56
End: 2021-03-26
Payer: MEDICARE

## 2021-03-26 DIAGNOSIS — R52 PAIN: ICD-10-CM

## 2021-03-26 PROCEDURE — 97140 MANUAL THERAPY 1/> REGIONS: CPT | Performed by: PHYSICAL THERAPIST

## 2021-03-26 PROCEDURE — 97110 THERAPEUTIC EXERCISES: CPT | Performed by: PHYSICAL THERAPIST

## 2021-03-31 ENCOUNTER — CLINICAL SUPPORT (OUTPATIENT)
Dept: REHABILITATION | Facility: HOSPITAL | Age: 56
End: 2021-03-31
Payer: MEDICARE

## 2021-03-31 DIAGNOSIS — R52 PAIN: ICD-10-CM

## 2021-03-31 PROCEDURE — 97140 MANUAL THERAPY 1/> REGIONS: CPT | Performed by: PHYSICAL THERAPIST

## 2021-04-06 ENCOUNTER — CLINICAL SUPPORT (OUTPATIENT)
Dept: REHABILITATION | Facility: HOSPITAL | Age: 56
End: 2021-04-06
Payer: MEDICARE

## 2021-04-06 DIAGNOSIS — R52 PAIN: ICD-10-CM

## 2021-04-06 PROCEDURE — 97140 MANUAL THERAPY 1/> REGIONS: CPT | Performed by: PHYSICAL THERAPIST

## 2021-04-06 PROCEDURE — 97110 THERAPEUTIC EXERCISES: CPT | Performed by: PHYSICAL THERAPIST

## 2021-04-09 ENCOUNTER — CLINICAL SUPPORT (OUTPATIENT)
Dept: REHABILITATION | Facility: HOSPITAL | Age: 56
End: 2021-04-09
Payer: MEDICARE

## 2021-04-09 DIAGNOSIS — R52 PAIN: ICD-10-CM

## 2021-04-09 PROCEDURE — 97140 MANUAL THERAPY 1/> REGIONS: CPT | Performed by: PHYSICAL THERAPIST

## 2021-04-09 PROCEDURE — 97110 THERAPEUTIC EXERCISES: CPT | Performed by: PHYSICAL THERAPIST

## 2021-04-13 ENCOUNTER — CLINICAL SUPPORT (OUTPATIENT)
Dept: REHABILITATION | Facility: HOSPITAL | Age: 56
End: 2021-04-13
Payer: MEDICARE

## 2021-04-13 DIAGNOSIS — R52 PAIN: ICD-10-CM

## 2021-04-13 PROCEDURE — 97110 THERAPEUTIC EXERCISES: CPT | Performed by: PHYSICAL THERAPIST

## 2021-04-13 PROCEDURE — 97140 MANUAL THERAPY 1/> REGIONS: CPT | Performed by: PHYSICAL THERAPIST

## 2021-04-16 ENCOUNTER — CLINICAL SUPPORT (OUTPATIENT)
Dept: REHABILITATION | Facility: HOSPITAL | Age: 56
End: 2021-04-16
Payer: MEDICARE

## 2021-04-16 DIAGNOSIS — R52 PAIN: ICD-10-CM

## 2021-04-16 PROCEDURE — 97140 MANUAL THERAPY 1/> REGIONS: CPT | Performed by: PHYSICAL THERAPIST

## 2021-06-14 ENCOUNTER — OFFICE VISIT (OUTPATIENT)
Dept: PSYCHIATRY | Facility: CLINIC | Age: 56
End: 2021-06-14
Payer: MEDICARE

## 2021-06-14 VITALS
HEART RATE: 70 BPM | BODY MASS INDEX: 20.04 KG/M2 | DIASTOLIC BLOOD PRESSURE: 67 MMHG | WEIGHT: 109.56 LBS | SYSTOLIC BLOOD PRESSURE: 105 MMHG

## 2021-06-14 DIAGNOSIS — F31.9 BIPOLAR AFFECTIVE DISORDER, REMISSION STATUS UNSPECIFIED: ICD-10-CM

## 2021-06-14 DIAGNOSIS — Z86.59 HISTORY OF MAJOR DEPRESSION: ICD-10-CM

## 2021-06-14 DIAGNOSIS — F51.01 PRIMARY INSOMNIA: Primary | ICD-10-CM

## 2021-06-14 PROCEDURE — 99999 PR PBB SHADOW E&M-EST. PATIENT-LVL III: CPT | Mod: PBBFAC,,, | Performed by: PHYSICIAN ASSISTANT

## 2021-06-14 PROCEDURE — 99999 PR PBB SHADOW E&M-EST. PATIENT-LVL III: ICD-10-PCS | Mod: PBBFAC,,, | Performed by: PHYSICIAN ASSISTANT

## 2021-06-14 PROCEDURE — 99213 OFFICE O/P EST LOW 20 MIN: CPT | Mod: PBBFAC | Performed by: PHYSICIAN ASSISTANT

## 2021-06-14 PROCEDURE — 90792 PSYCH DIAG EVAL W/MED SRVCS: CPT | Mod: ,,, | Performed by: PHYSICIAN ASSISTANT

## 2021-06-14 PROCEDURE — 90792 PR PSYCHIATRIC DIAGNOSTIC EVALUATION W/MEDICAL SERVICES: ICD-10-PCS | Mod: ,,, | Performed by: PHYSICIAN ASSISTANT

## 2021-06-14 RX ORDER — TEMAZEPAM 30 MG/1
30 CAPSULE ORAL NIGHTLY PRN
Qty: 30 CAPSULE | Refills: 2 | Status: SHIPPED | OUTPATIENT
Start: 2021-06-14 | End: 2021-09-08 | Stop reason: SDUPTHER

## 2021-06-14 RX ORDER — LAMOTRIGINE 150 MG/1
150 TABLET ORAL 2 TIMES DAILY
Qty: 60 TABLET | Refills: 2 | Status: SHIPPED | OUTPATIENT
Start: 2021-06-14 | End: 2021-09-29 | Stop reason: SDUPTHER

## 2021-06-14 RX ORDER — QUETIAPINE FUMARATE 300 MG/1
300 TABLET, FILM COATED ORAL NIGHTLY
Qty: 30 TABLET | Refills: 2 | Status: SHIPPED | OUTPATIENT
Start: 2021-06-14 | End: 2021-09-29

## 2021-09-29 ENCOUNTER — OFFICE VISIT (OUTPATIENT)
Dept: PSYCHIATRY | Facility: CLINIC | Age: 56
End: 2021-09-29
Payer: MEDICARE

## 2021-09-29 VITALS
SYSTOLIC BLOOD PRESSURE: 101 MMHG | BODY MASS INDEX: 20.26 KG/M2 | WEIGHT: 110.81 LBS | DIASTOLIC BLOOD PRESSURE: 59 MMHG | HEART RATE: 78 BPM

## 2021-09-29 DIAGNOSIS — E03.9 HYPOTHYROIDISM, UNSPECIFIED TYPE: ICD-10-CM

## 2021-09-29 DIAGNOSIS — F31.9 BIPOLAR I DISORDER: Primary | ICD-10-CM

## 2021-09-29 DIAGNOSIS — F51.01 PRIMARY INSOMNIA: ICD-10-CM

## 2021-09-29 PROCEDURE — 99214 PR OFFICE/OUTPT VISIT, EST, LEVL IV, 30-39 MIN: ICD-10-PCS | Mod: S$PBB,,, | Performed by: PHYSICIAN ASSISTANT

## 2021-09-29 PROCEDURE — 99999 PR PBB SHADOW E&M-EST. PATIENT-LVL III: ICD-10-PCS | Mod: PBBFAC,,, | Performed by: PHYSICIAN ASSISTANT

## 2021-09-29 PROCEDURE — 99214 OFFICE O/P EST MOD 30 MIN: CPT | Mod: S$PBB,,, | Performed by: PHYSICIAN ASSISTANT

## 2021-09-29 PROCEDURE — 99999 PR PBB SHADOW E&M-EST. PATIENT-LVL III: CPT | Mod: PBBFAC,,, | Performed by: PHYSICIAN ASSISTANT

## 2021-09-29 PROCEDURE — 99213 OFFICE O/P EST LOW 20 MIN: CPT | Mod: PBBFAC | Performed by: PHYSICIAN ASSISTANT

## 2021-09-29 RX ORDER — QUETIAPINE FUMARATE 200 MG/1
200 TABLET, FILM COATED ORAL NIGHTLY
Qty: 30 TABLET | Refills: 0 | Status: SHIPPED | OUTPATIENT
Start: 2021-09-29 | End: 2021-10-26 | Stop reason: DRUGHIGH

## 2021-09-29 RX ORDER — LEVOTHYROXINE SODIUM 25 UG/1
25 TABLET ORAL DAILY
Qty: 90 TABLET | Refills: 3 | Status: SHIPPED | OUTPATIENT
Start: 2021-09-29 | End: 2022-10-04 | Stop reason: SDUPTHER

## 2021-09-29 RX ORDER — LAMOTRIGINE 150 MG/1
150 TABLET ORAL 2 TIMES DAILY
Qty: 60 TABLET | Refills: 3 | Status: SHIPPED | OUTPATIENT
Start: 2021-09-29 | End: 2021-12-14 | Stop reason: SDUPTHER

## 2021-09-29 RX ORDER — TEMAZEPAM 30 MG/1
30 CAPSULE ORAL NIGHTLY PRN
Qty: 30 CAPSULE | Refills: 2 | Status: SHIPPED | OUTPATIENT
Start: 2021-09-29 | End: 2021-12-14 | Stop reason: SDUPTHER

## 2021-10-05 ENCOUNTER — TELEPHONE (OUTPATIENT)
Dept: OPHTHALMOLOGY | Facility: CLINIC | Age: 56
End: 2021-10-05

## 2021-10-11 ENCOUNTER — OFFICE VISIT (OUTPATIENT)
Dept: OPTOMETRY | Facility: CLINIC | Age: 56
End: 2021-10-11
Payer: MEDICARE

## 2021-10-11 DIAGNOSIS — H52.4 MYOPIA WITH ASTIGMATISM AND PRESBYOPIA, LEFT: ICD-10-CM

## 2021-10-11 DIAGNOSIS — H52.12 MYOPIA WITH ASTIGMATISM AND PRESBYOPIA, LEFT: ICD-10-CM

## 2021-10-11 DIAGNOSIS — H52.201 ASTIGMATISM OF RIGHT EYE WITH PRESBYOPIA: ICD-10-CM

## 2021-10-11 DIAGNOSIS — H40.013 OAG (OPEN ANGLE GLAUCOMA) SUSPECT, LOW RISK, BILATERAL: ICD-10-CM

## 2021-10-11 DIAGNOSIS — H15.103 EPISCLERITIS OF BOTH EYES: Primary | ICD-10-CM

## 2021-10-11 DIAGNOSIS — H52.4 ASTIGMATISM OF RIGHT EYE WITH PRESBYOPIA: ICD-10-CM

## 2021-10-11 DIAGNOSIS — H52.202 MYOPIA WITH ASTIGMATISM AND PRESBYOPIA, LEFT: ICD-10-CM

## 2021-10-11 PROCEDURE — 92015 DETERMINE REFRACTIVE STATE: CPT | Mod: ,,, | Performed by: OPTOMETRIST

## 2021-10-11 PROCEDURE — 99999 PR PBB SHADOW E&M-EST. PATIENT-LVL III: CPT | Mod: PBBFAC,,, | Performed by: OPTOMETRIST

## 2021-10-11 PROCEDURE — 99999 PR PBB SHADOW E&M-EST. PATIENT-LVL III: ICD-10-PCS | Mod: PBBFAC,,, | Performed by: OPTOMETRIST

## 2021-10-11 PROCEDURE — 99213 OFFICE O/P EST LOW 20 MIN: CPT | Mod: PBBFAC | Performed by: OPTOMETRIST

## 2021-10-11 PROCEDURE — 92004 PR EYE EXAM, NEW PATIENT,COMPREHESV: ICD-10-PCS | Mod: S$PBB,,, | Performed by: OPTOMETRIST

## 2021-10-11 PROCEDURE — 92015 PR REFRACTION: ICD-10-PCS | Mod: ,,, | Performed by: OPTOMETRIST

## 2021-10-11 PROCEDURE — 92004 COMPRE OPH EXAM NEW PT 1/>: CPT | Mod: S$PBB,,, | Performed by: OPTOMETRIST

## 2021-10-11 RX ORDER — PREDNISOLONE ACETATE 10 MG/ML
SUSPENSION/ DROPS OPHTHALMIC
Qty: 10 ML | Refills: 0 | Status: SHIPPED | OUTPATIENT
Start: 2021-10-11 | End: 2021-11-02

## 2021-10-14 ENCOUNTER — TELEPHONE (OUTPATIENT)
Dept: INTERNAL MEDICINE | Facility: CLINIC | Age: 56
End: 2021-10-14

## 2021-10-18 ENCOUNTER — TELEPHONE (OUTPATIENT)
Dept: SPORTS MEDICINE | Facility: CLINIC | Age: 56
End: 2021-10-18

## 2021-10-18 ENCOUNTER — PATIENT MESSAGE (OUTPATIENT)
Dept: PSYCHIATRY | Facility: CLINIC | Age: 56
End: 2021-10-18
Payer: COMMERCIAL

## 2021-10-21 ENCOUNTER — TELEPHONE (OUTPATIENT)
Dept: SPORTS MEDICINE | Facility: CLINIC | Age: 56
End: 2021-10-21

## 2021-10-21 ENCOUNTER — PATIENT OUTREACH (OUTPATIENT)
Dept: ADMINISTRATIVE | Facility: OTHER | Age: 56
End: 2021-10-21

## 2021-10-22 ENCOUNTER — OFFICE VISIT (OUTPATIENT)
Dept: SPORTS MEDICINE | Facility: CLINIC | Age: 56
End: 2021-10-22
Payer: MEDICARE

## 2021-10-22 VITALS
HEIGHT: 62 IN | TEMPERATURE: 98 F | WEIGHT: 108.81 LBS | SYSTOLIC BLOOD PRESSURE: 97 MMHG | HEART RATE: 73 BPM | DIASTOLIC BLOOD PRESSURE: 64 MMHG | BODY MASS INDEX: 20.03 KG/M2

## 2021-10-22 DIAGNOSIS — S76.302D LEFT HAMSTRING INJURY, SUBSEQUENT ENCOUNTER: Primary | ICD-10-CM

## 2021-10-22 PROCEDURE — 99213 PR OFFICE/OUTPT VISIT, EST, LEVL III, 20-29 MIN: ICD-10-PCS | Mod: S$PBB,,, | Performed by: ORTHOPAEDIC SURGERY

## 2021-10-22 PROCEDURE — 99213 OFFICE O/P EST LOW 20 MIN: CPT | Mod: S$PBB,,, | Performed by: ORTHOPAEDIC SURGERY

## 2021-10-22 PROCEDURE — 99999 PR PBB SHADOW E&M-EST. PATIENT-LVL III: CPT | Mod: PBBFAC,,, | Performed by: ORTHOPAEDIC SURGERY

## 2021-10-22 PROCEDURE — 99999 PR PBB SHADOW E&M-EST. PATIENT-LVL III: ICD-10-PCS | Mod: PBBFAC,,, | Performed by: ORTHOPAEDIC SURGERY

## 2021-10-22 PROCEDURE — 99213 OFFICE O/P EST LOW 20 MIN: CPT | Mod: PBBFAC | Performed by: ORTHOPAEDIC SURGERY

## 2021-10-26 ENCOUNTER — OFFICE VISIT (OUTPATIENT)
Dept: PSYCHIATRY | Facility: CLINIC | Age: 56
End: 2021-10-26
Payer: MEDICARE

## 2021-10-26 VITALS
HEIGHT: 62 IN | BODY MASS INDEX: 20.22 KG/M2 | DIASTOLIC BLOOD PRESSURE: 70 MMHG | SYSTOLIC BLOOD PRESSURE: 120 MMHG | WEIGHT: 109.88 LBS | HEART RATE: 65 BPM

## 2021-10-26 DIAGNOSIS — F51.01 PRIMARY INSOMNIA: Primary | ICD-10-CM

## 2021-10-26 DIAGNOSIS — F31.9 BIPOLAR AFFECTIVE DISORDER, REMISSION STATUS UNSPECIFIED: ICD-10-CM

## 2021-10-26 PROCEDURE — 99999 PR PBB SHADOW E&M-EST. PATIENT-LVL III: ICD-10-PCS | Mod: PBBFAC,,, | Performed by: PHYSICIAN ASSISTANT

## 2021-10-26 PROCEDURE — 99999 PR PBB SHADOW E&M-EST. PATIENT-LVL III: CPT | Mod: PBBFAC,,, | Performed by: PHYSICIAN ASSISTANT

## 2021-10-26 PROCEDURE — 90833 PR PSYCHOTHERAPY W/PATIENT W/E&M, 30 MIN (ADD ON): ICD-10-PCS | Mod: S$PBB,,, | Performed by: PHYSICIAN ASSISTANT

## 2021-10-26 PROCEDURE — 90833 PSYTX W PT W E/M 30 MIN: CPT | Mod: S$PBB,,, | Performed by: PHYSICIAN ASSISTANT

## 2021-10-26 PROCEDURE — 99214 PR OFFICE/OUTPT VISIT, EST, LEVL IV, 30-39 MIN: ICD-10-PCS | Mod: S$PBB,,, | Performed by: PHYSICIAN ASSISTANT

## 2021-10-26 PROCEDURE — 99214 OFFICE O/P EST MOD 30 MIN: CPT | Mod: S$PBB,,, | Performed by: PHYSICIAN ASSISTANT

## 2021-10-26 PROCEDURE — 99213 OFFICE O/P EST LOW 20 MIN: CPT | Mod: PBBFAC | Performed by: PHYSICIAN ASSISTANT

## 2021-10-26 RX ORDER — QUETIAPINE FUMARATE 300 MG/1
300 TABLET, FILM COATED ORAL DAILY
Qty: 30 TABLET | Refills: 2 | Status: SHIPPED | OUTPATIENT
Start: 2021-10-26 | End: 2021-12-14 | Stop reason: SDUPTHER

## 2021-10-26 RX ORDER — DULOXETIN HYDROCHLORIDE 20 MG/1
20 CAPSULE, DELAYED RELEASE ORAL DAILY
Qty: 30 CAPSULE | Refills: 1 | Status: SHIPPED | OUTPATIENT
Start: 2021-10-26 | End: 2021-12-14 | Stop reason: SDUPTHER

## 2021-11-05 ENCOUNTER — TELEPHONE (OUTPATIENT)
Dept: OPHTHALMOLOGY | Facility: CLINIC | Age: 56
End: 2021-11-05
Payer: COMMERCIAL

## 2021-11-08 ENCOUNTER — CLINICAL SUPPORT (OUTPATIENT)
Dept: REHABILITATION | Facility: HOSPITAL | Age: 56
End: 2021-11-08
Payer: MEDICARE

## 2021-11-08 DIAGNOSIS — S76.302D LEFT HAMSTRING INJURY, SUBSEQUENT ENCOUNTER: ICD-10-CM

## 2021-11-08 PROCEDURE — 97161 PT EVAL LOW COMPLEX 20 MIN: CPT

## 2021-11-08 PROCEDURE — 97110 THERAPEUTIC EXERCISES: CPT

## 2021-11-10 ENCOUNTER — OFFICE VISIT (OUTPATIENT)
Dept: OPTOMETRY | Facility: CLINIC | Age: 56
End: 2021-11-10
Payer: MEDICARE

## 2021-11-10 DIAGNOSIS — H40.013 OAG (OPEN ANGLE GLAUCOMA) SUSPECT, LOW RISK, BILATERAL: Primary | ICD-10-CM

## 2021-11-10 PROCEDURE — 76514 ECHO EXAM OF EYE THICKNESS: CPT | Mod: PBBFAC | Performed by: OPTOMETRIST

## 2021-11-10 PROCEDURE — 99213 OFFICE O/P EST LOW 20 MIN: CPT | Mod: PBBFAC | Performed by: OPTOMETRIST

## 2021-11-10 PROCEDURE — 92133 CPTRZD OPH DX IMG PST SGM ON: CPT | Mod: PBBFAC | Performed by: OPTOMETRIST

## 2021-11-10 PROCEDURE — 92083 EXTENDED VISUAL FIELD XM: CPT | Mod: PBBFAC | Performed by: OPTOMETRIST

## 2021-11-10 PROCEDURE — 92012 PR EYE EXAM, EST PATIENT,INTERMED: ICD-10-PCS | Mod: S$PBB,,, | Performed by: OPTOMETRIST

## 2021-11-10 PROCEDURE — 76514 ECHO EXAM OF EYE THICKNESS: CPT | Mod: 26,S$PBB,, | Performed by: OPTOMETRIST

## 2021-11-10 PROCEDURE — 92083 HUMPHREY VISUAL FIELD - OU - BOTH EYES: ICD-10-PCS | Mod: 26,S$PBB,, | Performed by: OPTOMETRIST

## 2021-11-10 PROCEDURE — 76514 PR  US, EYE, FOR CORNEAL THICKNESS: ICD-10-PCS | Mod: 26,S$PBB,, | Performed by: OPTOMETRIST

## 2021-11-10 PROCEDURE — 99999 PR PBB SHADOW E&M-EST. PATIENT-LVL III: CPT | Mod: PBBFAC,,, | Performed by: OPTOMETRIST

## 2021-11-10 PROCEDURE — 99999 PR PBB SHADOW E&M-EST. PATIENT-LVL III: ICD-10-PCS | Mod: PBBFAC,,, | Performed by: OPTOMETRIST

## 2021-11-10 PROCEDURE — 92012 INTRM OPH EXAM EST PATIENT: CPT | Mod: S$PBB,,, | Performed by: OPTOMETRIST

## 2021-11-10 PROCEDURE — 92133 POSTERIOR SEGMENT OCT OPTIC NERVE(OCULAR COHERENCE TOMOGRAPHY) - OU - BOTH EYES: ICD-10-PCS | Mod: 26,S$PBB,, | Performed by: OPTOMETRIST

## 2021-12-07 ENCOUNTER — PES CALL (OUTPATIENT)
Dept: ADMINISTRATIVE | Facility: CLINIC | Age: 56
End: 2021-12-07
Payer: COMMERCIAL

## 2021-12-14 ENCOUNTER — OFFICE VISIT (OUTPATIENT)
Dept: PSYCHIATRY | Facility: CLINIC | Age: 56
End: 2021-12-14
Payer: MEDICARE

## 2021-12-14 DIAGNOSIS — F31.9 BIPOLAR AFFECTIVE DISORDER, REMISSION STATUS UNSPECIFIED: ICD-10-CM

## 2021-12-14 DIAGNOSIS — F51.01 PRIMARY INSOMNIA: Primary | ICD-10-CM

## 2021-12-14 PROCEDURE — 99214 OFFICE O/P EST MOD 30 MIN: CPT | Mod: S$PBB,,, | Performed by: PHYSICIAN ASSISTANT

## 2021-12-14 PROCEDURE — 99214 PR OFFICE/OUTPT VISIT, EST, LEVL IV, 30-39 MIN: ICD-10-PCS | Mod: S$PBB,,, | Performed by: PHYSICIAN ASSISTANT

## 2021-12-14 PROCEDURE — 99999 PR PBB SHADOW E&M-EST. PATIENT-LVL II: CPT | Mod: PBBFAC,,, | Performed by: PHYSICIAN ASSISTANT

## 2021-12-14 PROCEDURE — 99212 OFFICE O/P EST SF 10 MIN: CPT | Mod: PBBFAC | Performed by: PHYSICIAN ASSISTANT

## 2021-12-14 PROCEDURE — 99999 PR PBB SHADOW E&M-EST. PATIENT-LVL II: ICD-10-PCS | Mod: PBBFAC,,, | Performed by: PHYSICIAN ASSISTANT

## 2021-12-15 RX ORDER — QUETIAPINE FUMARATE 300 MG/1
300 TABLET, FILM COATED ORAL DAILY
Qty: 30 TABLET | Refills: 2 | Status: SHIPPED | OUTPATIENT
Start: 2021-12-15 | End: 2022-02-07

## 2021-12-15 RX ORDER — LAMOTRIGINE 150 MG/1
150 TABLET ORAL 2 TIMES DAILY
Qty: 60 TABLET | Refills: 3 | Status: SHIPPED | OUTPATIENT
Start: 2021-12-15 | End: 2022-03-22 | Stop reason: SDUPTHER

## 2021-12-15 RX ORDER — DULOXETIN HYDROCHLORIDE 20 MG/1
20 CAPSULE, DELAYED RELEASE ORAL DAILY
Qty: 30 CAPSULE | Refills: 2 | Status: SHIPPED | OUTPATIENT
Start: 2021-12-15 | End: 2022-03-08

## 2021-12-15 RX ORDER — TEMAZEPAM 30 MG/1
30 CAPSULE ORAL NIGHTLY PRN
Qty: 30 CAPSULE | Refills: 2 | Status: SHIPPED | OUTPATIENT
Start: 2021-12-15 | End: 2021-12-29 | Stop reason: SDUPTHER

## 2021-12-29 RX ORDER — TEMAZEPAM 30 MG/1
30 CAPSULE ORAL NIGHTLY PRN
Qty: 30 CAPSULE | Refills: 0 | Status: SHIPPED | OUTPATIENT
Start: 2021-12-29 | End: 2022-01-11 | Stop reason: SDUPTHER

## 2022-01-11 ENCOUNTER — TELEPHONE (OUTPATIENT)
Dept: PSYCHIATRY | Facility: CLINIC | Age: 57
End: 2022-01-11
Payer: COMMERCIAL

## 2022-01-11 DIAGNOSIS — F51.01 PRIMARY INSOMNIA: Primary | ICD-10-CM

## 2022-01-11 RX ORDER — TEMAZEPAM 30 MG/1
30 CAPSULE ORAL NIGHTLY PRN
Qty: 30 CAPSULE | Refills: 1 | Status: SHIPPED | OUTPATIENT
Start: 2022-01-11 | End: 2022-03-08

## 2022-01-11 NOTE — TELEPHONE ENCOUNTER
Patient requests that this provider call her.     Patient is back in [place] after spending the holidays in [place] with her sons. The patient reminds this provider that on  she requested a refill of her tenazepam to be refilled to [place], as she had ran out in [place]. This provider gave 1 prescription with 0 refills.     The patient reports that on Thursday she discovered she only had 6 tenazepam in her bottle. She reports her son, who she has talked about in appointments with this provider as he suffers from depression, anxiety, and insomnia, was taking her tenazepam while she was in [place] with him. She states that in the past he took pain pills for his kidney issues and feels that he got hooked and is now desperate for something to help with his insomnia. She states she has tried to get him to go to a psychiatrist but he will not.     She states that she discovered the missing medication on Thursday and was upset. When she confronted him, she states he . However, when her other son confronted him, he admitted to taking the medication and expressed regret.     The patient is highly upset that her son was taking her medication and lied about it. She asks this provider what to do. This provider encourages her to encourage him to find a psychiatrist in [place], as that would best help his insomnia and depression.     The patient informs this provider that she only has 1 pill left. She expresses embarrassment, as she states she knows she was recently prescribed in [place].     The patient is known to this provider and has never requested an early refill before.  is reviewed, no suspicious activity.    Patient is informed that an early refill will be authorized this time.

## 2022-01-20 NOTE — TELEPHONE ENCOUNTER
----- Message from Joseph Lira sent at 1/20/2022 11:23 AM CST -----  Patient called requesting an refill on Rx linaCLOtide (LINZESS) 290 mcg Cap capsule be sent to [facility]. Callback for confirmation [phone]       [facility] - [place], [place] - [address] [place] [address]  Phone: [phone] Fax: [phone]

## 2022-03-02 ENCOUNTER — PATIENT OUTREACH (OUTPATIENT)
Dept: ADMINISTRATIVE | Facility: OTHER | Age: 57
End: 2022-03-02
Payer: COMMERCIAL

## 2022-03-02 ENCOUNTER — PATIENT MESSAGE (OUTPATIENT)
Dept: ADMINISTRATIVE | Facility: OTHER | Age: 57
End: 2022-03-02
Payer: COMMERCIAL

## 2022-03-02 DIAGNOSIS — Z12.31 ENCOUNTER FOR SCREENING MAMMOGRAM FOR MALIGNANT NEOPLASM OF BREAST: Primary | ICD-10-CM

## 2022-03-02 DIAGNOSIS — H15.109 EPISCLERITIS, UNSPECIFIED LATERALITY: Primary | ICD-10-CM

## 2022-03-02 NOTE — PROGRESS NOTES
Care Everywhere: updated  Immunization: updated  Health Maintenance: updated  Media Review:   Legacy Review:   DIS:  Order placed:   Upcoming appts:  EFAX:  Task Tickets:Scheduling ticket to schedule annual pcp visit sent to patient's portal   Referrals:

## 2022-03-03 ENCOUNTER — OFFICE VISIT (OUTPATIENT)
Dept: OPTOMETRY | Facility: CLINIC | Age: 57
End: 2022-03-03
Payer: MEDICARE

## 2022-03-03 ENCOUNTER — TELEPHONE (OUTPATIENT)
Dept: OPTOMETRY | Facility: CLINIC | Age: 57
End: 2022-03-03
Payer: COMMERCIAL

## 2022-03-03 ENCOUNTER — LAB VISIT (OUTPATIENT)
Dept: LAB | Facility: OTHER | Age: 57
End: 2022-03-03
Attending: OPTOMETRIST
Payer: MEDICARE

## 2022-03-03 DIAGNOSIS — H15.103 EPISCLERITIS OF BOTH EYES: ICD-10-CM

## 2022-03-03 DIAGNOSIS — H15.103 EPISCLERITIS OF BOTH EYES: Primary | ICD-10-CM

## 2022-03-03 LAB
BASOPHILS # BLD AUTO: 0.02 K/UL (ref 0–0.2)
BASOPHILS NFR BLD: 0.4 % (ref 0–1.9)
CRP SERPL-MCNC: 0.8 MG/L (ref 0–8.2)
DIFFERENTIAL METHOD: NORMAL
EOSINOPHIL # BLD AUTO: 0 K/UL (ref 0–0.5)
EOSINOPHIL NFR BLD: 0.6 % (ref 0–8)
ERYTHROCYTE [DISTWIDTH] IN BLOOD BY AUTOMATED COUNT: 14.1 % (ref 11.5–14.5)
ERYTHROCYTE [SEDIMENTATION RATE] IN BLOOD: 1 MM/HR (ref 0–20)
HCT VFR BLD AUTO: 39.6 % (ref 37–48.5)
HGB BLD-MCNC: 13 G/DL (ref 12–16)
IMM GRANULOCYTES # BLD AUTO: 0.01 K/UL (ref 0–0.04)
IMM GRANULOCYTES NFR BLD AUTO: 0.2 % (ref 0–0.5)
LYMPHOCYTES # BLD AUTO: 1.2 K/UL (ref 1–4.8)
LYMPHOCYTES NFR BLD: 25.3 % (ref 18–48)
MCH RBC QN AUTO: 28.6 PG (ref 27–31)
MCHC RBC AUTO-ENTMCNC: 32.8 G/DL (ref 32–36)
MCV RBC AUTO: 87 FL (ref 82–98)
MONOCYTES # BLD AUTO: 0.3 K/UL (ref 0.3–1)
MONOCYTES NFR BLD: 6.2 % (ref 4–15)
NEUTROPHILS # BLD AUTO: 3.3 K/UL (ref 1.8–7.7)
NEUTROPHILS NFR BLD: 67.3 % (ref 38–73)
NRBC BLD-RTO: 0 /100 WBC
PLATELET # BLD AUTO: 271 K/UL (ref 150–450)
PMV BLD AUTO: 10 FL (ref 9.2–12.9)
RBC # BLD AUTO: 4.55 M/UL (ref 4–5.4)
RHEUMATOID FACT SERPL-ACNC: <13 IU/ML (ref 0–15)
WBC # BLD AUTO: 4.87 K/UL (ref 3.9–12.7)

## 2022-03-03 PROCEDURE — 99999 PR PBB SHADOW E&M-EST. PATIENT-LVL III: CPT | Mod: PBBFAC,,, | Performed by: OPTOMETRIST

## 2022-03-03 PROCEDURE — 86431 RHEUMATOID FACTOR QUANT: CPT | Performed by: OPTOMETRIST

## 2022-03-03 PROCEDURE — 86592 SYPHILIS TEST NON-TREP QUAL: CPT | Performed by: OPTOMETRIST

## 2022-03-03 PROCEDURE — 86780 TREPONEMA PALLIDUM: CPT | Performed by: OPTOMETRIST

## 2022-03-03 PROCEDURE — 86038 ANTINUCLEAR ANTIBODIES: CPT | Performed by: OPTOMETRIST

## 2022-03-03 PROCEDURE — 86235 NUCLEAR ANTIGEN ANTIBODY: CPT | Mod: 59 | Performed by: OPTOMETRIST

## 2022-03-03 PROCEDURE — 99999 PR PBB SHADOW E&M-EST. PATIENT-LVL III: ICD-10-PCS | Mod: PBBFAC,,, | Performed by: OPTOMETRIST

## 2022-03-03 PROCEDURE — 85025 COMPLETE CBC W/AUTO DIFF WBC: CPT | Performed by: OPTOMETRIST

## 2022-03-03 PROCEDURE — 36415 COLL VENOUS BLD VENIPUNCTURE: CPT | Performed by: OPTOMETRIST

## 2022-03-03 PROCEDURE — 99213 OFFICE O/P EST LOW 20 MIN: CPT | Mod: PBBFAC | Performed by: OPTOMETRIST

## 2022-03-03 PROCEDURE — 86140 C-REACTIVE PROTEIN: CPT | Performed by: OPTOMETRIST

## 2022-03-03 PROCEDURE — 86255 FLUORESCENT ANTIBODY SCREEN: CPT | Performed by: OPTOMETRIST

## 2022-03-03 PROCEDURE — 92012 INTRM OPH EXAM EST PATIENT: CPT | Mod: S$PBB,,, | Performed by: OPTOMETRIST

## 2022-03-03 PROCEDURE — 85651 RBC SED RATE NONAUTOMATED: CPT | Performed by: OPTOMETRIST

## 2022-03-03 PROCEDURE — 86039 ANTINUCLEAR ANTIBODIES (ANA): CPT | Performed by: OPTOMETRIST

## 2022-03-03 PROCEDURE — 82164 ANGIOTENSIN I ENZYME TEST: CPT | Performed by: OPTOMETRIST

## 2022-03-03 PROCEDURE — 92012 PR EYE EXAM, EST PATIENT,INTERMED: ICD-10-PCS | Mod: S$PBB,,, | Performed by: OPTOMETRIST

## 2022-03-03 RX ORDER — PREDNISOLONE ACETATE 10 MG/ML
SUSPENSION/ DROPS OPHTHALMIC
Qty: 10 ML | Refills: 0 | Status: SHIPPED | OUTPATIENT
Start: 2022-03-03 | End: 2022-04-07

## 2022-03-03 NOTE — TELEPHONE ENCOUNTER
----- Message from Madhuri Jackman sent at 3/3/2022  3:11 PM CST -----  Regarding: Prescription info  Contact: [facility]  Please contact [facility] for clarification of the following prescription:    prednisoLONE acetate (PRED FORTE) 1 % DrpS 10 mL 0 3/3/2022 4/7/2022 --  Sig - Route: Place 1 drop into both eyes 4 (four) times daily for 7 days, THEN 1 drop 3 (three) times daily for 7 days, THEN 1 drop 2 (two) times daily for 7 days, THEN 1 drop 2 (two) times daily for 7 days, THEN 1 drop once daily for 7 days. - Both Eyes        Please call @ [phone]

## 2022-03-04 LAB
ANA PATTERN 1: NORMAL
ANA SER QL IF: POSITIVE
ANA TITR SER IF: NORMAL {TITER}
RPR SER QL: NORMAL

## 2022-03-04 NOTE — PROGRESS NOTES
HPI     ROSALINDA 11/21 Patient states OD got red nasally about 2 weeks ago. OS feels   inflamed. She has a history of episcleritis that seems to be related to   strenuous physical activity. Uses refresh prn. Patient also still has Pred   forte drops left but she hasn't used them. Patient has also had more   frequent headaches in the past couple of weeks.    Last edited by Lexi Gatica on 3/3/2022  2:01 PM. (History)            Assessment /Plan     For exam results, see Encounter Report.    Episcleritis of both eyes  -     RANJAN Screen w/Reflex; Future; Expected date: 03/03/2022  -     Angiotensin Converting Enzyme; Future; Expected date: 03/03/2022  -     Anti-Neutrophilic Cytoplasmic Antibody; Future; Expected date: 03/03/2022  -     CBC Auto Differential; Future; Expected date: 03/03/2022  -     C-Reactive Protein; Future; Expected date: 03/03/2022  -     FTA antibodies, IgG and IgM; Future; Expected date: 03/03/2022  -     Rheumatoid Factor; Future; Expected date: 03/03/2022  -     RPR; Future; Expected date: 03/03/2022  -     Sedimentation rate; Future; Expected date: 03/03/2022  -     prednisoLONE acetate (PRED FORTE) 1 % DrpS; Place 1 drop into both eyes 4 (four) times daily for 7 days, THEN 1 drop 3 (three) times daily for 7 days, THEN 1 drop 2 (two) times daily for 7 days, THEN 1 drop 2 (two) times daily for 7 days, THEN 1 drop once daily for 7 days.  Dispense: 10 mL; Refill: 0    Pt has blanching with Phenyl OU.   Rx Pred Forte QID OU w/tapering.   Labs ordered. Pt mentioned that sister has Sjogren's and possible RA. Educated pt on potential for inflammatory systemic condition but likelihood of idiopathic nature.   Will call w/results and check on pt after course of steroid.

## 2022-03-05 LAB — ACE SERPL-CCNC: 35 U/L (ref 16–85)

## 2022-03-07 ENCOUNTER — TELEPHONE (OUTPATIENT)
Dept: INTERNAL MEDICINE | Facility: CLINIC | Age: 57
End: 2022-03-07
Payer: COMMERCIAL

## 2022-03-07 ENCOUNTER — PATIENT MESSAGE (OUTPATIENT)
Dept: INTERNAL MEDICINE | Facility: CLINIC | Age: 57
End: 2022-03-07
Payer: COMMERCIAL

## 2022-03-07 ENCOUNTER — TELEPHONE (OUTPATIENT)
Dept: OPTOMETRY | Facility: CLINIC | Age: 57
End: 2022-03-07
Payer: COMMERCIAL

## 2022-03-07 DIAGNOSIS — R76.8 POSITIVE ANA (ANTINUCLEAR ANTIBODY): Primary | ICD-10-CM

## 2022-03-07 LAB
ANCA AB TITR SER IF: NORMAL TITER
P-ANCA TITR SER IF: NORMAL TITER
T PALLIDUM AB SER QL IF: NORMAL

## 2022-03-07 NOTE — TELEPHONE ENCOUNTER
Spoke to patient and advised of RANJAN postive result and recommendation to been seen by rheumatology. Patient verbalized understanding.  Sent portal message with a list of  as pt requested

## 2022-03-07 NOTE — TELEPHONE ENCOUNTER
----- Message from Alber Kelsey MD sent at 3/7/2022  4:04 PM CST -----  Received note from Dr. Priest that patient's RANJAN returned positive and she'd like me to consider a rheumatology referral/ the patient; referred to rheumatology for further evaluation.  A positive RANJAN it and of itself does not mean the patient has any auto-immune conditions but may increase the likelihood.  Please notify patient of above.

## 2022-03-08 LAB
ANTI SM ANTIBODY: 0.08 RATIO (ref 0–0.99)
ANTI SM/RNP ANTIBODY: 0.06 RATIO (ref 0–0.99)
ANTI-SM INTERPRETATION: NEGATIVE
ANTI-SM/RNP INTERPRETATION: NEGATIVE
ANTI-SSA ANTIBODY: 0.06 RATIO (ref 0–0.99)
ANTI-SSA INTERPRETATION: NEGATIVE
ANTI-SSB ANTIBODY: 0.06 RATIO (ref 0–0.99)
ANTI-SSB INTERPRETATION: NEGATIVE
DSDNA AB SER-ACNC: NORMAL [IU]/ML

## 2022-03-14 NOTE — PROGRESS NOTES
[facility] Consultation Note    Reason for Consult:  The primary encounter diagnosis was Family history of colon cancer. A diagnosis of Constipation, unspecified constipation type was also pertinent to this visit.    PCP:   Alber Kelsey       Referring MD:  No referring provider defined for this encounter.    HPI:  This is a 56 y.o. female here for evaluation of abdominal pain.  She contacted the office 8/6/18 with complaints of stomach pain, swelling and burning   She did see her PCP and labs and ABD US were ordered.  Non-fasting lipase was mildly elevated - 65  ABD US - normal, no gallstones  She has a Hx of H. Pylori    Interval Hx 03/16/2022:  Linzess helps but no BM if she skips a day  Has worked hard on gaining weight  Supplements - probiotics, MVI, turmeric, omega 3, methyl B12  Some occasional foul smelling stool    ROS:  Constitutional: + malaise, has gained some weight  ENT: No allergies  CV: No chest pain  Pulm: No cough, No shortness of breath  Ophtho: + episcleritis  GI: see HPI  Derm: No rash  Heme: No lymphadenopathy, No bruising  MSK: + torn hamstring  : No dysuria, No hematuria  Endo: No hot or cold intolerance  Neuro: No syncope, No seizure  Psych: No anxiety, No depression    Medical History:  has a past medical history of Abnormal coronary angiogram, Alcohol abuse, in remission, Alcohol related seizure (7/24/2012), Anxiety, Bipolar affective, Chronic idiopathic constipation, Depression, H. pylori infection, Headache(784.0), Hypothyroid, Memory loss, Osteopenia, and Seizures.    Surgical History:  has a past surgical history that includes Tubal ligation; Bunionectomy; Colonoscopy (N/A, 4/12/2017); and Esophagogastroduodenoscopy (N/A, 10/23/2018).      Review of patient's allergies indicates:  No Known Allergies    Current Outpatient Medications on File Prior to Visit   Medication Sig Dispense Refill    Ca-D3-mag#11-zinc-cupr-man-bor 600 mg calcium- 800 unit-50 mg  "Tab Take 1 tablet by mouth once daily.      cholecalciferol, vitamin D3, (VITAMIN D3) 25 mcg (1,000 unit) capsule Take 1,000 Units by mouth once daily.      cyanocobalamin, vitamin B-12, 5,000 mcg Subl Place under the tongue once daily.      DULoxetine (CYMBALTA) 20 MG capsule Take 1 capsule by mouth once daily 30 capsule 0    fish oil-omega-3 fatty acids 300-1,000 mg capsule Take 1 capsule by mouth once daily.      lamoTRIgine (LAMICTAL) 150 MG Tab Take 1 tablet (150 mg total) by mouth 2 (two) times daily. 60 tablet 3    levothyroxine (SYNTHROID) 25 MCG tablet Take 1 tablet (25 mcg total) by mouth once daily. 90 tablet 3    linaCLOtide (LINZESS) 290 mcg Cap capsule Take 1 capsule (290 mcg total) by mouth once daily. 90 capsule 3    multivitamin (THERAGRAN) per tablet Take 1 tablet by mouth once daily.      prednisoLONE acetate (PRED FORTE) 1 % DrpS Place 1 drop into both eyes 4 (four) times daily for 7 days, THEN 1 drop 3 (three) times daily for 7 days, THEN 1 drop 2 (two) times daily for 7 days, THEN 1 drop 2 (two) times daily for 7 days, THEN 1 drop once daily for 7 days. 10 mL 0    QUEtiapine (SEROQUEL) 300 MG Tab Take 1 tablet by mouth once daily 30 tablet 0    temazepam (RESTORIL) 30 mg capsule TAKE 1 CAPSULE BY MOUTH NIGHTLY AS NEEDED FOR INSOMNIA 30 capsule 0    TURMERIC ORAL Take by mouth.       No current facility-administered medications on file prior to visit.         Objective Findings:    Vital Signs:  /72   Pulse 63   Ht 5' 2" (1.575 m)   Wt 48.3 kg (106 lb 7.7 oz)   LMP 02/07/2022   BMI 19.48 kg/m²   Body mass index is 19.48 kg/m².    Physical Exam:  General Appearance: Well appearing in no acute distress  Head:   Normocephalic, without obvious abnormality  Eyes:    No scleral icterus  ENT: Neck supple, Lips, mucosa, and tongue normal  Lungs: CTA bilaterally in anterior and posterior fields, no wheezes, no crackles.  Heart:  Regular rate and rhythm, S1, S2 normal, no murmurs " heard  Abdomen: Soft, non tender, non distended with positive bowel sounds in all four quadrants.   Extremities: no edema  Skin: No rash  Neurologic: AAO x 3      Labs:  Lab Results   Component Value Date    WBC 4.87 03/03/2022    HGB 13.0 03/03/2022    HCT 39.6 03/03/2022     03/03/2022    CHOL 194 10/27/2020    TRIG 36 10/27/2020    HDL 93 (H) 10/27/2020    ALT 24 10/27/2020    AST 27 10/27/2020     10/27/2020    K 3.8 10/27/2020     10/27/2020    CREATININE 0.7 10/27/2020    BUN 14 10/27/2020    CO2 26 10/27/2020    TSH 1.850 10/27/2020     ttg- neg    Imaging:  3/19 Defecogram - Normal neutral, straining, and lifting anorectal angles as above.  Normal defecation with no significant residual.  Small anterior rectocele.    Endoscopy:    4/2017 colonoscopy - normal    4/2017 EGD              - Monilial esophagitis.                        - LA Grade A reflux esophagitis.                        - Erythematous mucosa in the antrum.                        - Normal examined duodenum. Biopsied.   She was treated with diflucan x 2 weeks    Assessment:  1. Family history of colon cancer    2. 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 Linzess 290 mcg continue for now, may be abl to wean down dose in future  2. Colon will schedule with fam hx  3. EGD with appetite    No follow-ups on file.      Order summary:  Orders Placed This Encounter    Case Request Endoscopy: ESOPHAGOGASTRODUODENOSCOPY (EGD), COLONOSCOPY         Thank you so much for allowing me to participate in the care of Gay Salazar MD

## 2022-03-16 ENCOUNTER — OFFICE VISIT (OUTPATIENT)
Dept: GASTROENTEROLOGY | Facility: CLINIC | Age: 57
End: 2022-03-16
Payer: MEDICARE

## 2022-03-16 ENCOUNTER — TELEPHONE (OUTPATIENT)
Dept: PRIMARY CARE CLINIC | Facility: CLINIC | Age: 57
End: 2022-03-16

## 2022-03-16 ENCOUNTER — PATIENT MESSAGE (OUTPATIENT)
Dept: ENDOSCOPY | Facility: HOSPITAL | Age: 57
End: 2022-03-16
Payer: COMMERCIAL

## 2022-03-16 ENCOUNTER — PATIENT MESSAGE (OUTPATIENT)
Dept: ADMINISTRATIVE | Facility: HOSPITAL | Age: 57
End: 2022-03-16
Payer: COMMERCIAL

## 2022-03-16 VITALS
DIASTOLIC BLOOD PRESSURE: 72 MMHG | WEIGHT: 106.5 LBS | SYSTOLIC BLOOD PRESSURE: 125 MMHG | HEIGHT: 62 IN | HEART RATE: 63 BPM | BODY MASS INDEX: 19.6 KG/M2

## 2022-03-16 DIAGNOSIS — Z80.0 FAMILY HISTORY OF COLON CANCER: Primary | ICD-10-CM

## 2022-03-16 DIAGNOSIS — Z12.11 COLON CANCER SCREENING: Primary | ICD-10-CM

## 2022-03-16 DIAGNOSIS — Z01.818 PRE-OP TESTING: ICD-10-CM

## 2022-03-16 DIAGNOSIS — K59.00 CONSTIPATION, UNSPECIFIED CONSTIPATION TYPE: ICD-10-CM

## 2022-03-16 PROCEDURE — 99214 OFFICE O/P EST MOD 30 MIN: CPT | Mod: S$PBB,,, | Performed by: INTERNAL MEDICINE

## 2022-03-16 PROCEDURE — 99213 OFFICE O/P EST LOW 20 MIN: CPT | Mod: PBBFAC | Performed by: INTERNAL MEDICINE

## 2022-03-16 PROCEDURE — 99999 PR PBB SHADOW E&M-EST. PATIENT-LVL III: CPT | Mod: PBBFAC,,, | Performed by: INTERNAL MEDICINE

## 2022-03-16 PROCEDURE — 99214 PR OFFICE/OUTPT VISIT, EST, LEVL IV, 30-39 MIN: ICD-10-PCS | Mod: S$PBB,,, | Performed by: INTERNAL MEDICINE

## 2022-03-16 PROCEDURE — 99999 PR PBB SHADOW E&M-EST. PATIENT-LVL III: ICD-10-PCS | Mod: PBBFAC,,, | Performed by: INTERNAL MEDICINE

## 2022-03-16 RX ORDER — SODIUM, POTASSIUM,MAG SULFATES 17.5-3.13G
1 SOLUTION, RECONSTITUTED, ORAL ORAL DAILY
Qty: 1 KIT | Refills: 0 | Status: SHIPPED | OUTPATIENT
Start: 2022-03-16 | End: 2022-03-18

## 2022-03-16 NOTE — PATIENT INSTRUCTIONS
10 Best Probiotics for SIBO  Probiotics can be a confusing topic if you are dealing with SIBO. There are some doctors who dont recommend probiotics in SIBO patients and others who believe they can be a helpful supplement in SIBO treatment and maintenance.      When choosing the right probiotic for your condition its important that you test a strain that has been researched well, is made from a reputable company that guarantees the amount until expiration and has the necessary therapeutic action you need based on your symptoms.      In this review, we will outline the most effective probiotics based on the scientific evidence so that you can find a strain that actually has positive therapeutic effects.    Table of Contents [show]    10 Top SIBO Probiotics (2021 Reviews)  All of the probiotics listed in the chart above have been proven in scientific studies to help with IBS or SIBO. Lets dive a little bit more into these specific probiotic strains and highlight the studies conducted on them and the details of each product.    1. LACTOPRIME PLUS  Therapeutic Strain Included: Lactobacillus species & Bifidobacterium species    Evidence:  This study and this study, suggest that Lactobacillus species & Bifidobacterium species can promote many health benefits such as antimicrobial activity; cholesterol metabolism; immunomodulation; anti-oxidative effects; anti-diabetic effects; anti-allergenic effects; and tumor suppression.      These microorganisms can be found in many traditional products produced locally in many locations around the world.    Who may benefit:  Everybody can benefit from this product to promote general gut health.      2. Now Foods Clinical GI - Evidence for Slow Transitnow foods probiotic for SIBO   Therapeutic Strain Included: Bifidobacterium lactis     Evidence:  In this study, which is the same as the study cited above, B lactis  decreased intestinal transit time by 57%. Both Now Foods  Clinical GI and Metagenics Ultra Emerald Acute Care contain this strain of B lactis.    Who may benefit:    This probiotic strain is ideal for people with functional GI symptoms who tend towards constipation and have slow intestinal transit. This product is also a multi-strain formulation.        3. FLORASSIST® GI with Phage Technology  Therapeutic Strain Included: B. breve Bbr 8 & MoreLifeextension GI with Phage technology    Evidence:  This study shows that low counts of Bifidobacteria have been linked to many diseases, and Bifidobacteria supplements may help treat symptoms of certain diseases.    Bifidobacteria also help produce other important chemicals, including B vitamins and healthy fatty acids    Who may benefit:    Anybody can benefit from this product. It promotes digestive health, proper nutrient absorption, and a healthy balance of bacteria in your intestines. It does this with two important blends: a TetraPhage blend of innovative bacteriophages, and the new proprietary probiotic blend.        4. Metagenics Ultra Emerald Acute Care- Evidence for Slow Transit  Metagenics - UltraFlora Balance - Daily Probiotic - Immune Support* (120 Capsules)    Buy on Amazon    Therapeutic Strain Included: Bifidobacterium lactis     Evidence:  In this study, 100 people with functional gastrointestinal symptoms who tended towards constipation were given a high dose (17.2 billion cfu) of B lactis , low dose (1.8 billion cfu), or placebo for 14 days. The result concluded that the whole gut transit time decrease was statistically significant in both the high dose and the low dose groups. The strain also reduced other functional GI symptoms.    Who may benefit:    This probiotic strain is ideal for people with functional GI symptoms who tend towards constipation and have slow intestinal transit. This product does contain multiple different strains but it also contains the strain studied to help with slow transit,  Bifidobacterium lactis . Overall, this is one of the best probiotics for gut motility to try.    5. Florastor- Evidence in SIBO  Florastor Daily Probiotic Supplement for Women and Men, Proven to Support Digestive Health,...    Buy on Amazon    Therapeutic Strain Included: Saccharomyces Cerevisiae Boulardii    Evidence:  In this study, ScB was used in the treatment of SIBO in pediatric patients with short bowel syndrome. The treatment was used for one month and patients showed decreased levels of hydrogen gas as well as significantly decreased digestive symptoms.    Who may benefit:  This beneficial yeast is ideal for people with SIBO, IBS, immune deficiency, travelers diarrhea, and more. It is an all-around good probiotic yeast strain. If you are someone who does not do well with lactobacillus and bifidobacterium species trying this yeast-based probiotic may be helpful.    6. Activia Yogurt- Evidence in IBS-C  RAFA ACTIVIA PROBIOTIC YOGURT STRAWBERRY 4 CT 4.4 OZ PACK OF 3        Therapeutic Strain Included: Bifidobacterium animalis lactis DN-173 010    Evidence:  In this study, it showed the B animalis lactis DN-173 010 decreased colonic transit time in women. There are also numerous other studies with this strain showing its effects on constipation and IBS-C.    Who may benefit:  Patients with constipation, slow colonic transit, and IBS-C. This product is a medicinal yogurt and can be just as effective if not more than probiotic capsules. Yogurt has traditionally been one of the best delivery mediums for probiotics and beneficial bacteria.    7. Align- Evidence in IBS  Align Probiotic, #1 Doctor Recommended Brand, Helps with Occasional Gas, Abdominal Discomfort,...        Therapeutic Strain Included: Bifidobacterium infantis 13886    Evidence:  In this study, B infantis 46345 was trialed in women with IBS at different doses. The results after 4 weeks in a large study of 362 IBS patients showed that the  dosage of 108 CFU was superior to all the other doses and showed significant improvement in abdominal pain, bloating, bowel dysfunction, incomplete evacuation, straining, and gas. This is just one of many studies that show significant benefits in IBS patients.    Who may benefit:  IBS patients and other functional gut disorders. Align is a well researched probiotic strain and may be effective for anyone dealing with bowel issues. Its easy to get and relatively cheap as well.      8. BioGaia Protectis- Evidence in Constipation & Methane Gas  BioGaia Protectis Chewable Tablets for Toddlers, Kids, and Teens Occasional Stomach Pain,...    Buy on Amazon    Therapeutic Strain Included: Lactobacillus reuteri DSM 83858    Evidence:  In this study, L reuteri DSM 11424 was shown to decrease methane gas in constipation patients determined by the lactulose breath test.    Who may benefit:  IBS-C, constipation, and methane gas-producing patients. Methane gas has been associated with constipation in numerous scientific studies and this product was shown to help decrease methane gas production, ultimately aiding in constipation relief.    9. Doctors Best 2 Billion- Evidence in IBS-D  Doctor's Best Digestive Health Probiotic 2 Billion with Lactospore, Non-GMO, Vegan, Gluten Free, Soy...      Therapeutic strain included: Bacillus coagulans MTCC 5856    Evidence:  In this study, the concluded that B coagulans MTCC 5856 at a dose of 2 x 109 CFU per day was found to be effective in diarrhea-predominant IBS patients for 90 days. This means that this strain could be helpful for people with IBS-D    Who may benefit:  IBS-D patients and others with Diarrhea. This product has not been studied as extensively as some of the other probiotics on the list but it has shown some promise in helping those with diarrhea-type symptoms.    10. Jarrows Bowel Support 299v- Evidence in IBS  Jarrow Formulas [place] Bowel Support, 10 Billion Organisms per  Cap, Reduces Bloating Gas and...      Therapeutic strain included: Lactobacillus plantarum 299v    Evidence:  In this study, a 4-week treatment with L. plantarum 299v provided effective symptom relief, particularly of abdominal pain and bloating, in IBS patients fulfilling the Aakash III criteria.    Who may benefit:  This probiotic strain is ideal for people with IBS, abdominal pain, and bloating

## 2022-03-16 NOTE — TELEPHONE ENCOUNTER
----- Message from Jonathan Hernanedz sent at 3/16/2022 11:59 AM CDT -----  Contact: self [phone]  New pt stated GI provider referred her in formed pt panel was closed but she stated her GI Dr. Martin Funk  referred her and requesting a call back.    Please call and advise

## 2022-03-21 LAB
BCS RECOMMENDATION EXT: NORMAL
PAP RECOMMENDATION EXT: NORMAL

## 2022-03-22 ENCOUNTER — OFFICE VISIT (OUTPATIENT)
Dept: PSYCHIATRY | Facility: CLINIC | Age: 57
End: 2022-03-22
Payer: MEDICARE

## 2022-03-22 ENCOUNTER — PATIENT MESSAGE (OUTPATIENT)
Dept: ENDOSCOPY | Facility: HOSPITAL | Age: 57
End: 2022-03-22
Payer: COMMERCIAL

## 2022-03-22 VITALS
WEIGHT: 110.31 LBS | BODY MASS INDEX: 20.18 KG/M2 | SYSTOLIC BLOOD PRESSURE: 114 MMHG | DIASTOLIC BLOOD PRESSURE: 60 MMHG | HEART RATE: 77 BPM

## 2022-03-22 DIAGNOSIS — F31.9 BIPOLAR AFFECTIVE DISORDER, REMISSION STATUS UNSPECIFIED: Primary | ICD-10-CM

## 2022-03-22 DIAGNOSIS — F51.01 PRIMARY INSOMNIA: ICD-10-CM

## 2022-03-22 PROCEDURE — 90833 PSYTX W PT W E/M 30 MIN: CPT | Mod: ,,, | Performed by: PHYSICIAN ASSISTANT

## 2022-03-22 PROCEDURE — 99213 OFFICE O/P EST LOW 20 MIN: CPT | Mod: PBBFAC | Performed by: PHYSICIAN ASSISTANT

## 2022-03-22 PROCEDURE — 99214 PR OFFICE/OUTPT VISIT, EST, LEVL IV, 30-39 MIN: ICD-10-PCS | Mod: S$PBB,,, | Performed by: PHYSICIAN ASSISTANT

## 2022-03-22 PROCEDURE — 99999 PR PBB SHADOW E&M-EST. PATIENT-LVL III: CPT | Mod: PBBFAC,,, | Performed by: PHYSICIAN ASSISTANT

## 2022-03-22 PROCEDURE — 99214 OFFICE O/P EST MOD 30 MIN: CPT | Mod: S$PBB,,, | Performed by: PHYSICIAN ASSISTANT

## 2022-03-22 PROCEDURE — 90833 PR PSYCHOTHERAPY W/PATIENT W/E&M, 30 MIN (ADD ON): ICD-10-PCS | Mod: ,,, | Performed by: PHYSICIAN ASSISTANT

## 2022-03-22 PROCEDURE — 99999 PR PBB SHADOW E&M-EST. PATIENT-LVL III: ICD-10-PCS | Mod: PBBFAC,,, | Performed by: PHYSICIAN ASSISTANT

## 2022-03-22 RX ORDER — QUETIAPINE FUMARATE 400 MG/1
400 TABLET, FILM COATED ORAL NIGHTLY
Qty: 30 TABLET | Refills: 1 | Status: SHIPPED | OUTPATIENT
Start: 2022-03-22 | End: 2022-04-22 | Stop reason: DRUGHIGH

## 2022-03-22 RX ORDER — TEMAZEPAM 30 MG/1
30 CAPSULE ORAL NIGHTLY PRN
Qty: 30 CAPSULE | Refills: 2 | Status: SHIPPED | OUTPATIENT
Start: 2022-03-22 | End: 2022-04-22 | Stop reason: SDUPTHER

## 2022-03-22 RX ORDER — LAMOTRIGINE 150 MG/1
150 TABLET ORAL 2 TIMES DAILY
Qty: 60 TABLET | Refills: 3 | Status: SHIPPED | OUTPATIENT
Start: 2022-03-22 | End: 2022-04-22 | Stop reason: SDUPTHER

## 2022-03-22 RX ORDER — DULOXETIN HYDROCHLORIDE 20 MG/1
20 CAPSULE, DELAYED RELEASE ORAL DAILY
Qty: 30 CAPSULE | Refills: 2 | Status: SHIPPED | OUTPATIENT
Start: 2022-03-22 | End: 2022-04-22 | Stop reason: SDUPTHER

## 2022-03-22 NOTE — PROGRESS NOTES
"Outpatient Psychiatry Follow-Up Visit (PA)    3/22/2022    Clinical Status of Patient:  Outpatient (Ambulatory)    Chief Complaint:  Gay Gurrola is a 56 y.o. female who presents today for follow-up of mood disorder.  Met with patient.      Current Medications:  Lamictal 150 mg   Seroquel 300 mg   Restoril 30 mg  Cymbalta 20 mg    Interval History and Content of Current Session:  Interim Events/Subjective Report/Content of Current Session:  Patient is a 56 year old female with a psychiatric history of bipolar 1 disorder and insomnia. The patient's is on Lamictal 150 mg, Restoril 30 mg and Seroquel 300 mg.    Patient presents for follow up stating she has been "okay, busy." She reports she has been hormonal and was told by her OBGYN that she was perimenopausal.     The patient reports recent increased stress in her life and responsibilities. She is now helping to take care of her older sister, as her sister has poor health and speaks Kiswahili primarily. The patient has begun spending more time with her sister and going to her appointments. She reports her sister is really loud and often gives the patient a migraine. She is having difficulty setting boundaries with her sister but reports feeling overwhelmed and drained.     Despite recent increased fatigue, patient reports she has been sleeping poorly. Patient has a long history of insomnia. She reports poor quality sleep for the past 6 or so weeks. She has been tossing and turning throughout the night. Patient maintains good sleep hygeine. In addition to Seroquel and temazepam, patient has started taking magnesium but has not noticed any benefit.     Patient recently had blood work come back positive for RANJAN. Patient has had difficulty finding a PCP.       Psychotherapy:  · Target symptoms: mood disorder, insomnia  · Why chosen therapy is appropriate versus another modality: relevant to diagnosis  · Outcome monitoring methods: self-report  · Therapeutic " intervention type: insight oriented psychotherapy, behavior modifying psychotherapy, supportive psychotherapy  · Topics discussed/themes: relationships difficulties, stress related to medical comorbidities, building skills sets for symptom management  · The patient's response to the intervention is accepting. The patient's progress toward treatment goals is good.   · Duration of intervention: 18 minutes.    Review of Systems   · PSYCHIATRIC: Pertinant items are noted in the narrative.    Past Medical, Family and Social History: The patient's past medical, family and social history have been reviewed and updated as appropriate within the electronic medical record - see encounter notes.    Compliance: yes    Side effects: None    Risk Parameters:  Patient reports no suicidal ideation  Patient reports no homicidal ideation  Patient reports no self-injurious behavior  Patient reports no violent behavior    Exam (detailed: at least 9 elements; comprehensive: all 15 elements)   Constitutional  Vitals:  Most recent vital signs, dated less than 90 days prior to this appointment, were reviewed.   Vitals:    03/22/22 1303   BP: 114/60   Pulse: 77   Weight: 50 kg (110 lb 5.4 oz)        General:  unremarkable, age appropriate, well dressed, neatly groomed     Musculoskeletal  Muscle Strength/Tone:  not examined   Gait & Station:  non-ataxic     Psychiatric  Speech:  no latency; no press   Mood & Affect:  steady  congruent and appropriate   Thought Process:  normal and logical   Associations:  intact   Thought Content:  normal, no suicidality, no homicidality, delusions, or paranoia   Insight:  intact   Judgement: behavior is adequate to circumstances   Orientation:  grossly intact   Memory: intact for content of interview   Language: grossly intact   Attention Span & Concentration:  able to focus   Fund of Knowledge:  intact and appropriate to age and level of education     Assessment and Diagnosis   Status/Progress: Based on  the examination today, the patient's problem(s) is/are adequately but not ideally controlled.  New problems have been presented today.   Lack of compliance are not complicating management of the primary condition.  There are no active rule-out diagnoses for this patient at this time.     General Impression: Patient is a 56 year old female with a psychiatric history of bipolar 1 disorder and insomnia. The patient's is on Lamictal 150 mg, Restoril 30 mg and Seroquel 300 mg. The patient started Cymbalta 20 mg and is doing well    Due to increased stress, patient complains of worsening insomnia      ICD-10-CM ICD-9-CM   1. Bipolar affective disorder, remission status unspecified  F31.9 296.80   2. Primary insomnia  F51.01 307.42       Intervention/Counseling/Treatment Plan   · Medication Management: Continue current medications.   · Continue Lamictal 150 mg daily  · Continue Restoril 30 mg nightly  · Increase to Seroquel 400 mg nightly  · Continue Cymbalta 20 mg for pain  · Establish with primary care provider for hypothyroidism, positive RANJAN  Discussed with patient informed consent, risks vs. benefits, alternative treatments, side effect profile and the inherent unpredictability of individual responses to these treatments. The patient expresses understanding of the above and displays the capacity to agree with this current plan and had no other questions.  Encouraged patient to keep future appointments.   Encouraged patient to message or call with questions or concerns  Safety plan reviewed with patient for worsening condition or suicidal ideations. In the event of an emergency patient was advised to go to the emergency room.       Return to Clinic: 3 months

## 2022-03-24 ENCOUNTER — PATIENT OUTREACH (OUTPATIENT)
Dept: ADMINISTRATIVE | Facility: HOSPITAL | Age: 57
End: 2022-03-24
Payer: COMMERCIAL

## 2022-03-24 NOTE — PROGRESS NOTES
Health Maintenance Due   Topic Date Due    Hepatitis C Screening  Never done    COVID-19 Vaccine (1) Never done    Shingles Vaccine (1 of 2) Never done    Mammogram  03/11/2022     Triggered LINKS. Updated Care Everywhere. Imported outside pap smear results into . Chart review completed.

## 2022-03-25 ENCOUNTER — PATIENT OUTREACH (OUTPATIENT)
Dept: ADMINISTRATIVE | Facility: HOSPITAL | Age: 57
End: 2022-03-25
Payer: COMMERCIAL

## 2022-03-25 NOTE — LETTER
AUTHORIZATION FOR RELEASE OF   CONFIDENTIAL INFORMATION    Dear Dr. Catalina Armendariz,    We are seeing Gay Gurrola, date of birth 1965, in the clinic at [facility]. Alber Kelsey MD is the patient's PCP. Gay Gurrola has an outstanding lab/procedure at the time we reviewed her chart. In order to help keep her health information updated, she has authorized us to request the following medical record(s):        ( X )  MAMMOGRAM                                    (  )  COLONOSCOPY      (  )  PAP SMEAR                                          (  )  OUTSIDE LAB RESULTS     (  )  DEXA SCAN                                          (  )  EYE EXAM            (  )  FOOT EXAM                                          (  )  ENTIRE RECORD     (  )  OUTSIDE IMMUNIZATIONS                 (  )  _______________         Please fax records to [facility], Bennett W Hailey, [place], [phone]     If you have any questions, please contact [facility] at [phone].           Patient Name: Gay Gurrola  : 1965  Patient Phone #: [phone]

## 2022-03-25 NOTE — PROGRESS NOTES
Health Maintenance Due   Topic Date Due    Hepatitis C Screening  Never done    COVID-19 Vaccine (1) Never done    Shingles Vaccine (1 of 2) Never done    Mammogram  03/11/2022     Triggered LINKS. Updated Care Everywhere. Record request has been sent to Dr. Catalina Armendariz for a copy of pt's most recent mammography results. Chart review completed.

## 2022-03-26 NOTE — PROGRESS NOTES
Subjective:     Patient ID: Gay Gurrola is a 56 y.o. female sent by Dr. Alber Kelsey for a +RANJAN (via Optometrist Dr. Priest)    Chief Complaint: No chief complaint on file.       HPI   56 yr old lady with a hx of multiple morbidities here for low +RANJAN 1:80H neg pro prompted by recurrent episcleritis ordered by her optometrist through Dr. Kelsey;     In October was dx w episcleritis (red eyes, not too painful) by optometrist. Used prednisone eye drops and they helped temporarily x 2. Now down to 1 drop/day and still having some mild eye redness.  She is concerned about the chronic use of steroids.     Her optometrist got labs and RANJAN came back + 1:80 H with negative pro.     Patient has very little going for a CTD.  AM stiffness no;   .  Denies joint pain, joint swelling, Raynaud's, dysphagia, tight skin, oral ulcers, parotid gland swelling, dry mouth, pleurisy, pericarditis, photosensitivity, skin rashes, miscarriages (0/2 FT), thromboses, muscle weakness; fevers, headaches, bloody diarrhea, vaginal/urethral D/C; paresthesias; LBP.  She does have hypothyroidism & is taking a small dose of levothyroxine daily.  Has mild diffuse hair thinning; denies sicca sxs but does get some dryness in her eyes w running.  Sister has arthritis probably OA  No Family hx of CTDs or SpA    Recently, developed L buttock pain from running attributed to chronic tendinosis during pandemic. She had seen Dr. Eastman on 3/15/18 for a similar issue. At that time she was experiencing bilateral buttock pain for which daily meloxicam, PT and possible referral to PMR was recommended.        Current Outpatient Medications   Medication Sig Dispense Refill    Ca-D3-mag#11-zinc-cupr-man-bor 600 mg calcium- 800 unit-50 mg Tab Take 1 tablet by mouth once daily.      cholecalciferol, vitamin D3, (VITAMIN D3) 25 mcg (1,000 unit) capsule Take 1,000 Units by mouth once daily.      cyanocobalamin, vitamin B-12, 5,000 mcg Subl Place under  the tongue once daily.      DULoxetine (CYMBALTA) 20 MG capsule Take 1 capsule (20 mg total) by mouth once daily. 30 capsule 2    fish oil-omega-3 fatty acids 300-1,000 mg capsule Take 1 capsule by mouth once daily.      lamoTRIgine (LAMICTAL) 150 MG Tab Take 1 tablet (150 mg total) by mouth 2 (two) times daily. 60 tablet 3    levothyroxine (SYNTHROID) 25 MCG tablet Take 1 tablet (25 mcg total) by mouth once daily. 90 tablet 3    linaCLOtide (LINZESS) 290 mcg Cap capsule Take 1 capsule (290 mcg total) by mouth once daily. 90 capsule 3    multivitamin (THERAGRAN) per tablet Take 1 tablet by mouth once daily.      prednisoLONE acetate (PRED FORTE) 1 % DrpS Place 1 drop into both eyes 4 (four) times daily for 7 days, THEN 1 drop 3 (three) times daily for 7 days, THEN 1 drop 2 (two) times daily for 7 days, THEN 1 drop 2 (two) times daily for 7 days, THEN 1 drop once daily for 7 days. 10 mL 0    QUEtiapine (SEROQUEL) 400 MG tablet Take 1 tablet (400 mg total) by mouth every evening. 30 tablet 1    temazepam (RESTORIL) 30 mg capsule Take 1 capsule (30 mg total) by mouth nightly as needed. 30 capsule 2    TURMERIC ORAL Take by mouth.       No current facility-administered medications for this visit.       Review of patient's allergies indicates:  No Known Allergies    Review of Systems   Constitutional: Positive for fatigue.   HENT: Negative for mouth sores, tinnitus and trouble swallowing.    Eyes: Negative.    Respiratory: Negative for cough and shortness of breath.    Cardiovascular: Negative.  Negative for palpitations.   Gastrointestinal: Positive for constipation. Negative for diarrhea.        Dx w IBS; on Linzess   Genitourinary: Negative.  Menstrual problem: still menstruating.        Still menstruating.  No vasomotor sxs of menopause.   Musculoskeletal: Negative for arthralgias, back pain, joint swelling, myalgias and neck pain.   Neurological: Negative for tremors, seizures (in chart attributed to prior  use of ETOH), syncope, weakness, numbness and headaches (in the past via chart).   Hematological: Negative.    Psychiatric/Behavioral: Negative for decreased concentration, dysphoric mood (admits to depression ; now ok on meds) and sleep disturbance (ok on meds).       Past Medical History:   Diagnosis Date    Abnormal coronary angiogram     Alcohol abuse, in remission     Alcohol related seizure 2012    Anxiety     Bipolar affective     since teenage years    Chronic idiopathic constipation     Depression     H. pylori infection     Headache(784.0)     followed by Dr. Corona    Hypothyroid     Memory loss     Osteopenia     Seizures        Past Surgical History:   Procedure Laterality Date    BUNIONECTOMY      COLONOSCOPY N/A 2017    Procedure: COLONOSCOPY;  Surgeon: Estuardo Salazar MD;  Location: [facility] ENDO (4TH FLR);  Service: Endoscopy;  Laterality: N/A;  PM prep    ESOPHAGOGASTRODUODENOSCOPY N/A 10/23/2018    Procedure: EGD (ESOPHAGOGASTRODUODENOSCOPY);  Surgeon: Irlanda Schwarz MD;  [facility] ([facility] FLR);  Service: Endoscopy;  Laterality: N/A;    TUBAL LIGATION         Family History   Problem Relation Age of Onset    Stroke Mother     Diabetes Mother     Anuerysm Mother         brain    Insomnia Mother     Alzheimer's disease Mother     Depression Mother     Dementia Maternal Grandmother     Hypertension Father     Coronary artery disease Father     Kidney disease Father     Diabetes Father     Dementia Sister 59    Crohn's disease Maternal Aunt     Dementia Maternal Aunt     Crohn's disease Other     Colon cancer Sister 64    Melanoma Neg Hx     Psoriasis Neg Hx     Lupus Neg Hx     Esophageal cancer Neg Hx    M: 65 had CVA hemorrhagic; DM II; depression  1 S 67 with probably OA  1 Brother  of unknown causes at age 59  1 Brother A&W  2 sons: A&W      Social History     Tobacco Use    Smoking status: Never Smoker    Smokeless tobacco: Never Used  "  Substance Use Topics    Alcohol use: No     Comment: History of alcoholism in remission.    Drug use: No       Objective:     /75   Pulse 63   Ht 5' 2" (1.575 m)   Wt 50.8 kg (111 lb 15.9 oz)   BMI 20.48 kg/m²     Physical Exam   Constitutional: She is oriented to person, place, and time. No distress.   HENT:   Head: Normocephalic and atraumatic.   Mouth/Throat: Oropharynx is clear and moist. No oropharyngeal exudate.   No facial rashes  Parotids not enlarged   Eyes: Pupils are equal, round, and reactive to light. Conjunctivae are normal. Right eye exhibits no discharge. Left eye exhibits no discharge. No scleral icterus.   No conjunctivitis/erythema visible   Neck: No JVD present. No tracheal deviation present. No thyromegaly present.   Cardiovascular: Normal rate, regular rhythm and normal heart sounds. Exam reveals no gallop and no friction rub.   No murmur heard.  Pulmonary/Chest: Effort normal and breath sounds normal. No respiratory distress. She has no wheezes. She has no rales. She exhibits no tenderness.   Abdominal: Soft. Normal appearance and bowel sounds are normal. She exhibits no distension and no mass. There is no splenomegaly or hepatomegaly. There is no abdominal tenderness. There is no rebound and no guarding.   Musculoskeletal:         General: No tenderness. Normal range of motion.      Cervical back: Neck supple.      Comments: FROM all joints.  NO synovitis anywhere.  Small, non tender Dipika's nodes.      Lymphadenopathy:     She has no cervical adenopathy.   Neurological: She is alert and oriented to person, place, and time. She has normal reflexes. No cranial nerve deficit. Gait normal.   Proximal and distal muscle strength 5/5.   Skin: Skin is warm and dry. No rash noted. She is not diaphoretic.   Psychiatric: Mood, memory, affect and judgment normal.   Vitals reviewed.           3/3/22: ESR 1; CRP 0.8; CBC ok; RF neg; ACE ok; FTA ABS/RPR NR:     11/4/20: MRI Pelvis: " personally viewed: insertional left hamstring tendinosis with partial-thickness tear; minimal degenerative changes of the hip joints.  Assessment:   Positive RANJAN 1:80H neg pro   May be associated w hypothyroid or possibly prior medication use.  Recurrent Episcleritis  Hypothyroidism  IBS    Plan:   Meaning of +RANJAN explained.  Patient reassured.  Ambulatory Referral to Ophthalmology as per patient's request for episcleritis that is recurrent.  RTC prn      CC: Alber Kelsey MD

## 2022-03-28 ENCOUNTER — PATIENT MESSAGE (OUTPATIENT)
Dept: ENDOSCOPY | Facility: HOSPITAL | Age: 57
End: 2022-03-28
Payer: COMMERCIAL

## 2022-03-29 ENCOUNTER — OFFICE VISIT (OUTPATIENT)
Dept: RHEUMATOLOGY | Facility: CLINIC | Age: 57
End: 2022-03-29
Payer: MEDICARE

## 2022-03-29 VITALS
HEIGHT: 62 IN | SYSTOLIC BLOOD PRESSURE: 118 MMHG | BODY MASS INDEX: 20.61 KG/M2 | WEIGHT: 112 LBS | HEART RATE: 63 BPM | DIASTOLIC BLOOD PRESSURE: 75 MMHG

## 2022-03-29 DIAGNOSIS — R76.8 POSITIVE ANA (ANTINUCLEAR ANTIBODY): Primary | ICD-10-CM

## 2022-03-29 DIAGNOSIS — Z55.9 EDUCATIONAL CIRCUMSTANCE: ICD-10-CM

## 2022-03-29 DIAGNOSIS — E03.9 HYPOTHYROIDISM, UNSPECIFIED TYPE: ICD-10-CM

## 2022-03-29 DIAGNOSIS — H15.103 EPISCLERITIS OF BOTH EYES: ICD-10-CM

## 2022-03-29 PROCEDURE — 99204 PR OFFICE/OUTPT VISIT, NEW, LEVL IV, 45-59 MIN: ICD-10-PCS | Mod: S$PBB,,, | Performed by: INTERNAL MEDICINE

## 2022-03-29 PROCEDURE — 99999 PR PBB SHADOW E&M-EST. PATIENT-LVL V: CPT | Mod: PBBFAC,,, | Performed by: INTERNAL MEDICINE

## 2022-03-29 PROCEDURE — 99999 PR PBB SHADOW E&M-EST. PATIENT-LVL V: ICD-10-PCS | Mod: PBBFAC,,, | Performed by: INTERNAL MEDICINE

## 2022-03-29 PROCEDURE — 99215 OFFICE O/P EST HI 40 MIN: CPT | Mod: PBBFAC | Performed by: INTERNAL MEDICINE

## 2022-03-29 PROCEDURE — 99204 OFFICE O/P NEW MOD 45 MIN: CPT | Mod: S$PBB,,, | Performed by: INTERNAL MEDICINE

## 2022-03-29 SDOH — SOCIAL DETERMINANTS OF HEALTH (SDOH): PROBLEMS RELATED TO EDUCATION AND LITERACY, UNSPECIFIED: Z55.9

## 2022-03-29 ASSESSMENT — ROUTINE ASSESSMENT OF PATIENT INDEX DATA (RAPID3)
PSYCHOLOGICAL DISTRESS SCORE: 1.1
PAIN SCORE: 0
MDHAQ FUNCTION SCORE: 0
AM STIFFNESS SCORE: 0, NO
FATIGUE SCORE: 5
PATIENT GLOBAL ASSESSMENT SCORE: 5
TOTAL RAPID3 SCORE: 1.67

## 2022-03-30 ENCOUNTER — ANESTHESIA (OUTPATIENT)
Dept: ENDOSCOPY | Facility: HOSPITAL | Age: 57
End: 2022-03-30
Payer: MEDICARE

## 2022-03-30 ENCOUNTER — ANESTHESIA EVENT (OUTPATIENT)
Dept: ENDOSCOPY | Facility: HOSPITAL | Age: 57
End: 2022-03-30
Payer: MEDICARE

## 2022-03-30 ENCOUNTER — HOSPITAL ENCOUNTER (OUTPATIENT)
Facility: HOSPITAL | Age: 57
Discharge: HOME | End: 2022-03-30
Attending: INTERNAL MEDICINE | Admitting: INTERNAL MEDICINE
Payer: MEDICARE

## 2022-03-30 VITALS
OXYGEN SATURATION: 100 % | BODY MASS INDEX: 20.24 KG/M2 | HEART RATE: 70 BPM | WEIGHT: 110 LBS | DIASTOLIC BLOOD PRESSURE: 62 MMHG | HEIGHT: 62 IN | TEMPERATURE: 98 F | SYSTOLIC BLOOD PRESSURE: 104 MMHG | RESPIRATION RATE: 16 BRPM

## 2022-03-30 DIAGNOSIS — Z12.11 SCREENING FOR COLON CANCER: ICD-10-CM

## 2022-03-30 LAB
B-HCG UR QL: NEGATIVE
CTP QC/QA: YES

## 2022-03-30 PROCEDURE — 37000008 HC ANESTHESIA 1ST 15 MINUTES: Performed by: INTERNAL MEDICINE

## 2022-03-30 PROCEDURE — 43239 EGD BIOPSY SINGLE/MULTIPLE: CPT | Mod: 51,,, | Performed by: INTERNAL MEDICINE

## 2022-03-30 PROCEDURE — G0105 COLORECTAL SCRN; HI RISK IND: ICD-10-PCS | Mod: ,,, | Performed by: INTERNAL MEDICINE

## 2022-03-30 PROCEDURE — 88342 CHG IMMUNOCYTOCHEMISTRY: ICD-10-PCS | Mod: 26,,, | Performed by: PATHOLOGY

## 2022-03-30 PROCEDURE — G0105 COLORECTAL SCRN; HI RISK IND: HCPCS | Performed by: INTERNAL MEDICINE

## 2022-03-30 PROCEDURE — E9220 PRA ENDO ANESTHESIA: HCPCS | Mod: ,,, | Performed by: NURSE ANESTHETIST, CERTIFIED REGISTERED

## 2022-03-30 PROCEDURE — 88305 TISSUE EXAM BY PATHOLOGIST: CPT | Mod: 26,,, | Performed by: PATHOLOGY

## 2022-03-30 PROCEDURE — 43239 PR EGD, FLEX, W/BIOPSY, SGL/MULTI: ICD-10-PCS | Mod: 51,,, | Performed by: INTERNAL MEDICINE

## 2022-03-30 PROCEDURE — 88305 TISSUE EXAM BY PATHOLOGIST: ICD-10-PCS | Mod: 26,,, | Performed by: PATHOLOGY

## 2022-03-30 PROCEDURE — 27201012 HC FORCEPS, HOT/COLD, DISP: Performed by: INTERNAL MEDICINE

## 2022-03-30 PROCEDURE — G0105 COLORECTAL SCRN; HI RISK IND: HCPCS | Mod: ,,, | Performed by: INTERNAL MEDICINE

## 2022-03-30 PROCEDURE — 25000003 PHARM REV CODE 250: Performed by: NURSE ANESTHETIST, CERTIFIED REGISTERED

## 2022-03-30 PROCEDURE — 25000003 PHARM REV CODE 250: Performed by: INTERNAL MEDICINE

## 2022-03-30 PROCEDURE — 88305 TISSUE EXAM BY PATHOLOGIST: CPT | Mod: 59 | Performed by: PATHOLOGY

## 2022-03-30 PROCEDURE — 37000009 HC ANESTHESIA EA ADD 15 MINS: Performed by: INTERNAL MEDICINE

## 2022-03-30 PROCEDURE — E9220 PRA ENDO ANESTHESIA: ICD-10-PCS | Mod: ,,, | Performed by: NURSE ANESTHETIST, CERTIFIED REGISTERED

## 2022-03-30 PROCEDURE — 63600175 PHARM REV CODE 636 W HCPCS: Performed by: NURSE ANESTHETIST, CERTIFIED REGISTERED

## 2022-03-30 PROCEDURE — 88342 IMHCHEM/IMCYTCHM 1ST ANTB: CPT | Performed by: PATHOLOGY

## 2022-03-30 PROCEDURE — 43239 EGD BIOPSY SINGLE/MULTIPLE: CPT | Performed by: INTERNAL MEDICINE

## 2022-03-30 PROCEDURE — 81025 URINE PREGNANCY TEST: CPT | Performed by: INTERNAL MEDICINE

## 2022-03-30 PROCEDURE — 88342 IMHCHEM/IMCYTCHM 1ST ANTB: CPT | Mod: 26,,, | Performed by: PATHOLOGY

## 2022-03-30 RX ORDER — PROPOFOL 10 MG/ML
VIAL (ML) INTRAVENOUS CONTINUOUS PRN
Status: DISCONTINUED | OUTPATIENT
Start: 2022-03-30 | End: 2022-03-30

## 2022-03-30 RX ORDER — PROPOFOL 10 MG/ML
VIAL (ML) INTRAVENOUS
Status: DISCONTINUED | OUTPATIENT
Start: 2022-03-30 | End: 2022-03-30

## 2022-03-30 RX ORDER — SODIUM CHLORIDE 9 MG/ML
INJECTION, SOLUTION INTRAVENOUS CONTINUOUS
Status: DISCONTINUED | OUTPATIENT
Start: 2022-03-30 | End: 2022-03-30 | Stop reason: HOSPADM

## 2022-03-30 RX ORDER — LIDOCAINE HCL/PF 100 MG/5ML
SYRINGE (ML) INTRAVENOUS
Status: DISCONTINUED | OUTPATIENT
Start: 2022-03-30 | End: 2022-03-30

## 2022-03-30 RX ORDER — SODIUM CHLORIDE 9 MG/ML
INJECTION, SOLUTION INTRAVENOUS CONTINUOUS PRN
Status: DISCONTINUED | OUTPATIENT
Start: 2022-03-30 | End: 2022-03-30

## 2022-03-30 RX ADMIN — SODIUM CHLORIDE: 0.9 INJECTION, SOLUTION INTRAVENOUS at 10:03

## 2022-03-30 RX ADMIN — PROPOFOL 180 MG: 10 INJECTION, EMULSION INTRAVENOUS at 11:03

## 2022-03-30 RX ADMIN — PROPOFOL 200 MCG/KG/MIN: 10 INJECTION, EMULSION INTRAVENOUS at 11:03

## 2022-03-30 RX ADMIN — SODIUM CHLORIDE: 0.9 INJECTION, SOLUTION INTRAVENOUS at 11:03

## 2022-03-30 RX ADMIN — GLYCOPYRROLATE 0.2 MG: 0.2 INJECTION, SOLUTION INTRAMUSCULAR; INTRAVITREAL at 11:03

## 2022-03-30 RX ADMIN — Medication 40 MG: at 11:03

## 2022-03-30 NOTE — ANESTHESIA PREPROCEDURE EVALUATION
03/30/2022  Gay Gurrola is a 56 y.o., female   [facility]  Anesthesia Pre-Operative Evaluation       Patient Name: Gay Gurrola  YOB: 1965  MRN: [id number]  CSN: [id number]      Code Status: Prior   Date of Procedure: 3/30/2022  Procedure: Procedure(s) (LRB):  ESOPHAGOGASTRODUODENOSCOPY (EGD) (N/A)  COLONOSCOPY (N/A)  Anesthesia: General  Pre-Operative Diagnosis: Final diagnoses:  [Z12.11] Screening for colon cancer  Proceduralist/Surgeon(s) and Role:     * Estuardo Salazar MD - Primary    SUBJECTIVE:   Gay Gurrola is a 56 y.o. female w/ a significant PMHx listed below.    Patient now presents for the above procedure(s). Pt appropriately NPO.     LDA:        Peripheral IV - Single Lumen 03/30/22 1100 20 G Right Hand (Active)   Number of days: 0      Anesthesia Evaluation      Airway   Mallampati: II  TM distance: Normal  Neck ROM: Normal ROM  Dental    (+) Intact    Pulmonary    Cardiovascular   Exercise tolerance: good    ECG reviewed  Rate: Normal    Neuro/Psych    (+) neuromuscular disease, headaches, psychiatric history    GI/Hepatic/Renal    (+) bowel prep    Endo/Other    (+) hypothyroidism,   Abdominal                 ALLERGIES:   Review of patient's allergies indicates:  No Known Allergies  MEDICATIONS:     Current Outpatient Medications on File Prior to Encounter   Medication Sig Dispense Refill Last Dose    Ca-D3-mag#11-zinc-cupr-man-bor 600 mg calcium- 800 unit-50 mg Tab Take 1 tablet by mouth once daily.   Past Week at Unknown time    cholecalciferol, vitamin D3, (VITAMIN D3) 25 mcg (1,000 unit) capsule Take 1,000 Units by mouth once daily.   Past Week at Unknown time    cyanocobalamin, vitamin B-12, 5,000 mcg Subl Place under the tongue once daily.   Past Week at Unknown time    fish oil-omega-3 fatty acids 300-1,000 mg capsule  Take 1 capsule by mouth once daily.   Past Week at Unknown time    levothyroxine (SYNTHROID) 25 MCG tablet Take 1 tablet (25 mcg total) by mouth once daily. 90 tablet 3 3/30/2022 at Unknown time    linaCLOtide (LINZESS) 290 mcg Cap capsule Take 1 capsule (290 mcg total) by mouth once daily. 90 capsule 3 Past Week at Unknown time    multivitamin (THERAGRAN) per tablet Take 1 tablet by mouth once daily.   Past Week at Unknown time    prednisoLONE acetate (PRED FORTE) 1 % DrpS Place 1 drop into both eyes 4 (four) times daily for 7 days, THEN 1 drop 3 (three) times daily for 7 days, THEN 1 drop 2 (two) times daily for 7 days, THEN 1 drop 2 (two) times daily for 7 days, THEN 1 drop once daily for 7 days. 10 mL 0 3/29/2022 at Unknown time    TURMERIC ORAL Take by mouth.   Past Week at Unknown time     Current Facility-Administered Medications   Medication Dose Route Frequency Provider Last Rate Last Admin    0.9%  NaCl infusion   Intravenous Continuous Estuardo Salazar MD 10 mL/hr at 03/30/22 1046 New Bag at 03/30/22 1046          History:   There are no hospital problems to display for this patient.    Patient Active Problem List   Diagnosis    Depression    Alcohol abuse, in remission    Memory loss    Attention or concentration deficit    Bipolar I disorder    Hypothyroid    Osteopenia    Insomnia    Other chest pain    Chronic migraine without aura without status migrainosus, not intractable    Abdominal pain, LUQ    Left hamstring injury    Pain      Past Medical History:   Diagnosis Date    Abnormal coronary angiogram     Alcohol abuse, in remission     Alcohol related seizure 7/24/2012    Anxiety     Bipolar affective     since teenage years    Chronic idiopathic constipation     Depression     H. pylori infection     Headache(784.0)     followed by Dr. Corona    Hypothyroid     Memory loss     Osteopenia     Seizures      Past Surgical History:   Procedure Laterality Date     BUNIONECTOMY      COLONOSCOPY N/A 4/12/2017    Procedure: COLONOSCOPY;  Surgeon: Estuardo Salazar MD;  [facility]  Service: Endoscopy;  Laterality: N/A;  PM prep    ESOPHAGOGASTRODUODENOSCOPY N/A 10/23/2018    Procedure: EGD (ESOPHAGOGASTRODUODENOSCOPY);  Surgeon: Irlanda Schwarz MD;  [facility]  Service: Endoscopy;  Laterality: N/A;    TUBAL LIGATION       Alcohol use:    Social History     Substance and Sexual Activity   Alcohol Use No    Comment: History of alcoholism in remission.          OBJECTIVE:   Last 3 sets of Vitals    Vitals - 1 value per visit 3/29/2022 3/29/2022 3/30/2022   SYSTOLIC - 118 108   DIASTOLIC - 75 62   Pulse - 63 71   Temp - - 97.7   Resp - - 16   SPO2 - - 100   Weight (lb) - 111.99 110   Weight (kg) - 50.8 49.896   Height - 62 62   BMI (Calculated) - 20.5 20.1   VISIT REPORT - - -   Pain Score  0 - -   Some encounter information is confidential and restricted. Go to Review Flowsheets activity to see all data.   Some recent data might be hidden       Vital Signs (Most Recent):  Temp: 36.5 °C (97.7 °F) (03/30/22 1056)  Pulse: 71 (03/30/22 1056)  Resp: 16 (03/30/22 1056)  BP: 108/62 (03/30/22 1056)  SpO2: 100 % (03/30/22 1056) Vital Signs Range (Last 24H):  Temp:  [36.5 °C (97.7 °F)]   Pulse:  [63-71]   Resp:  [16]   BP: (108-118)/(62-75)   SpO2:  [100 %]      No intake or output data in the 24 hours ending 03/30/22 1127    Body mass index is 20.12 kg/m².     Significant Labs:  Lab Results   Component Value Date    WBC 4.87 03/03/2022    HGB 13.0 03/03/2022    HCT 39.6 03/03/2022     03/03/2022    CHOL 194 10/27/2020    TRIG 36 10/27/2020    HDL 93 (H) 10/27/2020    ALT 24 10/27/2020    AST 27 10/27/2020     10/27/2020    K 3.8 10/27/2020     10/27/2020    CREATININE 0.7 10/27/2020    BUN 14 10/27/2020    CO2 26 10/27/2020    TSH 1.850 10/27/2020     Lab Results   Component Value Date    PREGTESTUR Negative 11/08/2008      Patient's  last menstrual period was 02/01/2022 (approximate).    EKG:       TTE:  No results found for this or any previous visit.  No results found for: EF  No results found for this or any previous visit.  CHUCK:  No results found for this or any previous visit.  Stress Test:  No results found for this or any previous visit.     LHC:  Results for orders placed during the hospital encounter of 05/23/18    Cath lab procedure    Narrative  Date of Procedure:  05/23/2018    A. Indication/Pre-Operative Diagnosis    The patient is a 52 year old female that was referred for catheterization by Lloyd Horton for ACS (unstable angina) and abnormal CV function study (Intermediate risk).    B. Summary/Post-Operative Diagnosis    LVEDP=7mmHg.  Normal coronary arteries.    C. HPI    I have reviewed the history and physical completed on 05/23/2018. The patient has been examined and I concur with the findings from 05/23/2018.    Patient history was obtained from the patient.    Counseling: The patient was counseled regarding the potential risks and benefits of this procedure, as well as possible alternative approaches to the problem and gave informed consent.    The ASA Score was Class III.    [facility] Risk Score for MACE is 1.40%. [facility] Risk Score for Death is 0.10%.    Height: 62 in.      Weight: 97 lbs.      BMI: 17.70 kg/m2    OUTPATIENT MEDICATIONS: Medications were reviewed.  ALLERGIES: Allergies were reviewed.    Laboratory data revealed:    05/23/2018 CREAT  1.0, GLU  99, HCT  39.2, HGB  12.9, K  4.7, NA  134, PLT  210, WBCIR  5.60, BUN  16        ACS Enzymes:    05/23/2018 TROP  <0.00          D. Hemodynamic Results    LVEDP (Pre): 7 mmHg  AOP: 94/54 (72)    E. Angiographic Results    Diagnostic:    Patient has a right dominant coronary artery.    - Left Main Coronary Artery:  The LM is normal. There is OLGA 3 flow.  - Left Anterior Descending Artery:  The LAD is normal. There is OLGA 3 flow.  - D1:  The D1 is normal.  There is OLGA 3 flow.  - D2:  The D2 is normal. There is OLGA 3 flow.  - D3:  The D3 is normal. There is OLGA 3 flow.  - D4:  The D4 is normal. There is OLGA 3 flow.  - Left Circumflex Artery:  The LCX is normal. There is OLGA 3 flow.  - OM1:  The OM1 is normal. There is OLGA 3 flow.  - OM2:  The OM2 is normal. There is OLGA 3 flow.  - OM3:  The OM3 is normal. There is OLGA 3 flow.  - OM4:  The OM4 is normal. There is OLGA 3 flow.  - Right Coronary Artery:  The RCA is normal. There is OLGA 3 flow.  - Posterior Descending Artery:  The PDA is normal. There is OLGA 3 flow.    F. Details of Procedure    PROCEDURES PERFORMED: LHC and Coronary Angio    ANESTHESIA: Conscious sedation was achieved with 3 mg of MIDAZOLAM (VERSED) 1MG/ML. Local anesthesia was achieved with 20 ml of LIDOCAINE 2% 20ML. Moderate conscious sedation was performed and cardiorespiratory functions were monitored the entire  procedure by Courtney Hernandez RN. Sedation began at 02:20 PM and concluded at 02:54 PM, totalling 34.2 minutes.    PRIMARY SURGEON: Roberto Diaz MD  FELLOW: Hollis Mauro MD and Jessica Carey MD    COMPLICATIONS: There were no complications.    Medications given on sterile field: Lidocaine 2% 20ml (20 ml) and Nitroglycerine 200mcg/ml (10 ml).    Medications given during procedure: Heparin Bag 1000 Units/500ml (2 bags), Verapamil (calan) 5mg/2ml (1.25 mg), Heparin 1000 Units/ml (2750 units), Normal Saline (250 ml), Nitroglycerine (tridil) 5mg/ml (1.25 mg), Zofran (onedanstrin) 4mg/2ml (4 mg) and  Midazolam (versed) 1mg/ml (3 mg).    The patient was brought to the catheterization laboratory after premedication with oral Benadryl 50 mg. Bilateral groin prepped and draped. Right radial prepped and draped. The Custom Kit was flushed and connected to contrast. After local anesthesia, a  Glidesheath 5 Fr. Terumo was inserted into the right Radial artery.    AORTA    A Guidewire J .035 X 260cm was inserted into the Aorta.    LM    A Cath  Tylor Radial 5fr 100cm was inserted into the ostial LM. The Guidewire J .035 X 260cm was removed. Angiography performed in multiple views in the left coronary arteries.    RCA    The Cath Tylor Radial 5fr 100cm was repositioned into the ostial RCA. Angiography performed in multiple views in the right coronary arteries.    Left  VENTRICLE    The Cath Tylor Radial 5fr 100cm was repositioned into the left ventricle. Hemodynamics recorded in the left ventricle.    The Cath Tylor Radial 5fr 100cm was removed. The Glidesheath 5 Fr. Terumo was removed. A Hemostat Vasc Band Reg 24cm was applied to the right Radial artery. Total Omnipaque injected was 60.0 ml.      Fluoroscopy Time 3.5 minutes  Contrast Injected 60 ml Omnipaque  Contrast Used  60 ml Omnipaque        Procedure log documented by RT Steven and verified by Roberto Diaz MD    ESTIMATED BLOOD LOSS is < 50 cc.    SPECIMEN: No specimen.    G. Recommendations    1. Routine post-cath care.  2. Primary prevention of CAD.  3. Work-up of non-coronary chest pain syndrome    I certify that I was present for catheter insertion, catheter manipulation, angiography, and angiographic interpretation of this patient.    This document has been electronically  SIGNED BY: Roberto Diaz MD On: 05/23/2018 15:02     PFT:  No results found for: FEV1, FVC, PRI9HOA, TLC, DLCO     ASSESSMENT/PLAN:   .      Pre-op Assessment    I have reviewed the Patient Summary Reports.     I have reviewed the Nursing Notes. I have reviewed the NPO Status.   I have reviewed the Medications.     Review of Systems  Anesthesia Hx:  No problems with previous Anesthesia    Social:  Non-Smoker, Social Alcohol Use    Hematology/Oncology:  Hematology Normal   Oncology Normal     EENT/Dental:EENT/Dental Normal   Cardiovascular:  Cardiovascular Normal Exercise tolerance: good  ECG has been reviewed.    Pulmonary:  Pulmonary Normal    Renal/:  Renal/ Normal     Hepatic/GI:  Hepatic/GI Normal Bowel Prep.     Musculoskeletal:  Musculoskeletal Normal    Neurological:   Neuromuscular Disease, Headaches    Endocrine:   Hypothyroidism    Dermatological:  Skin Normal    Psych:   Psychiatric History          Physical Exam  General: Well nourished, Cooperative, Alert and Oriented    Airway:  Mallampati: II   Mouth Opening: Normal  TM Distance: Normal  Tongue: Normal  Neck ROM: Normal ROM    Dental:  Intact    Chest/Lungs:  Clear to auscultation, Normal Respiratory Rate    Heart:  Rate: Normal  Rhythm: Regular Rhythm  Sounds: Normal        Anesthesia Plan  Type of Anesthesia, risks & benefits discussed:    Anesthesia Type: Gen Natural Airway  Intra-op Monitoring Plan: Standard ASA Monitors  Induction:  IV  Informed Consent: Informed consent signed with the Patient and all parties understand the risks and agree with anesthesia plan.  All questions answered.   ASA Score: 1  Day of Surgery Review of History & Physical: H&P Update referred to the surgeon/provider.    Ready For Surgery From Anesthesia Perspective.     .

## 2022-03-30 NOTE — [employer]ATION PATIENT INSTRUCTIONS
Discharge Summary/Instructions after an Endoscopic Procedure  Patient Name: Gay Gurrola  Patient MRN: [id number]  Patient YOB: 1965 Wednesday, March 30, 2022  Estuardo Salazar MD  Dear patient,  As a result of recent federal legislation (The Federal Cures Act), you may   receive lab or pathology results from your procedure in your [facility]   account before your physician is able to contact you. Your physician or   their representative will relay the results to you with their   recommendations at their soonest availability.  Thank you,  RESTRICTIONS:  During your procedure today, you received medications for sedation.  These   medications may affect your judgment, balance and coordination.  Therefore,   for 24 hours, you have the following restrictions:   - DO NOT drive a car, operate machinery, make legal/financial decisions,   sign important papers or drink alcohol.    ACTIVITY:  Today: no heavy lifting, straining or running due to procedural   sedation/anesthesia.  The following day: return to full activity including work.  DIET:  Eat and drink normally unless instructed otherwise.     TREATMENT FOR COMMON SIDE EFFECTS:  - Mild abdominal pain, nausea, belching, bloating or excessive gas:  rest,   eat lightly and use a heating pad.  - Sore Throat: treat with throat lozenges and/or gargle with warm salt   water.  - Because air was used during the procedure, expelling large amounts of air   from your rectum or belching is normal.  - If a bowel prep was taken, you may not have a bowel movement for 1-3 days.    This is normal.  SYMPTOMS TO WATCH FOR AND REPORT TO YOUR PHYSICIAN:  1. Abdominal pain or bloating, other than gas cramps.  2. Chest pain.  3. Back pain.  4. Signs of infection such as: chills or fever occurring within 24 hours   after the procedure.  5. Rectal bleeding, which would show as bright red, maroon, or black stools.   (A tablespoon of blood from the rectum is not serious, especially if    hemorrhoids are present.)  6. Vomiting.  7. Weakness or dizziness.  GO DIRECTLY TO THE NEAREST EMERGENCY ROOM IF YOU HAVE ANY OF THE FOLLOWING:      Difficulty breathing              Chills and/or fever over 101 F   Persistent vomiting and/or vomiting blood   Severe abdominal pain   Severe chest pain   Black, tarry stools   Bleeding- more than one tablespoon   Any other symptom or condition that you feel may need urgent attention  Your doctor recommends these additional instructions:  If any biopsies were taken, your doctors clinic will contact you in 1 to 2   weeks with any results.  - Discharge patient to home.   - Perform a colonoscopy today.   - Await pathology results.   - The findings and recommendations were discussed with the designated   responsible adult.  For questions, problems or results please call your physician - Estuardo Salazar MD at Work:  [phone].  [facility], EMERGENCY ROOM PHONE NUMBER: [phone]  IF A COMPLICATION OR EMERGENCY SITUATION ARISES AND YOU ARE UNABLE TO REACH   YOUR PHYSICIAN - GO DIRECTLY TO THE EMERGENCY ROOM.  Estuardo Salazar MD  3/30/2022 11:44:16 AM  This report has been verified and signed electronically.  Dear patient,  As a result of recent federal legislation (The Federal Cures Act), you may   receive lab or pathology results from your procedure in your [facility]   account before your physician is able to contact you. Your physician or   their representative will relay the results to you with their   recommendations at their soonest availability.  Thank you,  PROVATION

## 2022-03-30 NOTE — PROVATION PATIENT INSTRUCTIONS
Discharge Summary/Instructions after an Endoscopic Procedure  Patient Name: Gay Gurrola  Patient MRN: [id number]  Patient YOB: 1965 Wednesday, March 30, 2022  Estuardo Salazar MD  Dear patient,  As a result of recent federal legislation (The Federal Cures Act), you may   receive lab or pathology results from your procedure in your [facility]   account before your physician is able to contact you. Your physician or   their representative will relay the results to you with their   recommendations at their soonest availability.  Thank you,  RESTRICTIONS:  During your procedure today, you received medications for sedation.  These   medications may affect your judgment, balance and coordination.  Therefore,   for 24 hours, you have the following restrictions:   - DO NOT drive a car, operate machinery, make legal/financial decisions,   sign important papers or drink alcohol.    ACTIVITY:  Today: no heavy lifting, straining or running due to procedural   sedation/anesthesia.  The following day: return to full activity including work.  DIET:  Eat and drink normally unless instructed otherwise.     TREATMENT FOR COMMON SIDE EFFECTS:  - Mild abdominal pain, nausea, belching, bloating or excessive gas:  rest,   eat lightly and use a heating pad.  - Sore Throat: treat with throat lozenges and/or gargle with warm salt   water.  - Because air was used during the procedure, expelling large amounts of air   from your rectum or belching is normal.  - If a bowel prep was taken, you may not have a bowel movement for 1-3 days.    This is normal.  SYMPTOMS TO WATCH FOR AND REPORT TO YOUR PHYSICIAN:  1. Abdominal pain or bloating, other than gas cramps.  2. Chest pain.  3. Back pain.  4. Signs of infection such as: chills or fever occurring within 24 hours   after the procedure.  5. Rectal bleeding, which would show as bright red, maroon, or black stools.   (A tablespoon of blood from the rectum is not serious, especially if    hemorrhoids are present.)  6. Vomiting.  7. Weakness or dizziness.  GO DIRECTLY TO THE NEAREST EMERGENCY ROOM IF YOU HAVE ANY OF THE FOLLOWING:      Difficulty breathing              Chills and/or fever over 101 F   Persistent vomiting and/or vomiting blood   Severe abdominal pain   Severe chest pain   Black, tarry stools   Bleeding- more than one tablespoon   Any other symptom or condition that you feel may need urgent attention  Your doctor recommends these additional instructions:  If any biopsies were taken, your doctors clinic will contact you in 1 to 2   weeks with any results.  - Patient has a contact number available for emergencies.  The signs and   symptoms of potential delayed complications were discussed with the   patient.  Return to normal activities tomorrow.  Written discharge   instructions were provided to the patient.   - Discharge patient to home.   - Repeat colonoscopy in 5 years for screening purposes.   - The findings and recommendations were discussed with the designated   responsible adult.  For questions, problems or results please call your physician - Estuardo Salazar MD at Work:  [phone].  [facility], EMERGENCY ROOM PHONE NUMBER: [phone]  IF A COMPLICATION OR EMERGENCY SITUATION ARISES AND YOU ARE UNABLE TO REACH   YOUR PHYSICIAN - GO DIRECTLY TO THE EMERGENCY ROOM.  Estuardo Salazar MD  3/30/2022 12:03:33 PM  This report has been verified and signed electronically.  Dear patient,  As a result of recent federal legislation (The Federal Cures Act), you may   receive lab or pathology results from your procedure in your [facility]   account before your physician is able to contact you. Your physician or   their representative will relay the results to you with their   recommendations at their soonest availability.  Thank you,  PROVATION

## 2022-03-30 NOTE — H&P
Short Stay Endoscopy History and Physical    PCP - Alber Kelsey MD    Procedure - EGD and colonoscopy    ASA - per anesthesia  Mallampati - per anesthesia  History of Anesthesia problems - no  Family history Anesthesia problems - no     HPI:  Gay Grurola a 56 y.o. female here for evaluation of abdominal pain, decreased appetite, and colon cancer screening. She has a FH of colon cancer in her sister (diagnosed at age 60).     ROS:  Constitutional: No fevers, chills, No weight loss  ENT: No allergies  CV: No chest pain  Pulm: No shortness of breath  GI: see HPI  Derm: No rash    Medical History:  has a past medical history of Abnormal coronary angiogram, Alcohol abuse, in remission, Alcohol related seizure (7/24/2012), Anxiety, Bipolar affective, Chronic idiopathic constipation, Depression, H. pylori infection, Headache(784.0), Hypothyroid, Memory loss, Osteopenia, and Seizures.    Surgical History:  has a past surgical history that includes Tubal ligation; Bunionectomy; Colonoscopy (N/A, 4/12/2017); and Esophagogastroduodenoscopy (N/A, 10/23/2018).    Family History: family history includes Alzheimer's disease in her mother; Anuerysm in her mother; Colon cancer (age of onset: 64) in her sister; Coronary artery disease in her father; Crohn's disease in her maternal aunt and other; Dementia in her maternal aunt and maternal grandmother; Dementia (age of onset: 59) in her sister; Depression in her mother; Diabetes in her father and mother; Hypertension in her father; Insomnia in her mother; Kidney disease in her father; Stroke in her mother.. Otherwise no colon cancer, inflammatory bowel disease, or GI malignancies.    Social History:  reports that she has never smoked. She has never used smokeless tobacco. She reports that she does not drink alcohol and does not use drugs.    Review of patient's allergies indicates:  No Known Allergies    Medications:   Medications Prior to Admission   Medication Sig  Dispense Refill Last Dose    Ca-D3-mag#11-zinc-cupr-man-bor 600 mg calcium- 800 unit-50 mg Tab Take 1 tablet by mouth once daily.   Past Week at Unknown time    cholecalciferol, vitamin D3, (VITAMIN D3) 25 mcg (1,000 unit) capsule Take 1,000 Units by mouth once daily.   Past Week at Unknown time    cyanocobalamin, vitamin B-12, 5,000 mcg Subl Place under the tongue once daily.   Past Week at Unknown time    DULoxetine (CYMBALTA) 20 MG capsule Take 1 capsule (20 mg total) by mouth once daily. 30 capsule 2 Past Week at Unknown time    fish oil-omega-3 fatty acids 300-1,000 mg capsule Take 1 capsule by mouth once daily.   Past Week at Unknown time    levothyroxine (SYNTHROID) 25 MCG tablet Take 1 tablet (25 mcg total) by mouth once daily. 90 tablet 3 3/30/2022 at Unknown time    linaCLOtide (LINZESS) 290 mcg Cap capsule Take 1 capsule (290 mcg total) by mouth once daily. 90 capsule 3 Past Week at Unknown time    multivitamin (THERAGRAN) per tablet Take 1 tablet by mouth once daily.   Past Week at Unknown time    prednisoLONE acetate (PRED FORTE) 1 % DrpS Place 1 drop into both eyes 4 (four) times daily for 7 days, THEN 1 drop 3 (three) times daily for 7 days, THEN 1 drop 2 (two) times daily for 7 days, THEN 1 drop 2 (two) times daily for 7 days, THEN 1 drop once daily for 7 days. 10 mL 0 3/29/2022 at Unknown time    QUEtiapine (SEROQUEL) 400 MG tablet Take 1 tablet (400 mg total) by mouth every evening. 30 tablet 1 3/29/2022 at Unknown time    temazepam (RESTORIL) 30 mg capsule Take 1 capsule (30 mg total) by mouth nightly as needed. 30 capsule 2 3/29/2022 at Unknown time    TURMERIC ORAL Take by mouth.   Past Week at Unknown time    lamoTRIgine (LAMICTAL) 150 MG Tab Take 1 tablet (150 mg total) by mouth 2 (two) times daily. 60 tablet 3          Objective Findings:    Vital Signs: see nursing notes    Physical Exam:  General Appearance: Well appearing in no acute distress  Eyes:    No scleral icterus  ENT:  Neck supple  Lungs: CTA anteriorly  Heart:  S1, S2 normal, no murmurs heard  Abdomen: Soft, non tender, non distended with positive bowel sounds. No hepatosplenomegaly, ascites, or mass  Extremities: no edema  Skin: No rash      Labs:  Lab Results   Component Value Date    WBC 4.87 03/03/2022    HGB 13.0 03/03/2022    HCT 39.6 03/03/2022     03/03/2022    CHOL 194 10/27/2020    TRIG 36 10/27/2020    HDL 93 (H) 10/27/2020    ALT 24 10/27/2020    AST 27 10/27/2020     10/27/2020    K 3.8 10/27/2020     10/27/2020    CREATININE 0.7 10/27/2020    BUN 14 10/27/2020    CO2 26 10/27/2020    TSH 1.850 10/27/2020         Assessment:   Gay Gurrola is a 56 y.o. female presenting today for an EGD and colonoscopy for evaluation of abdominal pain/decreased appetite and colon cancer screening, respectively.       Plan:   -Proceed with scheduled procedure today. I have explained the risks and benefits of endoscopy procedures to the patient including but not limited to bleeding, perforation, infection, and death.  -Evaluation and consent for anesthesia portion of this procedure completed by anesthesia team.         Kirit Ornelas MD, PGY-IV  Gastroenterology Fellow  [facility]

## 2022-03-30 NOTE — TRANSFER OF CARE
"Anesthesia Transfer of Care Note    Patient: Gay Gurrola    Procedure(s) Performed: Procedure(s) (LRB):  ESOPHAGOGASTRODUODENOSCOPY (EGD) (N/A)  COLONOSCOPY (N/A)    Patient location: PACU    Anesthesia Type: general    Transport from OR: Transported from OR on 6-10 L/min O2 by face mask with adequate spontaneous ventilation    Post pain: adequate analgesia    Post assessment: no apparent anesthetic complications and tolerated procedure well    Post vital signs: stable    Level of consciousness: sedated    Nausea/Vomiting: no nausea/vomiting    Complications: none    Transfer of care protocol was followed      Last vitals:   Visit Vitals  BP (!) 104/59 (BP Location: Left arm, Patient Position: Lying)   Pulse 80   Temp 36.6 °C (97.9 °F) (Temporal)   Resp 16   Ht 5' 2" (1.575 m)   Wt 49.9 kg (110 lb)   LMP 02/01/2022 (Approximate)   SpO2 100%   Breastfeeding No   BMI 20.12 kg/m²     "

## 2022-03-31 NOTE — ANESTHESIA POSTPROCEDURE EVALUATION
Anesthesia Post Evaluation    Patient: Gay Gurrola    Procedure(s) Performed: Procedure(s) (LRB):  ESOPHAGOGASTRODUODENOSCOPY (EGD) (N/A)  COLONOSCOPY (N/A)    Final Anesthesia Type: general      Patient location during evaluation: PACU  Patient participation: Yes- Able to Participate  Level of consciousness: awake and alert and oriented  Post-procedure vital signs: reviewed and stable  Pain management: adequate  Airway patency: patent    PONV status at discharge: No PONV  Anesthetic complications: no      Cardiovascular status: blood pressure returned to baseline  Respiratory status: unassisted, room air and spontaneous ventilation  Hydration status: euvolemic  Follow-up not needed.          Vitals Value Taken Time   /62 03/30/22 1238   Temp 36.6 °C (97.9 °F) 03/30/22 1208   Pulse 70 03/30/22 1238   Resp 16 03/30/22 1238   SpO2 100 % 03/30/22 1238         Event Time   Out of Recovery 12:39:40         Pain/Cat Score: Cat Score: 10 (3/30/2022 12:23 PM)

## 2022-04-06 ENCOUNTER — TELEPHONE (OUTPATIENT)
Dept: OPHTHALMOLOGY | Facility: CLINIC | Age: 57
End: 2022-04-06
Payer: COMMERCIAL

## 2022-04-06 NOTE — TELEPHONE ENCOUNTER
----- Message from Shahid Villela sent at 4/6/2022 10:54 AM CDT -----  Regarding: scheduling  Contact: pt  Pt requesting a call back in regards to scheduling an appt for episcleritis.      Pt @ [phone]

## 2022-04-07 ENCOUNTER — TELEPHONE (OUTPATIENT)
Dept: OPTOMETRY | Facility: CLINIC | Age: 57
End: 2022-04-07
Payer: COMMERCIAL

## 2022-04-07 NOTE — TELEPHONE ENCOUNTER
Called to check on status of pt's episcleritis. Pt reports episcleritis improved but still present OD but only in the morning and goes away on it's own. Pt's rheumatologist reports no systemic inflammatory concers and referred her to ophthalmology for her episcleritis. Pt shows concern since the rheumatologist did not do any additional labs or blood work. Pt also reports that she was told by her rheumatologist that she may have to see a retina specialist if the referred ophthalmologist can not help her. Pt advised that this would be unnecessary since she does not have retinal issues. I recommended to resume Pred Forte BID OD today but reports that she is not interested in staying on Pred Forte. Pt advised that her episcleritis may be idiopathic in nature. Pt advised that treatment for episcleritis includes topical steroids or oral NSAIDs. Pt advised to cont w/referral to Dr Wren.     ----- Message from Mario Priest, OD sent at 3/4/2022 10:12 AM CST -----  Regarding: episcleritis

## 2022-04-08 LAB
FINAL PATHOLOGIC DIAGNOSIS: NORMAL
Lab: NORMAL

## 2022-04-10 ENCOUNTER — TELEPHONE (OUTPATIENT)
Dept: GASTROENTEROLOGY | Facility: CLINIC | Age: 57
End: 2022-04-10
Payer: COMMERCIAL

## 2022-04-11 ENCOUNTER — PATIENT MESSAGE (OUTPATIENT)
Dept: GASTROENTEROLOGY | Facility: CLINIC | Age: 57
End: 2022-04-11
Payer: COMMERCIAL

## 2022-04-11 RX ORDER — BISMUTH SUBCITRATE POTASSIUM, METRONIDAZOLE AND TETRACYCLINE HYDROCHLORIDE 140; 125; 125 MG/1; MG/1; MG/1
3 CAPSULE ORAL
Qty: 120 CAPSULE | Refills: 0 | Status: SHIPPED | OUTPATIENT
Start: 2022-04-11 | End: 2022-04-21

## 2022-04-11 RX ORDER — OMEPRAZOLE 20 MG/1
20 CAPSULE, DELAYED RELEASE ORAL 2 TIMES DAILY
Qty: 20 CAPSULE | Refills: 0 | Status: SHIPPED | OUTPATIENT
Start: 2022-04-11 | End: 2022-10-04 | Stop reason: ALTCHOICE

## 2022-04-13 ENCOUNTER — PATIENT OUTREACH (OUTPATIENT)
Dept: ADMINISTRATIVE | Facility: HOSPITAL | Age: 57
End: 2022-04-13
Payer: COMMERCIAL

## 2022-04-13 NOTE — PROGRESS NOTES
Health Maintenance Due   Topic Date Due    Hepatitis C Screening  Never done    COVID-19 Vaccine (1) Never done    Shingles Vaccine (1 of 2) Never done     Triggered LINKS. Updated Care Everywhere. Imported outside mammography results from Care Everywhere into . Chart review completed.

## 2022-04-22 ENCOUNTER — PATIENT OUTREACH (OUTPATIENT)
Dept: ADMINISTRATIVE | Facility: OTHER | Age: 57
End: 2022-04-22
Payer: COMMERCIAL

## 2022-04-22 ENCOUNTER — OFFICE VISIT (OUTPATIENT)
Dept: PSYCHIATRY | Facility: CLINIC | Age: 57
End: 2022-04-22
Payer: MEDICARE

## 2022-04-22 VITALS
WEIGHT: 108 LBS | BODY MASS INDEX: 19.75 KG/M2 | HEART RATE: 68 BPM | DIASTOLIC BLOOD PRESSURE: 59 MMHG | SYSTOLIC BLOOD PRESSURE: 109 MMHG

## 2022-04-22 DIAGNOSIS — F51.01 PRIMARY INSOMNIA: ICD-10-CM

## 2022-04-22 DIAGNOSIS — F31.70 BIPOLAR DISORDER IN PARTIAL REMISSION, MOST RECENT EPISODE UNSPECIFIED TYPE: Primary | ICD-10-CM

## 2022-04-22 PROCEDURE — 99213 OFFICE O/P EST LOW 20 MIN: CPT | Mod: PBBFAC | Performed by: PHYSICIAN ASSISTANT

## 2022-04-22 PROCEDURE — 99214 OFFICE O/P EST MOD 30 MIN: CPT | Mod: S$PBB,,, | Performed by: PHYSICIAN ASSISTANT

## 2022-04-22 PROCEDURE — 99214 PR OFFICE/OUTPT VISIT, EST, LEVL IV, 30-39 MIN: ICD-10-PCS | Mod: S$PBB,,, | Performed by: PHYSICIAN ASSISTANT

## 2022-04-22 PROCEDURE — 99999 PR PBB SHADOW E&M-EST. PATIENT-LVL III: CPT | Mod: PBBFAC,,, | Performed by: PHYSICIAN ASSISTANT

## 2022-04-22 PROCEDURE — 99999 PR PBB SHADOW E&M-EST. PATIENT-LVL III: ICD-10-PCS | Mod: PBBFAC,,, | Performed by: PHYSICIAN ASSISTANT

## 2022-04-22 RX ORDER — LAMOTRIGINE 150 MG/1
150 TABLET ORAL 2 TIMES DAILY
Qty: 60 TABLET | Refills: 1 | Status: SHIPPED | OUTPATIENT
Start: 2022-04-22 | End: 2022-06-23 | Stop reason: SDUPTHER

## 2022-04-22 RX ORDER — QUETIAPINE FUMARATE 300 MG/1
300 TABLET, FILM COATED ORAL NIGHTLY
Qty: 30 TABLET | Refills: 1 | Status: SHIPPED | OUTPATIENT
Start: 2022-04-22 | End: 2022-06-23 | Stop reason: SDUPTHER

## 2022-04-22 RX ORDER — TEMAZEPAM 30 MG/1
30 CAPSULE ORAL NIGHTLY PRN
Qty: 30 CAPSULE | Refills: 1 | Status: SHIPPED | OUTPATIENT
Start: 2022-04-22 | End: 2022-06-23 | Stop reason: SDUPTHER

## 2022-04-22 RX ORDER — DULOXETIN HYDROCHLORIDE 20 MG/1
20 CAPSULE, DELAYED RELEASE ORAL DAILY
Qty: 30 CAPSULE | Refills: 1 | Status: SHIPPED | OUTPATIENT
Start: 2022-04-22 | End: 2022-06-23 | Stop reason: SDUPTHER

## 2022-04-22 NOTE — PROGRESS NOTES
"Outpatient Psychiatry Follow-Up Visit (PA)    4/22/2022    Clinical Status of Patient:  Outpatient (Ambulatory)    Chief Complaint:  Gay Gurrola is a 56 y.o. female who presents today for follow-up of mood disorder.  Met with patient.      Current Medications:  Lamictal 150 mg   Seroquel 400 mg   Restoril 30 mg   Cymbalta 20 mg    Interval History and Content of Current Session:  Interim Events/Subjective Report/Content of Current Session:  Patient is a 56 year old female with a psychiatric history of bipolar 1 disorder and insomnia. The patient's is on Lamictal 150 mg, Restoril 30 mg and Seroquel 400 mg.    Patient presents to follow up after increasing to Seroquel 400 mg.     Patient reports she has been feeling better. She has started prednisone eye drops and has realized that a lot of her tension was due to the irritation/pain in her eyes. She reports since starting the drops, she is feeling better and is not as easily annoyed with her sister, stating "a lot of my annoyance and sensitivity was coming from my eyes."     The patient reports that since increasing the seroquel to 400 mg, she is not sleeping any better but is groggier in the morning.     The patient is eating better since being treated for h pylori.     The patient is requesting printed prescriptions, as she is going to [place] to see her son who was recently in a motorcycle to help him. She states in the past, she has had difficulty with transfering prescriptions to the pharmacy there.       Psychotherapy:  · Target symptoms: mood disorder, insomnia  · Why chosen therapy is appropriate versus another modality: relevant to diagnosis  · Outcome monitoring methods: self-report  · Therapeutic intervention type: insight oriented psychotherapy, supportive psychotherapy  · Topics discussed/themes: stress related to medical comorbidities, building skills sets for symptom management, symptom recognition  · The patient's response to the " intervention is accepting. The patient's progress toward treatment goals is good.   · Duration of intervention: 10 minutes.    Review of Systems   · PSYCHIATRIC: Pertinant items are noted in the narrative.    Past Medical, Family and Social History: The patient's past medical, family and social history have been reviewed and updated as appropriate within the electronic medical record - see encounter notes.    Compliance: yes    Side effects: None    Risk Parameters:  Patient reports no suicidal ideation  Patient reports no homicidal ideation  Patient reports no self-injurious behavior  Patient reports no violent behavior    Exam (detailed: at least 9 elements; comprehensive: all 15 elements)   Constitutional  Vitals:  Most recent vital signs, dated less than 90 days prior to this appointment, were reviewed.   Vitals:    04/22/22 1102   BP: (!) 109/59   Pulse: 68   Weight: 49 kg (108 lb)        General:  unremarkable, age appropriate, well dressed, neatly groomed     Musculoskeletal  Muscle Strength/Tone:  not examined   Gait & Station:  non-ataxic     Psychiatric  Speech:  no latency; no press   Mood & Affect:  steady, euthymic  congruent and appropriate   Thought Process:  normal and logical   Associations:  intact   Thought Content:  normal, no suicidality, no homicidality, delusions, or paranoia   Insight:  intact   Judgement: behavior is adequate to circumstances   Orientation:  grossly intact   Memory: intact for content of interview   Language: grossly intact   Attention Span & Concentration:  able to focus   Fund of Knowledge:  intact and appropriate to age and level of education     Assessment and Diagnosis   Status/Progress: Based on the examination today, the patient's problem(s) is/are adequately but not ideally controlled.  New problems have not been presented today.   Lack of compliance are not complicating management of the primary condition.  There are no active rule-out diagnoses for this patient at  this time.     General Impression: Patient is a 56 year old female with a psychiatric history of bipolar 1 disorder and insomnia. The patient's is on Lamictal 150 mg, Restoril 30 mg and Seroquel 300 mg, and cymbalta 20 mg.   Due to increased stress, patient complains of worsening insomnia      ICD-10-CM ICD-9-CM   1. Bipolar disorder in partial remission, most recent episode unspecified type  F31.70 296.80   2. Primary insomnia  F51.01 307.42       Intervention/Counseling/Treatment Plan   · Medication Management: Continue current medications.   · Continue Lamictal 150 mg daily  · Continue Restoril 30 mg nightly   · Decrease back to Seroquel 300 mg nightly  · Continue Cymbalta 20 mg for pain  · Establish with primary care provider for hypothyroidism, positive RAJNAN  Discussed with patient informed consent, risks vs. benefits, alternative treatments, side effect profile and the inherent unpredictability of individual responses to these treatments. The patient expresses understanding of the above and displays the capacity to agree with this current plan and had no other questions.  Encouraged patient to keep future appointments.   Encouraged patient to message or call with questions or concerns  Safety plan reviewed with patient for worsening condition or suicidal ideations. In the event of an emergency patient was advised to go to the emergency room.       Return to Clinic: 6 weeks, 2 months

## 2022-04-25 ENCOUNTER — OFFICE VISIT (OUTPATIENT)
Dept: OPHTHALMOLOGY | Facility: CLINIC | Age: 57
End: 2022-04-25
Payer: MEDICARE

## 2022-04-25 DIAGNOSIS — H15.103 EPISCLERITIS OF BOTH EYES: Primary | ICD-10-CM

## 2022-04-25 DIAGNOSIS — H04.123 DRY EYE SYNDROME OF BOTH EYES: ICD-10-CM

## 2022-04-25 PROCEDURE — 92002 INTRM OPH EXAM NEW PATIENT: CPT | Mod: S$PBB,,, | Performed by: OPHTHALMOLOGY

## 2022-04-25 PROCEDURE — 99213 OFFICE O/P EST LOW 20 MIN: CPT | Mod: PBBFAC | Performed by: OPHTHALMOLOGY

## 2022-04-25 PROCEDURE — 99999 PR PBB SHADOW E&M-EST. PATIENT-LVL III: ICD-10-PCS | Mod: PBBFAC,,, | Performed by: OPHTHALMOLOGY

## 2022-04-25 PROCEDURE — 92002 PR EYE EXAM, NEW PATIENT,INTERMED: ICD-10-PCS | Mod: S$PBB,,, | Performed by: OPHTHALMOLOGY

## 2022-04-25 PROCEDURE — 99999 PR PBB SHADOW E&M-EST. PATIENT-LVL III: CPT | Mod: PBBFAC,,, | Performed by: OPHTHALMOLOGY

## 2022-04-25 NOTE — PROGRESS NOTES
Subjective:       Patient ID: Gay Gurrola is a 56 y.o. female.    Chief Complaint: Concerns About Ocular Health    HPI     Patient here today for f/u episcleritis per Dr. Priest. Patient c/o   pain OD > OS for several mos. Patient has treated with Prednisone with no   resolve. Reports frequent use of Lumify prior to symptoms. Patient has had   lab work done and tested +RANJAN. Patient was seen by rheumatologist, but has   not been formally diagnosed with autoimmune disease. Patient reports that   she has a fmhx of Sjogren ( sister). No previous sx or injury ou. Va   stable. No f/f.    No gtt usage at this time    Last edited by Cinthia Ralph on 4/25/2022  1:52 PM. (History)             Assessment:       1. Episcleritis of both eyes    2. Dry eye syndrome of both eyes        Plan:       Episcleritis OU-Resolved on PF taper per Dr Priest. Doing well.     CRISTINE-Needs AT's & possibly AT gel qhs OU(Pt states that eyes are more irritated & red in AM when she wakes up).      Trial of Systane Complete PF bid- qid OU.  If still have worse symptoms in AM, would rec gel gtts qhs OU.  RTC Dr Priest as scheduled.

## 2022-05-06 ENCOUNTER — TELEPHONE (OUTPATIENT)
Dept: PSYCHIATRY | Facility: HOSPITAL | Age: 57
End: 2022-05-06

## 2022-05-06 RX ORDER — TRAZODONE HYDROCHLORIDE 50 MG/1
50 TABLET ORAL NIGHTLY
Qty: 30 TABLET | Refills: 1 | Status: SHIPPED | OUTPATIENT
Start: 2022-05-06 | End: 2022-05-27 | Stop reason: DRUGHIGH

## 2022-05-06 NOTE — TELEPHONE ENCOUNTER
"Patient requested call. Patient is in [place], states she is not sleeping well. Poor sleep quality.     Historically when not sleeping, patient reports depressed mood.     Wakes up feeling tired, feeling depressed    Patient states she finds herself going into dark place, is able to identify poor sleep as trigger    I have no reason to be sad.     Poor quiality sleep  Still following sleep habits, going to bed at 10, but tossing and turning, light sleep, poor REM sleep, no deep sleep, not restful    Patient has taken traozodon in past, has not been on in years. Stopped taking because had maxed out on dose, then increased.   "50 mg worked wonders for weeks"                "

## 2022-05-25 ENCOUNTER — PATIENT MESSAGE (OUTPATIENT)
Dept: PSYCHIATRY | Facility: CLINIC | Age: 57
End: 2022-05-25
Payer: COMMERCIAL

## 2022-05-27 ENCOUNTER — TELEPHONE (OUTPATIENT)
Dept: PSYCHIATRY | Facility: CLINIC | Age: 57
End: 2022-05-27
Payer: COMMERCIAL

## 2022-05-27 RX ORDER — TRAZODONE HYDROCHLORIDE 150 MG/1
150 TABLET ORAL NIGHTLY
Qty: 30 TABLET | Refills: 1 | Status: SHIPPED | OUTPATIENT
Start: 2022-05-27 | End: 2022-06-23 | Stop reason: SDUPTHER

## 2022-05-27 NOTE — TELEPHONE ENCOUNTER
Patient requested call back.     Patient reports she is still in [place], had to cancel return home due to issues with youngest son.     Patient reports that her son was prescribed opiates for years and began addicted. She recently learned during this visit that he has been buying drugs off the street.   She reports that within the last 2 weeks she noticed behavior changes/realized he was under the influence.    She states he was recently fired, has not been sleeping, and has been losing weight. She found that this last Saturday he was texting/calling multiple drug dealers.      She reports that she brought him to an addiction center where he tested positive for cocaine, opiates, benzos, and fentanyl. She states he detoxed there for 5-6 days but just returned home. She states he had promised to go to an inpatient rehab but is now refusing.     Patient is tearful during this conversation. She has since cancelled her flight home but endorses feeling helpless. She is unsure how to help son at this time. She reports feeling overwhelmed and is unsure what to do.     She reports that she has noticed consequent depression; she is sad, is sleeping maximum of 2 hours nightly, and is having decreased appetite. She has been taking trazodone 100 mg in addiction to her seroquel 300 mg and temazepam 30 mg but continues to endorse poor sleep. She complains that the tenazapam she receives in [place] is not as effective as the prescriptions she receives in [place]    Patient will trial increasing to trazodone 150 mg

## 2022-06-23 DIAGNOSIS — F51.01 PRIMARY INSOMNIA: ICD-10-CM

## 2022-06-23 DIAGNOSIS — F31.70 BIPOLAR DISORDER IN PARTIAL REMISSION, MOST RECENT EPISODE UNSPECIFIED TYPE: Primary | ICD-10-CM

## 2022-06-23 RX ORDER — QUETIAPINE FUMARATE 300 MG/1
300 TABLET, FILM COATED ORAL NIGHTLY
Qty: 30 TABLET | Refills: 1 | Status: SHIPPED | OUTPATIENT
Start: 2022-06-23 | End: 2022-06-24 | Stop reason: SDUPTHER

## 2022-06-23 RX ORDER — DULOXETIN HYDROCHLORIDE 20 MG/1
20 CAPSULE, DELAYED RELEASE ORAL DAILY
Qty: 30 CAPSULE | Refills: 1 | Status: SHIPPED | OUTPATIENT
Start: 2022-06-23 | End: 2022-08-24

## 2022-06-23 RX ORDER — TEMAZEPAM 30 MG/1
30 CAPSULE ORAL NIGHTLY PRN
Qty: 30 CAPSULE | Refills: 1 | Status: SHIPPED | OUTPATIENT
Start: 2022-06-23 | End: 2022-06-27 | Stop reason: SDUPTHER

## 2022-06-23 RX ORDER — LAMOTRIGINE 150 MG/1
150 TABLET ORAL 2 TIMES DAILY
Qty: 60 TABLET | Refills: 1 | Status: SHIPPED | OUTPATIENT
Start: 2022-06-23 | End: 2022-09-22

## 2022-06-23 RX ORDER — TRAZODONE HYDROCHLORIDE 150 MG/1
150 TABLET ORAL NIGHTLY
Qty: 30 TABLET | Refills: 1 | Status: SHIPPED | OUTPATIENT
Start: 2022-06-23 | End: 2022-08-24

## 2022-06-24 DIAGNOSIS — F31.70 BIPOLAR DISORDER IN PARTIAL REMISSION, MOST RECENT EPISODE UNSPECIFIED TYPE: ICD-10-CM

## 2022-06-24 RX ORDER — QUETIAPINE FUMARATE 300 MG/1
300 TABLET, FILM COATED ORAL NIGHTLY
Qty: 30 TABLET | Refills: 1 | Status: SHIPPED | OUTPATIENT
Start: 2022-06-24 | End: 2022-06-24 | Stop reason: SDUPTHER

## 2022-06-24 RX ORDER — QUETIAPINE FUMARATE 300 MG/1
300 TABLET, FILM COATED ORAL NIGHTLY
Qty: 30 TABLET | Refills: 1 | Status: SHIPPED | OUTPATIENT
Start: 2022-06-24 | End: 2022-09-22 | Stop reason: SDUPTHER

## 2022-06-27 DIAGNOSIS — F31.70 BIPOLAR DISORDER IN PARTIAL REMISSION, MOST RECENT EPISODE UNSPECIFIED TYPE: ICD-10-CM

## 2022-06-27 DIAGNOSIS — F51.01 PRIMARY INSOMNIA: ICD-10-CM

## 2022-06-27 RX ORDER — TEMAZEPAM 30 MG/1
30 CAPSULE ORAL NIGHTLY PRN
Qty: 30 CAPSULE | Refills: 1 | Status: SHIPPED | OUTPATIENT
Start: 2022-06-27 | End: 2022-08-26

## 2022-07-12 LAB
CHOL/HDLC RATIO: 1.5
CHOLESTEROL, TOTAL: 184
HBA1C MFR BLD: 5.1 % (ref 4–6)
HDLC SERPL-MCNC: 120 MG/DL
LDLC SERPL CALC-MCNC: 52 MG/DL (ref 0–160)
NON HDL CHOL (CALC): 64
TRIGL SERPL-MCNC: 50 MG/DL

## 2022-09-14 ENCOUNTER — TELEPHONE (OUTPATIENT)
Dept: DERMATOLOGY | Facility: CLINIC | Age: 57
End: 2022-09-14
Payer: COMMERCIAL

## 2022-09-14 NOTE — TELEPHONE ENCOUNTER
----- Message from Julisa Hernandez sent at 9/14/2022  1:15 PM CDT -----  Pt is requesting a call back Regarding Scheduling appt for Annual Skin Screening and Cyst Located on pt Scalp please call to discuss further .    Pt @[phone]

## 2022-09-21 ENCOUNTER — TELEPHONE (OUTPATIENT)
Dept: ENDOCRINOLOGY | Facility: CLINIC | Age: 57
End: 2022-09-21
Payer: COMMERCIAL

## 2022-09-21 NOTE — TELEPHONE ENCOUNTER
Left message for patient that they are scheduled with Endocrinology , if this day and time does not work to please give us a call back at [phone]. Thank you an have a great day.

## 2022-09-21 NOTE — TELEPHONE ENCOUNTER
----- Message from Joanna Strong MA sent at 9/12/2022  4:48 PM CDT -----  Regarding: FW: New pt    ----- Message -----  From: Sarah Renee  Sent: 9/12/2022  10:13 AM CDT  To: [facility] Staff  Subject: New pt                                           Name of Who is Calling:  MEHDI ARVIZU [[id number]]        What is the request in detail: Former pt is trying to reestablish care.           Can the clinic reply by [facility]:no          [facility][phone]

## 2022-09-22 ENCOUNTER — OFFICE VISIT (OUTPATIENT)
Dept: PSYCHIATRY | Facility: CLINIC | Age: 57
End: 2022-09-22
Payer: MEDICARE

## 2022-09-22 DIAGNOSIS — F51.01 PRIMARY INSOMNIA: ICD-10-CM

## 2022-09-22 DIAGNOSIS — F31.70 BIPOLAR DISORDER IN PARTIAL REMISSION, MOST RECENT EPISODE UNSPECIFIED TYPE: ICD-10-CM

## 2022-09-22 PROCEDURE — 99213 OFFICE O/P EST LOW 20 MIN: CPT | Mod: PBBFAC | Performed by: PHYSICIAN ASSISTANT

## 2022-09-22 PROCEDURE — 99999 PR PBB SHADOW E&M-EST. PATIENT-LVL III: ICD-10-PCS | Mod: PBBFAC,,, | Performed by: PHYSICIAN ASSISTANT

## 2022-09-22 PROCEDURE — 99213 PR OFFICE/OUTPT VISIT, EST, LEVL III, 20-29 MIN: ICD-10-PCS | Mod: S$PBB,,, | Performed by: PHYSICIAN ASSISTANT

## 2022-09-22 PROCEDURE — 99213 OFFICE O/P EST LOW 20 MIN: CPT | Mod: S$PBB,,, | Performed by: PHYSICIAN ASSISTANT

## 2022-09-22 PROCEDURE — 99999 PR PBB SHADOW E&M-EST. PATIENT-LVL III: CPT | Mod: PBBFAC,,, | Performed by: PHYSICIAN ASSISTANT

## 2022-09-22 RX ORDER — TRAZODONE HYDROCHLORIDE 150 MG/1
150 TABLET ORAL NIGHTLY
Qty: 90 TABLET | Refills: 1 | Status: SHIPPED | OUTPATIENT
Start: 2022-09-22 | End: 2022-10-04 | Stop reason: ALTCHOICE

## 2022-09-22 RX ORDER — TEMAZEPAM 30 MG/1
30 CAPSULE ORAL NIGHTLY PRN
Qty: 30 CAPSULE | Refills: 2 | Status: SHIPPED | OUTPATIENT
Start: 2022-09-22 | End: 2022-11-29

## 2022-09-22 RX ORDER — DULOXETIN HYDROCHLORIDE 20 MG/1
20 CAPSULE, DELAYED RELEASE ORAL DAILY
Qty: 90 CAPSULE | Refills: 1 | Status: SHIPPED | OUTPATIENT
Start: 2022-09-22 | End: 2022-10-04 | Stop reason: SDUPTHER

## 2022-09-22 RX ORDER — QUETIAPINE FUMARATE 300 MG/1
300 TABLET, FILM COATED ORAL NIGHTLY
Qty: 90 TABLET | Refills: 0 | Status: SHIPPED | OUTPATIENT
Start: 2022-09-22 | End: 2022-11-30 | Stop reason: SDUPTHER

## 2022-09-22 NOTE — PROGRESS NOTES
"Outpatient Psychiatry Follow-Up Visit ([facility])    9/22/2022    Clinical Status of Patient:  Outpatient (Ambulatory)    Chief Complaint:  Gay Gurrola is a 57 y.o. female who presents today for follow-up of mood disorder.  Met with patient.      Current Medications:  Seroquel 300 mg   Restoril 30 mg   Cymbalta 20 mg  Trazodone 150 mg    Interval History and Content of Current Session:  Interim Events/Subjective Report/Content of Current Session:  Patient last seen 4/22/2022    Patient is a 56 year old female with a psychiatric history of bipolar 1 disorder and insomnia. The patient's is on Lamictal 150 mg, Restoril 30 mg and Seroquel 400 mg.    Patient presents to follow up after staying in [place] for several months caring for her son. During this time she started trazodone and increased to 150 mg due to poor sleep due to stress of caring for son.     She reports she stopped taking lamictal as she forgot to fill it in [place] without realizing. She has not been taking for 1 month, reports no difference in her mood.     She reports that the summer was stressful as she was taking care of her son. She states that she has been naive to his substance use, but that he had been buying street drugs in [place] and tested postive for fentanyl, opiates, benzos, and "about 10 things."  She states he is in a rehab program and is doing better at this time, he has started antidepressants and states its been "a game changer."    She states that she was overwhelmed due to the stress but was able to take care of herself. She reports that she did a lot of medication, yoga, and exercise.   She denies any depression over the summer, only stating she felt "overwhelmed that I couldn't fix the problem."    She is doing well at this time. She denies depression or anxiety. She feels the medications are working well and she continues to use diet and exercise as coping skills.     She is sleeping well with restoril, seroquel, and " trazodone.       Psychotherapy:  Target symptoms: mood disorder, insomnia  Why chosen therapy is appropriate versus another modality: relevant to diagnosis  Outcome monitoring methods: self-report  Therapeutic intervention type: insight oriented psychotherapy, supportive psychotherapy  Topics discussed/themes: parenting issues, illness/death of a loved one, building skills sets for symptom management, symptom recognition  The patient's response to the intervention is accepting. The patient's progress toward treatment goals is good.   Duration of intervention: 15 minutes.    Review of Systems   PSYCHIATRIC: Pertinant items are noted in the narrative.    Past Medical, Family and Social History: The patient's past medical, family and social history have been reviewed and updated as appropriate within the electronic medical record - see encounter notes.    Compliance: yes    Side effects: None    Risk Parameters:  Patient reports no suicidal ideation  Patient reports no homicidal ideation  Patient reports no self-injurious behavior  Patient reports no violent behavior    Exam (detailed: at least 9 elements; comprehensive: all 15 elements)   Constitutional  Vitals:  Most recent vital signs, dated less than 90 days prior to this appointment, were reviewed.   There were no vitals filed for this visit.       General:  unremarkable, age appropriate, well dressed, neatly groomed     Musculoskeletal  Muscle Strength/Tone:  not examined   Gait & Station:  non-ataxic     Psychiatric  Speech:  no latency; no press   Mood & Affect:  steady, euthymic  congruent and appropriate   Thought Process:  normal and logical   Associations:  intact   Thought Content:  normal, no suicidality, no homicidality, delusions, or paranoia   Insight:  intact   Judgement: behavior is adequate to circumstances   Orientation:  grossly intact   Memory: intact for content of interview   Language: grossly intact   Attention Span & Concentration:  able to  focus   Fund of Knowledge:  intact and appropriate to age and level of education     Assessment and Diagnosis   Status/Progress: Based on the examination today, the patient's problem(s) is/are improved and well controlled.  New problems have not been presented today.   Lack of compliance are not complicating management of the primary condition.  There are no active rule-out diagnoses for this patient at this time.     General Impression: Patient is a 56 year old female with a psychiatric history of bipolar 1 disorder and insomnia. The patient reports her depression, mood, and insomnia are well controlled at this time. She is no longer taking lamictal and reports no change in her mood.       ICD-10-CM ICD-9-CM   1. Primary insomnia  F51.01 307.42   2. Bipolar disorder in partial remission, most recent episode unspecified type  F31.70 296.80         Intervention/Counseling/Treatment Plan   Medication Management: Continue current medications.   Continue Restoril 30 mg nightly   Continue Seroquel 300 mg nightly  Continue Cymbalta 20 mg for pain  Continue trazodone 150 mg qhs  Establish with primary care provider for hypothyroidism, positive RANJAN  Discussed with patient informed consent, risks vs. benefits, alternative treatments, side effect profile and the inherent unpredictability of individual responses to these treatments. The patient expresses understanding of the above and displays the capacity to agree with this current plan and had no other questions.  Encouraged patient to keep future appointments.   Encouraged patient to message or call with questions or concerns  Safety plan reviewed with patient for worsening condition or suicidal ideations. In the event of an emergency patient was advised to go to the emergency room.       Return to Clinic: 3 months

## 2022-10-04 ENCOUNTER — OFFICE VISIT (OUTPATIENT)
Dept: PRIMARY CARE CLINIC | Facility: CLINIC | Age: 57
End: 2022-10-04
Payer: MEDICARE

## 2022-10-04 VITALS
SYSTOLIC BLOOD PRESSURE: 114 MMHG | HEIGHT: 62 IN | BODY MASS INDEX: 20.32 KG/M2 | RESPIRATION RATE: 18 BRPM | TEMPERATURE: 98 F | WEIGHT: 110.44 LBS | HEART RATE: 66 BPM | OXYGEN SATURATION: 99 % | DIASTOLIC BLOOD PRESSURE: 70 MMHG

## 2022-10-04 DIAGNOSIS — Z82.0 FAMILY HISTORY OF ALZHEIMER'S DISEASE: ICD-10-CM

## 2022-10-04 DIAGNOSIS — E03.9 HYPOTHYROIDISM, UNSPECIFIED TYPE: Primary | ICD-10-CM

## 2022-10-04 DIAGNOSIS — Z80.0 FAMILY HISTORY OF COLON CANCER: ICD-10-CM

## 2022-10-04 DIAGNOSIS — Z12.83 SKIN CANCER SCREENING: ICD-10-CM

## 2022-10-04 DIAGNOSIS — Z83.3 FAMILY HISTORY OF DIABETES MELLITUS: ICD-10-CM

## 2022-10-04 DIAGNOSIS — F31.9 BIPOLAR I DISORDER: ICD-10-CM

## 2022-10-04 DIAGNOSIS — F10.11 ALCOHOL ABUSE, IN REMISSION: ICD-10-CM

## 2022-10-04 DIAGNOSIS — M85.80 OSTEOPENIA, UNSPECIFIED LOCATION: ICD-10-CM

## 2022-10-04 DIAGNOSIS — Z82.49 FAMILY HISTORY OF BRAIN ANEURYSM: ICD-10-CM

## 2022-10-04 DIAGNOSIS — G89.29 CHRONIC PAIN OF LEFT LOWER EXTREMITY: ICD-10-CM

## 2022-10-04 DIAGNOSIS — L57.0 AK (ACTINIC KERATOSIS): ICD-10-CM

## 2022-10-04 DIAGNOSIS — M79.605 CHRONIC PAIN OF LEFT LOWER EXTREMITY: ICD-10-CM

## 2022-10-04 DIAGNOSIS — K58.1 IRRITABLE BOWEL SYNDROME WITH CONSTIPATION: ICD-10-CM

## 2022-10-04 DIAGNOSIS — R92.30 DENSE BREAST TISSUE ON MAMMOGRAM: ICD-10-CM

## 2022-10-04 DIAGNOSIS — N95.9 UNSPECIFIED MENOPAUSAL AND PERIMENOPAUSAL DISORDER: ICD-10-CM

## 2022-10-04 PROCEDURE — 99215 OFFICE O/P EST HI 40 MIN: CPT | Mod: PBBFAC,PN | Performed by: FAMILY MEDICINE

## 2022-10-04 PROCEDURE — 99999 PR PBB SHADOW E&M-EST. PATIENT-LVL V: ICD-10-PCS | Mod: PBBFAC,,, | Performed by: FAMILY MEDICINE

## 2022-10-04 PROCEDURE — 99999 PR PBB SHADOW E&M-EST. PATIENT-LVL V: CPT | Mod: PBBFAC,,, | Performed by: FAMILY MEDICINE

## 2022-10-04 PROCEDURE — 99215 OFFICE O/P EST HI 40 MIN: CPT | Mod: S$PBB,,, | Performed by: FAMILY MEDICINE

## 2022-10-04 PROCEDURE — 99215 PR OFFICE/OUTPT VISIT, EST, LEVL V, 40-54 MIN: ICD-10-PCS | Mod: S$PBB,,, | Performed by: FAMILY MEDICINE

## 2022-10-04 RX ORDER — LEVOTHYROXINE SODIUM 13 UG/1
13 CAPSULE ORAL DAILY
Qty: 30 TABLET | Refills: 3 | Status: SHIPPED | OUTPATIENT
Start: 2022-10-04 | End: 2023-02-16

## 2022-10-04 RX ORDER — DULOXETIN HYDROCHLORIDE 20 MG/1
40 CAPSULE, DELAYED RELEASE ORAL DAILY
Qty: 180 CAPSULE | Refills: 1 | Status: SHIPPED | OUTPATIENT
Start: 2022-10-04 | End: 2022-11-30 | Stop reason: SDUPTHER

## 2022-10-04 NOTE — PROGRESS NOTES
Subjective:       Patient ID: Gay Gurrola is a 57 y.o. female.    Chief Complaint: Medication Refill and Establish Care    HPI  58 y/o female is here to establish care.     She is feeling good, she denies f/n/v/d/constipation/cp/sob/urinary sx. She is following Mediterranean diet, she is active and exercising regularly, she usually runs but currently has a tear in her hamstring, she is having pain all the time  for the past year, even with yoga and stretching, she has been been on Cymbalta 20 mg daily and its helped but she has not tried to increase it. She is sleeping ok most days, taking Restoril 30 mg nightly. She was seen by sleep doc in the past and had sleep study 2018 that she reports was normal, she has tried multiple sleep medications without relief. Feels her mood is good with her current regimen.    Hx of possible Covid 1/2022 never took a test, cough, fever, feels she fully recovered  Bipolar d/o/Insomnia: following with Psychiatry [facility]; Seroquel 300 mg nightly, Restoril 30 mg nightly for 10 years  Chronic L leg pain, was told she had a tear in her hamstring, did not recommend surgery at the time  Thyroid disease: has been evaluated by Dr. Lira in the past, levothyroxine 25 mcg daily, no hx of thyroid US  IBS w Constipation/Hx of H pylori: following with Dr. Salazar, Colonoscopy 2022 repeat 5 years, EGD ok, linzess 290 mg daily  Osteopenia: dexa 2019, D3 2,000 IU daily, not on calcium  Family hx of colon cancer in sister  Hx of Seizure, may have been from alcohol withdrawal over 15 years ago  GYN: following with Dr. Armendariz, pap utd, cycles regular, mmg 3/2022, recommend US has not scheduled  Family hx of Alzheimers in mom and sister and aunt: hx of neuropsych testing 12/2020  Positive RANJAN: rheumatology prn only  Sebacous cyst of scalp/AKs: has not followed here recently  Eye exam: following with Dr. Priest, episcleritis  Dental utd    Labs from 7/2022 reviewed and will scan in, TSH 2.22, Free T4  "1.8, she is interested to doing an trial of higher levothyroxine to see if her hair thinning improves, thin nails, constipation    Review of Systems  see HPI  Objective:      /70 (BP Location: Left arm, Patient Position: Sitting, BP Method: Medium (Manual))   Pulse 66   Temp 97.9 °F (36.6 °C) (Oral)   Resp 18   Ht 5' 2" (1.575 m)   Wt 50.1 kg (110 lb 7.2 oz)   LMP 08/15/2022 (Approximate)   SpO2 99%   BMI 20.20 kg/m²   Physical Exam  Vitals and nursing note reviewed.   Constitutional:       Appearance: She is well-developed.   HENT:      Head: Normocephalic and atraumatic.   Neck:      Thyroid: No thyromegaly.   Cardiovascular:      Rate and Rhythm: Normal rate and regular rhythm.      Heart sounds: Normal heart sounds.   Pulmonary:      Effort: Pulmonary effort is normal. No respiratory distress.      Breath sounds: Normal breath sounds.   Abdominal:      General: Abdomen is flat. Bowel sounds are normal. There is no distension.      Palpations: Abdomen is soft. There is no mass.      Tenderness: There is no abdominal tenderness.   Musculoskeletal:      Cervical back: Normal range of motion and neck supple.      Right lower leg: No edema.      Left lower leg: No edema.   Lymphadenopathy:      Cervical: No cervical adenopathy.   Skin:     General: Skin is warm and dry.   Neurological:      Mental Status: She is alert.       Assessment:       1. Hypothyroidism, unspecified type    2. Family history of brain aneurysm    3. Family history of Alzheimer's disease    4. Family history of diabetes mellitus    5. Chronic pain of left lower extremity    6. Family history of colon cancer    7. Dense breast tissue on mammogram    8. Alcohol abuse, in remission    9. Bipolar I disorder    10. Irritable bowel syndrome with constipation    11. Osteopenia, unspecified location    12. AK (actinic keratosis)    13. Skin cancer screening    14. Unspecified menopausal and perimenopausal disorder        Plan:   Gay" was seen today for medication refill and establish care.    Diagnoses and all orders for this visit:    Hypothyroidism, unspecified type  -     Ambulatory referral/consult to Endocrinology; Future  -     T3; Future  -     T4, Free; Future  -     Thyroid Peroxidase Antibody; Future  -     Thyrotropin Receptor Antibody; Future  -     TSH; Future  -     levothyroxine 13 mcg Cap; Take 13 mcg by mouth once daily. To be combined with the 25 mcg dose for a total of 38    Family history of brain aneurysm    Family history of Alzheimer's disease    Family history of diabetes mellitus    Chronic pain of left lower extremity  -     DULoxetine (CYMBALTA) 20 MG capsule; Take 2 capsules (40 mg total) by mouth once daily.    Family history of colon cancer    Dense breast tissue on mammogram  -     Ambulatory referral/consult to Breast Surgery; Future    Alcohol abuse, in remission    Bipolar I disorder    Irritable bowel syndrome with constipation    Osteopenia, unspecified location  -     DXA Bone Density Spine And Hip; Future    AK (actinic keratosis)  -     Ambulatory referral/consult to Dermatology; Future    Skin cancer screening  -     Ambulatory referral/consult to Dermatology; Future    Unspecified menopausal and perimenopausal disorder  -     DXA Bone Density Spine And Hip; Future    Time spent with this patient was over 55 minutes.  Patient was counseled for over 50% of the visit.

## 2022-10-05 ENCOUNTER — PATIENT OUTREACH (OUTPATIENT)
Dept: ADMINISTRATIVE | Facility: HOSPITAL | Age: 57
End: 2022-10-05
Payer: COMMERCIAL

## 2022-10-10 ENCOUNTER — OFFICE VISIT (OUTPATIENT)
Dept: GASTROENTEROLOGY | Facility: CLINIC | Age: 57
End: 2022-10-10
Payer: MEDICARE

## 2022-10-10 VITALS
DIASTOLIC BLOOD PRESSURE: 70 MMHG | WEIGHT: 108.69 LBS | HEART RATE: 73 BPM | HEIGHT: 62 IN | SYSTOLIC BLOOD PRESSURE: 109 MMHG | BODY MASS INDEX: 20 KG/M2

## 2022-10-10 DIAGNOSIS — A04.8 HELICOBACTER PYLORI (H. PYLORI) INFECTION: Primary | ICD-10-CM

## 2022-10-10 PROCEDURE — 99999 PR PBB SHADOW E&M-EST. PATIENT-LVL III: ICD-10-PCS | Mod: PBBFAC,,, | Performed by: INTERNAL MEDICINE

## 2022-10-10 PROCEDURE — 99214 PR OFFICE/OUTPT VISIT, EST, LEVL IV, 30-39 MIN: ICD-10-PCS | Mod: S$PBB,,, | Performed by: INTERNAL MEDICINE

## 2022-10-10 PROCEDURE — 99999 PR PBB SHADOW E&M-EST. PATIENT-LVL III: CPT | Mod: PBBFAC,,, | Performed by: INTERNAL MEDICINE

## 2022-10-10 PROCEDURE — 99213 OFFICE O/P EST LOW 20 MIN: CPT | Mod: PBBFAC | Performed by: INTERNAL MEDICINE

## 2022-10-10 PROCEDURE — 99214 OFFICE O/P EST MOD 30 MIN: CPT | Mod: S$PBB,,, | Performed by: INTERNAL MEDICINE

## 2022-10-10 NOTE — PROGRESS NOTES
[facility] Consultation Note    Reason for Consult:  The encounter diagnosis was Helicobacter pylori (H. pylori) infection.    PCP:   Bhavana Lopez       Referring MD:  Bhavana Lopez Md  [address] [place] [address]    HPI:  This is a 57 y.o. female here for evaluation of abdominal pain.  She contacted the office 8/6/18 with complaints of stomach pain, swelling and burning   She did see her PCP and labs and ABD US were ordered.  Non-fasting lipase was mildly elevated - 65  ABD US - normal, no gallstones  She has a Hx of H. Pylori    Interval Hx 10/10/2022:  Found H pylori on EGD Bx and treated  Still taking Linzess      Current regimen  Omega 3  Turmeric  B12, Vitamin D  Probiotics - 40 strains 100 billion CFU - helps with regularity and appetite      ROS:  Constitutional: + malaise, has gained some weight  ENT: No allergies  CV: No chest pain  Pulm: No cough, No shortness of breath  Ophtho: + episcleritis  GI: see HPI  Derm: No rash  Heme: No lymphadenopathy, No bruising  MSK: + torn hamstring  : No dysuria, No hematuria  Endo: No hot or cold intolerance  Neuro: No syncope, No seizure  Psych: No anxiety, No depression    Medical History:  has a past medical history of Abnormal coronary angiogram, Alcohol abuse, in remission, Alcohol related seizure (7/24/2012), Anxiety, Bipolar affective, Chronic idiopathic constipation, Depression, H. pylori infection, Headache(784.0), Hypothyroid, Memory loss, Osteopenia, and Seizures.    Surgical History:  has a past surgical history that includes Tubal ligation; Bunionectomy; Colonoscopy (N/A, 04/12/2017); Esophagogastroduodenoscopy (N/A, 10/23/2018); Esophagogastroduodenoscopy (N/A, 03/30/2022); Colonoscopy (N/A, 03/30/2022); and hemrrhoidectomy (1996).      Review of patient's allergies indicates:  No Known Allergies    Current Outpatient Medications on File Prior to Visit   Medication Sig Dispense Refill     "Ca-D3-mag#11-zinc-cupr-man-damian 600 mg calcium- 800 unit-50 mg Tab Take 1 tablet by mouth once daily.      cholecalciferol, vitamin D3, (VITAMIN D3) 25 mcg (1,000 unit) capsule Take 1,000 Units by mouth once daily.      cyanocobalamin, vitamin B-12, 5,000 mcg Subl Place under the tongue once daily.      DULoxetine (CYMBALTA) 20 MG capsule Take 2 capsules (40 mg total) by mouth once daily. 180 capsule 1    fish oil-omega-3 fatty acids 300-1,000 mg capsule Take 1 capsule by mouth once daily.      levothyroxine 13 mcg Cap Take 13 mcg by mouth once daily. To be combined with the 25 mcg dose for a total of 38 30 tablet 3    linaCLOtide (LINZESS) 290 mcg Cap capsule Take 1 capsule (290 mcg total) by mouth once daily. 90 capsule 3    multivitamin (THERAGRAN) per tablet Take 1 tablet by mouth once daily.      QUEtiapine (SEROQUEL) 300 MG Tab Take 1 tablet (300 mg total) by mouth every evening. 90 tablet 0    temazepam (RESTORIL) 30 mg capsule Take 1 capsule (30 mg total) by mouth nightly as needed for Insomnia. for insomnia. 30 capsule 2    TURMERIC ORAL Take by mouth once daily.       No current facility-administered medications on file prior to visit.         Objective Findings:    Vital Signs:  /70   Pulse 73   Ht 5' 2" (1.575 m)   Wt 49.3 kg (108 lb 11 oz)   LMP 10/07/2022 Comment: pointing  BMI 19.88 kg/m²   Body mass index is 19.88 kg/m².    Physical Exam:  General Appearance: Well appearing in no acute distress  Head:   Normocephalic, without obvious abnormality  Eyes:    No scleral icterus  ENT: Neck supple, Lips, mucosa, and tongue normal  Lungs: CTA bilaterally in anterior and posterior fields, no wheezes, no crackles.  Heart:  Regular rate and rhythm, S1, S2 normal, no murmurs heard  Abdomen: Soft, non tender, non distended with positive bowel sounds in all four quadrants.   Extremities: no edema  Skin: No rash  Neurologic: AAO x 3      Labs:  Lab Results   Component Value Date    WBC 4.87 03/03/2022    " HGB 13.0 03/03/2022    HCT 39.6 03/03/2022     03/03/2022    CHOL 194 10/27/2020    TRIG 50 07/12/2022     07/12/2022    ALT 24 10/27/2020    AST 27 10/27/2020     10/27/2020    K 3.8 10/27/2020     10/27/2020    CREATININE 0.7 10/27/2020    BUN 14 10/27/2020    CO2 26 10/27/2020    TSH 1.850 10/27/2020    HGBA1C 5.1 07/12/2022     ttg- neg    Imaging:  3/19 Defecogram - Normal neutral, straining, and lifting anorectal angles as above.  Normal defecation with no significant residual.  Small anterior rectocele.    Endoscopy:    4/2017 colonoscopy - normal    4/2017 EGD              - Monilial esophagitis.                        - LA Grade A reflux esophagitis.                        - Erythematous mucosa in the antrum.                        - Normal examined duodenum. Biopsied.   She was treated with diflucan x 2 weeks    Colon 22 - tortuous, wnl  EGD - wnl, + H pylori    Assessment:  1. Helicobacter pylori (H. pylori) infection           Likely a post infectious IBS with severe constipation    Recommendations:  1. Linzess 290 mcg continue for now, may be abl to wean down dose in future. She will try half capsules and let us know  2. H pylori check stool for eradication      No follow-ups on file.      Order summary:  Orders Placed This Encounter    H. pylori antigen, stool           Thank you so much for allowing me to participate in the care of Gay Salazar MD

## 2022-10-17 ENCOUNTER — HOSPITAL ENCOUNTER (OUTPATIENT)
Dept: RADIOLOGY | Facility: HOSPITAL | Age: 57
Discharge: HOME | End: 2022-10-17
Attending: ORTHOPAEDIC SURGERY
Payer: MEDICARE

## 2022-10-17 ENCOUNTER — OFFICE VISIT (OUTPATIENT)
Dept: DERMATOLOGY | Facility: CLINIC | Age: 57
End: 2022-10-17
Payer: MEDICARE

## 2022-10-17 ENCOUNTER — OFFICE VISIT (OUTPATIENT)
Dept: SPORTS MEDICINE | Facility: CLINIC | Age: 57
End: 2022-10-17
Payer: MEDICARE

## 2022-10-17 VITALS
HEART RATE: 63 BPM | DIASTOLIC BLOOD PRESSURE: 68 MMHG | SYSTOLIC BLOOD PRESSURE: 103 MMHG | BODY MASS INDEX: 19.88 KG/M2 | HEIGHT: 62 IN | WEIGHT: 108 LBS

## 2022-10-17 DIAGNOSIS — M25.552 LEFT HIP PAIN: ICD-10-CM

## 2022-10-17 DIAGNOSIS — M25.552 LEFT HIP PAIN: Primary | ICD-10-CM

## 2022-10-17 DIAGNOSIS — L82.1 SEBORRHEIC KERATOSES: ICD-10-CM

## 2022-10-17 DIAGNOSIS — Z12.83 SCREENING EXAM FOR SKIN CANCER: Primary | ICD-10-CM

## 2022-10-17 DIAGNOSIS — D18.01 CHERRY ANGIOMA: ICD-10-CM

## 2022-10-17 DIAGNOSIS — D22.9 MULTIPLE BENIGN NEVI: ICD-10-CM

## 2022-10-17 PROCEDURE — 99999 PR PBB SHADOW E&M-EST. PATIENT-LVL III: ICD-10-PCS | Mod: PBBFAC,,, | Performed by: ORTHOPAEDIC SURGERY

## 2022-10-17 PROCEDURE — 99213 PR OFFICE/OUTPT VISIT, EST, LEVL III, 20-29 MIN: ICD-10-PCS | Mod: S$PBB,,, | Performed by: STUDENT IN AN ORGANIZED HEALTH CARE EDUCATION/TRAINING PROGRAM

## 2022-10-17 PROCEDURE — 99213 OFFICE O/P EST LOW 20 MIN: CPT | Mod: PBBFAC | Performed by: STUDENT IN AN ORGANIZED HEALTH CARE EDUCATION/TRAINING PROGRAM

## 2022-10-17 PROCEDURE — 99214 OFFICE O/P EST MOD 30 MIN: CPT | Mod: S$PBB,,, | Performed by: ORTHOPAEDIC SURGERY

## 2022-10-17 PROCEDURE — 99213 OFFICE O/P EST LOW 20 MIN: CPT | Mod: PBBFAC,27 | Performed by: ORTHOPAEDIC SURGERY

## 2022-10-17 PROCEDURE — 99214 PR OFFICE/OUTPT VISIT, EST, LEVL IV, 30-39 MIN: ICD-10-PCS | Mod: S$PBB,,, | Performed by: ORTHOPAEDIC SURGERY

## 2022-10-17 PROCEDURE — 99999 PR PBB SHADOW E&M-EST. PATIENT-LVL III: ICD-10-PCS | Mod: PBBFAC,,, | Performed by: STUDENT IN AN ORGANIZED HEALTH CARE EDUCATION/TRAINING PROGRAM

## 2022-10-17 PROCEDURE — 99999 PR PBB SHADOW E&M-EST. PATIENT-LVL III: CPT | Mod: PBBFAC,,, | Performed by: ORTHOPAEDIC SURGERY

## 2022-10-17 PROCEDURE — 99213 OFFICE O/P EST LOW 20 MIN: CPT | Mod: S$PBB,,, | Performed by: STUDENT IN AN ORGANIZED HEALTH CARE EDUCATION/TRAINING PROGRAM

## 2022-10-17 PROCEDURE — 99999 PR PBB SHADOW E&M-EST. PATIENT-LVL III: CPT | Mod: PBBFAC,,, | Performed by: STUDENT IN AN ORGANIZED HEALTH CARE EDUCATION/TRAINING PROGRAM

## 2022-10-17 NOTE — PROGRESS NOTES
CC: left hamstring pain, previous patient    HPI:   Gay Gurrola is a pleasant retired and active 57 y.o. runner patient who reports to clinic with persistent left hamstring pain.      In 2020 she had a proximal hamstring injury while running. She has done extensive physical therapy, dry needling, yoga to her left hamstring but she still has persistent morning stiffness/hamstring pain. After stretching in the morning, the pain subsides.    She has been on Duloxetine which provided relief for approximately 5 months. It is no longer providing relief.    Pain is 8/10.    SANE 70      She had a MRI performed in November of 2020 which demonstrated partial thickness tearing of her hamstring origin.    She has been seeing Dr. Ramos for the issue in back in the fall had a ischial bursa cortisone injection in September 2020 which provided 0 relief per the patient      Review of Systems   Constitution: Negative. Negative for chills, fever and night sweats.   HENT: Negative for congestion and headaches.    Eyes: Negative for blurred vision, left vision loss and right vision loss.   Cardiovascular: Negative for chest pain and syncope.   Respiratory: Negative for cough and shortness of breath.    Endocrine: Negative for polydipsia, polyphagia and polyuria.   Hematologic/Lymphatic: Negative for bleeding problem. Does not bruise/bleed easily.   Skin: Negative for dry skin, itching and rash.   Musculoskeletal: Negative for falls and muscle weakness.   Gastrointestinal: Negative for abdominal pain and bowel incontinence.   Genitourinary: Negative for bladder incontinence and nocturia.   Neurological: Negative for disturbances in coordination, loss of balance and seizures.   Psychiatric/Behavioral: Negative for depression. The patient does not have insomnia.    Allergic/Immunologic: Negative for hives and persistent infections.     PAST MEDICAL HISTORY:   Past Medical History:   Diagnosis Date    Abnormal coronary  angiogram     Alcohol abuse, in remission     Alcohol related seizure 2012    Anxiety     Bipolar affective     since teenage years    Chronic idiopathic constipation     Depression     H. pylori infection     Headache(784.0)     followed by Dr. Corona    Hypothyroid     Memory loss     Osteopenia     Seizures      PAST SURGICAL HISTORY:   Past Surgical History:   Procedure Laterality Date    BUNIONECTOMY      COLONOSCOPY N/A 2017    Procedure: COLONOSCOPY;  Surgeon: Estuardo Salazar MD;  Location: [facility] ENDO (4TH FLR);  Service: Endoscopy;  Laterality: N/A;  PM prep    COLONOSCOPY N/A 2022    Procedure: COLONOSCOPY;  Surgeon: Estuardo Salazar MD;  Location: [facility] ENDO (4TH FLR);  Service: Endoscopy;  Laterality: N/A;  suprep    ESOPHAGOGASTRODUODENOSCOPY N/A 10/23/2018    Procedure: EGD (ESOPHAGOGASTRODUODENOSCOPY);  Surgeon: Irlanda Schwarz MD;  Location: [facility] ENDO (4TH FLR);  Service: Endoscopy;  Laterality: N/A;    ESOPHAGOGASTRODUODENOSCOPY N/A 2022    Procedure: ESOPHAGOGASTRODUODENOSCOPY (EGD);  Surgeon: Estuardo Salazar MD;  Location: [facility] ([facility] FLR);  Service: Endoscopy;  Laterality: N/A;  Pt informed to bring official paper documentation of COVID results w/ test date/name/ . Informed Pt that procedure will not be done w/o this documentation. Pt informed COVID test must be PCR or Rapid RNA.  instructions sent to [facility]    hemrrhoidectomy      TUBAL LIGATION       FAMILY HISTORY:   Family History   Problem Relation Age of Onset    Stroke Mother     Diabetes Mother     Anuerysm Mother         brain    Insomnia Mother     Alzheimer's disease Mother     Depression Mother     Hypertension Father     Coronary artery disease Father     Kidney disease Father     Diabetes Father     Cancer Sister         colon    Alzheimer's disease Sister     Dementia Sister 59    Colon cancer Sister 64    Cancer Maternal Aunt         breast    Alzheimer's disease Maternal Aunt     Crohn's disease Maternal  Aunt     Dementia Maternal Aunt     Alzheimer's disease Maternal Grandmother     Dementia Maternal Grandmother     Crohn's disease Other     Melanoma Neg Hx     Psoriasis Neg Hx     Lupus Neg Hx     Esophageal cancer Neg Hx      SOCIAL HISTORY:   Social History     Socioeconomic History    Marital status:    Occupational History    Occupation: disability    Tobacco Use    Smoking status: Never    Smokeless tobacco: Never   Substance and Sexual Activity    Alcohol use: No     Comment: History of alcoholism in remission.    Drug use: No    Sexual activity: Not Currently   Social History Narrative    She is currently not working, she used to be a cardiology tech, she has 2 boys, age 38 and 30       MEDICATIONS:   Current Outpatient Medications:     Ca-D3-mag#11-zinc-cupr-man-bor 600 mg calcium- 800 unit-50 mg Tab, Take 1 tablet by mouth once daily., Disp: , Rfl:     cholecalciferol, vitamin D3, (VITAMIN D3) 25 mcg (1,000 unit) capsule, Take 1,000 Units by mouth once daily., Disp: , Rfl:     cyanocobalamin, vitamin B-12, 5,000 mcg Subl, Place under the tongue once daily., Disp: , Rfl:     DULoxetine (CYMBALTA) 20 MG capsule, Take 2 capsules (40 mg total) by mouth once daily., Disp: 180 capsule, Rfl: 1    fish oil-omega-3 fatty acids 300-1,000 mg capsule, Take 1 capsule by mouth once daily., Disp: , Rfl:     levothyroxine 13 mcg Cap, Take 13 mcg by mouth once daily. To be combined with the 25 mcg dose for a total of 38, Disp: 30 tablet, Rfl: 3    linaCLOtide (LINZESS) 290 mcg Cap capsule, Take 1 capsule (290 mcg total) by mouth once daily., Disp: 90 capsule, Rfl: 3    multivitamin (THERAGRAN) per tablet, Take 1 tablet by mouth once daily., Disp: , Rfl:     QUEtiapine (SEROQUEL) 300 MG Tab, Take 1 tablet (300 mg total) by mouth every evening., Disp: 90 tablet, Rfl: 0    temazepam (RESTORIL) 30 mg capsule, Take 1 capsule (30 mg total) by mouth nightly as needed for Insomnia. for insomnia., Disp: 30 capsule, Rfl: 2    " TURMERIC ORAL, Take by mouth once daily., Disp: , Rfl:   ALLERGIES: Review of patient's allergies indicates:  No Known Allergies    VITAL SIGNS: /68   Pulse 63   Ht 5' 2" (1.575 m)   Wt 49 kg (108 lb)   LMP 10/07/2022 Comment: pointing  BMI 19.75 kg/m²        PHYSICAL EXAM /  HIP  PHYSICAL EXAMINATION  General:  The patient is alert and oriented x 3.  Mood is pleasant.  Observation of ears, eyes and nose reveal no gross abnormalities.  HEENT: NCAT, sclera nonicteric  Lungs: Respirations are equal and unlabored..    left HIP EXAMINATION     OBSERVATION / INSPECTION  Gait:   Nonantalgic   Alignment:  Neutral   Scars:   None   Muscle atrophy: None   Effusion:  None   Warmth:  None   Discoloration:   None   Leg lengths:   Equal   Pelvis:    Level     TENDERNESS / CREPITUS (T/C):      T / C  Trochanteric bursa   - / -  Piriformis    - / -  SI joint    - / -  Psoas tendon   - / -  Rectus insertion  - / -  Adductor insertion  - / -  Pubic symphysis  - / -  Ischial Tuberosity  + / -    ROM: (* = pain)    Flexion:    130 degrees  External rotation: 40 degrees  Internal rotation with axial load: 50 degrees  Internal rotation without axial load: 60 degrees  Abduction:  60 degrees  Adduction:   40 degrees    SPECIAL TESTS:  Pain w/ forced internal rotation (FADIR): -   Pain w/ forced external rotation (DESTINY): Absent   Circumduction test:    -  Stinchfield test:    Negative   Log roll:      Negative   Snapping hip (internal):   Negative   Sit-up pain:     Negative   Resisted sit-up pain:    Negative   Resisted sit-up with adductor contraction pain:  Negative   Step-down test:    +  Trendelenburg test:    Negative  Bridge test     +     EXTREMITY NEURO-VASCULAR EXAMINATION:   Sensation:  Grossly intact to light touch all dermatomal regions.   Motor Function:  Fully intact motor function at hip, knee, foot and ankle    DTRs;  quadriceps and  achilles 2+.  No clonus and downgoing Babinski.    Vascular status:  DP " and PT pulses 2+, brisk capillary refill, symmetric.    Skin:  intact, compartments soft.    OTHER FINDINGS:  Popliteal angle L: 0  Popliteal angle R: 0      XRAYS:  No new imaging performed today          ASSESSMENT:    Left proximal hamstring tendinosis, partial thickness tearing, chronic  Left hip abd/core weakness      PLAN:  PT for left hip abd/core strengthing  Bilat hip abductor/core strengthening 1-2x/week x 6-8 weeks with HEP while in [place]  NSAIDS prn  RTC PRN    All questions were answered, pt will contact us for questions or concerns in the interim.

## 2022-10-17 NOTE — PROGRESS NOTES
Subjective:       Patient ID:  Gay Gurrola is a 57 y.o. female who presents for   Chief Complaint   Patient presents with    Skin Check     History of Present Illness: The patient presents for follow up of skin check.    The patient was last seen on: 12/3/2019 for cryosurgery to actinic keratoses which have not resolved.    No hx skin cancer.    Patient with new complaint of lesion(s)  Location: under right eye and chest  Duration: since last visit  Symptoms: previous AKs  Relieving factors/Previous treatments: previously cryo at last visit    Patient with new complaint of lesion(s)  Location: under right arm  Duration: years  Symptoms: skin tag  Relieving factors/Previous treatments: none            One concerning spot on right infraorbital area and chest that were previously frozen    Review of Systems   Skin:  Positive for daily sunscreen use and activity-related sunscreen use. Negative for recent sunburn.   Hematologic/Lymphatic: Does not bruise/bleed easily.      Objective:    Physical Exam   Constitutional: She appears well-developed and well-nourished. No distress.   Neurological: She is alert and oriented to person, place, and time. She is not disoriented.   Psychiatric: She has a normal mood and affect.   Skin:   Areas Examined (abnormalities noted in diagram):   Scalp / Hair Palpated and Inspected  Head / Face Inspection Performed  Neck Inspection Performed  Chest / Axilla Inspection Performed  Abdomen Inspection Performed  Back Inspection Performed  RUE Inspected  LUE Inspection Performed  RLE Inspected  LLE Inspection Performed                 Diagram Legend     Erythematous scaling macule/papule c/w actinic keratosis       Vascular papule c/w angioma      Pigmented verrucoid papule/plaque c/w seborrheic keratosis      Yellow umbilicated papule c/w sebaceous hyperplasia      Irregularly shaped tan macule c/w lentigo     1-2 mm smooth white papules consistent with Milia      Movable  subcutaneous cyst with punctum c/w epidermal inclusion cyst      Subcutaneous movable cyst c/w pilar cyst      Firm pink to brown papule c/w dermatofibroma      Pedunculated fleshy papule(s) c/w skin tag(s)      Evenly pigmented macule c/w junctional nevus     Mildly variegated pigmented, slightly irregular-bordered macule c/w mildly atypical nevus      Flesh colored to evenly pigmented papule c/w intradermal nevus       Pink pearly papule/plaque c/w basal cell carcinoma      Erythematous hyperkeratotic cursted plaque c/w SCC      Surgical scar with no sign of skin cancer recurrence      Open and closed comedones      Inflammatory papules and pustules      Verrucoid papule consistent consistent with wart     Erythematous eczematous patches and plaques     Dystrophic onycholytic nail with subungual debris c/w onychomycosis     Umbilicated papule    Erythematous-base heme-crusted tan verrucoid plaque consistent with inflamed seborrheic keratosis     Erythematous Silvery Scaling Plaque c/w Psoriasis     See annotation      Assessment / Plan:        Screening exam for skin cancer    Upper body skin examination performed today including at least 6 points as noted in physical examination. No lesions suspicious for malignancy noted.    Recommend daily sun protection/avoidance and use of at least SPF 30, broad spectrum sunscreen (OTC drug).     Seborrheic keratoses  Reassurance given to patient. No treatment is necessary.   Treatment of benign, asymptomatic lesions may be considered cosmetic.    Cherry angioma  Reassurance given to patient. No treatment is necessary.   Treatment of benign, asymptomatic lesions may be considered cosmetic.  -Explained that it could be treated with hyfercation or PDL laser for cosmetic fee    Multiple benign nevi  Reassurance given to patient. No treatment is necessary.   Treatment of benign, asymptomatic lesions may be considered cosmetic.         Follow up in about 1 year (around  10/17/2023).        LOS NUMBER AND COMPLEXITY OF PROBLEMS    COMPLEXITY OF DATA RISK TOTAL TIME (m)   24442  04809 [] 1 self-limited or minor problem [] Minimal to none [] No treatment recommended or patient to monitor. Reassurance.  15-29  10-19   75113  22150 Low  [x] 2 or more self limited or minor problems  [] 1 stable chronic illness  [] 1 acute, uncomplicated illness or injury Limited (2)  [] Prior external notes from each unique source  [] Review result of each unique test  [] Order each unique test  OR [] Independent historian Low  [x]  OTC medications   []  Discussed/Decision for minor skin surgery (no risk factors) 30-44  20-29   40055  00138 Moderate  []  1 or more chronic unstable illness (not at goal or progression or exacerbation) or SE of treatment  []  2 or more stable chronic illnesses  []  1 acute illness with systemic symptoms  []  1 acute complicated injury  []  1 undiagnosed new problem with uncertain prognosis Moderate (1/3 below)  []  3 or more data items        *Now includes independent historian  []  Independent interpretation of a test  []  Discuss management/test with another provider Moderate  []  Prescription drug mgmt  []  Discussed/Decision for Minor surgery with risk factors  []  Mgmt limited by social determinates 45-59  30-39   29728  84512 High  []  1 or more chronic illness with severe exacerbation, progression or SE of treatment  []  1 acute or chronic illness/injury that poses a threat to life or bodily function Extensive (2/3 below)  []  3 or more data items        *Now includes independent historian.  []  Independent interpretation of a test  []  Discuss management/test with another provider High  []  Major surgery with risk discussed  []  Drug therapy requiring intensive monitoring for toxicity  []  Hospitalization  []  Decision for DNR 60-74  40-54

## 2022-10-31 ENCOUNTER — TELEPHONE (OUTPATIENT)
Dept: PRIMARY CARE CLINIC | Facility: CLINIC | Age: 57
End: 2022-10-31
Payer: COMMERCIAL

## 2022-11-01 ENCOUNTER — PATIENT MESSAGE (OUTPATIENT)
Dept: DERMATOLOGY | Facility: CLINIC | Age: 57
End: 2022-11-01
Payer: COMMERCIAL

## 2022-11-02 ENCOUNTER — OFFICE VISIT (OUTPATIENT)
Dept: ENDOCRINOLOGY | Facility: CLINIC | Age: 57
End: 2022-11-02
Payer: MEDICARE

## 2022-11-02 VITALS
WEIGHT: 107.13 LBS | HEART RATE: 70 BPM | BODY MASS INDEX: 19.71 KG/M2 | SYSTOLIC BLOOD PRESSURE: 100 MMHG | DIASTOLIC BLOOD PRESSURE: 62 MMHG | OXYGEN SATURATION: 96 % | HEIGHT: 62 IN

## 2022-11-02 DIAGNOSIS — R79.9 ABNORMAL FINDING OF BLOOD CHEMISTRY, UNSPECIFIED: ICD-10-CM

## 2022-11-02 DIAGNOSIS — Z83.3 FAMILY HISTORY OF DIABETES MELLITUS: Primary | ICD-10-CM

## 2022-11-02 DIAGNOSIS — E03.9 HYPOTHYROIDISM, UNSPECIFIED TYPE: ICD-10-CM

## 2022-11-02 PROCEDURE — 99203 OFFICE O/P NEW LOW 30 MIN: CPT | Mod: S$PBB,,, | Performed by: INTERNAL MEDICINE

## 2022-11-02 PROCEDURE — 99999 PR PBB SHADOW E&M-EST. PATIENT-LVL IV: ICD-10-PCS | Mod: PBBFAC,,, | Performed by: INTERNAL MEDICINE

## 2022-11-02 PROCEDURE — 99203 PR OFFICE/OUTPT VISIT, NEW, LEVL III, 30-44 MIN: ICD-10-PCS | Mod: S$PBB,,, | Performed by: INTERNAL MEDICINE

## 2022-11-02 PROCEDURE — 99999 PR PBB SHADOW E&M-EST. PATIENT-LVL IV: CPT | Mod: PBBFAC,,, | Performed by: INTERNAL MEDICINE

## 2022-11-02 PROCEDURE — 99214 OFFICE O/P EST MOD 30 MIN: CPT | Mod: PBBFAC | Performed by: INTERNAL MEDICINE

## 2022-11-02 NOTE — PROGRESS NOTES
Subjective:      Patient ID: Gay Gurrola is a 57 y.o. female.    Chief Complaint:  Hypothyroidism and Diabetes      History of Present Illness    Diagnosed with hypothyroidism 15 years ago. Started on thyroid hormone supplement at that time. Previously on 25 mcg daily.   Started synthroid 13 mcg daily three weeks ago based on labs.     Most recent TSH done 7/2022 --> normal.     Still reports hair loss.     Denies polyuria, or weight loss or blurred vision.   Most recent A1C was 5.1%, but would like insulin levels checked.   Mother, maternal aunt, sister and brother with T2DM     Review of Systems   Constitutional:  Negative for fever.   HENT:  Negative for congestion.    Eyes:  Negative for visual disturbance.   Respiratory:  Negative for shortness of breath.    Cardiovascular:  Negative for chest pain.   Gastrointestinal:  Negative for abdominal pain.   Genitourinary:  Negative for dysuria.   Musculoskeletal:  Negative for arthralgias.   Skin:  Negative for rash.   Neurological:  Negative for weakness.   Hematological:  Does not bruise/bleed easily.   Psychiatric/Behavioral:  Negative for sleep disturbance.      Objective:   Physical Exam  Constitutional:       General: She is not in acute distress.     Appearance: She is well-developed.   Eyes:      General: No scleral icterus.     Conjunctiva/sclera: Conjunctivae normal.      Pupils: Pupils are equal, round, and reactive to light.      Comments: No proptosis.    Neck:      Thyroid: No thyromegaly.      Trachea: No tracheal deviation.   Cardiovascular:      Rate and Rhythm: Normal rate.   Pulmonary:      Effort: Pulmonary effort is normal.      Breath sounds: Normal breath sounds.   Musculoskeletal:      Cervical back: Normal range of motion and neck supple.   Lymphadenopathy:      Cervical: No cervical adenopathy.   Skin:     General: Skin is warm and dry.      Findings: No rash.   Neurological:      Mental Status: She is alert and oriented to  "person, place, and time.      Deep Tendon Reflexes: Reflexes are normal and symmetric.      Comments: No tremor.     Vitals:    11/02/22 1002   BP: 100/62   BP Location: Right arm   Patient Position: Sitting   BP Method: Medium (Manual)   Pulse: 70   SpO2: 96%   Weight: 48.6 kg (107 lb 2.3 oz)   Height: 5' 2" (1.575 m)       BP Readings from Last 3 Encounters:   11/02/22 100/62   10/17/22 103/68   10/10/22 109/70     Wt Readings from Last 1 Encounters:   11/02/22 1002 48.6 kg (107 lb 2.3 oz)         Body mass index is 19.6 kg/m².    Lab Review:   Lab Results   Component Value Date    HGBA1C 5.1 07/12/2022     Lab Results   Component Value Date    CHOL 194 10/27/2020     07/12/2022    LDLCALC 52 07/12/2022    TRIG 50 07/12/2022    CHOLHDL 47.9 10/27/2020     Lab Results   Component Value Date     10/27/2020    K 3.8 10/27/2020     10/27/2020    CO2 26 10/27/2020     10/27/2020    BUN 14 10/27/2020    CREATININE 0.7 10/27/2020    CALCIUM 9.1 10/27/2020    PROT 7.3 10/27/2020    ALBUMIN 4.3 10/27/2020    BILITOT 0.7 10/27/2020    ALKPHOS 48 (L) 10/27/2020    AST 27 10/27/2020    ALT 24 10/27/2020    ANIONGAP 9 10/27/2020    ESTGFRAFRICA >60.0 10/27/2020    EGFRNONAA >60.0 10/27/2020    TSH 1.850 10/27/2020      Latest Reference Range & Units 10/27/20 13:20   TSH 0.400 - 4.000 uIU/mL 1.850   Free T4 0.71 - 1.51 ng/dL 1.01             Assessment and Plan     Hypothyroid  Clinically and biochemically euthyroid  Plan to repeat tests in six to eight weeks on new dose  Would like insulin levels checked due to family history of diabetes/insulin resistance   Last A1C was 5.1% > no need to repeat            "

## 2022-11-07 NOTE — ASSESSMENT & PLAN NOTE
Clinically and biochemically euthyroid  Plan to repeat tests in six to eight weeks on new dose  Would like insulin levels checked due to family history of diabetes/insulin resistance   Last A1C was 5.1% > no need to repeat

## 2022-11-15 ENCOUNTER — HOSPITAL ENCOUNTER (OUTPATIENT)
Dept: RADIOLOGY | Facility: OTHER | Age: 57
Discharge: HOME | End: 2022-11-15
Attending: FAMILY MEDICINE
Payer: MEDICARE

## 2022-11-15 DIAGNOSIS — N95.9 UNSPECIFIED MENOPAUSAL AND PERIMENOPAUSAL DISORDER: ICD-10-CM

## 2022-11-15 DIAGNOSIS — M85.80 OSTEOPENIA, UNSPECIFIED LOCATION: ICD-10-CM

## 2022-11-15 PROCEDURE — 77080 DXA BONE DENSITY AXIAL: CPT | Mod: 26,,, | Performed by: RADIOLOGY

## 2022-11-15 PROCEDURE — 77080 DXA BONE DENSITY AXIAL: CPT | Mod: TC

## 2022-11-15 PROCEDURE — 77080 DEXA BONE DENSITY SPINE HIP: ICD-10-PCS | Mod: 26,,, | Performed by: RADIOLOGY

## 2022-11-30 ENCOUNTER — OFFICE VISIT (OUTPATIENT)
Dept: PSYCHIATRY | Facility: CLINIC | Age: 57
End: 2022-11-30
Payer: MEDICARE

## 2022-11-30 DIAGNOSIS — M79.605 CHRONIC PAIN OF LEFT LOWER EXTREMITY: ICD-10-CM

## 2022-11-30 DIAGNOSIS — F51.01 PRIMARY INSOMNIA: ICD-10-CM

## 2022-11-30 DIAGNOSIS — F31.70 BIPOLAR DISORDER IN PARTIAL REMISSION, MOST RECENT EPISODE UNSPECIFIED TYPE: ICD-10-CM

## 2022-11-30 DIAGNOSIS — G89.29 CHRONIC PAIN OF LEFT LOWER EXTREMITY: ICD-10-CM

## 2022-11-30 PROCEDURE — 99213 OFFICE O/P EST LOW 20 MIN: CPT | Mod: S$PBB,,, | Performed by: PHYSICIAN ASSISTANT

## 2022-11-30 PROCEDURE — 99213 PR OFFICE/OUTPT VISIT, EST, LEVL III, 20-29 MIN: ICD-10-PCS | Mod: S$PBB,,, | Performed by: PHYSICIAN ASSISTANT

## 2022-11-30 PROCEDURE — 99999 PR PBB SHADOW E&M-EST. PATIENT-LVL III: CPT | Mod: PBBFAC,,, | Performed by: PHYSICIAN ASSISTANT

## 2022-11-30 PROCEDURE — 99213 OFFICE O/P EST LOW 20 MIN: CPT | Mod: PBBFAC | Performed by: PHYSICIAN ASSISTANT

## 2022-11-30 PROCEDURE — 99999 PR PBB SHADOW E&M-EST. PATIENT-LVL III: ICD-10-PCS | Mod: PBBFAC,,, | Performed by: PHYSICIAN ASSISTANT

## 2022-11-30 RX ORDER — QUETIAPINE FUMARATE 300 MG/1
300 TABLET, FILM COATED ORAL NIGHTLY
Qty: 90 TABLET | Refills: 0 | Status: SHIPPED | OUTPATIENT
Start: 2022-11-30 | End: 2023-01-23

## 2022-11-30 RX ORDER — TEMAZEPAM 30 MG/1
30 CAPSULE ORAL NIGHTLY PRN
Qty: 30 CAPSULE | Refills: 2 | Status: SHIPPED | OUTPATIENT
Start: 2022-11-30 | End: 2023-02-10 | Stop reason: SDUPTHER

## 2022-11-30 RX ORDER — DULOXETIN HYDROCHLORIDE 20 MG/1
40 CAPSULE, DELAYED RELEASE ORAL DAILY
Qty: 180 CAPSULE | Refills: 1 | Status: SHIPPED | OUTPATIENT
Start: 2022-11-30 | End: 2023-03-22 | Stop reason: DRUGHIGH

## 2022-11-30 RX ORDER — TRAZODONE HYDROCHLORIDE 150 MG/1
150 TABLET ORAL NIGHTLY
Qty: 90 TABLET | Refills: 1 | Status: SHIPPED | OUTPATIENT
Start: 2022-11-30 | End: 2023-02-10 | Stop reason: SDUPTHER

## 2022-11-30 NOTE — PROGRESS NOTES
"Outpatient Psychiatry Follow-Up Visit ([facility])    11/30/2022    Clinical Status of Patient:  Outpatient (Ambulatory)    Chief Complaint:  Gay Gurrola is a 57 y.o. female who presents today for follow-up of mood disorder.  Met with patient.      Current Medications:  Seroquel 300 mg   Restoril 30 mg   Cymbalta 40 mg  Trazodone 150 mg    Interval History and Content of Current Session:  Interim Events/Subjective Report/Content of Current Session:  Patient last seen 9/22/2022    Patient is a 57 year old female with a psychiatric history of bipolar 1 disorder and insomnia. The patient's is on Lamictal 150 mg, Restoril 30 mg and Seroquel 400 mg.    Patient presents to follow up today reporting high stress lately due to renovations to the house. She reports poor communication with contractor and states it is costing "an arm and a leg."    She states otherwise, things are good, "stable."  Her mood has been "good."  She is stable. She denies depression or sadness.   She denies any abdiel/hypomania.     She established with a PCP, states it went well. Her cymbalta was increased to 40 mg for pain in her hamstring. She reports the higher dose is going well.     She has been spending time with her sister, who is also doing better.   Her younger son is doing better.    She is going to [place] for the holidays.     She states her medications are working well. She requests refills, requests printed prescriptions as she will be in [place].      Psychotherapy:  Target symptoms: mood disorder, insomnia  Why chosen therapy is appropriate versus another modality: relevant to diagnosis  Outcome monitoring methods: self-report  Therapeutic intervention type: insight oriented psychotherapy, supportive psychotherapy  Topics discussed/themes: stress related to medical comorbidities, building skills sets for symptom management, symptom recognition  The patient's response to the intervention is accepting. The patient's progress toward " treatment goals is good.   Duration of intervention: 15 minutes.    Review of Systems   PSYCHIATRIC: Pertinant items are noted in the narrative.    Past Medical, Family and Social History: The patient's past medical, family and social history have been reviewed and updated as appropriate within the electronic medical record - see encounter notes.    Compliance: yes    Side effects: None    Risk Parameters:  Patient reports no suicidal ideation  Patient reports no homicidal ideation  Patient reports no self-injurious behavior  Patient reports no violent behavior    Exam (detailed: at least 9 elements; comprehensive: all 15 elements)   Constitutional  Vitals:  Most recent vital signs, dated less than 90 days prior to this appointment, were reviewed.   There were no vitals filed for this visit.       General:  unremarkable, age appropriate, well dressed, neatly groomed     Musculoskeletal  Muscle Strength/Tone:  not examined   Gait & Station:  non-ataxic     Psychiatric  Speech:  no latency; no press   Mood & Affect:  steady, euthymic  congruent and appropriate   Thought Process:  normal and logical   Associations:  intact   Thought Content:  normal, no suicidality, no homicidality, delusions, or paranoia   Insight:  intact   Judgement: behavior is adequate to circumstances   Orientation:  grossly intact   Memory: intact for content of interview   Language: grossly intact   Attention Span & Concentration:  able to focus   Fund of Knowledge:  intact and appropriate to age and level of education     Assessment and Diagnosis   Status/Progress: Based on the examination today, the patient's problem(s) is/are improved and well controlled.  New problems have not been presented today.   Lack of compliance are not complicating management of the primary condition.  There are no active rule-out diagnoses for this patient at this time.     General Impression: Patient is a 56 year old female with a psychiatric history of bipolar 1  disorder and insomnia. The patient reports her depression, mood, and insomnia are well controlled at this time. She increased cymbalta to 40 mg for pain, reports it is going well.       ICD-10-CM ICD-9-CM   1. Primary insomnia  F51.01 307.42   2. Bipolar disorder in partial remission, most recent episode unspecified type  F31.70 296.80   3. Chronic pain of left lower extremity  M79.605 729.5    G89.29 338.29           Intervention/Counseling/Treatment Plan   Medication Management: Continue current medications.   Continue Restoril 30 mg nightly   Continue Seroquel 300 mg nightly  Continue Cymbalta 40 mg for pain  Continue trazodone 150 mg qhs  Discussed with patient informed consent, risks vs. benefits, alternative treatments, side effect profile and the inherent unpredictability of individual responses to these treatments. The patient expresses understanding of the above and displays the capacity to agree with this current plan and had no other questions.  Encouraged patient to keep future appointments.   Encouraged patient to message or call with questions or concerns  Safety plan reviewed with patient for worsening condition or suicidal ideations. In the event of an emergency patient was advised to go to the emergency room.       Return to Clinic: 3 months

## 2023-01-24 NOTE — TELEPHONE ENCOUNTER
Spoke to pt regarding message below. Per the pt can Dr. Salazar send a new Rx for Linzess 290 mcg to [facility] pharmacy in [facility] 90 day supply 0 refills   Rx pend and routed to Dr. Salazar.   ----- Message from Christy Chapa sent at 1/24/2023 11:41 AM CST -----  Regarding: refill  Contact: self [phone]  Pt requesting a call back from assistant regarding refill of linaCLOtide (LINZESS) 290 mcg Cap capsule.     [id number] - [place], [place] - [address] [place] [address]  Phone: [phone] Fax: [phone]

## 2023-02-10 DIAGNOSIS — F31.70 BIPOLAR DISORDER IN PARTIAL REMISSION, MOST RECENT EPISODE UNSPECIFIED TYPE: ICD-10-CM

## 2023-02-10 DIAGNOSIS — F51.01 PRIMARY INSOMNIA: ICD-10-CM

## 2023-02-10 RX ORDER — QUETIAPINE FUMARATE 300 MG/1
300 TABLET, FILM COATED ORAL NIGHTLY
Qty: 90 TABLET | Refills: 0 | Status: SHIPPED | OUTPATIENT
Start: 2023-02-10 | End: 2023-03-22 | Stop reason: SDUPTHER

## 2023-02-10 RX ORDER — TRAZODONE HYDROCHLORIDE 150 MG/1
150 TABLET ORAL NIGHTLY
Qty: 90 TABLET | Refills: 1 | OUTPATIENT
Start: 2023-02-10 | End: 2023-03-22 | Stop reason: SDUPTHER

## 2023-02-10 RX ORDER — TEMAZEPAM 30 MG/1
30 CAPSULE ORAL NIGHTLY PRN
Qty: 30 CAPSULE | Refills: 2 | Status: SHIPPED | OUTPATIENT
Start: 2023-02-10 | End: 2023-02-21 | Stop reason: SDUPTHER

## 2023-02-20 DIAGNOSIS — F31.70 BIPOLAR DISORDER IN PARTIAL REMISSION, MOST RECENT EPISODE UNSPECIFIED TYPE: ICD-10-CM

## 2023-02-20 DIAGNOSIS — F51.01 PRIMARY INSOMNIA: ICD-10-CM

## 2023-02-21 RX ORDER — TEMAZEPAM 30 MG/1
30 CAPSULE ORAL NIGHTLY PRN
Qty: 30 CAPSULE | Refills: 2 | Status: SHIPPED | OUTPATIENT
Start: 2023-02-21 | End: 2023-06-01

## 2023-03-22 ENCOUNTER — OFFICE VISIT (OUTPATIENT)
Dept: PSYCHIATRY | Facility: CLINIC | Age: 58
End: 2023-03-22
Payer: MEDICARE

## 2023-03-22 VITALS
DIASTOLIC BLOOD PRESSURE: 65 MMHG | BODY MASS INDEX: 19.92 KG/M2 | WEIGHT: 108.94 LBS | HEART RATE: 71 BPM | SYSTOLIC BLOOD PRESSURE: 114 MMHG

## 2023-03-22 DIAGNOSIS — F51.01 PRIMARY INSOMNIA: ICD-10-CM

## 2023-03-22 DIAGNOSIS — F31.70 BIPOLAR DISORDER IN PARTIAL REMISSION, MOST RECENT EPISODE UNSPECIFIED TYPE: ICD-10-CM

## 2023-03-22 PROCEDURE — 99214 OFFICE O/P EST MOD 30 MIN: CPT | Mod: S$PBB,,, | Performed by: PHYSICIAN ASSISTANT

## 2023-03-22 PROCEDURE — 99999 PR PBB SHADOW E&M-EST. PATIENT-LVL III: CPT | Mod: PBBFAC,,, | Performed by: PHYSICIAN ASSISTANT

## 2023-03-22 PROCEDURE — 99214 PR OFFICE/OUTPT VISIT, EST, LEVL IV, 30-39 MIN: ICD-10-PCS | Mod: S$PBB,,, | Performed by: PHYSICIAN ASSISTANT

## 2023-03-22 PROCEDURE — 99999 PR PBB SHADOW E&M-EST. PATIENT-LVL III: ICD-10-PCS | Mod: PBBFAC,,, | Performed by: PHYSICIAN ASSISTANT

## 2023-03-22 PROCEDURE — 99213 OFFICE O/P EST LOW 20 MIN: CPT | Mod: PBBFAC | Performed by: PHYSICIAN ASSISTANT

## 2023-03-22 RX ORDER — TRAZODONE HYDROCHLORIDE 150 MG/1
150 TABLET ORAL NIGHTLY
Qty: 90 TABLET | Refills: 1 | Status: SHIPPED | OUTPATIENT
Start: 2023-03-22 | End: 2023-03-22 | Stop reason: SDUPTHER

## 2023-03-22 RX ORDER — QUETIAPINE FUMARATE 300 MG/1
300 TABLET, FILM COATED ORAL NIGHTLY
Qty: 90 TABLET | Refills: 1 | Status: SHIPPED | OUTPATIENT
Start: 2023-03-22 | End: 2024-01-16 | Stop reason: DRUGHIGH

## 2023-03-22 RX ORDER — TRAZODONE HYDROCHLORIDE 150 MG/1
150 TABLET ORAL NIGHTLY
Qty: 90 TABLET | Refills: 1 | Status: SHIPPED | OUTPATIENT
Start: 2023-03-22 | End: 2023-11-30 | Stop reason: ALTCHOICE

## 2023-03-22 RX ORDER — QUETIAPINE FUMARATE 300 MG/1
300 TABLET, FILM COATED ORAL NIGHTLY
Qty: 90 TABLET | Refills: 1 | Status: SHIPPED | OUTPATIENT
Start: 2023-03-22 | End: 2023-03-22 | Stop reason: SDUPTHER

## 2023-03-22 NOTE — PROGRESS NOTES
"Outpatient Psychiatry Follow-Up Visit (PA)    3/22/2023    Clinical Status of Patient:  Outpatient (Ambulatory)    Chief Complaint:  Gay Gurrola is a 57 y.o. female who presents today for follow-up of mood disorder.  Met with patient.      Current Medications:  Seroquel 300 mg   Restoril 30 mg   Cymbalta 40 mg  Trazodone 150 mg    Interval History and Content of Current Session:  Interim Events/Subjective Report/Content of Current Session:  Patient last seen 11/30/2022    Patient is a 57 year old female with a psychiatric history of bipolar 1 disorder and insomnia. The patient's is on Lamictal 150 mg, Restoril 30 mg and Seroquel 300 mg.    Patient is doing well.     Youngest son is still struggling with addiction- is going to therapy.  Son lives in [place]- lots of access to street drugs- easily triggered.     She admits to anxiety, particularly at night, due to thinking about her sons in [place].     She denies any depression    Patient stopped taking cymbalta- weaned herself off.   She reports having pain in December- started doing more exercise, stretching, has relieved the pain.   Patient states "I know how to manage the pain without having to take medicine."    Patient is sleeping well with seroquel, trazodone, restoril. Patient has long, extensive history of insomnia despite medication management. Patient has tried decreasing seroquel, couldn't sleep.   Patient reports when she gets medication filled in [place]- she stays for 6-8 weeks when visiting her son, reports medication filled at pharmacy there are not as effective as medication here.     Patient is doing well, no changes needed      Psychotherapy:  Target symptoms: mood disorder, insomnia  Why chosen therapy is appropriate versus another modality: relevant to diagnosis  Outcome monitoring methods: self-report  Therapeutic intervention type: insight oriented psychotherapy, supportive psychotherapy  Topics discussed/themes: stress related to " medical comorbidities, building skills sets for symptom management, symptom recognition  The patient's response to the intervention is accepting. The patient's progress toward treatment goals is good.   Duration of intervention: 15 minutes.    Review of Systems   PSYCHIATRIC: Pertinant items are noted in the narrative.    Past Medical, Family and Social History: The patient's past medical, family and social history have been reviewed and updated as appropriate within the electronic medical record - see encounter notes.    Compliance: yes    Side effects: None    Risk Parameters:  Patient reports no suicidal ideation  Patient reports no homicidal ideation  Patient reports no self-injurious behavior  Patient reports no violent behavior    Exam (detailed: at least 9 elements; comprehensive: all 15 elements)   Constitutional  Vitals:  Most recent vital signs, dated less than 90 days prior to this appointment, were reviewed.   Vitals:    03/22/23 0759   BP: 114/65   Pulse: 71   Weight: 49.4 kg (108 lb 14.5 oz)          General:  unremarkable, age appropriate, well dressed, neatly groomed     Musculoskeletal  Muscle Strength/Tone:  not examined   Gait & Station:  non-ataxic     Psychiatric  Speech:  no latency; no press   Mood & Affect:  steady, euthymic  congruent and appropriate   Thought Process:  normal and logical   Associations:  intact   Thought Content:  normal, no suicidality, no homicidality, delusions, or paranoia   Insight:  intact   Judgement: behavior is adequate to circumstances   Orientation:  grossly intact   Memory: intact for content of interview   Language: grossly intact   Attention Span & Concentration:  able to focus   Fund of Knowledge:  intact and appropriate to age and level of education     Assessment and Diagnosis   Status/Progress: Based on the examination today, the patient's problem(s) is/are improved and well controlled.  New problems have not been presented today.   Lack of compliance are  not complicating management of the primary condition.  There are no active rule-out diagnoses for this patient at this time.     General Impression: Patient is a 56 year old female with a psychiatric history of bipolar 1 disorder and insomnia. The patient reports her depression, mood, and insomnia are well controlled at this time.       ICD-10-CM ICD-9-CM   1. Primary insomnia  F51.01 307.42   2. Bipolar disorder in partial remission, most recent episode unspecified type  F31.70 296.80             Intervention/Counseling/Treatment Plan   Medication Management: Continue current medications.   Continue Restoril 30 mg nightly   Continue Seroquel 300 mg nightly  Continue trazodone 150 mg qhs  Discussed with patient informed consent, risks vs. benefits, alternative treatments, side effect profile and the inherent unpredictability of individual responses to these treatments. The patient expresses understanding of the above and displays the capacity to agree with this current plan and had no other questions.  Encouraged patient to keep future appointments.   Encouraged patient to message or call with questions or concerns  Safety plan reviewed with patient for worsening condition or suicidal ideations. In the event of an emergency patient was advised to go to the emergency room.       Return to Clinic: 3 months

## 2023-04-13 LAB — BCS RECOMMENDATION EXT: NORMAL

## 2023-05-15 DIAGNOSIS — E03.9 HYPOTHYROIDISM, UNSPECIFIED TYPE: ICD-10-CM

## 2023-05-15 NOTE — TELEPHONE ENCOUNTER
No care due was identified.  [facility] Embedded Care Due Messages. Reference number: [id number].   5/15/2023 12:50:59 PM CDT

## 2023-05-15 NOTE — TELEPHONE ENCOUNTER
Refill Routing Note   Medication(s) are not appropriate for processing by [facility] for the following reason(s):      Required labs outdated:  TSH, T4    [facility] action(s):  Defer Care Due:  Labs due          Appointments  past 12m or future 3m with PCP    Date Provider   Last Visit   10/4/2022 Bhavana Lopez MD   Next Visit   5/25/2023 Bhavana Lopez MD   ED visits in past 90 days: 0        Note composed:1:55 PM 05/15/2023

## 2023-05-17 ENCOUNTER — PATIENT MESSAGE (OUTPATIENT)
Dept: PRIMARY CARE CLINIC | Facility: CLINIC | Age: 58
End: 2023-05-17
Payer: MEDICARE

## 2023-05-17 DIAGNOSIS — E03.9 HYPOTHYROIDISM, UNSPECIFIED TYPE: ICD-10-CM

## 2023-05-17 DIAGNOSIS — Z00.00 ANNUAL PHYSICAL EXAM: Primary | ICD-10-CM

## 2023-05-17 DIAGNOSIS — M85.80 OSTEOPENIA, UNSPECIFIED LOCATION: ICD-10-CM

## 2023-05-17 RX ORDER — LEVOTHYROXINE SODIUM 13 UG/1
CAPSULE ORAL
Qty: 90 CAPSULE | Refills: 0 | Status: SHIPPED | OUTPATIENT
Start: 2023-05-17 | End: 2023-07-11

## 2023-05-17 NOTE — TELEPHONE ENCOUNTER
She is overdue for blood work, she has labs ordered by Dr. Brown from November, please have her do those as soon as possible as well as the labs from me.  Please verify the dose of Synthroid she has been taking, if she taking a 13+ 25 mcg ?    Also she is a stool test from Dr. Salazar that has not been completed    Please schedule her for annual exam with me in October

## 2023-05-25 ENCOUNTER — PATIENT MESSAGE (OUTPATIENT)
Dept: OBSTETRICS AND GYNECOLOGY | Facility: CLINIC | Age: 58
End: 2023-05-25
Payer: MEDICARE

## 2023-05-25 ENCOUNTER — OFFICE VISIT (OUTPATIENT)
Dept: PRIMARY CARE CLINIC | Facility: CLINIC | Age: 58
End: 2023-05-25
Payer: MEDICARE

## 2023-05-25 VITALS
TEMPERATURE: 98 F | HEART RATE: 79 BPM | RESPIRATION RATE: 18 BRPM | HEIGHT: 62 IN | BODY MASS INDEX: 19.84 KG/M2 | SYSTOLIC BLOOD PRESSURE: 110 MMHG | WEIGHT: 107.81 LBS | OXYGEN SATURATION: 100 % | DIASTOLIC BLOOD PRESSURE: 68 MMHG

## 2023-05-25 DIAGNOSIS — Z00.00 ANNUAL PHYSICAL EXAM: Primary | ICD-10-CM

## 2023-05-25 DIAGNOSIS — Z82.0 FAMILY HISTORY OF ALZHEIMER'S DISEASE: ICD-10-CM

## 2023-05-25 DIAGNOSIS — Z71.89 COUNSELING FOR HORMONE REPLACEMENT THERAPY: ICD-10-CM

## 2023-05-25 DIAGNOSIS — Z13.6 ENCOUNTER FOR SCREENING FOR CARDIOVASCULAR DISORDERS: ICD-10-CM

## 2023-05-25 DIAGNOSIS — Z86.16 HISTORY OF COVID-19: ICD-10-CM

## 2023-05-25 DIAGNOSIS — Z80.0 FAMILY HISTORY OF COLON CANCER: ICD-10-CM

## 2023-05-25 DIAGNOSIS — Z79.899 OTHER LONG TERM (CURRENT) DRUG THERAPY: ICD-10-CM

## 2023-05-25 DIAGNOSIS — E03.9 HYPOTHYROIDISM, UNSPECIFIED TYPE: ICD-10-CM

## 2023-05-25 DIAGNOSIS — Z83.3 FAMILY HISTORY OF DIABETES MELLITUS: ICD-10-CM

## 2023-05-25 DIAGNOSIS — M85.80 OSTEOPENIA, UNSPECIFIED LOCATION: ICD-10-CM

## 2023-05-25 DIAGNOSIS — R76.8 POSITIVE ANA (ANTINUCLEAR ANTIBODY): ICD-10-CM

## 2023-05-25 PROCEDURE — 99999 PR PBB SHADOW E&M-EST. PATIENT-LVL V: CPT | Mod: PBBFAC,,, | Performed by: FAMILY MEDICINE

## 2023-05-25 PROCEDURE — 99999 PR PBB SHADOW E&M-EST. PATIENT-LVL V: ICD-10-PCS | Mod: PBBFAC,,, | Performed by: FAMILY MEDICINE

## 2023-05-25 PROCEDURE — 99215 OFFICE O/P EST HI 40 MIN: CPT | Mod: PBBFAC,PN | Performed by: FAMILY MEDICINE

## 2023-05-25 PROCEDURE — 99396 PREV VISIT EST AGE 40-64: CPT | Mod: S$PBB,GZ,, | Performed by: FAMILY MEDICINE

## 2023-05-25 PROCEDURE — 99396 PR PREVENTIVE VISIT,EST,40-64: ICD-10-PCS | Mod: S$PBB,GZ,, | Performed by: FAMILY MEDICINE

## 2023-05-25 NOTE — PROGRESS NOTES
Subjective:       Patient ID: Gay Gurrola is a 57 y.o. female.    Chief Complaint: Annual Exam    HPI  58 y/o female is here for annual exam.    She is concerned about her strong family history of Alzheimer's. She is interested in exploring hormone replacement options for bone health and mental health.    Since last visit she had EMBx 4/2023 that was benign, MMG and L breast US 4/2023, stable cysts.      She is feeling good, she denies f/n/v/d/constipation/cp/sob/urinary sx. She is eating healthy and following Mediterranean diet, she is exercising regularly, she does cardio and weights most days. She is sleeping ok. Mood good.     Hx of possible Covid 1/2022 never took a test, cough, fever, feels she fully recovered  Bipolar d/o/Insomnia: following with Psychiatry [facility]; Trazodone 150 mg nightly, Seroquel 300 mg nightly, Restoril 30 mg nightly  Chronic L leg pain, was told she had a tear in her hamstring, did not recommend surgery at the time  Thyroid disease: has been evaluated by Dr. Lira in the past, levothyroxine 25 mcg + 13 mcg daily, no hx of thyroid US  IBS w Constipation/Hx of H pylori: following with Dr. Salazar, Colonoscopy 2022 repeat 5 years, EGD ok, linzess 290 mg daily  Hx of H. Pylori  Osteopenia: dexa 11/2022 stable, D3 2,000 IU daily, not on calcium  Family hx of colon cancer in sister  Hx of Seizure, may have been from alcohol withdrawal over 15 years ago  GYN: following with Dr. Armendariz at , pap and pelvic utd, cycles regular, mmg 3/2022, recommend US has not scheduled  Family hx of Alzheimers in mom and sister and aunt: hx of neuropsych testing 12/2020  Positive RANJAN: rheumatology prn only  Sebacous cyst of scalp/AKs: has not followed here recently  Eye exam: following with Dr. Priest, episcleritis  Dental utd    Review of Systems  see HPI  Objective:      /68 (BP Location: Left arm, Patient Position: Sitting, BP Method: Small (Manual))   Pulse 79   Temp 98 °F (36.7 °C) (Oral)    "Resp 18   Ht 5' 2" (1.575 m)   Wt 48.9 kg (107 lb 12.9 oz)   SpO2 100%   BMI 19.72 kg/m²   Physical Exam  Vitals and nursing note reviewed.   Constitutional:       Appearance: She is well-developed.   HENT:      Head: Normocephalic and atraumatic.   Neck:      Thyroid: No thyromegaly.   Cardiovascular:      Rate and Rhythm: Normal rate and regular rhythm.      Heart sounds: Normal heart sounds.   Pulmonary:      Effort: Pulmonary effort is normal. No respiratory distress.      Breath sounds: Normal breath sounds.   Abdominal:      General: Abdomen is flat. Bowel sounds are normal. There is no distension.      Palpations: Abdomen is soft. There is no mass.      Tenderness: There is no abdominal tenderness.   Musculoskeletal:      Cervical back: Normal range of motion and neck supple.      Right lower leg: No edema.      Left lower leg: No edema.   Lymphadenopathy:      Cervical: No cervical adenopathy.   Skin:     General: Skin is warm and dry.   Neurological:      Mental Status: She is alert.       Assessment:       1. Annual physical exam    2. Hypothyroidism, unspecified type    3. Family history of Alzheimer's disease    4. Family history of colon cancer    5. Osteopenia, unspecified location    6. Family history of diabetes mellitus    7. History of COVID-19    8. Counseling for hormone replacement therapy    9. Encounter for screening for cardiovascular disorders    10. Positive RANJAN (antinuclear antibody)    11. Other long term (current) drug therapy        Plan:   Gay was seen today for annual exam.    Diagnoses and all orders for this visit:    Annual physical exam  -     TSH; Future  -     T4, FREE; Future  -     Lipoprotein Analysis, by NMR; Future  -     Lipid Panel; Future  -     Uric Acid; Future  -     Insulin, random; Future  -     Comprehensive Metabolic Panel; Future  -     CBC Auto Differential; Future  -     Vitamin B12; Future  -     Vitamin D; Future    Hypothyroidism, unspecified " type  -     TSH; Future  -     T4, FREE; Future  -     CBC Auto Differential; Future  -     Vitamin B12; Future  -     Vitamin D; Future    Family history of Alzheimer's disease  -     Ambulatory referral/consult to Neurology; Future    Family history of colon cancer    Osteopenia, unspecified location  -     Vitamin D; Future    Family history of diabetes mellitus  -     Lipoprotein Analysis, by NMR; Future  -     Lipid Panel; Future  -     Uric Acid; Future  -     Insulin, random; Future    History of COVID-19    Counseling for hormone replacement therapy  -     Ambulatory referral/consult to Gynecology; Future    Encounter for screening for cardiovascular disorders  -     Lipid Panel; Future    Positive RANJAN (antinuclear antibody)  -     CBC Auto Differential; Future    Other long term (current) drug therapy  -     Vitamin B12; Future

## 2023-05-25 NOTE — PATIENT INSTRUCTIONS
Adrianna testing is mainly for cancers      Calcium 1000 mg from all sources      Dr. Rolf Burrell [facility]

## 2023-06-01 DIAGNOSIS — F51.01 PRIMARY INSOMNIA: ICD-10-CM

## 2023-06-01 DIAGNOSIS — F31.70 BIPOLAR DISORDER IN PARTIAL REMISSION, MOST RECENT EPISODE UNSPECIFIED TYPE: ICD-10-CM

## 2023-06-01 RX ORDER — TEMAZEPAM 30 MG/1
CAPSULE ORAL
Qty: 30 CAPSULE | Refills: 0 | Status: SHIPPED | OUTPATIENT
Start: 2023-06-01 | End: 2023-06-26 | Stop reason: SDUPTHER

## 2023-06-12 ENCOUNTER — PES CALL (OUTPATIENT)
Dept: ADMINISTRATIVE | Facility: CLINIC | Age: 58
End: 2023-06-12
Payer: MEDICARE

## 2023-06-19 ENCOUNTER — PATIENT OUTREACH (OUTPATIENT)
Dept: ADMINISTRATIVE | Facility: HOSPITAL | Age: 58
End: 2023-06-19
Payer: MEDICARE

## 2023-06-26 ENCOUNTER — TELEPHONE (OUTPATIENT)
Dept: NEUROLOGY | Facility: CLINIC | Age: 58
End: 2023-06-26
Payer: MEDICARE

## 2023-06-26 ENCOUNTER — OFFICE VISIT (OUTPATIENT)
Dept: PSYCHIATRY | Facility: CLINIC | Age: 58
End: 2023-06-26
Payer: MEDICARE

## 2023-06-26 VITALS
WEIGHT: 109.44 LBS | DIASTOLIC BLOOD PRESSURE: 77 MMHG | SYSTOLIC BLOOD PRESSURE: 119 MMHG | HEART RATE: 84 BPM | BODY MASS INDEX: 20.02 KG/M2

## 2023-06-26 DIAGNOSIS — F31.70 BIPOLAR DISORDER IN PARTIAL REMISSION, MOST RECENT EPISODE UNSPECIFIED TYPE: ICD-10-CM

## 2023-06-26 DIAGNOSIS — F51.01 PRIMARY INSOMNIA: ICD-10-CM

## 2023-06-26 PROCEDURE — 99999 PR PBB SHADOW E&M-EST. PATIENT-LVL III: CPT | Mod: PBBFAC,,, | Performed by: PHYSICIAN ASSISTANT

## 2023-06-26 PROCEDURE — 99999 PR PBB SHADOW E&M-EST. PATIENT-LVL III: ICD-10-PCS | Mod: PBBFAC,,, | Performed by: PHYSICIAN ASSISTANT

## 2023-06-26 PROCEDURE — 90833 PSYTX W PT W E/M 30 MIN: CPT | Mod: ,,, | Performed by: PHYSICIAN ASSISTANT

## 2023-06-26 PROCEDURE — 99213 OFFICE O/P EST LOW 20 MIN: CPT | Mod: PBBFAC | Performed by: PHYSICIAN ASSISTANT

## 2023-06-26 PROCEDURE — 90833 PR PSYCHOTHERAPY W/PATIENT W/E&M, 30 MIN (ADD ON): ICD-10-PCS | Mod: ,,, | Performed by: PHYSICIAN ASSISTANT

## 2023-06-26 PROCEDURE — 99214 OFFICE O/P EST MOD 30 MIN: CPT | Mod: S$PBB,,, | Performed by: PHYSICIAN ASSISTANT

## 2023-06-26 PROCEDURE — 99214 PR OFFICE/OUTPT VISIT, EST, LEVL IV, 30-39 MIN: ICD-10-PCS | Mod: S$PBB,,, | Performed by: PHYSICIAN ASSISTANT

## 2023-06-26 RX ORDER — TEMAZEPAM 30 MG/1
30 CAPSULE ORAL NIGHTLY PRN
Qty: 60 CAPSULE | Refills: 0 | Status: SHIPPED | OUTPATIENT
Start: 2023-06-30 | End: 2023-08-11

## 2023-06-26 NOTE — PROGRESS NOTES
"Outpatient Psychiatry Follow-Up Visit (PA)    6/26/2023    Clinical Status of Patient:  Outpatient (Ambulatory)    Chief Complaint:  Gay Gurrola is a 57 y.o. female who presents today for follow-up of mood disorder.  Met with patient.      Current Medications:  Seroquel 300 mg   Restoril 30 mg   Trazodone 150 mg    Interval History and Content of Current Session:  Interim Events/Subjective Report/Content of Current Session:  Patient last seen 3/22/2023    Patient is a 57 year old female with a psychiatric history of bipolar 1 disorder and insomnia.     Pt presents to follow up today stating she's been "okay."  She reports she has been feeling run down- is interested in HRT.  She continues to express concerns with cognitive decline- dementia runs in family.  She would like a recommendation for a neurologist.     Pt's mother in law staying with pt.   She is demanding, "aggressive"  "my boundaries have been broken."  MIL having "small cerebral hemorrhages"- memory issues. Pt reports caring for MIL has fallen on her.   She reports she is insisting on returning to [place] despite cognitive decline- she is unwilling to discuss her diagnosis.     MIL has been stressful. She is tearful at times throughout appt.   She reports high stress, worsened insomnia.     Pt acknowledges long term benzo use as being a risk for cognitive decline in older age. She reports she used to see sleep medicine, has tried many sleep medicines to no effect. She is not wanting to make changes at this time, as she is under particular stress.     She continues to eat well, exercise, and meditate to help with mood, anxiety.       Psychotherapy:  Target symptoms: mood disorder, insomnia  Why chosen therapy is appropriate versus another modality: relevant to diagnosis  Outcome monitoring methods: self-report  Therapeutic intervention type: insight oriented psychotherapy, supportive psychotherapy  Topics discussed/themes: stress related to " medical comorbidities, building skills sets for symptom management, symptom recognition  The patient's response to the intervention is accepting. The patient's progress toward treatment goals is good.   Duration of intervention: 30 minutes.    Review of Systems   PSYCHIATRIC: Pertinant items are noted in the narrative.    Past Medical, Family and Social History: The patient's past medical, family and social history have been reviewed and updated as appropriate within the electronic medical record - see encounter notes.    Compliance: yes    Side effects: None    Risk Parameters:  Patient reports no suicidal ideation  Patient reports no homicidal ideation  Patient reports no self-injurious behavior  Patient reports no violent behavior    Exam (detailed: at least 9 elements; comprehensive: all 15 elements)   Constitutional  Vitals:  Most recent vital signs, dated less than 90 days prior to this appointment, were reviewed.   Vitals:    06/26/23 1017   BP: 119/77   Pulse: 84   Weight: 49.6 kg (109 lb 7.3 oz)          General:  unremarkable, age appropriate, well dressed, neatly groomed     Musculoskeletal  Muscle Strength/Tone:  not examined   Gait & Station:  non-ataxic     Psychiatric  Speech:  no latency; no press   Mood & Affect:  steady  congruent and appropriate   Thought Process:  normal and logical   Associations:  intact   Thought Content:  normal, no suicidality, no homicidality, delusions, or paranoia   Insight:  intact   Judgement: behavior is adequate to circumstances   Orientation:  grossly intact   Memory: intact for content of interview   Language: grossly intact   Attention Span & Concentration:  able to focus   Fund of Knowledge:  intact and appropriate to age and level of education     Assessment and Diagnosis   Status/Progress: Based on the examination today, the patient's problem(s) is/are improved and well controlled.  New problems have been presented today.   Lack of compliance are not  complicating management of the primary condition.  There are no active rule-out diagnoses for this patient at this time.     General Impression: Patient is a 56 year old female with a psychiatric history of bipolar 1 disorder and insomnia. The patient reports her depression, mood, and insomnia are well controlled at this time. She reports recent increased anxiety due to MIL staying with her. Increase in stress, anxiety, and insomnia.       ICD-10-CM ICD-9-CM   1. Primary insomnia  F51.01 307.42   2. Bipolar disorder in partial remission, most recent episode unspecified type  F31.70 296.80               Intervention/Counseling/Treatment Plan   Medication Management: Continue current medications.   Continue Restoril 30 mg nightly   Continue Seroquel 300 mg nightly  Continue trazodone 150 mg qhs  Recommendations for neurology given  Discussed with patient informed consent, risks vs. benefits, alternative treatments, side effect profile and the inherent unpredictability of individual responses to these treatments. The patient expresses understanding of the above and displays the capacity to agree with this current plan and had no other questions.  Encouraged patient to keep future appointments.   Encouraged patient to message or call with questions or concerns  Safety plan reviewed with patient for worsening condition or suicidal ideations. In the event of an emergency patient was advised to go to the emergency room.       Return to Clinic: 3 months

## 2023-06-26 NOTE — TELEPHONE ENCOUNTER
----- Message from Jefry FELIZ Route sent at 6/26/2023  4:29 PM CDT -----  Regarding: Appointment  Contact: pt.[phone]  Pt is calling to schedule an appt with provider. She has a referral in the system. Pt has a family history of Dementia and Alzheimer. Patient Requesting Call Back @ pt.[phone]

## 2023-06-27 ENCOUNTER — TELEPHONE (OUTPATIENT)
Dept: OBSTETRICS AND GYNECOLOGY | Facility: CLINIC | Age: 58
End: 2023-06-27
Payer: MEDICARE

## 2023-06-27 NOTE — TELEPHONE ENCOUNTER
Pt was referred to see [facility] for a Hormone Consultation. I mailed her the form and upon receiving it back from the pt, I will schedule her appt. Pt verbalized understanding.

## 2023-06-28 ENCOUNTER — TELEPHONE (OUTPATIENT)
Dept: OPHTHALMOLOGY | Facility: CLINIC | Age: 58
End: 2023-06-28
Payer: MEDICARE

## 2023-06-28 NOTE — TELEPHONE ENCOUNTER
----- Message from Raquel Chun sent at 6/27/2023  5:12 PM CDT -----  Regarding: FW: appt access  Contact: self @ [phone]    ----- Message -----  From: Eden Velazquez  Sent: 6/27/2023   4:30 PM CDT  To: [facility] Staff  Subject: appt access                                      Pt is calling to schedule a f/u appt for a routine eye exam and Episcleritis of both eyes.  There is nothing available.  Pls call with an appt.

## 2023-07-06 ENCOUNTER — PATIENT MESSAGE (OUTPATIENT)
Dept: PRIMARY CARE CLINIC | Facility: CLINIC | Age: 58
End: 2023-07-06
Payer: MEDICARE

## 2023-07-06 ENCOUNTER — TELEPHONE (OUTPATIENT)
Dept: NEUROLOGY | Facility: CLINIC | Age: 58
End: 2023-07-06
Payer: MEDICARE

## 2023-07-06 DIAGNOSIS — Z83.3 FAMILY HISTORY OF DIABETES MELLITUS: Primary | ICD-10-CM

## 2023-07-06 DIAGNOSIS — E53.8 DEFICIENCY OF VITAMIN B12: ICD-10-CM

## 2023-07-06 NOTE — TELEPHONE ENCOUNTER
----- Message from Mariposa Foster sent at 7/5/2023 11:57 AM CDT -----  Regarding: Appt/Pt Advice  Contact: pt [phone]  Pt is calling to schedule appt with provider, please call @[phone]

## 2023-07-06 NOTE — TELEPHONE ENCOUNTER
----- Message from Pola Kelly sent at 7/6/2023 10:10 AM CDT -----  Contact: [phone]  Gay Gurrola calling regarding Patient Advice (message) for Pt return a miss call asking if the nurse can give her a call back.

## 2023-07-10 ENCOUNTER — TELEPHONE (OUTPATIENT)
Dept: PRIMARY CARE CLINIC | Facility: CLINIC | Age: 58
End: 2023-07-10

## 2023-07-10 ENCOUNTER — PATIENT MESSAGE (OUTPATIENT)
Dept: PRIMARY CARE CLINIC | Facility: CLINIC | Age: 58
End: 2023-07-10
Payer: MEDICARE

## 2023-07-10 DIAGNOSIS — Z00.00 ANNUAL PHYSICAL EXAM: Primary | ICD-10-CM

## 2023-07-10 NOTE — TELEPHONE ENCOUNTER
Pt requesting specific lab (hemangioma serum)  Due to family having a history of the following  Heart disease  Fatty liver   Diabetes  States that she was told by her family to order this specific lab.

## 2023-07-10 NOTE — TELEPHONE ENCOUNTER
----- Message from Yue Decker sent at 7/10/2023  9:46 AM CDT -----  Regarding: Patient Requesting Meds Refill  Patient stated she need her Synthroid refilled to [facility] on [address] before Wednesday due to her going out of town Thursday. Please contact patient.

## 2023-07-11 NOTE — TELEPHONE ENCOUNTER
----- Message from Azul Lino sent at 7/10/2023  8:13 AM CDT -----  Regarding: refill  Contact: pt @ [phone]  Rx Refill/Request    Is this a Refill or New Rx:refill    Rx Name and Strength:linaCLOtide (LINZESS) 290 mcg Cap capsule    Preferred Pharmacy with phone number:  [facility] [id number] [place] [address]  Phone: [phone] Fax: [phone]    Communication Preference:pt @ [phone]    Additional Information:

## 2023-07-28 ENCOUNTER — TELEPHONE (OUTPATIENT)
Dept: NEUROLOGY | Facility: CLINIC | Age: 58
End: 2023-07-28
Payer: MEDICARE

## 2023-07-28 NOTE — TELEPHONE ENCOUNTER
----- Message from Adele Zuniga sent at 7/3/2023 12:47 PM CDT -----  Regarding: Appt  Contact: Pt [phone]  Pt is calling to schedule appt stated you called her please call

## 2023-08-08 ENCOUNTER — TELEPHONE (OUTPATIENT)
Dept: PRIMARY CARE CLINIC | Facility: CLINIC | Age: 58
End: 2023-08-08
Payer: MEDICARE

## 2023-08-08 NOTE — TELEPHONE ENCOUNTER
Returned call to pt and she has a history of previous seizures and having some R hand numbness. Patient is currently in [place] by her son. She has some family history for AD, but currently has not s/s for the AD. She states she wants to see someone in neurology to start getting ahead on everything. Booked appointment for the seizures and R hand numbers with provider per request.

## 2023-08-08 NOTE — TELEPHONE ENCOUNTER
Pt states that she is out of town/ numbness has restarted    She is waiting for catalino (neurology) to give her a call back to scheduled  States that she is experiencing numbness only in her right hand that's been going on for a couple of months .  Wakes her up in the middle of the night no matter what position she put her hand she have numbness  Only her right hand  Only happen at night   States that she is concern due to family history .  Would like some recommendations on what she can do until she get a callback to schedule with neurology

## 2023-08-10 NOTE — TELEPHONE ENCOUNTER
She could try an over-the-counter carpal tunnel splint at night to see if that helps in the meantime

## 2023-08-11 DIAGNOSIS — F31.70 BIPOLAR DISORDER IN PARTIAL REMISSION, MOST RECENT EPISODE UNSPECIFIED TYPE: ICD-10-CM

## 2023-08-11 DIAGNOSIS — F51.01 PRIMARY INSOMNIA: ICD-10-CM

## 2023-08-11 RX ORDER — TEMAZEPAM 30 MG/1
CAPSULE ORAL
Qty: 60 CAPSULE | Refills: 0 | Status: SHIPPED | OUTPATIENT
Start: 2023-08-11 | End: 2023-10-30 | Stop reason: SDUPTHER

## 2023-10-12 ENCOUNTER — TELEPHONE (OUTPATIENT)
Dept: PRIMARY CARE CLINIC | Facility: CLINIC | Age: 58
End: 2023-10-12
Payer: MEDICARE

## 2023-10-12 NOTE — TELEPHONE ENCOUNTER
Spoke with pt  She is still having numbness in her arm  States that it only happen at night time   Never happen during the day   Think that it might have something to due with poor circulation.  Also she is dealing with right eye pain   Offered her an appointment with Dr. Graham on 10/13 pt declined   States that she will see the neurology on tomorrow and will discuss with doctor.

## 2023-10-12 NOTE — TELEPHONE ENCOUNTER
----- Message from Haseeb Rosario sent at 10/12/2023 10:44 AM CDT -----  Type:  Patient Returning Call    Who Called:patient   Who Left Message for Patient:n/a   Does the patient know what this is regarding?: future appt   Would the patient rather a call back or a response via [facility]? Call back   Best Call Back Number:[phone]  Additional Information: please assist

## 2023-10-13 ENCOUNTER — TELEPHONE (OUTPATIENT)
Dept: PRIMARY CARE CLINIC | Facility: CLINIC | Age: 58
End: 2023-10-13

## 2023-10-13 ENCOUNTER — LAB VISIT (OUTPATIENT)
Dept: LAB | Facility: HOSPITAL | Age: 58
End: 2023-10-13
Attending: FAMILY MEDICINE
Payer: MEDICARE

## 2023-10-13 ENCOUNTER — OFFICE VISIT (OUTPATIENT)
Dept: NEUROLOGY | Facility: CLINIC | Age: 58
End: 2023-10-13
Payer: MEDICARE

## 2023-10-13 VITALS
WEIGHT: 108.94 LBS | DIASTOLIC BLOOD PRESSURE: 73 MMHG | HEIGHT: 62 IN | HEART RATE: 67 BPM | SYSTOLIC BLOOD PRESSURE: 112 MMHG | BODY MASS INDEX: 20.05 KG/M2

## 2023-10-13 DIAGNOSIS — M85.80 OSTEOPENIA, UNSPECIFIED LOCATION: ICD-10-CM

## 2023-10-13 DIAGNOSIS — R76.8 POSITIVE ANA (ANTINUCLEAR ANTIBODY): ICD-10-CM

## 2023-10-13 DIAGNOSIS — Z82.0 FAMILY HISTORY OF ALZHEIMER'S DISEASE: ICD-10-CM

## 2023-10-13 DIAGNOSIS — R41.3 MEMORY LOSS: ICD-10-CM

## 2023-10-13 DIAGNOSIS — Z00.00 ANNUAL PHYSICAL EXAM: ICD-10-CM

## 2023-10-13 DIAGNOSIS — E53.8 DEFICIENCY OF VITAMIN B12: ICD-10-CM

## 2023-10-13 DIAGNOSIS — Z00.00 ANNUAL PHYSICAL EXAM: Primary | ICD-10-CM

## 2023-10-13 DIAGNOSIS — Z82.0 FAMILY HISTORY OF PARKINSON DISEASE: ICD-10-CM

## 2023-10-13 DIAGNOSIS — R41.3 OTHER AMNESIA: Primary | ICD-10-CM

## 2023-10-13 DIAGNOSIS — M54.12 CERVICAL RADICULOPATHY: ICD-10-CM

## 2023-10-13 DIAGNOSIS — E03.9 HYPOTHYROIDISM, UNSPECIFIED TYPE: ICD-10-CM

## 2023-10-13 DIAGNOSIS — Z83.3 FAMILY HISTORY OF DIABETES MELLITUS: ICD-10-CM

## 2023-10-13 DIAGNOSIS — Z81.8 FAMILY HISTORY OF DEMENTIA: ICD-10-CM

## 2023-10-13 DIAGNOSIS — Z79.899 OTHER LONG TERM (CURRENT) DRUG THERAPY: ICD-10-CM

## 2023-10-13 DIAGNOSIS — Z13.6 ENCOUNTER FOR SCREENING FOR CARDIOVASCULAR DISORDERS: ICD-10-CM

## 2023-10-13 LAB
25(OH)D3+25(OH)D2 SERPL-MCNC: 98 NG/ML (ref 30–96)
ALBUMIN SERPL BCP-MCNC: 4.2 G/DL (ref 3.5–5.2)
ALP SERPL-CCNC: 75 U/L (ref 55–135)
ALT SERPL W/O P-5'-P-CCNC: 23 U/L (ref 10–44)
ANION GAP SERPL CALC-SCNC: 10 MMOL/L (ref 8–16)
AST SERPL-CCNC: 33 U/L (ref 10–40)
BASOPHILS # BLD AUTO: 0.02 K/UL (ref 0–0.2)
BASOPHILS NFR BLD: 0.6 % (ref 0–1.9)
BILIRUB SERPL-MCNC: 0.6 MG/DL (ref 0.1–1)
BUN SERPL-MCNC: 12 MG/DL (ref 6–20)
CALCIUM SERPL-MCNC: 9.4 MG/DL (ref 8.7–10.5)
CHLORIDE SERPL-SCNC: 103 MMOL/L (ref 95–110)
CHOLEST SERPL-MCNC: 205 MG/DL (ref 120–199)
CHOLEST/HDLC SERPL: 1.9 {RATIO} (ref 2–5)
CO2 SERPL-SCNC: 27 MMOL/L (ref 23–29)
CREAT SERPL-MCNC: 0.7 MG/DL (ref 0.5–1.4)
DIFFERENTIAL METHOD: ABNORMAL
EOSINOPHIL # BLD AUTO: 0 K/UL (ref 0–0.5)
EOSINOPHIL NFR BLD: 0.8 % (ref 0–8)
ERYTHROCYTE [DISTWIDTH] IN BLOOD BY AUTOMATED COUNT: 14.4 % (ref 11.5–14.5)
EST. GFR  (NO RACE VARIABLE): >60 ML/MIN/1.73 M^2
ESTIMATED AVG GLUCOSE: 91 MG/DL (ref 68–131)
FOLATE SERPL-MCNC: 16.7 NG/ML (ref 4–24)
GLUCOSE SERPL-MCNC: 103 MG/DL (ref 70–110)
HBA1C MFR BLD: 4.8 % (ref 4–5.6)
HCT VFR BLD AUTO: 40.4 % (ref 37–48.5)
HCYS SERPL-SCNC: 6.6 UMOL/L (ref 4–15.5)
HDLC SERPL-MCNC: 106 MG/DL (ref 40–75)
HDLC SERPL: 51.7 % (ref 20–50)
HGB BLD-MCNC: 12.7 G/DL (ref 12–16)
IMM GRANULOCYTES # BLD AUTO: 0 K/UL (ref 0–0.04)
IMM GRANULOCYTES NFR BLD AUTO: 0 % (ref 0–0.5)
INSULIN COLLECTION INTERVAL: NORMAL
INSULIN SERPL-ACNC: 1.7 UU/ML
LDLC SERPL CALC-MCNC: 91.4 MG/DL (ref 63–159)
LYMPHOCYTES # BLD AUTO: 1.3 K/UL (ref 1–4.8)
LYMPHOCYTES NFR BLD: 38 % (ref 18–48)
MCH RBC QN AUTO: 28.8 PG (ref 27–31)
MCHC RBC AUTO-ENTMCNC: 31.4 G/DL (ref 32–36)
MCV RBC AUTO: 92 FL (ref 82–98)
MONOCYTES # BLD AUTO: 0.3 K/UL (ref 0.3–1)
MONOCYTES NFR BLD: 8.5 % (ref 4–15)
NEUTROPHILS # BLD AUTO: 1.8 K/UL (ref 1.8–7.7)
NEUTROPHILS NFR BLD: 52.1 % (ref 38–73)
NONHDLC SERPL-MCNC: 99 MG/DL
NRBC BLD-RTO: 0 /100 WBC
PLATELET # BLD AUTO: 257 K/UL (ref 150–450)
PMV BLD AUTO: 10.3 FL (ref 9.2–12.9)
POTASSIUM SERPL-SCNC: 3.6 MMOL/L (ref 3.5–5.1)
PROT SERPL-MCNC: 7.2 G/DL (ref 6–8.4)
RBC # BLD AUTO: 4.41 M/UL (ref 4–5.4)
SODIUM SERPL-SCNC: 140 MMOL/L (ref 136–145)
T4 FREE SERPL-MCNC: 0.89 NG/DL (ref 0.71–1.51)
TRIGL SERPL-MCNC: 38 MG/DL (ref 30–150)
TSH SERPL DL<=0.005 MIU/L-ACNC: 2.32 UIU/ML (ref 0.4–4)
URATE SERPL-MCNC: 1.8 MG/DL (ref 2.4–5.7)
VIT B12 SERPL-MCNC: 1333 PG/ML (ref 210–950)
WBC # BLD AUTO: 3.53 K/UL (ref 3.9–12.7)

## 2023-10-13 PROCEDURE — 84439 ASSAY OF FREE THYROXINE: CPT | Performed by: FAMILY MEDICINE

## 2023-10-13 PROCEDURE — 82746 ASSAY OF FOLIC ACID SERUM: CPT | Performed by: STUDENT IN AN ORGANIZED HEALTH CARE EDUCATION/TRAINING PROGRAM

## 2023-10-13 PROCEDURE — 83036 HEMOGLOBIN GLYCOSYLATED A1C: CPT | Performed by: FAMILY MEDICINE

## 2023-10-13 PROCEDURE — 80053 COMPREHEN METABOLIC PANEL: CPT | Performed by: FAMILY MEDICINE

## 2023-10-13 PROCEDURE — 99999 PR PBB SHADOW E&M-EST. PATIENT-LVL IV: ICD-10-PCS | Mod: PBBFAC,,, | Performed by: STUDENT IN AN ORGANIZED HEALTH CARE EDUCATION/TRAINING PROGRAM

## 2023-10-13 PROCEDURE — 83090 ASSAY OF HOMOCYSTEINE: CPT | Performed by: FAMILY MEDICINE

## 2023-10-13 PROCEDURE — 86592 SYPHILIS TEST NON-TREP QUAL: CPT | Performed by: STUDENT IN AN ORGANIZED HEALTH CARE EDUCATION/TRAINING PROGRAM

## 2023-10-13 PROCEDURE — 36415 COLL VENOUS BLD VENIPUNCTURE: CPT | Performed by: FAMILY MEDICINE

## 2023-10-13 PROCEDURE — 80061 LIPID PANEL: CPT | Mod: 91 | Performed by: FAMILY MEDICINE

## 2023-10-13 PROCEDURE — 84550 ASSAY OF BLOOD/URIC ACID: CPT | Performed by: FAMILY MEDICINE

## 2023-10-13 PROCEDURE — 85025 COMPLETE CBC W/AUTO DIFF WBC: CPT | Performed by: FAMILY MEDICINE

## 2023-10-13 PROCEDURE — 99215 PR OFFICE/OUTPT VISIT, EST, LEVL V, 40-54 MIN: ICD-10-PCS | Mod: S$PBB,,, | Performed by: STUDENT IN AN ORGANIZED HEALTH CARE EDUCATION/TRAINING PROGRAM

## 2023-10-13 PROCEDURE — 99999 PR PBB SHADOW E&M-EST. PATIENT-LVL IV: CPT | Mod: PBBFAC,,, | Performed by: STUDENT IN AN ORGANIZED HEALTH CARE EDUCATION/TRAINING PROGRAM

## 2023-10-13 PROCEDURE — 83525 ASSAY OF INSULIN: CPT | Performed by: FAMILY MEDICINE

## 2023-10-13 PROCEDURE — 84443 ASSAY THYROID STIM HORMONE: CPT | Performed by: FAMILY MEDICINE

## 2023-10-13 PROCEDURE — 82607 VITAMIN B-12: CPT | Performed by: FAMILY MEDICINE

## 2023-10-13 PROCEDURE — 84425 ASSAY OF VITAMIN B-1: CPT | Performed by: STUDENT IN AN ORGANIZED HEALTH CARE EDUCATION/TRAINING PROGRAM

## 2023-10-13 PROCEDURE — 99214 OFFICE O/P EST MOD 30 MIN: CPT | Mod: PBBFAC | Performed by: STUDENT IN AN ORGANIZED HEALTH CARE EDUCATION/TRAINING PROGRAM

## 2023-10-13 PROCEDURE — 82306 VITAMIN D 25 HYDROXY: CPT | Performed by: FAMILY MEDICINE

## 2023-10-13 PROCEDURE — 99215 OFFICE O/P EST HI 40 MIN: CPT | Mod: S$PBB,,, | Performed by: STUDENT IN AN ORGANIZED HEALTH CARE EDUCATION/TRAINING PROGRAM

## 2023-10-13 NOTE — PATIENT INSTRUCTIONS
Plan:    MRI brain and cervical spine  Blood work today  We will obtain testing for alzheimer's genetic risk as well as parkinson's disease given extensive family history  Referral to neuropsychology  Please make appointment with ophthalmology    Follow up 3-4 months

## 2023-10-13 NOTE — PROGRESS NOTES
[facility] - NEUROLOGY 7TH [address] [facility], [place]    Date: 10/13/23  Patient Name: Gay Gurrola   MRN: [id number]   PCP: Bhavana Lopez      Assessment:   Gay Gurrola is a 58 y.o. female presenting for evaluation of:  (1) Memory problems: mostly short term memory, possibly related to stress/mood disorder however pertinent family hx of dementia.  Referral to neuropsychology, MRI brain, reversible labs.   (2) Right arm numbness/weakness/pain: possibly cervical radiculopathy given neck pain, radiating symptoms.  MRI c spine  (3) Eye pain/headache: possibly primary optho issue.  Ophthalmology eval.  If unrevealing may be cluster headaches (?) and the redness could be an autonomic issue.  (4) Significant PMH of dementia and PD: patient without symptoms of PD however needs further evaluation as majority of her family have developed some sort of neurological disorder.  Referral to genetics, hereditary PD panel.     RTC 3-4 months    Plan:     Problem List Items Addressed This Visit          Neuro    Memory loss    Relevant Orders    APOE Alzheimer's Risk    Ambulatory referral/consult to Neuropsychology    Vitamin B1    Folate    RPR     Other Visit Diagnoses       Other amnesia    -  Primary    Relevant Orders    MRI Brain Without Contrast    Family history of Parkinson disease        Relevant Orders    Hereditary Parkinsons Disease Panel    Ambulatory referral/consult to Genetics    Cervical radiculopathy        Relevant Orders    MRI Cervical Spine Without Contrast    Family history of dementia        Relevant Orders    Ambulatory referral/consult to Genetics            Paz Thibodeaux MD  [facility]   Department of Neurology/Epilepsy      Patient note was created using MModal Dictation.  Any errors in syntax or even information may not have been identified and edited on initial review prior to signing this note.  Subjective:      HPI:   Ms.  "Gay Gurrola is a 58 y.o. female presenting for evaluation of multiple neurological problems listed below including memory loss and pertinent past family hx of dementia and PD.     In terms of memory difficulties - Previously seeing neurologist named Dr. Reis.  Hx of low b12, on b12 supplements.  She is currently here for evaluation of brain fog, frequently forgetting where she placed her keys, forgetting what she was going to do.  Symptoms occurring since covid, some mild improvement recently. Previously saw neuropsychology who determined stress/psychiatric problems were a significant problem for her memory and she did not meet criteria for a neurocognitive disorder at this time.    The patient has a history of psychiatry of bipolar I disorder and is relatively stable.  However her stress level of high, taking care of her mother in law who has dementia.  Children are causing stress as well.      She is very active, lifts weights, walks at least 15,000 steps a day.   Current mood: "ok"    She reports episodes of right sided numbness starting in July.  Reports symptoms are in the whole right arm, also associated with excruciating pain and tingling in the hands.  She also reports some right arm weakness.  Thought maybe she was sleeping on arm wrong.  Often occurring at night.  She reports episodes often last throughout the night, then resolves.  Rare neck pain.  She is not having weakness currently on exam.      She is also concerned about her eyes.  also reports episodes of her eyes becoming bloodshot and having pain in her right eye.  She reports she get associated headaches on the right side.  Takes 800 mg ibuprofen occasionally, does get rid of the headaches.   She reports pain in her eye/headache every day.  Also endorses photophobia.  She reports previously being on steroids for this and was told that she had a possible autoimmune condition.  She then saw a rheumatologist who ruled out an " autoimmune condition.  Review of optho notes in past mention episcleritis of the eye.     Pertinent family history: Hx of alzheimer's in sister who is 68, 66 other sister is having symptoms of forgetting, repeats stories.  Grandmother  of dementia. Mother was 53 years old, had optic nerve inflammation on left, then age 64 started to have headaches, then developed right handed weakness.  Had workup at hospital but did not find a reason for the right hand weakness.  She continued to have headaches, 2 weeks later had a cerebral hemorrhage.  9 siblings of grandmother all passed away with dementia and parkinson's disease.  Has an uncle that recently  of a stroke.  Multiple family members have also had parkinson's disease and her son is having some symptoms of right hand tremor and is being worked up for a movement disorder.    Remote hx of seizures - one episode - 15 years ago in the setting of a parent's death and alcohol abuse.  No further episodes.  No longer using alcohol      PAST MEDICAL HISTORY:  Past Medical History:   Diagnosis Date    Abnormal coronary angiogram     Alcohol abuse, in remission     Alcohol related seizure 2012    Anxiety     Bipolar affective     since teenage years    Chronic idiopathic constipation     Depression     H. pylori infection     Headache(784.0)     followed by Dr. Corona    Hypothyroid     Memory loss     Osteopenia     Seizures        PAST SURGICAL HISTORY:  Past Surgical History:   Procedure Laterality Date    BUNIONECTOMY      COLONOSCOPY N/A 2017    Procedure: COLONOSCOPY;  Surgeon: Estuardo Salazar MD;  Location: [address]);  Service: Endoscopy;  Laterality: N/A;  PM prep    COLONOSCOPY N/A 2022    Procedure: COLONOSCOPY;  Surgeon: Estuardo Salazar MD;  Location: [address]);  Service: Endoscopy;  Laterality: N/A;  suprep    ESOPHAGOGASTRODUODENOSCOPY N/A 10/23/2018    Procedure: EGD (ESOPHAGOGASTRODUODENOSCOPY);  Surgeon: Irlanda Schwarz MD;   Location: [facility] ([address] [place] [address]);  Service: Endoscopy;  Laterality: N/A;    ESOPHAGOGASTRODUODENOSCOPY N/A 2022    Procedure: ESOPHAGOGASTRODUODENOSCOPY (EGD);  Surgeon: Estuardo Salazar MD;  [facility]  Service: Endoscopy;  Laterality: N/A;  Pt informed to bring official paper documentation of COVID results w/ test date/name/ . Informed Pt that procedure will not be done w/o this documentation. Pt informed COVID test must be PCR or Rapid RNA.  instructions sent to [facility]    hemrrhoidectomy      TUBAL LIGATION         CURRENT MEDS:  Current Outpatient Medications   Medication Sig Dispense Refill    Ca-D3-mag#11-zinc-cupr-man-bor 600 mg calcium- 800 unit-50 mg Tab Take 1 tablet by mouth once daily.      cholecalciferol, vitamin D3, (VITAMIN D3) 25 mcg (1,000 unit) capsule Take 1,000 Units by mouth once daily.      cyanocobalamin, vitamin B-12, 5,000 mcg Subl Place under the tongue once daily.      fish oil-omega-3 fatty acids 300-1,000 mg capsule Take 1 capsule by mouth once daily.      levothyroxine (SYNTHROID) 25 MCG tablet TAKE 1 TABLET BY MOUTH ONCE DAILY WITH  13  MCG  FOR  A  TOTAL  OF  38  MCG 90 tablet 0    levothyroxine 13 mcg Cap TAKE 1 CAPSULE BY MOUTH ONCE DAILY WITH  25MCG  FOR  A  TOTAL  OF  38MCG 90 capsule 0    linaCLOtide (LINZESS) 290 mcg Cap capsule Take 1 capsule (290 mcg total) by mouth once daily. 90 capsule 1    multivitamin (THERAGRAN) per tablet Take 1 tablet by mouth once daily.      QUEtiapine (SEROQUEL) 300 MG Tab Take 1 tablet (300 mg total) by mouth every evening. 90 tablet 1    temazepam (RESTORIL) 30 mg capsule TAKE 1 CAPSULE BY MOUTH NIGHTLY AS NEEDED FOR INSOMNIA OR ANXIETY 60 capsule 0    traZODone (DESYREL) 150 MG tablet Take 1 tablet (150 mg total) by mouth every evening. 90 tablet 1    TURMERIC ORAL Take by mouth once daily.       No current facility-administered medications for this visit.       ALLERGIES:  Review of patient's allergies  indicates:  No Known Allergies    FAMILY HISTORY:  Family History   Problem Relation Age of Onset    Stroke Mother     Diabetes Mother     Anuerysm Mother         brain    Insomnia Mother     Alzheimer's disease Mother     Depression Mother     Hypertension Father     Coronary artery disease Father     Kidney disease Father     Diabetes Father     Cancer Sister         colon    Alzheimer's disease Sister     Dementia Sister 59    Colon cancer Sister 64    Cancer Maternal Aunt         breast    Alzheimer's disease Maternal Aunt     Crohn's disease Maternal Aunt     Dementia Maternal Aunt     Alzheimer's disease Maternal Grandmother     Dementia Maternal Grandmother     Crohn's disease Other     Melanoma Neg Hx     Psoriasis Neg Hx     Lupus Neg Hx     Esophageal cancer Neg Hx        SOCIAL HISTORY:  Social History     Tobacco Use    Smoking status: Never    Smokeless tobacco: Never   Substance Use Topics    Alcohol use: No     Comment: History of alcoholism in remission.    Drug use: No       Review of Systems:  12 system review of systems is negative except for the symptoms mentioned in HPI.      Objective:   There were no vitals filed for this visit.  General: NAD, well nourished   Eyes: no tearing, discharge, no erythema   ENT: moist mucous membranes of the oral cavity, nares patent    Neck: Supple, full range of motion  Cardiovascular: Warm and well perfused, pulses equal and symmetrical  Lungs: Normal work of breathing, normal chest wall excursions  Skin: No rash, lesions, or breakdown on exposed skin  Psychiatry: Mood and affect are appropriate   Abdomen: soft, non tender, non distended  Extremeties: No cyanosis, clubbing or edema.    Neurological   MENTAL STATUS: Alert and oriented to person, place, and time. Attention and concentration within normal limits. Speech without dysarthria, able to name and repeat without difficulty. Recent and remote memory within normal limits   CRANIAL NERVES: Visual fields  intact. PERRL. EOMI. Facial sensation intact. Face symmetrical. Hearing grossly intact. Full shoulder shrug bilaterally. Tongue protrudes midline   SENSORY: Sensation is intact to light touch throughout.  Joint position perception intact. Negative Romberg.   MOTOR: Normal bulk and tone. No pronator drift.  5/5 deltoid, biceps, triceps, interosseous, hand  bilaterally. 5/5 iliopsoas, knee extension/flexion, foot dorsi/plantarflexion bilaterally.    REFLEXES: Symmetric and 2+ throughout. Toes down going bilaterally.   CEREBELLAR/COORDINATION/GAIT: Gait steady with normal arm swing and stride length.  Heel to shin intact. Finger to nose intact. Normal rapid alternating movements.

## 2023-10-14 LAB — RPR SER QL: NORMAL

## 2023-10-16 LAB
CHOLEST SERPL-MCNC: 211 MG/DL
HDL SERPL QN: >11 NM
HDL SERPL-SCNC: >41 UMOL/L
HDLC SERPL-MCNC: 122 MG/DL (ref 40–59)
HLD.LARGE SERPL-SCNC: >17 UMOL/L
LDL SERPL QN: 21.3 NM
LDL SERPL-SCNC: 575 NMOL/L
LDL SMALL SERPL-SCNC: <165 NMOL/L
LDLC SERPL CALC-MCNC: 80 MG/DL
PATHOLOGY STUDY: ABNORMAL
TRIGL SERPL-MCNC: 47 MG/DL (ref 30–149)
VLDL LARGE SERPL-SCNC: <1.5 NMOL/L
VLDL SERPL QN: 50.1 NM

## 2023-10-17 ENCOUNTER — TELEPHONE (OUTPATIENT)
Dept: OPTOMETRY | Facility: CLINIC | Age: 58
End: 2023-10-17
Payer: MEDICARE

## 2023-10-17 NOTE — TELEPHONE ENCOUNTER
----- Message from Erin Decker sent at 10/17/2023 11:36 AM CDT -----  Contact: pt @ [phone]  Gay Gurrola calling regarding Appointment Access  (message) for #pt is calling to get appt due to very bad right eye pain with redness, and alot of pressure, asking for call back

## 2023-10-18 ENCOUNTER — OFFICE VISIT (OUTPATIENT)
Dept: OPTOMETRY | Facility: CLINIC | Age: 58
End: 2023-10-18
Payer: MEDICARE

## 2023-10-18 DIAGNOSIS — G43.709 CHRONIC MIGRAINE WITHOUT AURA WITHOUT STATUS MIGRAINOSUS, NOT INTRACTABLE: ICD-10-CM

## 2023-10-18 DIAGNOSIS — H40.013 OAG (OPEN ANGLE GLAUCOMA) SUSPECT, LOW RISK, BILATERAL: ICD-10-CM

## 2023-10-18 DIAGNOSIS — H57.11 PAIN OF RIGHT EYE: Primary | ICD-10-CM

## 2023-10-18 DIAGNOSIS — H15.103 EPISCLERITIS OF BOTH EYES: ICD-10-CM

## 2023-10-18 LAB — VIT B1 BLD-MCNC: 66 UG/L (ref 38–122)

## 2023-10-18 PROCEDURE — 99999 PR PBB SHADOW E&M-EST. PATIENT-LVL III: ICD-10-PCS | Mod: PBBFAC,,, | Performed by: OPTOMETRIST

## 2023-10-18 PROCEDURE — 99999 PR PBB SHADOW E&M-EST. PATIENT-LVL III: CPT | Mod: PBBFAC,,, | Performed by: OPTOMETRIST

## 2023-10-18 PROCEDURE — 99213 OFFICE O/P EST LOW 20 MIN: CPT | Mod: PBBFAC,PO | Performed by: OPTOMETRIST

## 2023-10-18 PROCEDURE — 99214 OFFICE O/P EST MOD 30 MIN: CPT | Mod: S$PBB,,, | Performed by: OPTOMETRIST

## 2023-10-18 PROCEDURE — 99214 PR OFFICE/OUTPT VISIT, EST, LEVL IV, 30-39 MIN: ICD-10-PCS | Mod: S$PBB,,, | Performed by: OPTOMETRIST

## 2023-10-18 RX ORDER — PREDNISOLONE ACETATE 10 MG/ML
SUSPENSION/ DROPS OPHTHALMIC
Qty: 10 ML | Refills: 0 | Status: SHIPPED | OUTPATIENT
Start: 2023-10-18 | End: 2023-11-09

## 2023-10-18 NOTE — PROGRESS NOTES
HPI    ROSALINDA: 10/11/2021 Dr. Priest  Chief complaint (CC): Presents today for redness OU, primarily right that   progress throughout the day. Pt also states eye pain right eye (dull achy   pain that feels like her eye is going to pop out) that turns into a   cluster headache. Today, pt states it feels like something is in the eye.   Glasses? + OTC readers  Contacts? -  H/o eye surgery, injections or laser: -  H/o eye injury: episcleritis  Known eye conditions? -  Family h/o eye conditions? -  Eye gtts? -      (-) Flashes (-)  Floaters (-) Mucous   (+) Tearing (-) Itching (-) Burning   (+) Headaches (+) Eye Pain/discomfort (-) Irritation   (+)  Redness , both eye--primarily right(-) Double vision (-) Blurry   vision    Diabetic? -  A1c? -     Last edited by Mario Priest, OD on 10/18/2023 10:45 AM.            Assessment /Plan     For exam results, see Encounter Report.      Pain of right eye  Chronic migraine without aura without status migrainosus, not intractable  Episcleritis of both eyes  -     prednisoLONE acetate (PRED FORTE) 1 % DrpS; Place 1 drop into both eyes 4 (four) times daily for 7 days, THEN 1 drop 3 (three) times daily for 5 days, THEN 1 drop 2 (two) times daily for 5 days, THEN 1 drop once daily for 5 days.  Dispense: 10 mL; Refill: 0  Rx Pred Forte QiD OU w/tapering. NO e/o ocular pathology assoc w/migraines. Cont w/neuro consult and MRI of radha.     OAG (open angle glaucoma) suspect, low risk, bilateral  -     Posterior Segment OCT Optic Nerve- Both eyes; Future  -     James Visual Field - OU - Extended - Both Eyes; Future  (+) FHx- sister. IOP 13 OD 14 OS. C/d 0.5 OD, 0.45 OS. Pachy 510 OD, 515 OS.  11/10/2021 OCt WNL OU  11/10/2021 HVf OD low test reliability, abnormally high sensitivity, OS excessive high false positives, abnormally high sensitivity  Educated pt on findings w/understanding.   No e/o glaucoma  RTC 2 mo Routine/OCt/24-2 glenny faster HVF

## 2023-10-20 ENCOUNTER — TELEPHONE (OUTPATIENT)
Dept: GENETICS | Facility: CLINIC | Age: 58
End: 2023-10-20
Payer: MEDICARE

## 2023-10-20 NOTE — TELEPHONE ENCOUNTER
Spoke with pt  to schedule appt with GC. Pt scheduled in person appt for  11/13 at 1pm. Pt understood and confirmed a ppt.      ----- Message from Shreya Rizo CGC sent at 10/20/2023  8:59 AM CDT -----  Regarding: RE: appt access  Contact: [phone]  Schedule for GC only with Trang.   ----- Message -----  From: Kim Batista MA  Sent: 10/19/2023  11:16 AM CDT  To: Shreya Rizo CGC  Subject: FW: appt access                                  Z82.0 (ICD-10-CM) - Family history of Parkinson disease  Z81.8 (ICD-10-CM) - Family history of dementia      ----- Message -----  From: Kacey Chaves  Sent: 10/19/2023  11:11 AM CDT  To: [facility] Clinical Support Staff  Subject: appt access                                      Gay Gurrola calling regarding Appointment Access  (message) for #ALS/Alzheimer Disease.  referral in system.

## 2023-10-23 LAB — HEREDITARY PARKINSONS DISEASE PANEL: NORMAL

## 2023-10-26 ENCOUNTER — PATIENT MESSAGE (OUTPATIENT)
Dept: NEUROLOGY | Facility: CLINIC | Age: 58
End: 2023-10-26
Payer: MEDICARE

## 2023-10-26 ENCOUNTER — TELEPHONE (OUTPATIENT)
Dept: NEUROLOGY | Facility: CLINIC | Age: 58
End: 2023-10-26
Payer: MEDICARE

## 2023-10-26 NOTE — TELEPHONE ENCOUNTER
Communicating through Pt portal.     ----- Message from Kacy Bueno sent at 10/26/2023  8:33 AM CDT -----  Regarding: pt adivce  Contact: [phone]  Pt calling in regards to her MRI (Brain, needing nurse Kim to lucrecia please call.

## 2023-10-27 ENCOUNTER — HOSPITAL ENCOUNTER (OUTPATIENT)
Dept: RADIOLOGY | Facility: HOSPITAL | Age: 58
Discharge: HOME | End: 2023-10-27
Attending: STUDENT IN AN ORGANIZED HEALTH CARE EDUCATION/TRAINING PROGRAM
Payer: MEDICARE

## 2023-10-27 DIAGNOSIS — R41.3 OTHER AMNESIA: ICD-10-CM

## 2023-10-27 DIAGNOSIS — M54.12 CERVICAL RADICULOPATHY: ICD-10-CM

## 2023-10-27 PROCEDURE — 72141 MRI NECK SPINE W/O DYE: CPT | Mod: TC

## 2023-10-27 PROCEDURE — 72141 MRI CERVICAL SPINE WITHOUT CONTRAST: ICD-10-PCS | Mod: 26,,, | Performed by: RADIOLOGY

## 2023-10-27 PROCEDURE — 72141 MRI NECK SPINE W/O DYE: CPT | Mod: 26,,, | Performed by: RADIOLOGY

## 2023-10-27 PROCEDURE — 70551 MRI BRAIN STEM W/O DYE: CPT | Mod: TC

## 2023-10-27 PROCEDURE — 70551 MRI BRAIN WITHOUT CONTRAST: ICD-10-PCS | Mod: 26,,, | Performed by: RADIOLOGY

## 2023-10-27 PROCEDURE — 70551 MRI BRAIN STEM W/O DYE: CPT | Mod: 26,,, | Performed by: RADIOLOGY

## 2023-10-30 ENCOUNTER — TELEPHONE (OUTPATIENT)
Dept: NEUROLOGY | Facility: CLINIC | Age: 58
End: 2023-10-30
Payer: MEDICARE

## 2023-10-30 DIAGNOSIS — F31.70 BIPOLAR DISORDER IN PARTIAL REMISSION, MOST RECENT EPISODE UNSPECIFIED TYPE: ICD-10-CM

## 2023-10-30 DIAGNOSIS — F51.01 PRIMARY INSOMNIA: ICD-10-CM

## 2023-10-30 RX ORDER — TEMAZEPAM 30 MG/1
CAPSULE ORAL
Qty: 60 CAPSULE | Refills: 0 | Status: SHIPPED | OUTPATIENT
Start: 2023-10-30 | End: 2023-11-29 | Stop reason: SDUPTHER

## 2023-10-30 NOTE — TELEPHONE ENCOUNTER
----- Message from Iveth Feng sent at 10/30/2023 12:58 PM CDT -----  Regarding: Call back requested  Contact: [phone]  Hi, pt called to request a call back to discuss the MRI done on Friday. Pls call pt at [phone] to [facility] with the pt.

## 2023-11-06 ENCOUNTER — PATIENT MESSAGE (OUTPATIENT)
Dept: NEUROLOGY | Facility: CLINIC | Age: 58
End: 2023-11-06
Payer: MEDICARE

## 2023-11-06 ENCOUNTER — OFFICE VISIT (OUTPATIENT)
Dept: PRIMARY CARE CLINIC | Facility: CLINIC | Age: 58
End: 2023-11-06
Payer: MEDICARE

## 2023-11-06 VITALS
DIASTOLIC BLOOD PRESSURE: 58 MMHG | OXYGEN SATURATION: 99 % | SYSTOLIC BLOOD PRESSURE: 100 MMHG | HEART RATE: 71 BPM | BODY MASS INDEX: 19.63 KG/M2 | WEIGHT: 106.69 LBS | HEIGHT: 62 IN | TEMPERATURE: 98 F | RESPIRATION RATE: 18 BRPM

## 2023-11-06 DIAGNOSIS — M85.80 OSTEOPENIA, UNSPECIFIED LOCATION: ICD-10-CM

## 2023-11-06 DIAGNOSIS — Z82.0 FAMILY HISTORY OF ALZHEIMER'S DISEASE: ICD-10-CM

## 2023-11-06 DIAGNOSIS — R63.0 DECREASED APPETITE: ICD-10-CM

## 2023-11-06 DIAGNOSIS — Z80.3 FAMILY HISTORY OF BREAST CANCER: ICD-10-CM

## 2023-11-06 DIAGNOSIS — F51.01 PRIMARY INSOMNIA: ICD-10-CM

## 2023-11-06 DIAGNOSIS — E03.9 HYPOTHYROIDISM, UNSPECIFIED TYPE: Primary | ICD-10-CM

## 2023-11-06 DIAGNOSIS — R92.30 DENSE BREASTS: ICD-10-CM

## 2023-11-06 PROCEDURE — 99214 OFFICE O/P EST MOD 30 MIN: CPT | Mod: S$PBB,,, | Performed by: FAMILY MEDICINE

## 2023-11-06 PROCEDURE — 99999 PR PBB SHADOW E&M-EST. PATIENT-LVL V: ICD-10-PCS | Mod: PBBFAC,,, | Performed by: FAMILY MEDICINE

## 2023-11-06 PROCEDURE — 99999 PR PBB SHADOW E&M-EST. PATIENT-LVL V: CPT | Mod: PBBFAC,,, | Performed by: FAMILY MEDICINE

## 2023-11-06 PROCEDURE — 99214 PR OFFICE/OUTPT VISIT, EST, LEVL IV, 30-39 MIN: ICD-10-PCS | Mod: S$PBB,,, | Performed by: FAMILY MEDICINE

## 2023-11-06 PROCEDURE — 99215 OFFICE O/P EST HI 40 MIN: CPT | Mod: PBBFAC,PN | Performed by: FAMILY MEDICINE

## 2023-11-06 RX ORDER — LEVOTHYROXINE SODIUM 25 UG/1
TABLET ORAL
Qty: 90 TABLET | Refills: 1 | Status: SHIPPED | OUTPATIENT
Start: 2023-11-06

## 2023-11-06 RX ORDER — LEVOTHYROXINE SODIUM 13 UG/1
CAPSULE ORAL
Qty: 90 CAPSULE | Refills: 1 | Status: SHIPPED | OUTPATIENT
Start: 2023-11-06

## 2023-11-06 NOTE — PROGRESS NOTES
Subjective:       Patient ID: Gay Gurrola is a 58 y.o. female.    Chief Complaint: Results (Would like to review test results and discuss a few other things )    HPI  59 y/o female with bipolar d/o, insomnia, osteopenia, IBS, hx of Covid, thyroid disease, family hx of colon cancer in sister, family hx of Alzheimers dx, positive jd, alcohol abuse in remission is here to follow up hypothyroidism.    She is feeling ok, her appetite has decreased over the past 6 weeks, she does have increased stress in general, she adopted a puppy and is walking 20,000 steps a day, she has trouble maintaining weight. She supplements with protein shake. She is having hot flashes. Cycles stopped in Feb. She is still looking into HRT. She denies f/n/v/heartburn/d/constipation/cp/sob/urinary sx. She is eating healthy and following Mediterranean diet, she is exercising regularly. She is sleeping ok. Mood good.     Hx of possible Covid 1/2022 never took a test, cough, fever, feels she fully recovered  Bipolar d/o/Insomnia: following with Psychiatry PA; Trazodone 150 mg nightly, Seroquel 300 mg nightly, Restoril 30 mg nightly  Chronic L leg pain, was told she had a tear in her hamstring, did not recommend surgery at the time  Thyroid disease: has been evaluated by Dr. Lira in the past, levothyroxine 25 mcg + 13 mcg daily, no hx of thyroid US  IBS w Constipation/Hx of H pylori: following with Dr. Salazar, Colonoscopy 2022 repeat 5 years, EGD ok, linzess 290 mg daily  Hx of H. Pylori  Osteopenia: dexa 11/2022 stable, D3 2,000 IU daily, not on calcium  Family hx of colon cancer in sister  Hx of Seizure, may have been from alcohol withdrawal over 15 years ago  GYN: following with Dr. Armendariz at [facility], pap and pelvic utd, mmg 4/2023  Family hx of Alzheimers in mom and sister and aunt: hx of neuropsych testing 12/2020  Positive JD: rheumatology prn only  Sebacous cyst of scalp/AKs: has not followed here recently  Eye exam: following with   "Priest, episcleritis  Dental utd     Review of Systems   Constitutional:  Negative for activity change and unexpected weight change.   HENT:  Negative for hearing loss, rhinorrhea and trouble swallowing.    Eyes:  Negative for discharge and visual disturbance.   Respiratory:  Negative for chest tightness and wheezing.    Cardiovascular:  Negative for chest pain and palpitations.   Gastrointestinal:  Negative for blood in stool, constipation, diarrhea and vomiting.   Endocrine: Negative for polydipsia and polyuria.   Genitourinary:  Negative for difficulty urinating, dysuria, hematuria and menstrual problem.   Musculoskeletal:  Negative for arthralgias, joint swelling and neck pain.   Neurological:  Positive for headaches. Negative for weakness.   Psychiatric/Behavioral:  Negative for confusion and dysphoric mood.      see HPI  Objective:      BP (!) 100/58 (BP Location: Right arm, Patient Position: Sitting, BP Method: Small (Manual))   Pulse 71   Temp 97.7 °F (36.5 °C) (Oral)   Resp 18   Ht 5' 2" (1.575 m)   Wt 48.4 kg (106 lb 11.2 oz)   SpO2 99%   BMI 19.52 kg/m²   Physical Exam  Vitals and nursing note reviewed.   Constitutional:       Appearance: She is well-developed.   HENT:      Head: Normocephalic and atraumatic.      Mouth/Throat:      Pharynx: No oropharyngeal exudate or posterior oropharyngeal erythema.   Neck:      Thyroid: No thyromegaly.   Cardiovascular:      Rate and Rhythm: Normal rate and regular rhythm.      Heart sounds: Normal heart sounds.   Pulmonary:      Effort: Pulmonary effort is normal. No respiratory distress.      Breath sounds: Normal breath sounds.   Musculoskeletal:      Cervical back: Normal range of motion and neck supple.      Right lower leg: No edema.      Left lower leg: No edema.   Lymphadenopathy:      Cervical: No cervical adenopathy.   Skin:     General: Skin is warm and dry.   Neurological:      Mental Status: She is alert.         Assessment:       1. " Hypothyroidism, unspecified type    2. Family history of breast cancer    3. Dense breasts    4. Family history of Alzheimer's disease    5. Primary insomnia    6. Osteopenia, unspecified location    7. Decreased appetite        Plan:   Gay was seen today for results.    Diagnoses and all orders for this visit:    Hypothyroidism, unspecified type  -     levothyroxine (SYNTHROID) 25 MCG tablet; TAKE 1 TABLET BY MOUTH ONCE DAILY WITH  13  MCG  FOR  A  TOTAL  OF  38  MCG  -     levothyroxine 13 mcg Cap; TAKE 1 CAPSULE BY MOUTH ONCE DAILY WITH  25MCG  FOR  A  TOTAL  OF  38MCG    Family history of breast cancer  -     Ambulatory referral/consult to Breast Surgery; Future    Dense breasts  -     Ambulatory referral/consult to Breast Surgery; Future    Family history of Alzheimer's disease    Primary insomnia    Osteopenia, unspecified location    Decreased appetite

## 2023-11-13 ENCOUNTER — OFFICE VISIT (OUTPATIENT)
Dept: GENETICS | Facility: CLINIC | Age: 58
End: 2023-11-13
Payer: MEDICARE

## 2023-11-13 ENCOUNTER — LAB VISIT (OUTPATIENT)
Dept: LAB | Facility: HOSPITAL | Age: 58
End: 2023-11-13
Attending: MEDICAL GENETICS
Payer: MEDICARE

## 2023-11-13 DIAGNOSIS — Z81.8 FAMILY HISTORY OF DEMENTIA: Primary | ICD-10-CM

## 2023-11-13 DIAGNOSIS — Z82.0 FAMILY HISTORY OF PARKINSON DISEASE: ICD-10-CM

## 2023-11-13 DIAGNOSIS — Z81.8 FAMILY HISTORY OF DEMENTIA: ICD-10-CM

## 2023-11-13 PROCEDURE — 99999 PR PBB SHADOW E&M-EST. PATIENT-LVL II: ICD-10-PCS | Mod: PBBFAC,,, | Performed by: GENETIC COUNSELOR, MS

## 2023-11-13 PROCEDURE — 36415 COLL VENOUS BLD VENIPUNCTURE: CPT | Performed by: MEDICAL GENETICS

## 2023-11-13 PROCEDURE — 96040 PR GENETIC COUNSELING, EACH 30 MIN: ICD-10-PCS | Mod: ,,, | Performed by: GENETIC COUNSELOR, MS

## 2023-11-13 PROCEDURE — 99212 OFFICE O/P EST SF 10 MIN: CPT | Mod: PBBFAC | Performed by: GENETIC COUNSELOR, MS

## 2023-11-13 PROCEDURE — 99999 PR PBB SHADOW E&M-EST. PATIENT-LVL II: CPT | Mod: PBBFAC,,, | Performed by: GENETIC COUNSELOR, MS

## 2023-11-13 PROCEDURE — 96040 PR GENETIC COUNSELING, EACH 30 MIN: CPT | Mod: ,,, | Performed by: GENETIC COUNSELOR, MS

## 2023-11-13 NOTE — PROGRESS NOTES
Gay Gurrola   DOS: 2023   : 1965   MRN: [id number]     REFERRING MD: Keon Thibodeaux*    REASON FOR CONSULT: Our [facility] was asked to provide genetic counseling for this 58-year-old female with a family history of Alzheimer disease (AD).     HISTORY OF PRESENT ILLNESS: Ms. Gurrola is a 58-year-old female with a family history of AD and PD. She had a neuropsychologic evaluation in  because she started noticing problems with her memory. Given her extensive family history of dementia, her doctor ordered neuropsychologic testing which was normal. She was under a lot of stress at the time. She also has bipolar disorder. Her memory has improved but she feels like she still has some difficulties. She was recently seen by neurology who ordered a Parkinson disease [facility] which was negative, but I do not have a copy of the full report. She recommended repeating neuropsychologic testing but this hasn't been done yet.  She had a normal brain MRI.     Two of her sisters have dementia that started in their mid-60s. All of her mother's siblings have dementia. Please see full family history below.     MEDICAL HISTORY:   Active Problem List with Overview Notes    Diagnosis Date Noted    Decreased appetite 2023    Family history of colon cancer 2023    Family history of brain aneurysm 10/04/2022    Family history of Alzheimer's disease 10/04/2022    Irritable bowel syndrome with constipation 10/04/2022    Pain 2021    Left hamstring injury 2021    Abdominal pain, LUQ 10/23/2018    Chronic migraine without aura without status migrainosus, not intractable 2018     Worse with insomnia, worse in the last 5 years  Sleeps 4-5 hours per night  Mother with headaches, insomnia, aneurysm   4 days of 7 with headache, more than 16 days of headache per month  Right side predominant  Triggers include stress, lack of sleep  Has tried and failed  depakote, magnesium oxide, tylenol, ibuprofen, celexa, gabapentin, lamictal, mobic, phenergan, trazodone    Avoid triptans due to serotonin syndrome, avoid anti hypertensives due to low BP    Aimovig        Other chest pain 2018    Osteopenia 2017    Insomnia 2017    Hypothyroid     Bipolar I disorder     Memory loss 2012    Attention or concentration deficit 2012    Depression     Alcohol abuse, in remission      FAMILY HISTORY:  Ms. Gurrola has two sons ages 38 and 30 who are healthy. Her oldest son has a hand tremor. She has two full sisters and two full brothers. Her oldest sister is 69 and has symptoms of dementia that started around age 66yo. Her 68yo sister is milder but is also having memory issues. Her mother  at age 65. She had untreated diabetes for a number of years and had optic nerve problems and lost vision in one eye. Ms. Gurrola reports that she declined rapidly following the loss of her vision. She had several ministrokes before a massive cerebral hemorrhage. Her mother has three brothers and two sisters. One brother is 85 and has dementia. One brother  in his 80s from dementia. Another brother  in his 70s. He had Parkinson's disease and dementia that started in his 70s. Her sister  in her 70s from dementia and Parkinson disease. She had breast cancer in her 60s. Another sister is living at age 83 and lives in a nursing home with dementia. Her maternal grandmother  at age 93 from old age.         IMPRESSION: Ms. Gurrola is a 58-year-old female with a strong family history of Alzheimer disease/dementia and Parkinson disease.     We reviewed the family history of Alzheimer disease. We discussed that the majority of Alzheimer disease is sporadic, particularly when it is late onset (above the age of 60). The background population risk for AD is approximately 1 in 9. 15-25% of cases are thought to be familial. Inheritance is generally considered to be  multifactorial, however, or a mixture of genetic and environmental factors.      There are also certain autosomal dominant genes that cause early-onset Alzheimer disease, but this does not easily fit Ms. Gurrola's family history, however incomplete penetrance has been noted and age of onset can be variable.  Genetic testing for APOE susceptibility is also possible, but risk association varies widely and is often presented as an odds ratio and other genetic factors are largely unknown at this time. Genetic testing for APOE is complicated because the ?4 allele is neither necessary nor sufficient to cause AD. There is low sensitivity and specificity of testing, lack of preventative options, and it is difficult to ascertain probabilistic risk. Having one APOE ?4 allele increases risk to develop AD 2-fold to about 23% for men and 35% for women, similar to the risk associated with a first-degree relative with AD. However, there are current prevention clinical trials for individuals with APOE ?4 alleles, and more prevention treatments may be available in the future. Ms. Gurrola expressed interest in having a clearer picture of her risk in order to make financial and life planning decisions and is comfortable with the uncertainty of APOE testing.       We discussed that a negative result may be uninformative and does not negate the risk of AD due to multifactorial inheritance or genetic factors that are largely unknown. We also spent time discussing the limitations of the Genetic Information Nondiscrimination Act (SAMUEL) which protects from discrimination from medical insurance companies and employers but does not protect from discrimination from life insurance or long-term disability. She consented to move forward with testing today.      RECOMMENDATIONS:  APOE testing to [place]  Follow-up once results return     TIME SPENT: 40 minutes     Lay Calloway, MPH, MS, [facility]  Genetic Counselor   [facility]    Janna  MIKKI Olivares.                                                                                   Medical Geneticist                                                                                                               [facility]

## 2023-11-17 ENCOUNTER — HOSPITAL ENCOUNTER (OUTPATIENT)
Dept: RADIOLOGY | Facility: HOSPITAL | Age: 58
Discharge: HOME | End: 2023-11-17
Attending: INTERNAL MEDICINE
Payer: MEDICARE

## 2023-11-17 LAB
APOLIPOPROTEIN E REASON FOR REFERRAL: NORMAL
APOLIPOPROTEIN E RELEASED BY: NORMAL
APOLIPOPROTEIN E RESULT SUMMARY: NORMAL
APOLIPOPROTEIN E RESULT: NORMAL
APOLIPOPROTEIN E SPECIMEN: NORMAL
GENETICIST REVIEW: NORMAL
SPECIMEN SOURCE: NORMAL

## 2023-11-29 ENCOUNTER — OFFICE VISIT (OUTPATIENT)
Dept: PSYCHIATRY | Facility: CLINIC | Age: 58
End: 2023-11-29
Payer: MEDICARE

## 2023-11-29 VITALS
BODY MASS INDEX: 20.12 KG/M2 | SYSTOLIC BLOOD PRESSURE: 120 MMHG | HEART RATE: 75 BPM | DIASTOLIC BLOOD PRESSURE: 72 MMHG | WEIGHT: 110 LBS

## 2023-11-29 DIAGNOSIS — F51.01 PRIMARY INSOMNIA: ICD-10-CM

## 2023-11-29 DIAGNOSIS — F31.70 BIPOLAR DISORDER IN PARTIAL REMISSION, MOST RECENT EPISODE UNSPECIFIED TYPE: ICD-10-CM

## 2023-11-29 PROCEDURE — 99213 OFFICE O/P EST LOW 20 MIN: CPT | Mod: PBBFAC | Performed by: PHYSICIAN ASSISTANT

## 2023-11-29 PROCEDURE — 99214 OFFICE O/P EST MOD 30 MIN: CPT | Mod: S$PBB,,, | Performed by: PHYSICIAN ASSISTANT

## 2023-11-29 PROCEDURE — 99214 PR OFFICE/OUTPT VISIT, EST, LEVL IV, 30-39 MIN: ICD-10-PCS | Mod: S$PBB,,, | Performed by: PHYSICIAN ASSISTANT

## 2023-11-29 PROCEDURE — 99999 PR PBB SHADOW E&M-EST. PATIENT-LVL III: ICD-10-PCS | Mod: PBBFAC,,, | Performed by: PHYSICIAN ASSISTANT

## 2023-11-29 PROCEDURE — 99999 PR PBB SHADOW E&M-EST. PATIENT-LVL III: CPT | Mod: PBBFAC,,, | Performed by: PHYSICIAN ASSISTANT

## 2023-11-29 RX ORDER — MIRTAZAPINE 7.5 MG/1
7.5 TABLET, FILM COATED ORAL NIGHTLY
Qty: 30 TABLET | Refills: 1 | Status: SHIPPED | OUTPATIENT
Start: 2023-11-29 | End: 2024-01-16

## 2023-11-29 RX ORDER — TEMAZEPAM 30 MG/1
CAPSULE ORAL
Qty: 60 CAPSULE | Refills: 0 | Status: SHIPPED | OUTPATIENT
Start: 2023-11-29 | End: 2024-01-25 | Stop reason: SDUPTHER

## 2023-11-29 NOTE — PROGRESS NOTES
"Outpatient Psychiatry Follow-Up Visit (PA)    11/29/2023    Clinical Status of Patient:  Outpatient (Ambulatory)    Chief Complaint:  Gay Gurrola is a 58 y.o. female who presents today for follow-up of mood disorder.  Met with patient.      Current Medications:  Seroquel 300 mg   Restoril 30 mg   Trazodone 150 mg    Interval History and Content of Current Session:  Interim Events/Subjective Report/Content of Current Session:  Patient last seen 6/26/2023    Patient is a 58 year old female with a psychiatric history of bipolar 1 disorder and insomnia.     Pt presents to follow up today stating her year has been stressful, her summer was "horrible."  She spent much of the summer in [place] for an extended visit with her sons. Her youngest is still using drugs. She describes him as a "functional addict." This has been stressful for her and her oldest son who lives with him. She reports daily headaches over the summer and poor sleep.   She has also had a lot of appointments with neurology and genetics.     She denies depression, tearfulness, or mood swings but endorses stress and feeling overwhelmed. She states she is starting to feel better, feels she is still trying to de-stress after her long stay in [place]. She reports decreased sleep, decreased appetite, decreased energy within the last few months.     She reports poor sleep for the last 4 months. She admits that within the last week, she increased to seroquel 400 mg and has been sleeping better.   Patient has good sleep hygiene, insomnia is long standing issue.     She reports her appetite remains low. She denies weight loss but states she is forcing herself to eat small meals to avoid losing any weight.   Her PCP encouraged remeron for sleep and appetite. Patient is interested in trying.   She does not feel trazodone is working well at this time. She is amenable to switching trazodone for remeron to assist with sleep, anxiety, and appetite.     She " denies ASE from current medications.       Psychotherapy:  Target symptoms: mood disorder, insomnia, stress  Why chosen therapy is appropriate versus another modality: relevant to diagnosis  Outcome monitoring methods: self-report  Therapeutic intervention type: insight oriented psychotherapy, supportive psychotherapy  Topics discussed/themes: building skills sets for symptom management, symptom recognition, substance abuse by a family member  The patient's response to the intervention is accepting. The patient's progress toward treatment goals is good.   Duration of intervention: 16 minutes.    Review of Systems   PSYCHIATRIC: Pertinant items are noted in the narrative.    Past Medical, Family and Social History: The patient's past medical, family and social history have been reviewed and updated as appropriate within the electronic medical record - see encounter notes.    Compliance: yes    Side effects: None    Risk Parameters:  Patient reports no suicidal ideation  Patient reports no homicidal ideation  Patient reports no self-injurious behavior  Patient reports no violent behavior    Exam (detailed: at least 9 elements; comprehensive: all 15 elements)   Constitutional  Vitals:  Most recent vital signs, dated less than 90 days prior to this appointment, were reviewed.   Vitals:    11/29/23 1307   BP: 120/72   Pulse: 75   Weight: 49.9 kg (110 lb 0.2 oz)        General:  unremarkable, age appropriate, well dressed, neatly groomed     Musculoskeletal  Muscle Strength/Tone:  not examined   Gait & Station:  non-ataxic     Psychiatric  Speech:  no latency; no press   Mood & Affect:  steady  congruent and appropriate   Thought Process:  normal and logical   Associations:  intact   Thought Content:  normal, no suicidality, no homicidality, delusions, or paranoia   Insight:  intact   Judgement: behavior is adequate to circumstances   Orientation:  grossly intact   Memory: intact for content of interview   Language:  grossly intact   Attention Span & Concentration:  able to focus   Fund of Knowledge:  intact and appropriate to age and level of education     Assessment and Diagnosis   Status/Progress: Based on the examination today, the patient's problem(s) is/are improved and adequately but not ideally controlled.  New problems have not been presented today.   Lack of compliance are not complicating management of the primary condition.  There are no active rule-out diagnoses for this patient at this time.     General Impression: Patient is a 58 year old female with a psychiatric history of bipolar 1 disorder and insomnia. The patient reports her depression, mood is well controlled but reports high stress, decreased sleep, decreased appetite. She is amenable to stop trazodone, start remeron for sleep, appetite, stress.       ICD-10-CM ICD-9-CM   1. Primary insomnia  F51.01 307.42   2. Bipolar disorder in partial remission, most recent episode unspecified type  F31.70 296.80                 Intervention/Counseling/Treatment Plan   Medication Management: Continue current medications.   Continue Restoril 30 mg nightly   Continue Seroquel 300 mg nightly  Stop trazodone 150 mg qhs  Start Remeron 7.5 mg qhs; can increase to 15 mg in one week as needed  Discussed with patient informed consent, risks vs. benefits, alternative treatments, side effect profile and the inherent unpredictability of individual responses to these treatments. The patient expresses understanding of the above and displays the capacity to agree with this current plan and had no other questions.  Encouraged patient to keep future appointments.   Encouraged patient to message or call with questions or concerns  Safety plan reviewed with patient for worsening condition or suicidal ideations. In the event of an emergency patient was advised to go to the emergency room.       Return to Clinic: 2 months

## 2023-12-13 ENCOUNTER — OFFICE VISIT (OUTPATIENT)
Dept: CARDIOLOGY | Facility: CLINIC | Age: 58
End: 2023-12-13
Payer: MEDICARE

## 2023-12-13 VITALS
HEART RATE: 90 BPM | HEIGHT: 62 IN | DIASTOLIC BLOOD PRESSURE: 67 MMHG | BODY MASS INDEX: 19.79 KG/M2 | WEIGHT: 107.56 LBS | SYSTOLIC BLOOD PRESSURE: 107 MMHG

## 2023-12-13 DIAGNOSIS — R00.2 PALPITATIONS: ICD-10-CM

## 2023-12-13 DIAGNOSIS — R07.2 PRECORDIAL CHEST PAIN: Primary | ICD-10-CM

## 2023-12-13 DIAGNOSIS — R00.0 TACHYCARDIA: ICD-10-CM

## 2023-12-13 PROCEDURE — 93010 ELECTROCARDIOGRAM REPORT: CPT | Mod: S$PBB,,, | Performed by: INTERNAL MEDICINE

## 2023-12-13 PROCEDURE — 99213 OFFICE O/P EST LOW 20 MIN: CPT | Mod: PBBFAC | Performed by: INTERNAL MEDICINE

## 2023-12-13 PROCEDURE — 99204 PR OFFICE/OUTPT VISIT, NEW, LEVL IV, 45-59 MIN: ICD-10-PCS | Mod: S$PBB,25,, | Performed by: INTERNAL MEDICINE

## 2023-12-13 PROCEDURE — 93005 ELECTROCARDIOGRAM TRACING: CPT | Mod: PBBFAC | Performed by: INTERNAL MEDICINE

## 2023-12-13 PROCEDURE — 99204 OFFICE O/P NEW MOD 45 MIN: CPT | Mod: S$PBB,25,, | Performed by: INTERNAL MEDICINE

## 2023-12-13 PROCEDURE — 99999 PR PBB SHADOW E&M-EST. PATIENT-LVL III: ICD-10-PCS | Mod: PBBFAC,,, | Performed by: INTERNAL MEDICINE

## 2023-12-13 PROCEDURE — 93010 EKG 12-LEAD: ICD-10-PCS | Mod: S$PBB,,, | Performed by: INTERNAL MEDICINE

## 2023-12-13 PROCEDURE — 99999 PR PBB SHADOW E&M-EST. PATIENT-LVL III: CPT | Mod: PBBFAC,,, | Performed by: INTERNAL MEDICINE

## 2023-12-13 NOTE — PROGRESS NOTES
Subjective:   12/13/2023     Patient ID:  Gay Gurrola is a 58 y.o. female who presents for evaulation of Palpitations, Breathing Problem, and Irregular Heart Beat      Comes in today for evaluation of anterior chest pain, this is a left-sided pain that has been present for last 2-3 weeks.  Is described as a pressure, elephant sitting on her chest.  It usually lasts most of the day, growing worse as the day proceeds.  Worse in the evening.  She would continues to be able to walk her dog, is able to walk through the pain.  She is an avid jogger, has stopped jogging though because of this.  She is also noted palpitations, her heartbeats rapidly at night, she has poor sleep.    She did have an abnormal stress echocardiogram suggestive of inferior ischemia in 2018.  Cardiac catheterization was normal.      A recent advanced lipid profile showed a particle number of 500, fabulous result,    She does not smoke cigarettes.  She does not drink alcohol.              Past Medical History:   Diagnosis Date    Abnormal coronary angiogram     Alcohol abuse, in remission     Alcohol related seizure 7/24/2012    Anxiety     Bipolar affective     since teenage years    Chronic idiopathic constipation     Depression     H. pylori infection     Headache(784.0)     followed by Dr. Corona    Hypothyroid     Memory loss     Osteopenia     Seizures        Review of patient's allergies indicates:  No Known Allergies      Current Outpatient Medications:     Ca-D3-mag#11-zinc-cupr-man-bor 600 mg calcium- 800 unit-50 mg Tab, Take 1 tablet by mouth once daily., Disp: , Rfl:     cholecalciferol, vitamin D3, (VITAMIN D3) 25 mcg (1,000 unit) capsule, Take 1,000 Units by mouth once daily., Disp: , Rfl:     cyanocobalamin, vitamin B-12, 5,000 mcg Subl, Place under the tongue once daily., Disp: , Rfl:     fish oil-omega-3 fatty acids 300-1,000 mg capsule, Take 1 capsule by mouth once daily., Disp: , Rfl:     levothyroxine (SYNTHROID)  25 MCG tablet, TAKE 1 TABLET BY MOUTH ONCE DAILY WITH  13  MCG  FOR  A  TOTAL  OF  38  MCG, Disp: 90 tablet, Rfl: 1    levothyroxine 13 mcg Cap, TAKE 1 CAPSULE BY MOUTH ONCE DAILY WITH  25MCG  FOR  A  TOTAL  OF  38MCG, Disp: 90 capsule, Rfl: 1    linaCLOtide (LINZESS) 290 mcg Cap capsule, Take 1 capsule (290 mcg total) by mouth once daily., Disp: 90 capsule, Rfl: 1    mirtazapine (REMERON) 7.5 MG Tab, Take 1 tablet (7.5 mg total) by mouth every evening., Disp: 30 tablet, Rfl: 1    multivitamin (THERAGRAN) per tablet, Take 1 tablet by mouth once daily., Disp: , Rfl:     QUEtiapine (SEROQUEL) 300 MG Tab, Take 1 tablet (300 mg total) by mouth every evening., Disp: 90 tablet, Rfl: 1    temazepam (RESTORIL) 30 mg capsule, TAKE 1 CAPSULE BY MOUTH NIGHTLY AS NEEDED FOR INSOMNIA OR ANXIETY, Disp: 60 capsule, Rfl: 0    TURMERIC ORAL, Take by mouth once daily., Disp: , Rfl:      Objective:   Review of Systems   Cardiovascular:  Positive for chest pain, irregular heartbeat and palpitations. Negative for claudication, cyanosis, dyspnea on exertion, leg swelling, near-syncope, orthopnea, paroxysmal nocturnal dyspnea and syncope.         Vitals:    12/13/23 1406   BP: 107/67   Pulse: 90     Wt Readings from Last 3 Encounters:   12/13/23 48.8 kg (107 lb 9.4 oz)   11/06/23 48.4 kg (106 lb 11.2 oz)   10/13/23 49.4 kg (108 lb 14.5 oz)     Temp Readings from Last 3 Encounters:   11/06/23 97.7 °F (36.5 °C) (Oral)   05/25/23 98 °F (36.7 °C) (Oral)   10/04/22 97.9 °F (36.6 °C) (Oral)     BP Readings from Last 3 Encounters:   12/13/23 107/67   11/06/23 (!) 100/58   10/13/23 112/73     Pulse Readings from Last 3 Encounters:   12/13/23 90   11/06/23 71   10/13/23 67             Physical Exam  Vitals reviewed.   Constitutional:       General: She is not in acute distress.     Appearance: She is well-developed.   HENT:      Head: Normocephalic and atraumatic.      Nose: Nose normal.   Eyes:      Conjunctiva/sclera: Conjunctivae normal.       Pupils: Pupils are equal, round, and reactive to light.   Neck:      Vascular: No carotid bruit or JVD.   Cardiovascular:      Rate and Rhythm: Normal rate and regular rhythm.      Pulses: Normal pulses and intact distal pulses.      Heart sounds: Normal heart sounds. No murmur heard.     No friction rub. No gallop.   Pulmonary:      Effort: Pulmonary effort is normal. No respiratory distress.      Breath sounds: Normal breath sounds. No wheezing or rales.   Chest:      Chest wall: No tenderness.   Abdominal:      General: Bowel sounds are normal. There is no distension.      Palpations: Abdomen is soft.      Tenderness: There is no abdominal tenderness.   Musculoskeletal:         General: No tenderness or deformity. Normal range of motion.      Cervical back: Normal range of motion and neck supple.      Right lower leg: No edema.      Left lower leg: No edema.   Skin:     General: Skin is warm and dry.      Findings: No erythema or rash.   Neurological:      Mental Status: She is alert and oriented to person, place, and time.      Cranial Nerves: No cranial nerve deficit.      Motor: No abnormal muscle tone.      Coordination: Coordination normal.   Psychiatric:         Behavior: Behavior normal.         Thought Content: Thought content normal.         Judgment: Judgment normal.           Lab Results   Component Value Date    CHOL 205 (H) 10/13/2023    CHOL 194 10/27/2020    CHOL 139 05/24/2018    CHOL 139 05/24/2018     Lab Results   Component Value Date     (H) 10/13/2023     07/12/2022    HDL 93 (H) 10/27/2020     Lab Results   Component Value Date    LDLCALC 91.4 10/13/2023    LDLCALC 52 07/12/2022    LDLCALC 93.8 10/27/2020     Lab Results   Component Value Date    ALT 23 10/13/2023    AST 33 10/13/2023    AST 27 10/27/2020    AST 29 02/04/2019     Lab Results   Component Value Date    CREATININE 0.7 10/13/2023    BUN 12 10/13/2023     10/13/2023    K 3.6 10/13/2023    CO2 27 10/13/2023     CO2 26 10/27/2020    CO2 26 02/04/2019     Lab Results   Component Value Date    HGB 12.7 10/13/2023    HCT 40.4 10/13/2023    HCT 39.6 03/03/2022    HCT 39.0 10/27/2020                   EKG today shows normal sinus rhythm, normal EKG      Assessment and Plan:     Precordial chest pain  -     Echo; Future; Expected date: 12/14/2023    Palpitations  -     Holter monitor - 48 hour; Future; Expected date: 12/13/2023    Tachycardia  -     Holter monitor - 48 hour; Future; Expected date: 12/13/2023       Chest pain certainly atypical for that of myocardial ischemia.  Coronary angiography 5 years ago showed a normal study.  No luminal irregularity even was noted.  It would appear that her risk for an atherosclerotic etiology of chest pain syndrome is quite unlikely.    The patient has noted new onset of tachycardia.  Will obtain a 48 hour Holter monitor and evaluate with echocardiography.      The patient was noted recently to have cervical spine disease.  This pain could be possibly due to a thoracic disc, that could be considered.      No follow-ups on file.          Future Appointments   Date Time Provider Department Center   12/28/2023  4:00 PM Betsy Gamino MD [facility] HEMONC3 [address] [facility]   1/24/2024  1:00 PM CORBIN, VISUAL FIELDS [facility] OPHTHAL [place]   1/24/2024  2:00 PM Mario Priest OD [facility] OPTOMTY [place]   1/29/2024 11:00 AM Judy Montana PA-C [facility] PSYCH [address]   5/7/2024 10:00 AM Bhavana Lopez MD [facility] PRICAR [place]

## 2023-12-14 NOTE — ADDENDUM NOTE
Addended by: ANNETTA RENAE on: 12/14/2023 09:46 AM     Modules accepted: Orders    
Statement Selected

## 2023-12-19 ENCOUNTER — HOSPITAL ENCOUNTER (OUTPATIENT)
Dept: CARDIOLOGY | Facility: HOSPITAL | Age: 58
Discharge: HOME | End: 2023-12-19
Attending: INTERNAL MEDICINE
Payer: MEDICARE

## 2023-12-19 VITALS
BODY MASS INDEX: 19.69 KG/M2 | WEIGHT: 107 LBS | SYSTOLIC BLOOD PRESSURE: 110 MMHG | HEART RATE: 70 BPM | DIASTOLIC BLOOD PRESSURE: 70 MMHG | HEIGHT: 62 IN

## 2023-12-19 DIAGNOSIS — R07.2 PRECORDIAL CHEST PAIN: ICD-10-CM

## 2023-12-19 DIAGNOSIS — R00.0 TACHYCARDIA: ICD-10-CM

## 2023-12-19 DIAGNOSIS — R00.2 PALPITATIONS: ICD-10-CM

## 2023-12-19 LAB
ASCENDING AORTA: 2.52 CM
AV INDEX (PROSTH): 0.88
AV MEAN GRADIENT: 4 MMHG
AV PEAK GRADIENT: 8 MMHG
AV VALVE AREA BY VELOCITY RATIO: 2.26 CM²
AV VALVE AREA: 2.61 CM²
AV VELOCITY RATIO: 0.76
BSA FOR ECHO PROCEDURE: 1.46 M2
CV ECHO LV RWT: 0.3 CM
DOP CALC AO PEAK VEL: 1.4 M/S
DOP CALC AO VTI: 27.68 CM
DOP CALC LVOT AREA: 3 CM2
DOP CALC LVOT DIAMETER: 1.94 CM
DOP CALC LVOT PEAK VEL: 1.07 M/S
DOP CALC LVOT STROKE VOLUME: 72.29 CM3
DOP CALCLVOT PEAK VEL VTI: 24.47 CM
E WAVE DECELERATION TIME: 181.49 MSEC
E/A RATIO: 0.94
E/E' RATIO: 5.64 M/S
ECHO LV POSTERIOR WALL: 0.55 CM (ref 0.6–1.1)
FRACTIONAL SHORTENING: 34 % (ref 28–44)
INTERVENTRICULAR SEPTUM: 0.58 CM (ref 0.6–1.1)
LA MAJOR: 3.54 CM
LA MINOR: 3.14 CM
LA WIDTH: 3.01 CM
LEFT ATRIUM SIZE: 2.77 CM
LEFT ATRIUM VOLUME INDEX MOD: 17.8 ML/M2
LEFT ATRIUM VOLUME INDEX: 16 ML/M2
LEFT ATRIUM VOLUME MOD: 26.15 CM3
LEFT ATRIUM VOLUME: 23.59 CM3
LEFT INTERNAL DIMENSION IN SYSTOLE: 2.43 CM (ref 2.1–4)
LEFT VENTRICLE DIASTOLIC VOLUME INDEX: 38.46 ML/M2
LEFT VENTRICLE DIASTOLIC VOLUME: 56.54 ML
LEFT VENTRICLE MASS INDEX: 35 G/M2
LEFT VENTRICLE SYSTOLIC VOLUME INDEX: 14.1 ML/M2
LEFT VENTRICLE SYSTOLIC VOLUME: 20.78 ML
LEFT VENTRICULAR INTERNAL DIMENSION IN DIASTOLE: 3.66 CM (ref 3.5–6)
LEFT VENTRICULAR MASS: 51.25 G
LV LATERAL E/E' RATIO: 4.77 M/S
LV SEPTAL E/E' RATIO: 6.89 M/S
MV A" WAVE DURATION": 9.13 MSEC
MV PEAK A VEL: 0.66 M/S
MV PEAK E VEL: 0.62 M/S
MV STENOSIS PRESSURE HALF TIME: 52.63 MS
MV VALVE AREA P 1/2 METHOD: 4.18 CM2
PISA TR MAX VEL: 2.42 M/S
PULM VEIN S/D RATIO: 1.49
PV PEAK D VEL: 0.35 M/S
PV PEAK S VEL: 0.52 M/S
RA MAJOR: 3.7 CM
RA PRESSURE ESTIMATED: 3 MMHG
RA WIDTH: 3.01 CM
RIGHT VENTRICULAR END-DIASTOLIC DIMENSION: 2.73 CM
RV TB RVSP: 5 MMHG
SINUS: 2.54 CM
STJ: 2.35 CM
TDI LATERAL: 0.13 M/S
TDI SEPTAL: 0.09 M/S
TDI: 0.11 M/S
TR MAX PG: 23 MMHG
TRICUSPID ANNULAR PLANE SYSTOLIC EXCURSION: 1.87 CM
TV REST PULMONARY ARTERY PRESSURE: 26 MMHG
Z-SCORE OF LEFT VENTRICULAR DIMENSION IN END DIASTOLE: -1.85
Z-SCORE OF LEFT VENTRICULAR DIMENSION IN END SYSTOLE: -0.93

## 2023-12-19 PROCEDURE — 93306 ECHO (CUPID ONLY): ICD-10-PCS | Mod: 26,,, | Performed by: INTERNAL MEDICINE

## 2023-12-19 PROCEDURE — 93306 TTE W/DOPPLER COMPLETE: CPT

## 2023-12-19 PROCEDURE — 93306 TTE W/DOPPLER COMPLETE: CPT | Mod: 26,,, | Performed by: INTERNAL MEDICINE

## 2023-12-19 PROCEDURE — 93226 XTRNL ECG REC<48 HR SCAN A/R: CPT

## 2023-12-19 PROCEDURE — 93227 HOLTER MONITOR - 48 HOUR (CUPID ONLY): ICD-10-PCS | Mod: ,,, | Performed by: INTERNAL MEDICINE

## 2023-12-19 PROCEDURE — 93227 XTRNL ECG REC<48 HR R&I: CPT | Mod: ,,, | Performed by: INTERNAL MEDICINE

## 2023-12-22 ENCOUNTER — TELEPHONE (OUTPATIENT)
Dept: OPTOMETRY | Facility: CLINIC | Age: 58
End: 2023-12-22
Payer: MEDICARE

## 2023-12-22 DIAGNOSIS — H15.109 EPISCLERITIS, UNSPECIFIED LATERALITY: Primary | ICD-10-CM

## 2023-12-22 RX ORDER — PREDNISOLONE ACETATE 10 MG/ML
SUSPENSION/ DROPS OPHTHALMIC
Qty: 5 ML | Refills: 0 | Status: SHIPPED | OUTPATIENT
Start: 2023-12-22 | End: 2024-01-18

## 2023-12-22 NOTE — TELEPHONE ENCOUNTER
----- Message from Mario Priest, BRUCE sent at 12/22/2023 10:35 AM CST -----  Regarding: RE: Refill Request  Refill sent. Please let the patient know that in the future she can try Lumify OTC.     ----- Message -----  From: Lexi Gatica  Sent: 12/22/2023   9:25 AM CST  To: Mario Priest, BRUCE  Subject: FW: Refill Request                                 ----- Message -----  From: Roz Armstrong  Sent: 12/22/2023   9:09 AM CST  To: [facility] Staff  Subject: Refill Request                                   Patient called in regards to having redness in right eye again and if a prescription for prednisoLONE acetate (PRED FORTE) 1 % DrpS could be called in.       Please call back to further assist- [phone].        [facility] - [place], [place] - [address] [place] [address]  Phone: [phone] Fax: [phone]

## 2023-12-22 NOTE — TELEPHONE ENCOUNTER
----- Message from Erin Decker sent at 12/22/2023 10:42 AM CST -----  Contact: Salma @ [phone] Fax: [phone]  [facility] calling regarding Patient Advice (message) for #is calling to get correct directions for pt eye drops prednisoLONE acetate (PRED FORTE) 1 % DrpS

## 2023-12-28 ENCOUNTER — OFFICE VISIT (OUTPATIENT)
Dept: HEMATOLOGY/ONCOLOGY | Facility: CLINIC | Age: 58
End: 2023-12-28
Payer: MEDICARE

## 2023-12-28 VITALS
WEIGHT: 109.38 LBS | BODY MASS INDEX: 20.13 KG/M2 | OXYGEN SATURATION: 100 % | HEART RATE: 71 BPM | DIASTOLIC BLOOD PRESSURE: 70 MMHG | TEMPERATURE: 98 F | SYSTOLIC BLOOD PRESSURE: 122 MMHG | RESPIRATION RATE: 18 BRPM | HEIGHT: 62 IN

## 2023-12-28 DIAGNOSIS — Z80.0 FAMILY HISTORY OF COLON CANCER: ICD-10-CM

## 2023-12-28 DIAGNOSIS — E03.8 HYPOTHYROIDISM DUE TO HASHIMOTO'S THYROIDITIS: ICD-10-CM

## 2023-12-28 DIAGNOSIS — M85.80 OSTEOPENIA, UNSPECIFIED LOCATION: ICD-10-CM

## 2023-12-28 DIAGNOSIS — Z80.3 FAMILY HISTORY OF BREAST CANCER: ICD-10-CM

## 2023-12-28 DIAGNOSIS — E06.3 HYPOTHYROIDISM DUE TO HASHIMOTO'S THYROIDITIS: ICD-10-CM

## 2023-12-28 DIAGNOSIS — Z12.31 ENCOUNTER FOR SCREENING MAMMOGRAM FOR MALIGNANT NEOPLASM OF BREAST: ICD-10-CM

## 2023-12-28 DIAGNOSIS — R92.2 DENSE BREASTS: Primary | ICD-10-CM

## 2023-12-28 DIAGNOSIS — R92.30 DENSE BREASTS: Primary | ICD-10-CM

## 2023-12-28 DIAGNOSIS — F31.70 BIPOLAR DISORDER IN PARTIAL REMISSION, MOST RECENT EPISODE UNSPECIFIED TYPE: ICD-10-CM

## 2023-12-28 PROCEDURE — 99214 OFFICE O/P EST MOD 30 MIN: CPT | Mod: PBBFAC | Performed by: INTERNAL MEDICINE

## 2023-12-28 PROCEDURE — 99205 OFFICE O/P NEW HI 60 MIN: CPT | Mod: S$PBB,,, | Performed by: INTERNAL MEDICINE

## 2023-12-28 PROCEDURE — 99999 PR PBB SHADOW E&M-EST. PATIENT-LVL IV: CPT | Mod: PBBFAC,,, | Performed by: INTERNAL MEDICINE

## 2023-12-28 NOTE — PROGRESS NOTES
High Risk For Breast Cancer Clinic - [facility]    Outpatient Medical Oncology Outpatient Note  Patient: Gay Gurrola  MRN: [id number]  Primary Care Provider: Bhavana Lopez MD  Referring Provider: Bhavana Lopez MD  [address] [place] [address]     Chief Complaint: Discuss Lifetime Risk of Breast Cancer    Subjective     Subjective:   HPI:   Gay Gurrola is a 58 y.o. perimenopausal female with PMH significant for:   - Hypothyroidism (Dx in 2012, On Synthroid)  - Osteopenia (DEXA: 11/2022: T score: -1.3)  - Bipolar 1/MDD (Seroquel)   - IBS-Constipation Predominant (Linzess)  - Possible HURD (CT A/P: 9/2018, negative abdominal ultrasound in 2020)    She presents for consultation to discuss her lifetime risk of breast cancer as she had her screening mammogram recently which was benign but revealed she has extremely dense breasts. She is here to discuss options for supplemental breast cancer screening imaging and risk reduction strategies.     HPI:  4/13/23: [place] [facility] [place] No suspicious masses (no masses seen) scattered benign appearing bilateral calcifications - BIRADS: 2, extremely dense breasts  4/13/23: Breast ultrasound: stable cysts < 5 mm, small 6 mm intramammary lymph node at 2 OC 7 cm FN, 4 mm cyst at 3 OC in left breast, 6 mm left breast cyst sat 5 OC - BIRADS: 2, benign appearing lesions  October 2023 Labs: WBC: 3.5, Hgb: 12.7, MCV: 92, Platelets: 257, CMP: unremarkable, B12: 1333  Prior labs: Ferritin: 11 (2017), TSAT: 9%    Interval History:    Today, patient feels well. No breast concerns - specifically no breast masses, nipple changes, or pain.   Patient otherwise denies fevers, chills, night sweats, headaches, chest pain, SOB, cough, abdominal pain, N/V, diarrhea, constipation, weight loss, rashes     Review of Systems:  14-point review of systems was asked with the pertinent positives and/or negatives stated in HPI.     High Risk Breast  cancer specific history:  - Age: 58 y.o.   - Height:  5'2  - Weight: 109 lbs  - Breast density per BI-RADS:  extremely dense (per outside mammogram)  - Prior Breast Biopsy: No  - Age at menarche:  15  - Number of pregnancies: ; age of first live birth: 19   - History of breastfeeding: Yes - 2 years. (estimated that for every 12 months of breastfeeding, there was a 4.3% reduction in the relative risk (RR) of breast cancer)  - Age at menopause, if applicable: LMP: 2023 - last spotting 2023  - Uterus and ovaries intact: Yes  (BTL)  - HRT: No  - OCPs: 10 years   - Genetic testing: No   - Personal history of cancer: No  - Previous chest radiation exposure between ages 10-30 years old: No  - Ashkenazi Religion Inheritance: No  - Race:        Past Medical History:   PMH: Hypothyroidism (Dx in , On Synthroid), Osteopenia (DEXA: 2022: T score: -1.3), Bipolar 1/MDD (Seroquel), Prior alcohol use disorder with prior withdrawal seizures, IBS-Constipation Predominant (Linzess), Possible HURD (CT A/P: 2018, negative abdominal ultrasound in ), Atypical Chest Pain (normal coronary angiogram in )    Past Medical History:   Diagnosis Date    Abnormal coronary angiogram     Alcohol abuse, in remission     Alcohol related seizure 2012    Anxiety     Bipolar affective     since teenage years    Chronic idiopathic constipation     Depression     H. pylori infection     Headache(784.0)     followed by Dr. Corona    Hypothyroid     Memory loss     Osteopenia     Seizures      Past Surgical HIstory:   Past Surgical History:   Procedure Laterality Date    BUNIONECTOMY      COLONOSCOPY N/A 2017    Procedure: COLONOSCOPY;  Surgeon: Estuardo Salazar MD;  Location: [facility] ENDO ([address] [place] [address]);  Service: Endoscopy;  Laterality: N/A;  PM prep    COLONOSCOPY N/A 2022    Procedure: COLONOSCOPY;  Surgeon: Estuardo Salazar MD;  [facility]  Service: Endoscopy;  Laterality: N/A;  suprep     ESOPHAGOGASTRODUODENOSCOPY N/A 10/23/2018    Procedure: EGD (ESOPHAGOGASTRODUODENOSCOPY);  Surgeon: Irlanda Schwarz MD;  Location: [facility] ([address] [place] [address]);  Service: Endoscopy;  Laterality: N/A;    ESOPHAGOGASTRODUODENOSCOPY N/A 2022    Procedure: ESOPHAGOGASTRODUODENOSCOPY (EGD);  Surgeon: Estuardo Salazar MD;  [facility]  Service: Endoscopy;  Laterality: N/A;  Pt informed to bring official paper documentation of COVID results w/ test date/name/ . Informed Pt that procedure will not be done w/o this documentation. Pt informed COVID test must be PCR or Rapid RNA.  instructions sent to [facility]    hemrrhoidectomy      TUBAL LIGATION       Family History:   - Maternal Aunt: Breast Cancer (Age of Diagnosis: 60s, Age of Death: 80s)  - Total sisters: 4    - Sister: Colon Cancer (Age of Diagnosis: 65, Current Age: 67) - sister had genetic testing; results unknown  - Maternal Cousin: Prostate (Death: 60)    Family History   Problem Relation Age of Onset    Stroke Mother     Diabetes Mother     Anuerysm Mother         brain    Insomnia Mother     Alzheimer's disease Mother     Depression Mother     Hypertension Father     Coronary artery disease Father     Kidney disease Father     Diabetes Father     Cancer Sister         colon    Alzheimer's disease Sister     Dementia Sister 59    Colon cancer Sister 64    Cancer Maternal Aunt         breast    Alzheimer's disease Maternal Aunt     Crohn's disease Maternal Aunt     Dementia Maternal Aunt     Alzheimer's disease Maternal Grandmother     Dementia Maternal Grandmother     Crohn's disease Other     Melanoma Neg Hx     Psoriasis Neg Hx     Lupus Neg Hx     Esophageal cancer Neg Hx        Social History:   Lives: [place]      Children: Yes   Tobacco use: denies   Alcohol use: denies   Illicit drug use: denies    reports that she has never smoked. She has never used smokeless tobacco. She reports that she does not drink alcohol and does  "not use drugs.     Allergies:  Review of patient's allergies indicates:  No Known Allergies    Medications:  Current Outpatient Medications   Medication Instructions    Ca-D3-mag#11-zinc-cupr-man-damian 600 mg calcium- 800 unit-50 mg Tab 1 tablet, Oral, Daily    cholecalciferol (vitamin D3) (VITAMIN D3) 1,000 Units, Oral, Daily    cyanocobalamin, vitamin B-12, 5,000 mcg Subl Sublingual, Daily,      fish oil-omega-3 fatty acids 300-1,000 mg capsule 1 capsule, Oral, Daily    levothyroxine (SYNTHROID) 25 MCG tablet TAKE 1 TABLET BY MOUTH ONCE DAILY WITH  13  MCG  FOR  A  TOTAL  OF  38  MCG    levothyroxine 13 mcg Cap TAKE 1 CAPSULE BY MOUTH ONCE DAILY WITH  25MCG  FOR  A  TOTAL  OF  38MCG    linaCLOtide (LINZESS) 290 mcg, Oral, Daily    mirtazapine (REMERON) 7.5 mg, Oral, Nightly    multivitamin (THERAGRAN) per tablet 1 tablet, Oral, Daily    prednisoLONE acetate (PRED FORTE) 1 % DrpS Place 1 drop into the right eye 4 (four) times daily for 7 days, THEN 1 drop 3 (three) times daily for 5 days, THEN 1 drop 2 (two) times daily for 5 days, THEN 1 drop 2 (two) times daily for 5 days, THEN 1 drop once daily for 5 days.    QUEtiapine (SEROQUEL) 300 mg, Oral, Nightly    temazepam (RESTORIL) 30 mg capsule TAKE 1 CAPSULE BY MOUTH NIGHTLY AS NEEDED FOR INSOMNIA OR ANXIETY    TURMERIC ORAL Oral, Daily          Objective:   Vitals:   Vitals:    12/28/23 1535   BP: 122/70   Pulse: 71   Resp: 18   Temp: 97.8 °F (36.6 °C)   TempSrc: Oral   SpO2: 100%   Weight: 49.6 kg (109 lb 5.6 oz)   Height: 5' 2" (1.575 m)     BMI: Body mass index is 20 kg/m².    Physical Exam     General Appearance:    Alert, cooperative, no distress, appears stated age   Head:    Normocephalic, without obvious abnormality, atraumatic   Throat:   Lips, mucosa, and tongue normal; teeth and gums normal   Neck:   Supple, symmetrical, no adenopathy;     thyroid:  no enlargement/tenderness/nodules   Lungs:     Clear to auscultation bilaterally, respirations unlabored    " "Heart:    Regular rate and rhythm, S1 and S2 normal   Abdomen:     Soft, non-tender, bowel sounds active, no masses, no organomegaly   Extremities:   Extremities normal, atraumatic, no cyanosis or edema   Pulses:   2+ and symmetric all extremities   Skin:   Skin color, texture, turgor normal, no rashes or lesions   Lymph nodes:   Cervical, supraclavicular, and axillary nodes normal. No adenopathy   Neurologic:   CNII-XII intact, normal strength, sensation    Breast Exam: Bilateral breast normal. No masses, no tenderness, no skin or nipple abnormalities.  No lymphadenopathy.     Laboratory Data:  No visits with results within 1 Week(s) from this visit.   Latest known visit with results is:   Hospital Outpatient Visit on 2023   Component Date Value Ref Range Status    Event Monitor Day 2023 0   Final    holter length minutes 2023 0   Final    Holter length hours 2023 48   Final    holter length dec hours 2023 48.00   Final    Sinus min HR 2023 61   Final    Sinus max hr 2023 124   Final    Sinus avg hr 2023 85   Final     Recent Labs   Lab Result Units 10/13/23  1309   WBC K/uL 3.53*   Hemoglobin g/dL 12.7   Hematocrit % 40.4   Platelets K/uL 257     Recent Labs   Lab Result Units 10/13/23  1309   Creatinine mg/dL 0.7   AST U/L 33   ALT U/L 23     No results for input(s): "IRON", "FERRITIN" in the last 2160 hours.    Imagin23: [place] [facility] [place] No suspicious masses, scattered benign appearing bilateral calcifications - BIRADS: 2, extremely dense breasts  23: Breast ultrasound: stable cysts < 5 mm, small 6 mm intramammary lymph node at 2 OC 7 cm FN, 4 mm cyst at 3 OC in left breast, 6 mm left breast cyst sat 5 OC - BIRADS: 2, benign appearing lesions    Assessment   Assessment:   Gay Gurrola is a 58 y.o. female with PMH of Hypothyroidism, Osteopenia, Bipolar 1/MDD (Seroquel). She presents for consultation to discuss her lifetime risk of " "breast cancer.       * Tyrer-Cuzick (TC) Score: Categories: Average lifetime risk <15%, Intermediate risk 15-19%, and High risk > or = to 20%  Lifetime Risk of Breast Cancer 9.5%     10 year Risk of Breast Cancer. 3.7%     *The Suyapa Model for Breast Cancer Risk:   5-year Breast Cancer Risk 0.5% (Compared with 0.9% for the average 56 y.o. woman)       - According to the American Cancer Society and NCCN guidelines, patients with a lifetime breast cancer risk of 20% or higher, defined by the Tyrer Cuzick score, may benefit from supplemental screening tests (breast MRI). In addition, endocrine therapy is associated with risk reduction of up to 49% in high risk patients defined as Suyapa Model 5-year breast cancer risk of ?1.7% or a 10-year risk by CAT/Kalanier-Cuzick of ?5% or a history of LCIS (NSABP Breast Cancer Prevention Trial (P1-Study))     Per NCCN guidelines, given patient's above average-risk lifetime score, we discussed:  - Imaging:  Annual mammogram   --Pt has extremely dense breast tissue. Discussed that the sensitivity of mammography is inversely correlated with breast density, but unfortunately, there is lack of medical evidence at the current time to routinely recommend supplemental screening with either MRI or ultrasound in average risk women who have dense breasts. American College of Radiology states that "the addition of ultrasound to screening mammography may be useful for incremental cancer detection". After discussion, pt elects to proceed with annual screening 3D mammogram and breast ultrasound  - Breast Exam: Breast exam yearly (OB/GYN); and self breast awareness   - Chemoprevention: Discussed side effects, risks, and benefits of endocrine therapy. Pt elects for observation  - Genetics: Refer given family history  - Lifestyle modifications: discussed (see detailed patient information below)       # Other Co-Morbidities:  - Possible thyroid nodule on exam: thyroid ultrasound   - Hypothyroidism (Dx in " 2012, On Synthroid), Osteopenia (DEXA: 11/2022: T score: -1.3), Bipolar 1/MDD (Seroquel), IBS-Constipation Predominant (Linzess), Possible HURD (CT A/P: 9/2018, negative abdominal ultrasound in 2020), Atypical Chest Pain (normal coronary angiogram in 2018): stable on current medications, per PCP  - Other Cancer Screening: recommend she update all other cancer screening through her PCP -  Colonoscopy: 3/2022 (colon cancer - sister; due in 2027), Hx of refractory H. pylori (EGD: 3/2022 - + for H pylori, negative for metaplasia, dysplasia, or malignancy (stool for eradication never done- pt to follow up with PCP), Pap: 3/2022 (pap: wnl; hpv status unknown)    Follow Up:  - Screening MMG/Breast ultrasound in April 2023 (message with results)  - Pt to follow with Gynecology for annual clinical breast exams  - RTC PRN    1. Dense breasts    2. Family history of breast cancer    3. Osteopenia, unspecified location    4. Family history of colon cancer    5. Hypothyroidism due to Hashimoto's thyroiditis    6. Bipolar disorder in partial remission, most recent episode unspecified type    7. Encounter for screening mammogram for malignant neoplasm of breast      Problem List Items Addressed This Visit          Oncology    Family history of colon cancer       Endocrine    Hypothyroid       Orthopedic    Osteopenia     Other Visit Diagnoses       Dense breasts    -  Primary    Relevant Orders    Mammo Digital Screening Bilat    US Breast Bilateral Complete    Family history of breast cancer        Bipolar disorder in partial remission, most recent episode unspecified type        Encounter for screening mammogram for malignant neoplasm of breast        Relevant Orders    Mammo Digital Screening Bilat            Assessment   _______________________________________________________________________________________________________________________________  DETAILED PATIENT INFORMATION BELOW:   - Discussed with patient that there are  several models available for stratifying breast cancer risk, and Mario-Reyna is presently the model utilized by [facility] and is a model recommended per current NCCN guidelines.  The Suyapa Model for Breast Cancer risk estimates the absolute 5 year risk and lifetime risk of developing breast cancer. Family history includes only first degree relatives with breast cancer, which is not enough information to estimate the risk of a patient having BRCA mutation. It also underestimates the cancer risk for patients with extensive family history. The Suyapa Model is a good predictor of risk for populations but not for individuals. It adjusts risk for race/ethnicity. It may underestimate breast cancer risk in patients with atypical hyperplasia and strong family history. The Suyapa Model was NOT designed to estimate risk for: Women with a prior diagnosis of breast cancer, lobular carcinoma in situ (LCIS), or ductal carcinoma in situ (DCIS); Women who have received previous radiation therapy to the chest for treatment of Hodgkin lymphoma;  Women with gene mutations in BRCA1 or BRCA2, or those who are known to have certain genetic syndromes that increase risk for breast cancer; Women of age <35 or >85. We discussed that there are limitations to every model for risk assessment, particularly that TC can overestimate risk in women with atypical hyperplasia and dense breasts and that Suyapa underestimates risk for those with a strong family history of breast or ovarian cancers as well as non-white women with atypical hyperplasia which can make them appear to not be candidates for risk reducing therapies.    Screening (If TC 20% or greater)   - Semiannual (every 6 months) CBE (clinical breast exam).   - Annual Breast MRI and Annual MMG - alternating q 6 months until 75 or older     The use of MRI for breast cancer detection is based on the concept of  tumor angiogenesis or neovascularity. Tumor-associated blood vessels have  increased permeability, which leads to prompt uptake and release of gadolinium within the first one to two minutes after administration, leading to a pattern of rapid enhancement and washout on MRI.   Bilateral breast examination - Both breasts should be evaluated in an MRI study, for comparison purposes, even when concern about possible pathology involves only one breast.  Contrast - Intravenous gadolinium contrast must be used to maximize cancer detection and is administered before breast MRI to highlight the neovascularity associated with cancers. Contrast is not necessary when the study is performed to evaluate silicone implant integrity.  Allergic and anaphylactoid reactions to gadolinium are rare, but can occur. In addition, in patients with renal failure, gadolinium can cause contrast nephropathy and/or nephrogenic systemic fibrosis.   A few studies have also reported gadolinium deposition in the brain from repeated intravenous administration, with the degree of deposition varying based on the specific contrast agent. The clinical significance of this deposition remains unknown, and no data for humans exist to show any adverse effects or harm at this time.   She understands that she may contact her insurance company with regards to coverage of MRI breasts.   She can not undergo an MRI if pregnant.   We discussed that MRI's may have false positives     FDA  link about Gadolinium exposure:  https://www.fda.gov/drugs/drug-safety-and-availability/fda-drug-safety-communication-fda-identifies-no-harmful-effects-date-brain-retention-gadolinium   https://www.fda.gov/Drugs/DrugSafety/eyi188913.htm        TC to determine MRI and Suyapa to determine chemoprevention   Recommendation for women with elevated TC score:         Recommendation for women with elevated Suyapa Model.         2. Breast Cancer Risk Reduction Therapy   - For women at high risk for breast cancer (defined below in the MD Miguel A Algorithm, see graph  "labeled "risk categories"), endocrine therapy can reduce the risk of invasive and/or in situ breast cancers.   - Risk-reduction agents (ie, tamoxifen, raloxifene, anastrozole, exemestane) are recommended for individuals ?35 years of age only, as the utility of these agents in those younger than 35 years is unknown. Tamoxifen is the only agent indicated for premenopausal patients, whereas all 4 agents may be used in those who are postmenopausal  - Trials:   A. CAT-II: Reyna CHRISTIANSEN, Osbaldo I, Dipesh SEGURA, Ling M, Octaviano J, Jason S, Jayjay C, Roche N, Refugio RE, von Jennifer G, Flaquita B, Karina T, Keith A; CAT-II investigators. Anastrozole for prevention of breast cancer in high-risk postmenopausal women (CAT-II): an international, double-blind, randomised placebo-controlled trial. Lancet. 2014 Mar 22;383(8130):1041-8. doi: 10.1016/-26861366469-3. Epub 2013 Dec 12. Erratum in: Lancet. 2014 Mar 22;3839922):1040. Erratum in: Lancet. 2017 Mar 11;389(34266):1010. PMID: 26613490.     B. [facility] Breast Cancer Prevention Trial (P1-Study):  Randomized clinical trial of healthy individuals aged 60 years or older, aged 35 to 59 years with a 1.7% or greater cumulative 5-year risk for developing breast cancer, or with a history of LCIS, were randomized to tamoxifen or placebo. Tamoxifen 20 mg daily for 5 years has shown to reduce risk of breast cancer by 49%. Among individuals with a history of atypical hyperplasia (ADH/ALH), this dose and duration of tamoxifen was associated with an 86% reduction in breast cancer risk.   - Aromatase inhibitors for 5 years have also shown risk reduction. At current, there is not adequate data to recommend longer courses of therapy more than 5 years for risk reduction.   - The results of the P-1 study showed that treatment with tamoxifen decreased the short-term risk for breast cancer by 49% in healthy individuals aged 35 years or older who had an increased risk for the disease.  The " absolute risk reduction was 21.4 cases per 1000 over 5 years.100 In terms of numbers needed to treat, this corresponds to treatment of 47 individuals with tamoxifen to prevent 1 case of invasive breast cancer. The reduction in invasive breast cancer risk in participants with AH was particularly striking (RR, 0.14; 95% CI, 0.03-0.47) in the initial study analysis, and an RR of 0.25 (95% CI, 0.10-0.52) was found after 7 years of follow-up.   - Above have been shown to lower the risk of breast cancer incidence, however there is no survival benefit in patients who don't have breast cancer.   -  For healthy, high-risk, premenopausal individuals, data regarding the risk/benefit ratio for tamoxifen appear relatively  favorable (category 1). For high-risk postmenopausal individuals, data regarding the risk/benefit ratio for tamoxifen are influenced by age, presence of uterus, or comorbid conditions (category 1)  - Reviewed risks of Tamoxifen side effects include hot flashes, invasive endometrial cancer in women > 49 years of age (2.3/1000 compared to 0.9/1000), cataracts, increased risk of VTE including pulmonary embolism among others.          3. Lifestyle modifications which have shown benefit:  - Limit alcohol consumption to less than 1 drink per day (1 ounce liquor, 6 oz wine, 8 oz beer)  - Avoid smoking.  - Exercise at least 150 minutes per week of moderate intensity aerobic activity or at least 75 minutes of vigorous activity. Exercise can lower the relative risk of breast cancer by ~18-20%.  - Maintain healthy weight and avoid post-menopausal weight gain. Avoid processed foods and eat more lean proteins, fruits and vegetables.   - Discussed available resources including genetic counseling, nutrition, weight management.     4. Factors associated with greater breast cancer risk:  Risk factors are categorized into 2 groups: Modifiable and Non-modifiable. Modifiable risk factors include use of hormones, alcohol, smoking,  diet and exercise. Non-modifiable risk factors include breast density, genetics, chest radiation, previous pregnancies, age of first period, and age of menopause.   -Increasing age -- The risk of breast cancer increases with older age.  -Female sex  -White race (In the [place], the highest breast cancer risk occurs among White women, although breast cancer remains the most common cancer among women of every major ethnic/racial group )  -Weight and body fat in postmenopausal women -- Obesity (defined as body mass index [BMI] ?30 kg/m2) is associated with an overall increase  in morbidity and mortality. However, the risk of breast cancer associated with BMI differs by menopausal status.   ?Postmenopausal women - A higher BMI and/or perimenopausal weight gain have been consistently associated with a higher risk of breast cancer among postmenopausal women. The association between a higher BMI and postmenopausal breast cancer risk may be mediated by higher estrogen levels resulting from the peripheral conversion of estrogen precursors (from adipose tissue) to estrogen    ?Inverse relationship in premenopausal women - Unlike postmenopausal women, an increased BMI is associated with a lower risk of breast cancer in premenopausal women, particularly in early adulthood. In a multicenter analysis using pooled individual-level data from approximately 760,000 premenopausal women from 19 prospective cohorts, there was a 4.2-fold increased risk between the lowest and highest BMI categories (BMI <17 versus ?35) at ages 18 to 24 years. The explanation of this finding remains unclear.  -Tall stature -women who were >175 cm (69 inches) tall were 20 percent more likely to develop breast cancer than those <160 cm (63 inches) tall.  -Benign breast disease  -Dense breast tissue -- The density of breast tissue reflects the relative amount of glandular and connective tissue (parenchyma) to adipose tissue. Women with mammographically  dense breast tissue, generally defined as dense tissue comprising ?75 percent of the breast, have a four to five times higher breast cancer risk compared with women of similar age with less or no dense tissue. Although breast density is a largely inherited trait, other factors can influence density. For example, lower density has been associated with higher levels of physical activity  and with a low-fat, high-carbohydrate diet. In postmenopausal women, estrogen and progesterone increase breast density  while the ER antagonist tamoxifen decreases breast density.  Despite the association of exogenous hormones with breast density, breast density is not strongly correlated with endogenous hormone levels. Breast tend to become more fatty with age.   -Bone mineral density -In multiple studies, women with higher bone density have a higher breast cancer risk  -Hormonal factors   -Reproductive factors -Earlier menarche or later menopause, Nulliparity, Increasing age at first full-term pregnancy.   -Personal and family history of breast cancer  -Alcohol use and smoking  -Exposure to therapeutic ionizing radiation        MDM includes:    - Acute or chronic illness or injury that poses a threat to life or bodily function  - Consideration and discussion of significant complications based on comorbidities  - Review of prior external notes from unique source and review of diagnostic tests and information  - Independent review and explanation of 3+ results from unique tests   - Discussion of management and ordering 3+ unique tests   - Extensive discussion of treatment and management including consideration of possible diagnoses and management options  - Prescription drug management  - Drug therapy requiring intensive monitoring for toxicity    - Patient was fully informed of current medical plan. All questions were answered to the patient's satisfaction and patient verbalized understanding of information and agreement with the  plan. Advised patient to RTC with any interval changes or concerns.    - Overall, I discussed the diagnosis, history, stage, labs/imaging, prognosis, management, and treatment plan as applicable. I reviewed adverse short and long term effects as applicable.   - Informed patient if symptoms are new or worsening, that it is their responsibility to call the clinic and schedule follow up sooner than stated follow up. Also informed patient if they do not hear from the appointment center in 2-5 business days for their referrals, the patient must call the Oncology clinic so we can follow up on procedures or referral scheduling.      - Visit time I spent with the patient:  60 minutes of total time spent on the encounter, including counseling patient and/or coordinating care, which includes face to face time and non-face to face time preparing to see the patient (eg, review of tests, chart review), Obtaining and/or reviewing separately obtained history, Documenting clinical information in the electronic or other health record, Independently interpreting results (not separately reported) and communicating results to the patient/family/caregiver, or Care coordination (not separately reported).       Signed   Betsy Gamino MD  Hematology & Medical Oncology

## 2024-01-02 LAB
OHS CV EVENT MONITOR DAY: 0
OHS CV HOLTER LENGTH DECIMAL HOURS: 48
OHS CV HOLTER LENGTH HOURS: 48
OHS CV HOLTER LENGTH MINUTES: 0
OHS CV HOLTER SINUS AVERAGE HR: 85
OHS CV HOLTER SINUS MAX HR: 124
OHS CV HOLTER SINUS MIN HR: 61

## 2024-01-03 ENCOUNTER — PATIENT MESSAGE (OUTPATIENT)
Dept: HEMATOLOGY/ONCOLOGY | Facility: CLINIC | Age: 59
End: 2024-01-03
Payer: MEDICARE

## 2024-01-03 DIAGNOSIS — E06.3 HYPOTHYROIDISM DUE TO HASHIMOTO'S THYROIDITIS: Primary | ICD-10-CM

## 2024-01-03 DIAGNOSIS — E03.8 HYPOTHYROIDISM DUE TO HASHIMOTO'S THYROIDITIS: Primary | ICD-10-CM

## 2024-01-03 PROBLEM — R10.12 ABDOMINAL PAIN, LUQ: Status: RESOLVED | Noted: 2018-10-23 | Resolved: 2024-01-03

## 2024-01-03 PROBLEM — R52 PAIN: Status: RESOLVED | Noted: 2021-03-01 | Resolved: 2024-01-03

## 2024-01-03 PROBLEM — R63.0 DECREASED APPETITE: Status: RESOLVED | Noted: 2023-11-06 | Resolved: 2024-01-03

## 2024-01-08 NOTE — TELEPHONE ENCOUNTER
----- Message from Virgie Taylor sent at 1/8/2024  8:52 AM CST -----  Contact: [phone]    Rx Refill/Request     Is this a Refill or New Rx:  refill  Rx Name and Strength:  linaCLOtide (LINZESS) 290 mcg Cap capsule  Preferred Pharmacy with phone number:    [facility] [address] [address] [address] [place] [address]  Phone: [phone] Fax: [phone]    Communication Preference:call   Additional Information

## 2024-01-16 ENCOUNTER — TELEPHONE (OUTPATIENT)
Dept: PSYCHIATRY | Facility: CLINIC | Age: 59
End: 2024-01-16
Payer: MEDICARE

## 2024-01-16 RX ORDER — MIRTAZAPINE 7.5 MG/1
7.5 TABLET, FILM COATED ORAL NIGHTLY
Qty: 30 TABLET | Refills: 0 | Status: SHIPPED | OUTPATIENT
Start: 2024-01-16 | End: 2024-01-16 | Stop reason: SDUPTHER

## 2024-01-16 RX ORDER — MIRTAZAPINE 7.5 MG/1
7.5 TABLET, FILM COATED ORAL NIGHTLY
Qty: 30 TABLET | Refills: 0 | Status: SHIPPED | OUTPATIENT
Start: 2024-01-16 | End: 2024-02-23 | Stop reason: ALTCHOICE

## 2024-01-16 RX ORDER — QUETIAPINE FUMARATE 400 MG/1
400 TABLET, FILM COATED ORAL NIGHTLY
Qty: 30 TABLET | Refills: 0 | Status: SHIPPED | OUTPATIENT
Start: 2024-01-16 | End: 2024-02-23 | Stop reason: DRUGHIGH

## 2024-01-16 NOTE — TELEPHONE ENCOUNTER
"Returning patient's call.     Patient is visiting her sons in [place]. She has been taking the remeron 7.5 mg, reports some benefit with sleep.     She is due to refill with seroquel. She reports difficulty with filling in [place] in the past- reports the strength feels lesser when filled in [place]. She remains concerned with "quality of sleep", would like to increase to Seroquel 400 mg, particularly as she feels the dose is not as effective when filled in [place].     Patient reports she is still having issues with sleep. She is currently taking seroquel 300 mg, remeron 7.5 mg, and restoril 30 mg.     Patient reports her appetite is the same with remeron 7.5 mg but has noticed weight gain and is not wanting to increase.         "

## 2024-01-25 DIAGNOSIS — F51.01 PRIMARY INSOMNIA: ICD-10-CM

## 2024-01-25 DIAGNOSIS — F31.70 BIPOLAR DISORDER IN PARTIAL REMISSION, MOST RECENT EPISODE UNSPECIFIED TYPE: ICD-10-CM

## 2024-01-25 RX ORDER — TEMAZEPAM 30 MG/1
CAPSULE ORAL
Qty: 30 CAPSULE | Refills: 0 | Status: SHIPPED | OUTPATIENT
Start: 2024-01-25 | End: 2024-02-23 | Stop reason: SDUPTHER

## 2024-02-01 DIAGNOSIS — F51.01 PRIMARY INSOMNIA: ICD-10-CM

## 2024-02-01 DIAGNOSIS — F31.70 BIPOLAR DISORDER IN PARTIAL REMISSION, MOST RECENT EPISODE UNSPECIFIED TYPE: ICD-10-CM

## 2024-02-01 RX ORDER — TEMAZEPAM 30 MG/1
CAPSULE ORAL
Qty: 30 CAPSULE | Refills: 0 | OUTPATIENT
Start: 2024-02-01

## 2024-02-21 RX ORDER — QUETIAPINE FUMARATE 400 MG/1
400 TABLET, FILM COATED ORAL NIGHTLY
Qty: 30 TABLET | Refills: 0 | Status: CANCELLED | OUTPATIENT
Start: 2024-02-21 | End: 2024-03-22

## 2024-02-22 ENCOUNTER — TELEPHONE (OUTPATIENT)
Dept: PSYCHIATRY | Facility: CLINIC | Age: 59
End: 2024-02-22
Payer: MEDICARE

## 2024-02-23 ENCOUNTER — TELEPHONE (OUTPATIENT)
Dept: PSYCHIATRY | Facility: CLINIC | Age: 59
End: 2024-02-23
Payer: MEDICARE

## 2024-02-23 DIAGNOSIS — F51.01 PRIMARY INSOMNIA: ICD-10-CM

## 2024-02-23 DIAGNOSIS — F31.70 BIPOLAR DISORDER IN PARTIAL REMISSION, MOST RECENT EPISODE UNSPECIFIED TYPE: ICD-10-CM

## 2024-02-23 RX ORDER — TEMAZEPAM 30 MG/1
CAPSULE ORAL
Qty: 30 CAPSULE | Refills: 2 | Status: SHIPPED | OUTPATIENT
Start: 2024-03-02 | End: 2024-03-06 | Stop reason: SDUPTHER

## 2024-02-23 RX ORDER — QUETIAPINE FUMARATE 100 MG/1
100 TABLET, FILM COATED ORAL NIGHTLY
Qty: 30 TABLET | Refills: 2 | Status: SHIPPED | OUTPATIENT
Start: 2024-02-23 | End: 2024-03-06 | Stop reason: DRUGHIGH

## 2024-02-23 NOTE — TELEPHONE ENCOUNTER
"Returning patient's call.     Patient reports she ran out of seroquel 400 mg last week. She refilled 300 mg, as she had a script, and has been taking seroquel 300 mg for the last week.     Patient reports she was sleeping much better on seroquel 400 mg. Since decreasing back to 300 mg, she has been sleeping "horrible."    She also reports that she ran out of mirtazapine and has re-started trazodone 150 mg from past prescriptions.   She states "the trazodone is working", denies any changes in switch.     She denies any mood changes, but is just "a little tired, from lack of sleep."    Will send prescription for seroquel 300 mg and an additional tablet of 100 mg, so patient can trial 350-400 mg.     "

## 2024-03-04 RX ORDER — TRAZODONE HYDROCHLORIDE 150 MG/1
150 TABLET ORAL NIGHTLY
Qty: 30 TABLET | Refills: 0 | Status: SHIPPED | OUTPATIENT
Start: 2024-03-04 | End: 2024-03-06 | Stop reason: SDUPTHER

## 2024-03-06 ENCOUNTER — PATIENT MESSAGE (OUTPATIENT)
Dept: PSYCHIATRY | Facility: CLINIC | Age: 59
End: 2024-03-06
Payer: MEDICARE

## 2024-03-06 DIAGNOSIS — F51.01 PRIMARY INSOMNIA: ICD-10-CM

## 2024-03-06 DIAGNOSIS — F31.70 BIPOLAR DISORDER IN PARTIAL REMISSION, MOST RECENT EPISODE UNSPECIFIED TYPE: ICD-10-CM

## 2024-03-06 RX ORDER — TRAZODONE HYDROCHLORIDE 150 MG/1
150 TABLET ORAL NIGHTLY
Qty: 60 TABLET | Refills: 0 | Status: SHIPPED | OUTPATIENT
Start: 2024-03-06 | End: 2024-04-01 | Stop reason: SDUPTHER

## 2024-03-06 RX ORDER — TEMAZEPAM 30 MG/1
CAPSULE ORAL
Qty: 60 CAPSULE | Refills: 0 | Status: SHIPPED | OUTPATIENT
Start: 2024-03-06 | End: 2024-05-06

## 2024-03-06 RX ORDER — QUETIAPINE FUMARATE 400 MG/1
400 TABLET, FILM COATED ORAL NIGHTLY
Qty: 60 TABLET | Refills: 0 | Status: SHIPPED | OUTPATIENT
Start: 2024-03-06 | End: 2024-05-02

## 2024-03-13 ENCOUNTER — OFFICE VISIT (OUTPATIENT)
Dept: PSYCHIATRY | Facility: CLINIC | Age: 59
End: 2024-03-13
Payer: MEDICARE

## 2024-03-13 VITALS
SYSTOLIC BLOOD PRESSURE: 105 MMHG | BODY MASS INDEX: 20.4 KG/M2 | DIASTOLIC BLOOD PRESSURE: 62 MMHG | WEIGHT: 111.56 LBS | HEART RATE: 89 BPM

## 2024-03-13 DIAGNOSIS — F31.70 BIPOLAR DISORDER IN PARTIAL REMISSION, MOST RECENT EPISODE UNSPECIFIED TYPE: Primary | ICD-10-CM

## 2024-03-13 DIAGNOSIS — F51.01 PRIMARY INSOMNIA: ICD-10-CM

## 2024-03-13 PROCEDURE — 99214 OFFICE O/P EST MOD 30 MIN: CPT | Mod: S$PBB,,, | Performed by: PHYSICIAN ASSISTANT

## 2024-03-13 PROCEDURE — 99213 OFFICE O/P EST LOW 20 MIN: CPT | Mod: PBBFAC | Performed by: PHYSICIAN ASSISTANT

## 2024-03-13 PROCEDURE — 99999 PR PBB SHADOW E&M-EST. PATIENT-LVL III: CPT | Mod: PBBFAC,,, | Performed by: PHYSICIAN ASSISTANT

## 2024-03-13 NOTE — PROGRESS NOTES
"Outpatient Psychiatry Follow-Up Visit (PA)    3/13/2024    Clinical Status of Patient:  Outpatient (Ambulatory)    Chief Complaint:  Gay Gurrola is a 58 y.o. female who presents today for follow-up of mood disorder.  Met with patient.      Current Medications:  Seroquel 400 mg   Restoril 30 mg   Trazodone 150 mg    Interval History and Content of Current Session:  Interim Events/Subjective Report/Content of Current Session:  Patient last seen 11/19/2023    Patient is a 58 year old female with a psychiatric history of bipolar 1 disorder and insomnia.     Pt presents to follow up after increasing to serouqel 400 mg qhs, switching back to trazodone 150 mg after insuccessful trial of remeron.     Patient continues to endorse stress regarding her son who continues to struggle with polysubstance use. She reports he is in therapy and is prescribed suboxone, but continues to use is entire paycheck for substance use. He lives with her other son, who is also struggling due to the youngest sons' addiction. She reports that he is no longer trying to hide his substance use, and her oldest son will come home to him passed out beside drugs. More recently, he has not been able to pay his share of rent and bills due to using on street drugs.     Patient reports his personality has been changing due to increased use, states he has been "rude and disrespectful" and has bene sending aggressive texts. She reports this has been emotionally draining. She and the family are trying to set boundaries, as she identifies "we've enabled him" and states "he's trying to manipulate us."    She reports this has been emotionally draining on her, and also her oldest son who lives with him. She reports he is feeling overwhelmed with the responsibility.     Patient is unsure how to navigate this going forward. Recommend [facility] attend [facility], as she feels she needs guidance and support.     She reports the higher dose of seroquel is " working well. She denies any ASE- denies any grogginess in the morning and reports feeling rested in the morning.     She denies SI/HI/self harm. She is practicing setting boundaries. She plans to attend [employer] meetings by her house.     Psychotherapy:  Target symptoms: anxiety , insomnia, stress  Why chosen therapy is appropriate versus another modality: relevant to diagnosis  Outcome monitoring methods: self-report, observation  Therapeutic intervention type: insight oriented psychotherapy, supportive psychotherapy  Topics discussed/themes: building skills sets for symptom management, symptom recognition, substance abuse by a family member  The patient's response to the intervention is accepting. The patient's progress toward treatment goals is good, fair.   Duration of intervention: 18 minutes.    Review of Systems   PSYCHIATRIC: Pertinant items are noted in the narrative.    Past Medical, Family and Social History: The patient's past medical, family and social history have been reviewed and updated as appropriate within the electronic medical record - see encounter notes.    Compliance: yes    Side effects: None    Risk Parameters:  Patient reports no suicidal ideation  Patient reports no homicidal ideation  Patient reports no self-injurious behavior  Patient reports no violent behavior    Exam (detailed: at least 9 elements; comprehensive: all 15 elements)   Constitutional  Vitals:  Most recent vital signs, dated less than 90 days prior to this appointment, were reviewed.   Vitals:    03/13/24 0909   BP: 105/62   Pulse: 89   Weight: 50.6 kg (111 lb 8.8 oz)        General:  unremarkable, age appropriate, well dressed, neatly groomed     Musculoskeletal  Muscle Strength/Tone:  not examined   Gait & Station:  non-ataxic     Psychiatric  Speech:  no latency; no press   Mood & Affect:  steady  congruent and appropriate   Thought Process:  normal and logical   Associations:  intact   Thought Content:  normal, no  suicidality, no homicidality, delusions, or paranoia   Insight:  intact   Judgement: behavior is adequate to circumstances   Orientation:  grossly intact   Memory: intact for content of interview   Language: grossly intact   Attention Span & Concentration:  able to focus   Fund of Knowledge:  intact and appropriate to age and level of education     Assessment and Diagnosis   Status/Progress: Based on the examination today, the patient's problem(s) is/are adequately but not ideally controlled.  New problems have not been presented today.   Lack of compliance are not complicating management of the primary condition.  There are no active rule-out diagnoses for this patient at this time.     General Impression: Patient is a 58 year old female with a psychiatric history of bipolar 1 disorder and insomnia. The patient reports her depression, mood is well controlled but reports high stress, decreased sleep, decreased appetite. She reports improvement in sleep with increased seroquel 400 mg and denies ASE. She reports high stress due to son's polysubstance use, encouraged to attend [facility].       ICD-10-CM ICD-9-CM   1. Bipolar disorder in partial remission, most recent episode unspecified type  F31.70 296.80   2. Primary insomnia  F51.01 307.42               Intervention/Counseling/Treatment Plan   Medication Management: Continue current medications.   Continue Restoril 30 mg nightly   Continue Seroquel 400 mg nightly  Continue trazodone 150 mg qhs  Discussed with patient informed consent, risks vs. benefits, alternative treatments, side effect profile and the inherent unpredictability of individual responses to these treatments. The patient expresses understanding of the above and displays the capacity to agree with this current plan and had no other questions.  Encouraged patient to keep future appointments.   Encouraged patient to message or call with questions or concerns  Safety plan reviewed with patient for  worsening condition or suicidal ideations. In the event of an emergency patient was advised to go to the emergency room.       Return to Clinic: 2 months

## 2024-03-17 NOTE — TELEPHONE ENCOUNTER
----- Message from Cherry Skaggs sent at 11/28/2018 10:03 AM CST -----  Contact: Self  Called stating that she went to urgent care yesterday with pain rating of 10. She did the stretches that she recommended and she couldn't walk. The medication didn't work so she would like a return call back regarding this matter. Pt could be reached at [phone]  
11am: Called pt and she states she was doing some housework when she started having back pain that was unbearable. She was in tears and could not get out of bed, so her  took her to urgent care, where she had a Toradol shot and was prescribed Norco and flexeril, as well as a medrol dose pack, per pt report. She states the toradol helped and she is feeling better today. She is going to fly out to [place] tomorrow and is concerned about this, stating she will go to the ED there if she has pain and that she needs an MRI of her back. Advised pt that she most likely would not have an MRI done if she went to the ED. Pt states she has 20 pills of flexeril and would like to know if Dr. Ramos would send a refill to a pharmacy in [place] if she runs out. Advised pt that we were in clinic and I would call her back after I spoke with Dr. Ramos.    Pt also continues to stretch and be physically active, even after she was advised not to. Again emphasized rest and the fact that she has been over-stretching and needs to stop. Pt states understanding of this today.     4:45 pm: Called pt after clinic and there was no answer. I left her a message stating that Dr. Ramos already prescribed her a muscle relaxer (Zanaflex, on 11/21) and that she will be unable to prescribe her any more without re-examining her. If pt would like to schedule another appointment we are happy to see her back.   
17-Mar-2024

## 2024-04-01 RX ORDER — TRAZODONE HYDROCHLORIDE 150 MG/1
150 TABLET ORAL NIGHTLY
Qty: 60 TABLET | Refills: 0 | Status: SHIPPED | OUTPATIENT
Start: 2024-04-01 | End: 2024-06-03 | Stop reason: SDUPTHER

## 2024-04-25 ENCOUNTER — HOSPITAL ENCOUNTER (OUTPATIENT)
Dept: RADIOLOGY | Facility: OTHER | Age: 59
Discharge: HOME | End: 2024-04-25
Attending: INTERNAL MEDICINE
Payer: MEDICARE

## 2024-04-25 DIAGNOSIS — E03.8 HYPOTHYROIDISM DUE TO HASHIMOTO'S THYROIDITIS: ICD-10-CM

## 2024-04-25 DIAGNOSIS — Z12.31 ENCOUNTER FOR SCREENING MAMMOGRAM FOR MALIGNANT NEOPLASM OF BREAST: ICD-10-CM

## 2024-04-25 DIAGNOSIS — R92.30 DENSE BREASTS: ICD-10-CM

## 2024-04-25 DIAGNOSIS — E06.3 HYPOTHYROIDISM DUE TO HASHIMOTO'S THYROIDITIS: ICD-10-CM

## 2024-04-25 PROCEDURE — 77063 BREAST TOMOSYNTHESIS BI: CPT | Mod: TC

## 2024-04-25 PROCEDURE — 77067 SCR MAMMO BI INCL CAD: CPT | Mod: 26,,, | Performed by: RADIOLOGY

## 2024-04-25 PROCEDURE — 77063 BREAST TOMOSYNTHESIS BI: CPT | Mod: 26,,, | Performed by: RADIOLOGY

## 2024-04-25 PROCEDURE — 76536 US EXAM OF HEAD AND NECK: CPT | Mod: 26,,, | Performed by: RADIOLOGY

## 2024-04-25 PROCEDURE — 76536 US EXAM OF HEAD AND NECK: CPT | Mod: TC

## 2024-04-30 DIAGNOSIS — E03.9 HYPOTHYROIDISM, UNSPECIFIED TYPE: ICD-10-CM

## 2024-04-30 RX ORDER — LEVOTHYROXINE SODIUM 25 UG/1
TABLET ORAL
Qty: 90 TABLET | Refills: 1 | Status: SHIPPED | OUTPATIENT
Start: 2024-04-30

## 2024-04-30 RX ORDER — LEVOTHYROXINE SODIUM 13 UG/1
CAPSULE ORAL
Qty: 90 CAPSULE | Refills: 1 | Status: SHIPPED | OUTPATIENT
Start: 2024-04-30

## 2024-04-30 NOTE — TELEPHONE ENCOUNTER
Refill Decision Note   Gay Gurrola  is requesting a refill authorization.  Brief Assessment and Rationale for Refill:  Approve     Medication Therapy Plan: TSH; PER EPIC DATA PT IS TAKING 13 MCG ALONG WITH 25 MCG      Alert overridden per protocol: Yes   Comments:     Note composed:2:25 PM 04/30/2024

## 2024-04-30 NOTE — TELEPHONE ENCOUNTER
No care due was identified.  [facility] Embedded Care Due Messages. Reference number: [id number].   4/30/2024 7:02:13 AM CDT

## 2024-05-01 ENCOUNTER — PATIENT MESSAGE (OUTPATIENT)
Dept: PRIMARY CARE CLINIC | Facility: CLINIC | Age: 59
End: 2024-05-01
Payer: MEDICARE

## 2024-05-02 RX ORDER — QUETIAPINE FUMARATE 400 MG/1
400 TABLET, FILM COATED ORAL NIGHTLY
Qty: 60 TABLET | Refills: 0 | Status: SHIPPED | OUTPATIENT
Start: 2024-05-02 | End: 2024-06-19 | Stop reason: SDUPTHER

## 2024-05-02 NOTE — TELEPHONE ENCOUNTER
Pt called in states she received a message she needed to submit an updated insurance card for a PA , she states she has done so and she has been having the same insurance for the past 4 years she doesn't understand why not she needs to do any of this ?

## 2024-05-07 ENCOUNTER — TELEPHONE (OUTPATIENT)
Dept: OBSTETRICS AND GYNECOLOGY | Facility: CLINIC | Age: 59
End: 2024-05-07
Payer: MEDICARE

## 2024-05-07 NOTE — NURSING
High Risk Breast Cancer interested in seeing Dr. Mora for recommendations regarding HRT. LMP 5/1/2023. Patient is confirmed post-menopausal by labs.     Denies VMS  Taking Trazodone for sleep, no breakthrough symptoms  Denies vaginal dryness and discomfort during intercourse  Mentions decline in arousal and sensation vaginally  Maintains sexual desire  Remains sexually active    Interested in discussing cardiac and bone health benefits of HRT.     Desires to establish gynecologic care with Dr. Mora following HRT visit as well as referral to High-Risk Breast Surgery to ensure Mammo/US and MRI are scheduled per standard of care.     Appt confirmed 5/9/24 at 1330 with Brandi Helton, RN.

## 2024-05-08 ENCOUNTER — TELEPHONE (OUTPATIENT)
Dept: OBSTETRICS AND GYNECOLOGY | Facility: CLINIC | Age: 59
End: 2024-05-08
Payer: MEDICARE

## 2024-05-08 NOTE — NURSING
MARY Mills spoke to patient and expressed apologies for 5/9/24 cancellation, stated necessity for re-scheduling Dr. Mora's appt (medical emergency in family). Patient appreciative, will re-schedule at earliest convenience, MARY Paz.

## 2024-05-15 ENCOUNTER — TELEPHONE (OUTPATIENT)
Dept: OBSTETRICS AND GYNECOLOGY | Facility: CLINIC | Age: 59
End: 2024-05-15
Payer: MEDICARE

## 2024-05-15 NOTE — NURSING
Confirmed 5/23/24 at 9:30am with Dr. Mora, re-scheduled x 3 for high risk breast cancer, desires to discuss HRT for heart and bone health. See prior tele encounters. MARY Paz.

## 2024-05-23 ENCOUNTER — LAB VISIT (OUTPATIENT)
Dept: LAB | Facility: OTHER | Age: 59
End: 2024-05-23
Attending: OBSTETRICS & GYNECOLOGY
Payer: MEDICARE

## 2024-05-23 ENCOUNTER — OFFICE VISIT (OUTPATIENT)
Dept: OBSTETRICS AND GYNECOLOGY | Facility: CLINIC | Age: 59
End: 2024-05-23
Attending: OBSTETRICS & GYNECOLOGY
Payer: MEDICARE

## 2024-05-23 VITALS
WEIGHT: 110 LBS | BODY MASS INDEX: 20.24 KG/M2 | HEIGHT: 62 IN | HEART RATE: 83 BPM | DIASTOLIC BLOOD PRESSURE: 73 MMHG | SYSTOLIC BLOOD PRESSURE: 118 MMHG

## 2024-05-23 DIAGNOSIS — N95.1 MENOPAUSAL SYMPTOM: ICD-10-CM

## 2024-05-23 DIAGNOSIS — N95.1 MENOPAUSAL SYMPTOM: Primary | ICD-10-CM

## 2024-05-23 LAB
ESTRADIOL SERPL-MCNC: <10 PG/ML
TESTOST SERPL-MCNC: 15 NG/DL (ref 5–73)

## 2024-05-23 PROCEDURE — 99203 OFFICE O/P NEW LOW 30 MIN: CPT | Mod: S$PBB,,, | Performed by: OBSTETRICS & GYNECOLOGY

## 2024-05-23 PROCEDURE — 84402 ASSAY OF FREE TESTOSTERONE: CPT | Performed by: OBSTETRICS & GYNECOLOGY

## 2024-05-23 PROCEDURE — 99213 OFFICE O/P EST LOW 20 MIN: CPT | Mod: PBBFAC | Performed by: OBSTETRICS & GYNECOLOGY

## 2024-05-23 PROCEDURE — 82670 ASSAY OF TOTAL ESTRADIOL: CPT | Performed by: OBSTETRICS & GYNECOLOGY

## 2024-05-23 PROCEDURE — 36415 COLL VENOUS BLD VENIPUNCTURE: CPT | Performed by: OBSTETRICS & GYNECOLOGY

## 2024-05-23 PROCEDURE — 99999 PR PBB SHADOW E&M-EST. PATIENT-LVL III: CPT | Mod: PBBFAC,,, | Performed by: OBSTETRICS & GYNECOLOGY

## 2024-05-23 PROCEDURE — 84403 ASSAY OF TOTAL TESTOSTERONE: CPT | Performed by: OBSTETRICS & GYNECOLOGY

## 2024-05-23 RX ORDER — ESTRADIOL 0.04 MG/D
1 FILM, EXTENDED RELEASE TRANSDERMAL
Qty: 8 PATCH | Refills: 11 | Status: SHIPPED | OUTPATIENT
Start: 2024-05-23 | End: 2025-05-23

## 2024-05-23 RX ORDER — PROGESTERONE 100 MG/1
100 CAPSULE ORAL NIGHTLY
Qty: 30 CAPSULE | Refills: 11 | Status: SHIPPED | OUTPATIENT
Start: 2024-05-23 | End: 2025-05-23

## 2024-05-23 NOTE — PROGRESS NOTES
Subjective:      Gay Gurrola is a 58 y.o. female who presents to discuss hormone replacement therapy. the patient went into menopause at 56 years of age,  Patient is requesting hormone replacement therapy due to minimization of heart disease and minimization of osteoporosis, decreased libido.The patient is not taking hormone replacement therapy. Patient denies post-menopausal vaginal bleeding. The patient is sexually active.  She denies the following contraindications to HRT:  Vaginal bleeding, history of VTE/PE, thrombophilia,  breast cancer, or active liver disease.     She has osteopenia- diagnosed in her 40's  She has difficulty sleeping- is on medication  She is not high risk for breast cancer  Bhavana Lopez MD           [unfilled]   Hemoglobin A1C   Date Value Ref Range Status   10/13/2023 4.8 4.0 - 5.6 % Final     Comment:     ADA Screening Guidelines:  5.7-6.4%  Consistent with prediabetes  >or=6.5%  Consistent with diabetes    High levels of fetal hemoglobin interfere with the HbA1C  assay. Heterozygous hemoglobin variants (HbS, HgC, etc)do  not significantly interfere with this assay.   However, presence of multiple variants may affect accuracy.     07/12/2022 5.1 4.0 - 6.0 % Final           Pap smear: 3/21/2022  Mammogram: 4/25/2024 negative  TC 9.32%  DEXA: 11/15/2022 osteopenia      No visits with results within 3 Month(s) from this visit.   Latest known visit with results is:   Hospital Outpatient Visit on 12/19/2023   Component Date Value Ref Range Status    Event Monitor Day 12/19/2023 0   Final    holter length minutes 12/19/2023 0   Final    Holter length hours 12/19/2023 48   Final    holter length dec hours 12/19/2023 48.00   Final    Sinus min HR 12/19/2023 61   Final    Sinus max hr 12/19/2023 124   Final    Sinus avg hr 12/19/2023 85   Final       Past Medical History:   Diagnosis Date    Abnormal coronary angiogram     Alcohol abuse, in remission     Alcohol related  seizure 2012    Anxiety     Bipolar affective     since teenage years    Chronic idiopathic constipation     Depression     H. pylori infection     Headache(784.0)     followed by Dr. Corona    Hypothyroid     Memory loss     Osteopenia     Seizures      Past Surgical History:   Procedure Laterality Date    BUNIONECTOMY      COLONOSCOPY N/A 2017    Procedure: COLONOSCOPY;  Surgeon: Estuardo Salazar MD;  [facility]  Service: Endoscopy;  Laterality: N/A;  PM prep    COLONOSCOPY N/A 2022    Procedure: COLONOSCOPY;  Surgeon: Estuardo Salazar MD;  Location: [address]);  Service: Endoscopy;  Laterality: N/A;  suprep    ESOPHAGOGASTRODUODENOSCOPY N/A 10/23/2018    Procedure: EGD (ESOPHAGOGASTRODUODENOSCOPY);  Surgeon: Irlanda Schwarz MD;  [facility]  Service: Endoscopy;  Laterality: N/A;    ESOPHAGOGASTRODUODENOSCOPY N/A 2022    Procedure: ESOPHAGOGASTRODUODENOSCOPY (EGD);  Surgeon: Estuardo Salazar MD;  Location: [facility] ([address] [place] [address]);  Service: Endoscopy;  Laterality: N/A;  Pt informed to bring official paper documentation of COVID results w/ test date/name/ . Informed Pt that procedure will not be done w/o this documentation. Pt informed COVID test must be PCR or Rapid RNA.  instructions sent to [facility]    hemrrhoidectomy      TUBAL LIGATION       Social History     Tobacco Use    Smoking status: Never    Smokeless tobacco: Never   Substance Use Topics    Alcohol use: No     Comment: History of alcoholism in remission.    Drug use: No     Family History   Problem Relation Name Age of Onset    Alzheimer's disease Maternal Grandmother      Dementia Maternal Grandmother      Hypertension Father      Coronary artery disease Father      Kidney disease Father      Diabetes Father      Stroke Mother      Diabetes Mother      Anuerysm Mother          brain    Insomnia Mother      Alzheimer's disease Mother      Depression Mother      Alzheimer's disease Sister  2     Dementia Sister 2 59    Uterine cancer Sister      Colon cancer Sister  64    Breast cancer Maternal Aunt  60    Cancer Maternal Aunt          breast    Alzheimer's disease Maternal Aunt      Crohn's disease Maternal Aunt      Dementia Maternal Aunt      Crohn's disease Other      Melanoma Neg Hx      Psoriasis Neg Hx      Lupus Neg Hx      Esophageal cancer Neg Hx      Cervical cancer Neg Hx       OB History    Para Term  AB Living   2 2 2 0 0 2   SAB IAB Ectopic Multiple Live Births                  # Outcome Date GA Lbr Chilo/2nd Weight Sex Type Anes PTL Lv   2 Term      Vag-Spont      1 Term      Vag-Spont          Current Outpatient Medications:     Ca-D3-mag#11-zinc-cupr-man-bor 600 mg calcium- 800 unit-50 mg Tab, Take 1 tablet by mouth once daily., Disp: , Rfl:     cholecalciferol, vitamin D3, (VITAMIN D3) 25 mcg (1,000 unit) capsule, Take 1,000 Units by mouth once daily., Disp: , Rfl:     cyanocobalamin, vitamin B-12, 5,000 mcg Subl, Place under the tongue once daily., Disp: , Rfl:     fish oil-omega-3 fatty acids 300-1,000 mg capsule, Take 1 capsule by mouth once daily., Disp: , Rfl:     levothyroxine (SYNTHROID) 25 MCG tablet, TAKE 1 TABLET BY MOUTH ONCE DAILY WITH  13MCG  FOR  A TOTAL  OF  38  MCG, Disp: 90 tablet, Rfl: 1    levothyroxine 13 mcg Cap, TAKE 1 CAPSULE BY MOUTH ONCE DAILY WITH  25  MCG  FOR  A  TOTAL  OF  38  MCG, Disp: 90 capsule, Rfl: 1    linaCLOtide (LINZESS) 290 mcg Cap capsule, Take 1 capsule (290 mcg total) by mouth once daily., Disp: 90 capsule, Rfl: 3    multivitamin (THERAGRAN) per tablet, Take 1 tablet by mouth once daily., Disp: , Rfl:     QUEtiapine (SEROQUEL) 400 MG tablet, Take 1 tablet by mouth in the evening, Disp: 60 tablet, Rfl: 0    traZODone (DESYREL) 150 MG tablet, Take 1 tablet (150 mg total) by mouth every evening., Disp: 60 tablet, Rfl: 0    TURMERIC ORAL, Take by mouth once daily., Disp: , Rfl:     Vitals:    24 0925   BP: 118/73   Pulse: 83  "  Weight: 49.9 kg (110 lb)   Height: 5' 2" (1.575 m)   PainSc: 0-No pain     Body mass index is 20.12 kg/m².    ROS:  Constitutional: no weight loss, weight gain, fever, fatigue  Eyes:  No vision changes, glasses/contacts  ENT/Mouth: No ulcers, sinus problems, ears ringing, headache  Cardiovascular: No inability to lie flat, chest pain, exercise intolerance, swelling, heart palpitations  Respiratory: No wheezing, coughing blood, shortness of breath, or cough  Gastrointestinal: No diarrhea, bloody stool, nausea/vomiting, constipation, gas, hemorrhoids  Genitourinary: No blood in urine, painful urination, urgency of urination, frequency of urination, incomplete emptying, incontinence, abnormal bleeding, painful periods, heavy periods, vaginal discharge, vaginal odor, painful intercourse, sexual problems, bleeding after intercourse.  Musculoskeletal: No muscle weakness  Skin/Breast: No painful breasts, nipple discharge, masses, rash, ulcers  Neurological: No passing out, seizures, numbness, headache  Endocrine: No diabetes, +hypothyroid, hyperthyroid, hot flashes, hair loss, abnormal hair growth, acne  Psychiatric: No depression, crying, +bipolar, history of alcohol abuse  Hematologic: No bruises, bleeding, swollen lymph nodes, anemia.      PE  deferred  Assessment:    Menopausal symptom        Plan:   Risks and benefits of hormone replacement therapy were discussed.  Hormone replacement therapy options, including bioidentical versus non-bioidentical hormones, as well as alternatives discussed.    Start:   Estradiol Vivelle Dot .0375mg/d twice weekly   Progesterone 100 mg orally QPM  Will consider testosterone in the future  Baseline labs today        Follow up in 3 months.  We will see how she is doing on the Vivelle-Dot and Prometrium and will consider adding testosterone at that time  Counseled her that she does not meet criteria for MRI given that she is not at high-risk for breast cancer with a tired q.6 score of " 9.32%.  She was seen by Oncology and this was confirmed    Total time 30 minutes.  Face-to-face, review of medical record and arranging follow-up

## 2024-05-28 LAB — TESTOST FREE SERPL-MCNC: 0.5 PG/ML

## 2024-06-03 ENCOUNTER — OFFICE VISIT (OUTPATIENT)
Dept: OPTOMETRY | Facility: CLINIC | Age: 59
End: 2024-06-03
Payer: MEDICARE

## 2024-06-03 ENCOUNTER — CLINICAL SUPPORT (OUTPATIENT)
Dept: OPHTHALMOLOGY | Facility: CLINIC | Age: 59
End: 2024-06-03
Payer: MEDICARE

## 2024-06-03 DIAGNOSIS — R76.8 POSITIVE ANA (ANTINUCLEAR ANTIBODY): ICD-10-CM

## 2024-06-03 DIAGNOSIS — H52.203 MYOPIA WITH ASTIGMATISM AND PRESBYOPIA, BILATERAL: ICD-10-CM

## 2024-06-03 DIAGNOSIS — E03.9 HYPOTHYROIDISM, UNSPECIFIED TYPE: ICD-10-CM

## 2024-06-03 DIAGNOSIS — H04.123 DRY EYE SYNDROME OF BOTH EYES: ICD-10-CM

## 2024-06-03 DIAGNOSIS — H40.013 OAG (OPEN ANGLE GLAUCOMA) SUSPECT, LOW RISK, BILATERAL: ICD-10-CM

## 2024-06-03 DIAGNOSIS — H52.13 MYOPIA WITH ASTIGMATISM AND PRESBYOPIA, BILATERAL: ICD-10-CM

## 2024-06-03 DIAGNOSIS — H52.4 MYOPIA WITH ASTIGMATISM AND PRESBYOPIA, BILATERAL: ICD-10-CM

## 2024-06-03 DIAGNOSIS — H40.013 OAG (OPEN ANGLE GLAUCOMA) SUSPECT, LOW RISK, BILATERAL: Primary | ICD-10-CM

## 2024-06-03 PROCEDURE — 92014 COMPRE OPH EXAM EST PT 1/>: CPT | Mod: S$PBB,,, | Performed by: OPTOMETRIST

## 2024-06-03 PROCEDURE — 99213 OFFICE O/P EST LOW 20 MIN: CPT | Mod: PBBFAC,PO | Performed by: OPTOMETRIST

## 2024-06-03 PROCEDURE — 99999 PR PBB SHADOW E&M-EST. PATIENT-LVL III: CPT | Mod: PBBFAC,,, | Performed by: OPTOMETRIST

## 2024-06-03 RX ORDER — TRAZODONE HYDROCHLORIDE 150 MG/1
150 TABLET ORAL NIGHTLY
Qty: 60 TABLET | Refills: 0 | Status: SHIPPED | OUTPATIENT
Start: 2024-06-03 | End: 2024-06-19 | Stop reason: SDUPTHER

## 2024-06-03 NOTE — LETTER
[facility]  [address].  [place] [address]  Phone: [phone]  Fax: [phone]   Christina 3, 2024    Gay Gurrola  [address] [place] [address]    Patient: Gay Gurrola   MR Number: [id number]   YOB: 1965   Date of Visit: 6/3/2024       Dear Dr. Lai,     I am referring Gay Gurrola to you for evaluation. Here is my assessment and plan of care:    Assessment   OAG (open angle glaucoma) suspect, low risk, bilateral  (+) FHx- sister, mother (optic nerve rupture?) IOP 12 OD, OS. Last 13 OD 14 OS. C/d 0.55 OD, 0.45 OS. Pachy 510 OD, 515 OS.  6/3/2024 OCt WNL OU  6/3/2024 HVf OD low test reliability, abnormally high sensitivity, OS excessive high false positives, abnormally high sensitivity  Educated pt on findings w/understanding.   No e/o glaucoma  RTC 1 year Routine    Dry eye syndrome of both eyes  Pt reports eye irritation. Increase Refresh to TID-QID OU.     Hypothyroidism, unspecified type  Positive RANJAN (antinuclear antibody)  H/o Episcleritis in 2022 w/inflammatory blood workup. Pt revealed positive RANJAN and was dx w/hypothyroidism by Rheumatologist. Pt has since talked to Dr Joshua Lai and would like to be referred to him for care. Letter of referral printed.     Myopia with astigmatism and presbyopia, bilateral  Cont w/oTC readers. Normal ocular health. RTC 1 year for routine exam.       If you have questions, please do not hesitate to call me. I look forward to following Ms. Gay Gurrola along with you.    Sincerely,        Mario Priest, OD       CC  No Recipients

## 2024-06-03 NOTE — PROGRESS NOTES
HPI    ROSALINDA: 10/23 for episcleritis  Chief complaint (CC): Patient is here for annual eye exam with HVF and OCT   today.  Patient hasn't noticed any vision changes since the last exam.    Wears OTC readers, seems to work fine. Patient would like a prescription   for glasses.  Patient has had one flare up of episcleritis sine she was   here last October.  Patient has trouble with redness and eye pain.  Glasses? +2.50 OTC  Contacts? -  H/o eye surgery, injections or laser: -  H/o eye injury: -  Known eye conditions? See above  Family h/o eye conditions? Sister with glaucoma, Mother's optic nerve   damage  Eye gtts? Lumify prn, Refresh qam prn      (-) Flashes (-)  Floaters (-) Mucous   (-)  Tearing (-) Itching (-) Burning   (-) Headaches (+) Eye Pain/discomfort (-) Irritation   (+)  Redness (-) Double vision (-) Blurry vision    Diabetic? -  A1c? -      Last edited by Mario Priest, OD on 6/3/2024 11:37 AM.            Assessment /Plan     For exam results, see Encounter Report.    OAG (open angle glaucoma) suspect, low risk, bilateral  (+) FHx- sister, mother (optic nerve rupture?) IOP 12 OD, OS. Last 13 OD 14 OS. C/d 0.55 OD, 0.45 OS. Pachy 510 OD, 515 OS.  6/3/2024 OCt WNL OU  6/3/2024 HVf OD low test reliability, abnormally high sensitivity, OS excessive high false positives, abnormally high sensitivity  Educated pt on findings w/understanding.   No e/o glaucoma  RTC 1 year Routine    Dry eye syndrome of both eyes  Pt reports eye irritation. Increase Refresh to TID-QID OU.     Hypothyroidism, unspecified type  Positive RANJAN (antinuclear antibody)  H/o Episcleritis in 2022 w/inflammatory blood workup. Pt revealed positive RANJAN and was dx w/hypothyroidism by Rheumatologist. Pt has since talked to Dr Joshua Lai and would like to be referred to him for care. Letter of referral printed.     Myopia with astigmatism and presbyopia, bilateral  Cont w/oTC readers. Normal ocular health. RTC 1 year for routine exam.

## 2024-06-03 NOTE — PROGRESS NOTES
OCT/HVF done ou/rel/fix/coop. Good ou./ chart checked for latex allergy./ plano + 1.00 x 80/od -.75 + 1.25 x 90/os-[facility]

## 2024-06-05 ENCOUNTER — OFFICE VISIT (OUTPATIENT)
Dept: PRIMARY CARE CLINIC | Facility: CLINIC | Age: 59
End: 2024-06-05
Payer: MEDICARE

## 2024-06-05 VITALS
HEART RATE: 88 BPM | HEIGHT: 62 IN | SYSTOLIC BLOOD PRESSURE: 110 MMHG | DIASTOLIC BLOOD PRESSURE: 62 MMHG | BODY MASS INDEX: 20.29 KG/M2 | WEIGHT: 110.25 LBS | RESPIRATION RATE: 14 BRPM | OXYGEN SATURATION: 96 %

## 2024-06-05 DIAGNOSIS — Z82.0 FAMILY HISTORY OF ALZHEIMER'S DISEASE: ICD-10-CM

## 2024-06-05 DIAGNOSIS — E03.9 HYPOTHYROIDISM, UNSPECIFIED TYPE: ICD-10-CM

## 2024-06-05 DIAGNOSIS — M85.80 OSTEOPENIA, UNSPECIFIED LOCATION: ICD-10-CM

## 2024-06-05 DIAGNOSIS — K58.1 IRRITABLE BOWEL SYNDROME WITH CONSTIPATION: ICD-10-CM

## 2024-06-05 DIAGNOSIS — Z00.00 ANNUAL PHYSICAL EXAM: Primary | ICD-10-CM

## 2024-06-05 DIAGNOSIS — Z78.0 POST-MENOPAUSAL: ICD-10-CM

## 2024-06-05 DIAGNOSIS — Z80.0 FAMILY HISTORY OF COLON CANCER: ICD-10-CM

## 2024-06-05 DIAGNOSIS — F31.9 BIPOLAR I DISORDER: ICD-10-CM

## 2024-06-05 DIAGNOSIS — R76.8 POSITIVE ANA (ANTINUCLEAR ANTIBODY): ICD-10-CM

## 2024-06-05 PROCEDURE — 99999 PR PBB SHADOW E&M-EST. PATIENT-LVL V: CPT | Mod: PBBFAC,,, | Performed by: FAMILY MEDICINE

## 2024-06-05 PROCEDURE — 99396 PREV VISIT EST AGE 40-64: CPT | Mod: S$PBB,GZ,, | Performed by: FAMILY MEDICINE

## 2024-06-05 PROCEDURE — 99215 OFFICE O/P EST HI 40 MIN: CPT | Mod: PBBFAC,PN | Performed by: FAMILY MEDICINE

## 2024-06-05 RX ORDER — MULTIVIT WITH MINERALS/HERBS
1 TABLET ORAL DAILY
COMMUNITY

## 2024-06-05 NOTE — PROGRESS NOTES
Subjective:       Patient ID: Gay Gurrola is a 58 y.o. female.    Chief Complaint: Annual Exam    HPI  59 y/o female with bipolar d/o, insomnia, osteopenia, IBS, hx of Covid, thyroid disease, family hx of colon cancer in sister, family hx of Alzheimers dx, positive jd, alcohol abuse in remission is here for annual exam.     She is feeling good, she has been on progesterone and estradiol patch for the past 2 weeks and she feels much better, she is not longer feeling agitated, sleep is better, calmer overall. She is eating healthy and exercising regularly. She denies f/n/v/d/constipation/cp/sob/urinary sx. She has achy epigastric discomfort once every 2 weeks for about 6 years, worse after meals and when laying down, 3/10, typically lasts a day and resolves, she takes tumeric which she feels helps. Last work up was in 2020, abdominal US normal and labs ok.     She would like a referral to Rheum Dr. Joshua Lai       Hx of possible Covid 1/2022 never took a test, cough, fever, feels she fully recovered  Bipolar d/o/Insomnia: following with Psychiatry PA; Trazodone 150 mg nightly, Seroquel 300 mg nightly, Restoril 30 mg nightly  Chronic L leg pain, was told she had a tear in her hamstring, did not recommend surgery at the time  Thyroid disease: has been evaluated by Dr. Lira in the past, levothyroxine 25 mcg + 13 mcg daily, no hx of thyroid US  IBS w Constipation/Hx of H pylori/family hx of colon cancer in sister: following with Dr. Salazar, Colonoscopy 2022 repeat 5 years, EGD ok, linzess 290 mg daily  Hx of H. Pylori  Osteopenia: dexa 11/2022 stable, D3 2,000 IU every other day, calcium daily  Hx of Seizure, may have been from alcohol withdrawal over 15 years ago  GYN: following with Dr. Mora, pap and pelvic utd, mmg 4/2024  Family hx of Alzheimers in mom and sister and aunt: hx of neuropsych testing 12/2020, genetic evaluation reassuring  Positive JD: rheumatology prn only  Sebacous cyst of  "scalp/AKs: has not followed here recently  Eye exam: following with Dr. Priest, episcleritis  Dental utd     Review of Systems    Objective:      /62 (BP Location: Left arm, Patient Position: Sitting, BP Method: Medium (Manual))   Pulse 88   Resp 14   Ht 5' 2" (1.575 m)   Wt 50 kg (110 lb 3.7 oz)   LMP 10/07/2022 Comment: pointing  SpO2 96%   BMI 20.16 kg/m²   Physical Exam  Vitals and nursing note reviewed.   Constitutional:       Appearance: She is well-developed.   HENT:      Head: Normocephalic and atraumatic.      Right Ear: Tympanic membrane normal.      Left Ear: Tympanic membrane normal.      Mouth/Throat:      Pharynx: No oropharyngeal exudate or posterior oropharyngeal erythema.   Neck:      Thyroid: No thyromegaly.   Cardiovascular:      Rate and Rhythm: Normal rate and regular rhythm.      Heart sounds: Normal heart sounds.   Pulmonary:      Effort: Pulmonary effort is normal. No respiratory distress.      Breath sounds: Normal breath sounds.   Abdominal:      General: Bowel sounds are normal. There is no distension.      Palpations: Abdomen is soft. There is no mass.      Tenderness: There is no abdominal tenderness.   Musculoskeletal:      Cervical back: Normal range of motion and neck supple.      Right lower leg: No edema.      Left lower leg: No edema.   Lymphadenopathy:      Cervical: No cervical adenopathy.   Skin:     General: Skin is warm and dry.   Neurological:      Mental Status: She is alert.         Assessment:       1. Annual physical exam    2. Osteopenia, unspecified location    3. Post-menopausal    4. Positive RANJAN (antinuclear antibody)    5. Irritable bowel syndrome with constipation    6. Hypothyroidism, unspecified type    7. Family history of colon cancer    8. Family history of Alzheimer's disease    9. Bipolar I disorder        Plan:   Gay was seen today for annual exam.    Diagnoses and all orders for this visit:    Annual physical exam    Osteopenia, " unspecified location  -     DXA Bone Density Axial Skeleton 1 or more sites; Future    Post-menopausal  -     DXA Bone Density Axial Skeleton 1 or more sites; Future    Positive RANJAN (antinuclear antibody)  -     Ambulatory referral/consult to Rheumatology; Future    Irritable bowel syndrome with constipation    Hypothyroidism, unspecified type    Family history of colon cancer    Family history of Alzheimer's disease    Bipolar I disorder

## 2024-06-19 ENCOUNTER — OFFICE VISIT (OUTPATIENT)
Dept: PSYCHIATRY | Facility: CLINIC | Age: 59
End: 2024-06-19
Payer: MEDICARE

## 2024-06-19 VITALS
SYSTOLIC BLOOD PRESSURE: 100 MMHG | DIASTOLIC BLOOD PRESSURE: 63 MMHG | WEIGHT: 108.44 LBS | HEART RATE: 80 BPM | BODY MASS INDEX: 19.84 KG/M2

## 2024-06-19 DIAGNOSIS — F43.23 ADJUSTMENT DISORDER WITH MIXED ANXIETY AND DEPRESSED MOOD: ICD-10-CM

## 2024-06-19 DIAGNOSIS — F31.70 BIPOLAR DISORDER IN PARTIAL REMISSION, MOST RECENT EPISODE UNSPECIFIED TYPE: ICD-10-CM

## 2024-06-19 DIAGNOSIS — F51.01 PRIMARY INSOMNIA: Primary | ICD-10-CM

## 2024-06-19 PROCEDURE — 99999 PR PBB SHADOW E&M-EST. PATIENT-LVL III: CPT | Mod: PBBFAC,,, | Performed by: PHYSICIAN ASSISTANT

## 2024-06-19 PROCEDURE — 99214 OFFICE O/P EST MOD 30 MIN: CPT | Mod: S$PBB,,, | Performed by: PHYSICIAN ASSISTANT

## 2024-06-19 PROCEDURE — 99213 OFFICE O/P EST LOW 20 MIN: CPT | Mod: PBBFAC | Performed by: PHYSICIAN ASSISTANT

## 2024-06-19 RX ORDER — QUETIAPINE FUMARATE 400 MG/1
400 TABLET, FILM COATED ORAL NIGHTLY
Qty: 90 TABLET | Refills: 0 | Status: SHIPPED | OUTPATIENT
Start: 2024-06-19

## 2024-06-19 RX ORDER — TRAZODONE HYDROCHLORIDE 150 MG/1
150 TABLET ORAL NIGHTLY
Qty: 90 TABLET | Refills: 0 | Status: SHIPPED | OUTPATIENT
Start: 2024-06-19 | End: 2024-09-17

## 2024-06-19 RX ORDER — TEMAZEPAM 30 MG/1
30 CAPSULE ORAL NIGHTLY PRN
Qty: 90 CAPSULE | Refills: 0 | Status: SHIPPED | OUTPATIENT
Start: 2024-06-19 | End: 2024-09-17

## 2024-06-19 RX ORDER — DULOXETIN HYDROCHLORIDE 20 MG/1
20 CAPSULE, DELAYED RELEASE ORAL DAILY
Qty: 30 CAPSULE | Refills: 2 | Status: SHIPPED | OUTPATIENT
Start: 2024-06-19 | End: 2024-09-17

## 2024-06-19 NOTE — PROGRESS NOTES
"Outpatient Psychiatry Follow-Up Visit (PA)    6/19/2024    Clinical Status of Patient:  Outpatient (Ambulatory)    Chief Complaint:  Gay Gurrola is a 58 y.o. female who presents today for follow-up of mood disorder.  Met with patient.      Current Medications:  Seroquel 400 mg   Restoril 30 mg   Trazodone 150 mg    Interval History and Content of Current Session:  Interim Events/Subjective Report/Content of Current Session:  Patient last seen 3/13/2024    Patient is a 58 year old female with a psychiatric history of bipolar 1 disorder and insomnia.     Pt presents to follow up today stating she is feeling "really overwhelmed".    Patient's mother in law fell, has been in a coma in [place]. She states she is frustrated, angry, and resentful, states "I knew this was going to happen". She reports a long history of conflict with mother in law, describes her as "selfish." She is also frustrated with her , as he has been paying for mother-in-laws care out of pocket. She reports the eldest brother has POA but has severe parkinsons, so estate and finances were not handled appropriately beforehand. She is able to identify that she has been lashing out at  and that this has placed a strain on their finances and marriage.     She also reports high stress as her youngest has "relapsed so bad." He continues to struggle with polysubstance use. He is coming home to stay "to support his dad". She reports this is a significant stressor, as he is "angry, mad... angry at the world." She reports that his brother will not let him return to live in [place], so he will be home long term. She is feeling overwhelmed and unprepared .    Patient reports she has been feeling overwhelmed and "stuck". She admits to feeling more reactive, quicker to lash out due to heightened anxiety.   She reports feeling more fatigued and sad.     Reviewed maintaining self care and boundaries with the family. Resources provided for " son- IOP and outside resources for substance use.   Patient is amenable to restart low dose cymbalta for anxiety, lower mood. Patient had benefit with cymbalta 20 mg in the past, with no induced hypo/abdiel.   Patient is also interested in STEP program for additional support in navigating stress with her mother in law and son      Psychotherapy:  Target symptoms: anxiety , insomnia, stress  Why chosen therapy is appropriate versus another modality: relevant to diagnosis  Outcome monitoring methods: self-report, observation  Therapeutic intervention type: insight oriented psychotherapy, behavior modifying psychotherapy, supportive psychotherapy  Topics discussed/themes: relationships difficulties, parenting issues, illness/death of a loved one, building skills sets for symptom management, symptom recognition, substance abuse by a family member  The patient's response to the intervention is accepting. The patient's progress toward treatment goals is good, fair.   Duration of intervention: 48 minutes.    Review of Systems   PSYCHIATRIC: Pertinant items are noted in the narrative.    Past Medical, Family and Social History: The patient's past medical, family and social history have been reviewed and updated as appropriate within the electronic medical record - see encounter notes.    Compliance: yes    Side effects: None    Risk Parameters:  Patient reports no suicidal ideation  Patient reports no homicidal ideation  Patient reports no self-injurious behavior  Patient reports no violent behavior    Exam (detailed: at least 9 elements; comprehensive: all 15 elements)   Constitutional  Vitals:  Most recent vital signs, dated less than 90 days prior to this appointment, were reviewed.   Vitals:    06/19/24 1100   BP: 100/63   Pulse: 80   Weight: 49.2 kg (108 lb 7.5 oz)          General:  unremarkable, age appropriate, well dressed, neatly groomed     Musculoskeletal  Muscle Strength/Tone:  not examined   Gait & Station:   non-ataxic     Psychiatric  Speech:  no latency; no press   Mood & Affect:  steady, dysthymic  congruent and appropriate, anxious   Thought Process:  normal and logical   Associations:  intact   Thought Content:  normal, no suicidality, no homicidality, delusions, or paranoia   Insight:  intact   Judgement: behavior is adequate to circumstances   Orientation:  grossly intact   Memory: intact for content of interview   Language: grossly intact   Attention Span & Concentration:  able to focus   Fund of Knowledge:  intact and appropriate to age and level of education     Assessment and Diagnosis   Status/Progress: Based on the examination today, the patient's problem(s) is/are adequately but not ideally controlled.  New problems have been presented today.   Lack of compliance are not complicating management of the primary condition.  There are no active rule-out diagnoses for this patient at this time.     General Impression: Patient is a 58 year old female with a psychiatric history of bipolar 1 disorder and insomnia. Patient reports insomnia better controlled with seroquel 400 mg, restoril 30 mg, trazodone 150 mg. She denies ASE. She reports significantly high stress, anxiety, worsened mood secondary to her mother-in-laws failing health and her son's polysubstance use. Patient has history of doing well with cymbalta 20 mg for depression, anxiety with no induction of hypo/abdiel. Patient to restart to assist with acute stress/anxiety.         ICD-10-CM ICD-9-CM   1. Primary insomnia  F51.01 307.42   2. Bipolar disorder in partial remission, most recent episode unspecified type  F31.70 296.80   3. Adjustment disorder with mixed anxiety and depressed mood  F43.23 309.28                 Intervention/Counseling/Treatment Plan   Medication Management: Continue current medications.   Continue Restoril 30 mg nightly   Continue Seroquel 400 mg nightly  Continue trazodone 150 mg qhs  Start cymbalta 20 mg  Pt made aware of risk  of hypo/abdiel, in event of decreased sleep, euphoria, increased impuslivity, spending, etc. Patient to discontinue.   No history of hypo/abdiel with cymbalta in the past.   Resources provided for son- IOP and outside resources for substance use  Referral placed for STEP program for patient  Discussed with patient informed consent, risks vs. benefits, alternative treatments, side effect profile and the inherent unpredictability of individual responses to these treatments. The patient expresses understanding of the above and displays the capacity to agree with this current plan and had no other questions.  Encouraged patient to keep future appointments.   Encouraged patient to message or call with questions or concerns  Safety plan reviewed with patient for worsening condition or suicidal ideations. In the event of an emergency patient was advised to go to the emergency room.       Return to Clinic: 2 months

## 2024-06-20 ENCOUNTER — PATIENT MESSAGE (OUTPATIENT)
Dept: PSYCHIATRY | Facility: CLINIC | Age: 59
End: 2024-06-20
Payer: MEDICARE

## 2024-07-22 ENCOUNTER — PATIENT MESSAGE (OUTPATIENT)
Dept: PSYCHIATRY | Facility: CLINIC | Age: 59
End: 2024-07-22
Payer: MEDICARE

## 2024-07-22 ENCOUNTER — TELEPHONE (OUTPATIENT)
Dept: PSYCHIATRY | Facility: CLINIC | Age: 59
End: 2024-07-22
Payer: MEDICARE

## 2024-07-22 NOTE — TELEPHONE ENCOUNTER
Patient left message with main line to schedule STeP intake. Patient was scheduled for 10/31 with Sherie Parada LCSW.

## 2024-08-05 ENCOUNTER — TELEPHONE (OUTPATIENT)
Dept: PRIMARY CARE CLINIC | Facility: CLINIC | Age: 59
End: 2024-08-05
Payer: MEDICARE

## 2024-08-06 ENCOUNTER — TELEPHONE (OUTPATIENT)
Dept: PRIMARY CARE CLINIC | Facility: CLINIC | Age: 59
End: 2024-08-06
Payer: MEDICARE

## 2024-08-06 DIAGNOSIS — Z53.20: Primary | ICD-10-CM

## 2024-08-06 DIAGNOSIS — Z78.0 POST-MENOPAUSAL: ICD-10-CM

## 2024-08-06 DIAGNOSIS — M85.80 OSTEOPENIA, UNSPECIFIED LOCATION: ICD-10-CM

## 2024-08-29 ENCOUNTER — TELEPHONE (OUTPATIENT)
Dept: PRIMARY CARE CLINIC | Facility: CLINIC | Age: 59
End: 2024-08-29
Payer: MEDICARE

## 2024-08-29 NOTE — TELEPHONE ENCOUNTER
----- Message from Barbara Lopez sent at 8/29/2024 10:25 AM CDT -----  Contact: [phone]  2TESTRESULTS    Type: Test Results    What test was performed? Bone Density/ Full Body Scan    Who ordered the test?John    When and where were the test performed?  [facility]/  Aug 27    Would you like a call back and or thru [facility]: call    Comments:Please call

## 2024-08-29 NOTE — TELEPHONE ENCOUNTER
DEXA scan printed from [facility], in basket for review.    Pt informed provider out of office will review upon return, results uploaded to pt portal.

## 2024-09-05 ENCOUNTER — OFFICE VISIT (OUTPATIENT)
Dept: OBSTETRICS AND GYNECOLOGY | Facility: CLINIC | Age: 59
End: 2024-09-05
Attending: OBSTETRICS & GYNECOLOGY
Payer: MEDICARE

## 2024-09-05 VITALS
WEIGHT: 114 LBS | SYSTOLIC BLOOD PRESSURE: 106 MMHG | BODY MASS INDEX: 20.98 KG/M2 | DIASTOLIC BLOOD PRESSURE: 67 MMHG | HEART RATE: 73 BPM | HEIGHT: 62 IN

## 2024-09-05 DIAGNOSIS — Z78.0 MENOPAUSE: Primary | ICD-10-CM

## 2024-09-05 PROCEDURE — 99213 OFFICE O/P EST LOW 20 MIN: CPT | Mod: PBBFAC | Performed by: OBSTETRICS & GYNECOLOGY

## 2024-09-05 PROCEDURE — 99999 PR PBB SHADOW E&M-EST. PATIENT-LVL III: CPT | Mod: PBBFAC,,, | Performed by: OBSTETRICS & GYNECOLOGY

## 2024-09-05 PROCEDURE — 99214 OFFICE O/P EST MOD 30 MIN: CPT | Mod: S$PBB,,, | Performed by: OBSTETRICS & GYNECOLOGY

## 2024-09-05 NOTE — PROGRESS NOTES
Subjective:      Gay Gurrola is a 59 y.o. female who is here for follow-up of hormone replacement therapy.  At her last visit she  Patient is requesting hormone replacement therapy due to minimization of heart disease and minimization of osteoporosis, decreased libido.The patient is not taking hormone replacement therapy. Patient denies post-menopausal vaginal bleeding. The patient is sexually active.  She denies the following contraindications to HRT:  Vaginal bleeding, history of VTE/PE, thrombophilia,  breast cancer, or active liver disease.     She has osteopenia- diagnosed in her 40's  She has difficulty sleeping- is on medication  She is not high risk for breast cancer    PLAN on 5/23/2024 Vivelle Dot .0375mg twice weekly and Prometrium 100mg nightly    She is currently taking   Vivelle dot and Prometrium .  The patient states the following symptoms have improved:  She is sleeping much better. She does not have vaginal dryness. .  Her main concern today is low libido and orgasm. .  She had the following side effects: none.  Patient denies post-menopausal vaginal bleeding. The patient is sexually active.   She reports that her libido is lower and takes longer for arousal.   No visits with results within 3 Month(s) from this visit.   Latest known visit with results is:   Lab Visit on 05/23/2024   Component Date Value Ref Range Status    Estradiol 05/23/2024 <10 (A)  See Text pg/mL Final    Testosterone, Free 05/23/2024 0.5  pg/mL Final    Testosterone, Total 05/23/2024 15  5 - 73 ng/dL Final       Past Medical History:   Diagnosis Date    Abnormal coronary angiogram     Alcohol abuse, in remission     Alcohol related seizure 07/24/2012    Anxiety     Bipolar affective     since teenage years    Chronic idiopathic constipation     Depression     H. pylori infection     Headache(784.0)     followed by Dr. Corona    Hypothyroid     Memory loss     Osteopenia     Seizures      Past Surgical History:    Procedure Laterality Date    BUNIONECTOMY      COLONOSCOPY N/A 2017    Procedure: COLONOSCOPY;  Surgeon: Estuardo Salazar MD;  Location: [facility] ENDO (4TH FLR);  Service: Endoscopy;  Laterality: N/A;  PM prep    COLONOSCOPY N/A 2022    Procedure: COLONOSCOPY;  Surgeon: Estuardo Salazar MD;  Location: [facility] ENDO (4TH FLR);  Service: Endoscopy;  Laterality: N/A;  suprep    ESOPHAGOGASTRODUODENOSCOPY N/A 10/23/2018    Procedure: EGD (ESOPHAGOGASTRODUODENOSCOPY);  Surgeon: Irlanda Schwarz MD;  Location: [facility] ENDO (4TH FLR);  Service: Endoscopy;  Laterality: N/A;    ESOPHAGOGASTRODUODENOSCOPY N/A 2022    Procedure: ESOPHAGOGASTRODUODENOSCOPY (EGD);  Surgeon: Estuardo Salazar MD;  Location: [facility] ENDO (4TH FLR);  Service: Endoscopy;  Laterality: N/A;  Pt informed to bring official paper documentation of COVID results w/ test date/name/ . Informed Pt that procedure will not be done w/o this documentation. Pt informed COVID test must be PCR or Rapid RNA.  instructions sent to [facility]    hemrrhoidectomy  1996    TUBAL LIGATION       Social History     Tobacco Use    Smoking status: Never    Smokeless tobacco: Never   Substance Use Topics    Alcohol use: No     Comment: History of alcoholism in remission.    Drug use: No     Family History   Problem Relation Name Age of Onset    Alzheimer's disease Maternal Grandmother      Dementia Maternal Grandmother      Hypertension Father      Coronary artery disease Father      Kidney disease Father      Diabetes Father      Stroke Mother      Diabetes Mother      Anuerysm Mother          brain    Insomnia Mother      Alzheimer's disease Mother      Depression Mother      Alzheimer's disease Sister 2     Dementia Sister 2 59    Uterine cancer Sister      Colon cancer Sister  64    Breast cancer Maternal Aunt  60    Cancer Maternal Aunt          breast    Alzheimer's disease Maternal Aunt      Crohn's disease Maternal Aunt      Dementia Maternal Aunt      Crohn's disease Other       Melanoma Neg Hx      Psoriasis Neg Hx      Lupus Neg Hx      Esophageal cancer Neg Hx      Cervical cancer Neg Hx      Ovarian cancer Neg Hx       OB History    Para Term  AB Living   2 2 2 0 0 2   SAB IAB Ectopic Multiple Live Births                  # Outcome Date GA Lbr Chilo/2nd Weight Sex Type Anes PTL Lv   2 Term      Vag-Spont      1 Term      Vag-Spont          Current Outpatient Medications:     b complex vitamins tablet, Take 1 tablet by mouth once daily., Disp: , Rfl:     Ca-D3-mag#11-zinc-cupr-man-bor 600 mg calcium- 800 unit-50 mg Tab, Take 1 tablet by mouth once daily., Disp: , Rfl:     cholecalciferol, vitamin D3, (VITAMIN D3) 25 mcg (1,000 unit) capsule, Take 1,000 Units by mouth once daily., Disp: , Rfl:     cyanocobalamin, vitamin B-12, 5,000 mcg Subl, Place under the tongue once daily. (Patient not taking: Reported on 2024), Disp: , Rfl:     DULoxetine (CYMBALTA) 20 MG capsule, Take 1 capsule (20 mg total) by mouth once daily., Disp: 30 capsule, Rfl: 2    estradioL (VIVELLE-DOT) 0.0375 mg/24 hr, Place 1 patch onto the skin twice a week., Disp: 8 patch, Rfl: 11    fish oil-omega-3 fatty acids 300-1,000 mg capsule, Take 1 capsule by mouth once daily., Disp: , Rfl:     levothyroxine (SYNTHROID) 25 MCG tablet, TAKE 1 TABLET BY MOUTH ONCE DAILY WITH  13MCG  FOR  A TOTAL  OF  38  MCG, Disp: 90 tablet, Rfl: 1    levothyroxine 13 mcg Cap, TAKE 1 CAPSULE BY MOUTH ONCE DAILY WITH  25  MCG  FOR  A  TOTAL  OF  38  MCG, Disp: 90 capsule, Rfl: 1    linaCLOtide (LINZESS) 290 mcg Cap capsule, Take 1 capsule (290 mcg total) by mouth once daily., Disp: 90 capsule, Rfl: 3    multivitamin (THERAGRAN) per tablet, Take 1 tablet by mouth once daily., Disp: , Rfl:     progesterone (PROMETRIUM) 100 MG capsule, Take 1 capsule (100 mg total) by mouth nightly., Disp: 30 capsule, Rfl: 11    QUEtiapine (SEROQUEL) 400 MG tablet, Take 1 tablet (400 mg total) by mouth every evening., Disp: 90 tablet, Rfl: 0     "temazepam (RESTORIL) 30 mg capsule, Take 1 capsule (30 mg total) by mouth nightly as needed for Insomnia., Disp: 90 capsule, Rfl: 0    traZODone (DESYREL) 150 MG tablet, Take 1 tablet (150 mg total) by mouth every evening., Disp: 90 tablet, Rfl: 0    TURMERIC ORAL, Take by mouth once daily., Disp: , Rfl:     Vitals:    09/05/24 1057   BP: 106/67   Pulse: 73   Weight: 51.7 kg (114 lb)   Height: 5' 2" (1.575 m)   PainSc: 0-No pain     Body mass index is 20.85 kg/m².       Assessment:    Menopause        Plan:   Risks and benefits of hormone replacement therapy were discussed.  Hormone replacement therapy options, including bioidentical versus non-bioidentical hormones, as well as alternatives discussed.      Continue Antonio pickard   .0375mg/d  twice weekly  Progesterone 100 mg orally QPM  Testosterone cream nightly to inner thigh Patient is aware this is off-label use in women, and FDA black box warnings were reviewed. Counseled her on possible side effects.   Counseled her on the use of compounded Sildenafil cream to help with arousal   Follow up with labs in 3 months and appointment    Total time 25 minutes- face to face, review of medical record and arranging follow up      "

## 2024-09-18 ENCOUNTER — TELEPHONE (OUTPATIENT)
Dept: PSYCHIATRY | Facility: CLINIC | Age: 59
End: 2024-09-18
Payer: MEDICARE

## 2024-09-18 DIAGNOSIS — F31.70 BIPOLAR DISORDER IN PARTIAL REMISSION, MOST RECENT EPISODE UNSPECIFIED TYPE: ICD-10-CM

## 2024-09-18 DIAGNOSIS — E03.9 HYPOTHYROIDISM, UNSPECIFIED TYPE: ICD-10-CM

## 2024-09-18 DIAGNOSIS — F51.01 PRIMARY INSOMNIA: ICD-10-CM

## 2024-09-18 RX ORDER — QUETIAPINE FUMARATE 400 MG/1
400 TABLET, FILM COATED ORAL NIGHTLY
Qty: 90 TABLET | Refills: 0 | Status: SHIPPED | OUTPATIENT
Start: 2024-09-24

## 2024-09-18 RX ORDER — TRAZODONE HYDROCHLORIDE 150 MG/1
150 TABLET ORAL NIGHTLY
Qty: 90 TABLET | Refills: 0 | Status: SHIPPED | OUTPATIENT
Start: 2024-09-24 | End: 2024-12-23

## 2024-09-18 RX ORDER — DULOXETIN HYDROCHLORIDE 20 MG/1
20 CAPSULE, DELAYED RELEASE ORAL DAILY
Qty: 90 CAPSULE | Refills: 0 | Status: SHIPPED | OUTPATIENT
Start: 2024-09-24 | End: 2024-12-23

## 2024-09-18 RX ORDER — TEMAZEPAM 30 MG/1
30 CAPSULE ORAL NIGHTLY PRN
Qty: 60 CAPSULE | Refills: 0 | Status: SHIPPED | OUTPATIENT
Start: 2024-09-24 | End: 2024-11-23

## 2024-09-18 NOTE — TELEPHONE ENCOUNTER
No care due was identified.  [facility] Embedded Care Due Messages. Reference number: [id number].   9/18/2024 2:41:41 PM CDT

## 2024-09-18 NOTE — TELEPHONE ENCOUNTER
"Returning patient's call. Patient reports her mother-in-law is still in [place]; her home health nurse gave notice that she would be leaving. Patient reports consequently, she and her  will be flying to [place] next Thursday, she anticipates having to care for her for some time while looking for long term care. She reports she doesn't plan to be there longer than 4-6 weeks, hoping to be there 2-3 weeks max.     She reports recent addition of cymbalta 20 mg going "very well"    She has been receiving Seroquel 90 day supply, cymbalta 20 mg 90 day supply, trazodone 90 day supply. She reports temazepam has been 30 day supply, but requests 2 month supply for travel.     "

## 2024-09-19 RX ORDER — LEVOTHYROXINE SODIUM 13 UG/1
13 CAPSULE ORAL DAILY
Qty: 90 CAPSULE | Refills: 0 | Status: SHIPPED | OUTPATIENT
Start: 2024-09-19

## 2024-09-19 RX ORDER — LEVOTHYROXINE SODIUM 25 UG/1
25 TABLET ORAL DAILY
Qty: 90 TABLET | Refills: 0 | Status: SHIPPED | OUTPATIENT
Start: 2024-09-19

## 2024-09-22 ENCOUNTER — PATIENT MESSAGE (OUTPATIENT)
Dept: PSYCHIATRY | Facility: CLINIC | Age: 59
End: 2024-09-22
Payer: MEDICARE

## 2024-12-01 DIAGNOSIS — F51.01 PRIMARY INSOMNIA: ICD-10-CM

## 2024-12-01 DIAGNOSIS — E03.9 HYPOTHYROIDISM, UNSPECIFIED TYPE: ICD-10-CM

## 2024-12-01 NOTE — TELEPHONE ENCOUNTER
Care Due:                  Date            Visit Type   Department     Provider  --------------------------------------------------------------------------------                                EP -                              PRIMARY      [facility]  Last Visit: 06-      CARE ([facility])   Care           Bhavana Lopez                              EP -                              PRIMARY      [facility]  Next Visit: 12-      CARE ([facility])   Care           Bhavana Lopez                                                            Last  Test          Frequency    Reason                     Performed    Due Date  --------------------------------------------------------------------------------    TSH.........  12 months..  levothyroxine............  10-   10-    Health [facility] Embedded Care Due Messages. Reference number: [id number].   12/01/2024 5:48:21 PM CST

## 2024-12-02 ENCOUNTER — OFFICE VISIT (OUTPATIENT)
Dept: PRIMARY CARE CLINIC | Facility: CLINIC | Age: 59
End: 2024-12-02
Payer: MEDICARE

## 2024-12-02 VITALS
BODY MASS INDEX: 20.48 KG/M2 | SYSTOLIC BLOOD PRESSURE: 112 MMHG | OXYGEN SATURATION: 97 % | DIASTOLIC BLOOD PRESSURE: 76 MMHG | WEIGHT: 111.31 LBS | RESPIRATION RATE: 14 BRPM | HEART RATE: 67 BPM | HEIGHT: 62 IN

## 2024-12-02 DIAGNOSIS — R10.10 PAIN OF UPPER ABDOMEN: Primary | ICD-10-CM

## 2024-12-02 DIAGNOSIS — E03.9 HYPOTHYROIDISM, UNSPECIFIED TYPE: ICD-10-CM

## 2024-12-02 DIAGNOSIS — M20.42 HAMMER TOE OF LEFT FOOT: ICD-10-CM

## 2024-12-02 DIAGNOSIS — Z13.6 ENCOUNTER FOR LIPID SCREENING FOR CARDIOVASCULAR DISEASE: ICD-10-CM

## 2024-12-02 DIAGNOSIS — E53.8 DEFICIENCY OF VITAMIN B12: ICD-10-CM

## 2024-12-02 DIAGNOSIS — Z13.220 ENCOUNTER FOR LIPID SCREENING FOR CARDIOVASCULAR DISEASE: ICD-10-CM

## 2024-12-02 DIAGNOSIS — R79.89 ELEVATED VITAMIN B12 LEVEL: ICD-10-CM

## 2024-12-02 DIAGNOSIS — R14.0 ABDOMINAL BLOATING: ICD-10-CM

## 2024-12-02 DIAGNOSIS — Z79.899 OTHER LONG TERM (CURRENT) DRUG THERAPY: ICD-10-CM

## 2024-12-02 DIAGNOSIS — Z00.00 ANNUAL PHYSICAL EXAM: ICD-10-CM

## 2024-12-02 DIAGNOSIS — M79.672 LEFT FOOT PAIN: ICD-10-CM

## 2024-12-02 DIAGNOSIS — K58.1 IRRITABLE BOWEL SYNDROME WITH CONSTIPATION: ICD-10-CM

## 2024-12-02 DIAGNOSIS — E67.3 HYPERVITAMINOSIS D: ICD-10-CM

## 2024-12-02 PROCEDURE — 99214 OFFICE O/P EST MOD 30 MIN: CPT | Mod: S$PBB,,, | Performed by: FAMILY MEDICINE

## 2024-12-02 PROCEDURE — 99999 PR PBB SHADOW E&M-EST. PATIENT-LVL V: CPT | Mod: PBBFAC,,, | Performed by: FAMILY MEDICINE

## 2024-12-02 PROCEDURE — 99215 OFFICE O/P EST HI 40 MIN: CPT | Mod: PBBFAC,PN | Performed by: FAMILY MEDICINE

## 2024-12-02 RX ORDER — TEMAZEPAM 30 MG/1
30 CAPSULE ORAL NIGHTLY PRN
Qty: 60 CAPSULE | Refills: 0 | Status: SHIPPED | OUTPATIENT
Start: 2024-12-02 | End: 2025-01-31

## 2024-12-02 RX ORDER — LEVOTHYROXINE SODIUM 13 UG/1
13 CAPSULE ORAL DAILY
Qty: 90 CAPSULE | Refills: 0 | Status: SHIPPED | OUTPATIENT
Start: 2024-12-02

## 2024-12-02 NOTE — PROGRESS NOTES
Subjective:       Patient ID: Gay Gurrola is a 59 y.o. female.    Chief Complaint: Abdominal Pain (Swelling in stomach since she got back from [place] )    HPI  60 y/o female with bipolar d/o, insomnia, osteopenia, IBS, hx of Covid, thyroid disease, family hx of colon cancer in sister, family hx of Alzheimers dx, positive jd, alcohol abuse in remission is here to discuss abdominal pain.     She has been in [place] since Sept and recently returned, she started having GI symptoms on Saturday, she feels some crampy abdominal pain that is resolved with stooling, her stool has a foul odor, no diarrhea or constipation, no blood in her stool, she feels abdominal bloating, increased fatigue, she denies f/n/v, she notes increased heartburn, she takes apple cider vinegar which helps, she denies cp/sob/urinary sx. Appetite has been decreased. She has been walking a lot but no dedicated exercise. She had a trip and fall a month ago and since then she has L foot pain, its worse with standing, sometimes it swells, its getting better overall but has not resolved, she took Ibuprofen daily x 1 month and is not taking it anymore and is now doing Turmeric only. She is sleeping well.     Only new medication is Cymbalta 20 mg daily    Hx of possible Covid 1/2022 never took a test, cough, fever, feels she fully recovered  Bipolar d/o/Insomnia: following with Psychiatry PA; Trazodone 150 mg nightly, Seroquel 400 mg nightly, Restoril 30 mg nightly  Chronic L leg pain, was told she had a tear in her hamstring, did not recommend surgery at the time  Thyroid disease: has been evaluated by Dr. Lira in the past, levothyroxine 25 mcg + 13 mcg daily, no hx of thyroid US  IBS w Constipation/Hx of H pylori/family hx of colon cancer in sister: following with Dr. Salazar, Colonoscopy 2022 repeat 5 years, EGD ok, linzess 290 mg daily  Hx of H. Pylori  Osteopenia: dexa 11/2022 stable, D3 2,000 IU every other day, calcium daily  Hx of  "Seizure, may have been from alcohol withdrawal over 15 years ago  GYN: following with Dr. Mora, pap and pelvic utd, mmg 4/2024  Family hx of Alzheimers in mom and sister and aunt: hx of neuropsych testing 12/2020, genetic evaluation reassuring  Positive RANJAN: rheumatology prn only  Sebacous cyst of scalp/AKs: has not followed here recently  Eye exam: following with Dr. Priest, episcleritis  Dental utd     Review of Systems    Objective:      /76 (BP Location: Right arm, Patient Position: Sitting)   Pulse 67   Resp 14   Ht 5' 2" (1.575 m)   Wt 50.5 kg (111 lb 5.3 oz)   LMP 10/07/2022 Comment: pointing  SpO2 97%   BMI 20.36 kg/m²   Physical Exam  Vitals and nursing note reviewed.   Constitutional:       Appearance: She is well-developed.   HENT:      Head: Normocephalic and atraumatic.      Mouth/Throat:      Pharynx: No oropharyngeal exudate or posterior oropharyngeal erythema.   Neck:      Thyroid: No thyromegaly.   Cardiovascular:      Rate and Rhythm: Normal rate and regular rhythm.      Heart sounds: Normal heart sounds.   Pulmonary:      Effort: Pulmonary effort is normal. No respiratory distress.      Breath sounds: Normal breath sounds.   Abdominal:      General: Bowel sounds are normal. There is no distension.      Palpations: Abdomen is soft. There is no mass.      Tenderness: There is abdominal tenderness (upper abdomen).   Musculoskeletal:      Cervical back: Normal range of motion and neck supple.   Lymphadenopathy:      Cervical: No cervical adenopathy.   Skin:     General: Skin is warm and dry.   Neurological:      Mental Status: She is alert.         Assessment:       1. Pain of upper abdomen    2. Left foot pain    3. Hammer toe of left foot    4. Annual physical exam    5. Hypothyroidism, unspecified type    6. Deficiency of vitamin B12    7. Hypervitaminosis D    8. Encounter for lipid screening for cardiovascular disease    9. Elevated vitamin B12 level    10. Other long term " (current) drug therapy    11. Abdominal bloating    12. Irritable bowel syndrome with constipation        Plan:   Gay was seen today for abdominal pain.    Diagnoses and all orders for this visit:    Pain of upper abdomen  -     LIPASE; Future  -     CT Abdomen Pelvis W Wo Contrast; Future  -     Giardia / Cryptosporidum, EIA; Future  -     Stool culture; Future  -     Stool Exam-Ova,Cysts,Parasites; Future  -     WBC, Stool; Future    Left foot pain  -     Ambulatory referral/consult to Orthopedics; Future  -     X-Ray Foot Complete 3 view Left; Future    Hammer toe of left foot  -     Ambulatory referral/consult to Orthopedics; Future    Annual physical exam  -     CBC Auto Differential; Future  -     Comprehensive Metabolic Panel; Future  -     Hemoglobin A1C; Future  -     TSH; Future  -     Vitamin D; Future  -     Lipoprotein Analysis, by NMR; Future  -     Vitamin B12; Future  -     Insulin, random; Future    Hypothyroidism, unspecified type  -     TSH; Future    Deficiency of vitamin B12    Hypervitaminosis D  -     Vitamin D; Future    Encounter for lipid screening for cardiovascular disease  -     Lipoprotein Analysis, by NMR; Future    Elevated vitamin B12 level  -     Vitamin B12; Future    Other long term (current) drug therapy  -     CBC Auto Differential; Future  -     Hemoglobin A1C; Future  -     Vitamin B12; Future    Abdominal bloating  -     LIPASE; Future  -     CT Abdomen Pelvis W Wo Contrast; Future  -     Giardia / Cryptosporidum, EIA; Future  -     Stool culture; Future  -     Stool Exam-Ova,Cysts,Parasites; Future  -     WBC, Stool; Future    Irritable bowel syndrome with constipation  -     CT Abdomen Pelvis W Wo Contrast; Future  -     Giardia / Cryptosporidum, EIA; Future  -     Stool culture; Future  -     Stool Exam-Ova,Cysts,Parasites; Future  -     WBC, Stool; Future

## 2024-12-02 NOTE — TELEPHONE ENCOUNTER
No care due was identified.  [facility] Embedded Care Due Messages. Reference number: [id number].   12/02/2024 10:25:12 AM CST

## 2024-12-03 RX ORDER — LEVOTHYROXINE SODIUM 25 UG/1
TABLET ORAL
Qty: 90 TABLET | Refills: 0 | Status: SHIPPED | OUTPATIENT
Start: 2024-12-03

## 2024-12-03 NOTE — TELEPHONE ENCOUNTER
Refill Routing Note   Medication(s) are not appropriate for processing by [facility] for the following reason(s):        Required labs outdated    [facility] action(s):  Defer               Appointments  past 12m or future 3m with PCP    Date Provider   Last Visit   6/5/2024 Bhavana Lopez MD   Next Visit   12/2/2024 Bhavana Lopez MD   ED visits in past 90 days: 0        Note composed:5:47 AM 12/03/2024

## 2024-12-05 ENCOUNTER — PATIENT MESSAGE (OUTPATIENT)
Dept: PRIMARY CARE CLINIC | Facility: CLINIC | Age: 59
End: 2024-12-05
Payer: MEDICARE

## 2024-12-05 DIAGNOSIS — E67.3 HYPERVITAMINOSIS D: ICD-10-CM

## 2024-12-05 DIAGNOSIS — Z82.0 FAMILY HISTORY OF ALZHEIMER'S DISEASE: ICD-10-CM

## 2024-12-05 DIAGNOSIS — K58.1 IRRITABLE BOWEL SYNDROME WITH CONSTIPATION: ICD-10-CM

## 2024-12-05 DIAGNOSIS — R14.0 ABDOMINAL BLOATING: ICD-10-CM

## 2024-12-05 DIAGNOSIS — R14.0 ABDOMINAL BLOATING: Primary | ICD-10-CM

## 2024-12-05 DIAGNOSIS — E53.8 DEFICIENCY OF VITAMIN B12: ICD-10-CM

## 2024-12-05 DIAGNOSIS — R10.10 PAIN OF UPPER ABDOMEN: ICD-10-CM

## 2024-12-05 NOTE — TELEPHONE ENCOUNTER
"Pt requesting to add homocysteine and Omega fatty acid orders to lab appt for tomorrow, labs pended below.     [facility] does not perform "[facility]" pt must have done at [facility].     Spoke with pt, orders placed for cosign. Pt will go and have two separate blood draws one at [facility] one at [facility].     Pt asked that "family history of Alzheimer's" ICD 10 code he added to Omegacheck lab and B12 def added to Homocysteine lab. Orders linked and placed, Pt aware an expresses understanding.       "

## 2024-12-06 ENCOUNTER — HOSPITAL ENCOUNTER (OUTPATIENT)
Dept: RADIOLOGY | Facility: HOSPITAL | Age: 59
Discharge: HOME | End: 2024-12-06
Attending: FAMILY MEDICINE
Payer: MEDICARE

## 2024-12-06 DIAGNOSIS — M79.672 LEFT FOOT PAIN: ICD-10-CM

## 2024-12-06 DIAGNOSIS — R10.10 PAIN OF UPPER ABDOMEN: ICD-10-CM

## 2024-12-06 DIAGNOSIS — K58.1 IRRITABLE BOWEL SYNDROME WITH CONSTIPATION: ICD-10-CM

## 2024-12-06 DIAGNOSIS — R14.0 ABDOMINAL BLOATING: ICD-10-CM

## 2024-12-06 PROCEDURE — 74177 CT ABD & PELVIS W/CONTRAST: CPT | Mod: TC

## 2024-12-06 PROCEDURE — 25500020 PHARM REV CODE 255: Performed by: FAMILY MEDICINE

## 2024-12-06 PROCEDURE — 73630 X-RAY EXAM OF FOOT: CPT | Mod: 26,LT,, | Performed by: RADIOLOGY

## 2024-12-06 PROCEDURE — 73630 X-RAY EXAM OF FOOT: CPT | Mod: TC,FY,LT

## 2024-12-06 PROCEDURE — 74177 CT ABD & PELVIS W/CONTRAST: CPT | Mod: 26,,, | Performed by: RADIOLOGY

## 2024-12-06 RX ADMIN — IOHEXOL 75 ML: 350 INJECTION, SOLUTION INTRAVENOUS at 08:12

## 2024-12-06 RX ADMIN — IOHEXOL 15 ML: 300 INJECTION, SOLUTION INTRAVENOUS at 08:12

## 2024-12-09 ENCOUNTER — PATIENT MESSAGE (OUTPATIENT)
Dept: PRIMARY CARE CLINIC | Facility: CLINIC | Age: 59
End: 2024-12-09
Payer: MEDICARE

## 2024-12-09 DIAGNOSIS — E78.5 DYSLIPIDEMIA: ICD-10-CM

## 2024-12-09 DIAGNOSIS — Z13.220 SCREENING FOR CHOLESTEROL LEVEL: Primary | ICD-10-CM

## 2024-12-09 DIAGNOSIS — R91.8 ABNORMAL CT SCAN, LUNG: ICD-10-CM

## 2024-12-09 NOTE — TELEPHONE ENCOUNTER
Pt would like to do chest xray, pended. Also inquiring why a lipid wasn't drawn, pended if you feel appropriate.

## 2024-12-10 ENCOUNTER — TELEPHONE (OUTPATIENT)
Dept: PRIMARY CARE CLINIC | Facility: CLINIC | Age: 59
End: 2024-12-10
Payer: MEDICARE

## 2024-12-10 NOTE — TELEPHONE ENCOUNTER
----- Message from Stas sent at 12/10/2024 11:48 AM CST -----  Regarding: Callback Request - Carla or Nydia  Contact: Pt [phone]  [email or web address]     Patient is calling for Medical Advice regarding: Patient sent a message and wants a callback to discuss with office. She is confused and needs help with appointments. She would like to speak to Carla or Nydia if either are available. Please call patient back to discuss.    How long has patient had these symptoms:    Pharmacy name and phone#:    Patient wants a call back or thru [facility]: Call    Comments:    Please advise patient replies from provider may take up to 48 hours.

## 2024-12-11 ENCOUNTER — PATIENT MESSAGE (OUTPATIENT)
Dept: PRIMARY CARE CLINIC | Facility: CLINIC | Age: 59
End: 2024-12-11
Payer: MEDICARE

## 2024-12-11 DIAGNOSIS — Z00.00 ANNUAL PHYSICAL EXAM: Primary | ICD-10-CM

## 2024-12-11 DIAGNOSIS — R76.8 POSITIVE ANA (ANTINUCLEAR ANTIBODY): ICD-10-CM

## 2024-12-12 NOTE — TELEPHONE ENCOUNTER
Pt would like to know if an order for CRP can be placed to be drawn with the 12/5 emiliano order she has    LOV 12/2/2024

## 2024-12-17 ENCOUNTER — PATIENT MESSAGE (OUTPATIENT)
Dept: CARDIOLOGY | Facility: CLINIC | Age: 59
End: 2024-12-17
Payer: MEDICARE

## 2024-12-18 ENCOUNTER — OFFICE VISIT (OUTPATIENT)
Dept: ORTHOPEDICS | Facility: CLINIC | Age: 59
End: 2024-12-18
Payer: MEDICARE

## 2024-12-18 VITALS — BODY MASS INDEX: 21.24 KG/M2 | HEIGHT: 62 IN | WEIGHT: 115.44 LBS

## 2024-12-18 DIAGNOSIS — M79.672 LEFT FOOT PAIN: ICD-10-CM

## 2024-12-18 DIAGNOSIS — S92.515A CLOSED NONDISPLACED FRACTURE OF PROXIMAL PHALANX OF LESSER TOE OF LEFT FOOT, INITIAL ENCOUNTER: ICD-10-CM

## 2024-12-18 DIAGNOSIS — M20.41 HAMMERTOE OF RIGHT FOOT: Primary | ICD-10-CM

## 2024-12-18 DIAGNOSIS — M20.42 HAMMER TOE OF LEFT FOOT: ICD-10-CM

## 2024-12-18 PROCEDURE — 99215 OFFICE O/P EST HI 40 MIN: CPT | Mod: PBBFAC | Performed by: ORTHOPAEDIC SURGERY

## 2024-12-18 PROCEDURE — 99203 OFFICE O/P NEW LOW 30 MIN: CPT | Mod: S$PBB,,, | Performed by: ORTHOPAEDIC SURGERY

## 2024-12-18 PROCEDURE — 99999 PR PBB SHADOW E&M-EST. PATIENT-LVL V: CPT | Mod: PBBFAC,,, | Performed by: ORTHOPAEDIC SURGERY

## 2024-12-18 RX ORDER — MELOXICAM 15 MG/1
15 TABLET ORAL DAILY
Qty: 30 TABLET | Refills: 1 | Status: SHIPPED | OUTPATIENT
Start: 2024-12-18

## 2024-12-19 ENCOUNTER — OFFICE VISIT (OUTPATIENT)
Dept: PSYCHIATRY | Facility: CLINIC | Age: 59
End: 2024-12-19
Payer: MEDICARE

## 2024-12-19 VITALS
WEIGHT: 112.13 LBS | HEART RATE: 69 BPM | BODY MASS INDEX: 20.5 KG/M2 | SYSTOLIC BLOOD PRESSURE: 131 MMHG | DIASTOLIC BLOOD PRESSURE: 66 MMHG

## 2024-12-19 DIAGNOSIS — F51.01 PRIMARY INSOMNIA: ICD-10-CM

## 2024-12-19 DIAGNOSIS — F31.70 BIPOLAR DISORDER IN PARTIAL REMISSION, MOST RECENT EPISODE UNSPECIFIED TYPE: ICD-10-CM

## 2024-12-19 PROCEDURE — 99213 OFFICE O/P EST LOW 20 MIN: CPT | Mod: PBBFAC | Performed by: PHYSICIAN ASSISTANT

## 2024-12-19 PROCEDURE — 99999 PR PBB SHADOW E&M-EST. PATIENT-LVL III: CPT | Mod: PBBFAC,,, | Performed by: PHYSICIAN ASSISTANT

## 2024-12-19 PROCEDURE — 99214 OFFICE O/P EST MOD 30 MIN: CPT | Mod: S$PBB,,, | Performed by: PHYSICIAN ASSISTANT

## 2024-12-19 RX ORDER — QUETIAPINE FUMARATE 400 MG/1
400 TABLET, FILM COATED ORAL NIGHTLY
Qty: 90 TABLET | Refills: 0 | Status: SHIPPED | OUTPATIENT
Start: 2024-12-19

## 2024-12-19 RX ORDER — TEMAZEPAM 30 MG/1
30 CAPSULE ORAL NIGHTLY PRN
Qty: 60 CAPSULE | Refills: 0 | Status: SHIPPED | OUTPATIENT
Start: 2024-12-26 | End: 2025-02-24

## 2024-12-19 RX ORDER — DULOXETIN HYDROCHLORIDE 20 MG/1
20 CAPSULE, DELAYED RELEASE ORAL DAILY
Qty: 90 CAPSULE | Refills: 0 | Status: SHIPPED | OUTPATIENT
Start: 2024-12-19 | End: 2025-03-19

## 2024-12-19 RX ORDER — TRAZODONE HYDROCHLORIDE 150 MG/1
150 TABLET ORAL NIGHTLY
Qty: 90 TABLET | Refills: 0 | Status: SHIPPED | OUTPATIENT
Start: 2024-12-19 | End: 2025-03-19

## 2024-12-19 NOTE — PROGRESS NOTES
"  Outpatient Psychiatry Follow-Up Visit (PA)    12/19/2024    Clinical Status of Patient:  Outpatient (Ambulatory)    Chief Complaint:  Gay Gurrola is a 59 y.o. female who presents today for follow-up of mood disorder.  Met with patient.      Current Medications:  Seroquel 400 mg qhs  Restoril 30 mg qhs  Trazodone 150 mg qhs  Cymbalta 20 mg    Interval History and Content of Current Session:  Interim Events/Subjective Report/Content of Current Session:  Patient last seen 6/19/2024    Patient is a 59 year old female with a psychiatric history of bipolar 1 disorder and insomnia.     Pt presents to follow up today after restarting cymbalta 20 mg and returning from extended stay in [place].     She was in [place] for 2.5 months caring for sick mother in law, returned early December.   She reports high stress while caring for her but states "nothing out of control, my medicines worked well."  She reports mother in law passed, they stayed for a few weeks, reports some peace while in [place].     She reports decreased stress, improved since prior to appointment.   She reports her youngest son is doing well, "really well" which has improved her stress/anxiety.     She reports the addition of the cymbalta has been helpful; helped with pain and emotional regulation.     She reports sleep has been going well. She continues to report benefit with medications, denies drowsiness in morning.  HRT has been helpful.     She denies ASE from medications.     She has been wearing a weighted vest, reports improved strength/bone strength.   She did fracture a toe but reports pain is well managed.     She is going to [place] next week, will be there for a few weeks.     Patient is doing well, stress/mood improved.     Will continue current meds. Restoril prescription printed to fill while in [place].        Psychotherapy:  Target symptoms: anxiety , insomnia  Why chosen therapy is appropriate versus another modality: relevant " to diagnosis  Outcome monitoring methods: self-report, observation  Therapeutic intervention type: supportive psychotherapy  Topics discussed/themes: illness/death of a loved one, building skills sets for symptom management, symptom recognition  The patient's response to the intervention is accepting. The patient's progress toward treatment goals is good.   Duration of intervention: 15 minutes.    Review of Systems   PSYCHIATRIC: Pertinant items are noted in the narrative.    Past Medical, Family and Social History: The patient's past medical, family and social history have been reviewed and updated as appropriate within the electronic medical record - see encounter notes.    Compliance: yes    Side effects: None    Risk Parameters:  Patient reports no suicidal ideation  Patient reports no homicidal ideation  Patient reports no self-injurious behavior  Patient reports no violent behavior    Exam (detailed: at least 9 elements; comprehensive: all 15 elements)   Constitutional  Vitals:  Most recent vital signs, dated less than 90 days prior to this appointment, were reviewed.   Vitals:    12/19/24 1505   BP: 131/66   Pulse: 69   Weight: 50.8 kg (112 lb 1.7 oz)          General:  unremarkable, age appropriate, well dressed, neatly groomed     Musculoskeletal  Muscle Strength/Tone:  not examined   Gait & Station:  non-ataxic     Psychiatric  Speech:  no latency; no press   Mood & Affect:  steady, euthymic  congruent and appropriate   Thought Process:  normal and logical   Associations:  intact   Thought Content:  normal, no suicidality, no homicidality, delusions, or paranoia   Insight:  intact   Judgement: behavior is adequate to circumstances   Orientation:  grossly intact   Memory: intact for content of interview   Language: grossly intact   Attention Span & Concentration:  able to focus   Fund of Knowledge:  intact and appropriate to age and level of education     Assessment and Diagnosis   Status/Progress: Based on  the examination today, the patient's problem(s) is/are improved and well controlled.  New problems have not been presented today.   Lack of compliance are not complicating management of the primary condition.  There are no active rule-out diagnoses for this patient at this time.     General Impression: Patient is a 59 year old female with a psychiatric history of bipolar 1 disorder and insomnia. Patient reports insomnia better controlled with seroquel 400 mg, restoril 30 mg, trazodone 150 mg. She denies ASE. She reports mood, anxiety, stress improved with addition of cymbalta 20 mg, as well as improvement in family stressors.         ICD-10-CM ICD-9-CM   1. Primary insomnia  F51.01 307.42   2. Bipolar disorder in partial remission, most recent episode unspecified type  F31.70 296.80               Intervention/Counseling/Treatment Plan   Medication Management: Continue current medications.   Continue Restoril 30 mg nightly- prescription printed as will be out of state for weeks  Continue Seroquel 400 mg nightly  Continue trazodone 150 mg qhs  Continue cymbalta 20 mg  Pt made aware of risk of hypo/abdiel, in event of decreased sleep, euphoria, increased impuslivity, spending, etc. Patient to discontinue.   No history of hypo/abdiel with cymbalta in the past.   Referral placed for STEP program for patient  Discussed with patient informed consent, risks vs. benefits, alternative treatments, side effect profile and the inherent unpredictability of individual responses to these treatments. The patient expresses understanding of the above and displays the capacity to agree with this current plan and had no other questions.  Encouraged patient to keep future appointments.   Encouraged patient to message or call with questions or concerns  Safety plan reviewed with patient for worsening condition or suicidal ideations. In the event of an emergency patient was advised to go to the emergency room.       Return to Clinic: 2  months

## 2024-12-20 NOTE — PROGRESS NOTES
Subjective:       Patient ID: Gay Gurrola is a 59 y.o. female.    Chief Complaint:   Foot Pain (Broken toe L foot/R foot hammer toe )    History of Present Illness    CHIEF COMPLAINT:  Patient presents today for evaluation of bilateral foot pain, with concerns about a possible fracture in the left foot and a hammer toe on the right foot.    HPI:  Patient presents with foot troubles in both feet. On the left foot, she reports injuring it while taking care of her mother, stating she fell while trying to fix blinds. She continued to care for her mother for six weeks after the injury. Upon returning home and having a physical exam, an x-ray revealed a fracture. She describes the pain as throbbing at the end of the day, particularly in the middle three toes, noting pain shooting from the affected area when pressure is applied.    On the right foot, she reports developing a hammer toe after wearing ill-fitting shoes for six months during a trip to [place]. She wore New Balance shoes that were a half size too small. The hammer toe causes discomfort when wearing shoes, as it rubs against them. This condition limits her shoe choices.    She describes herself as very active, mentioning yoga, Pilates, and weighted vest exercises. She carries 45-50 lbs 3 times per week and goes for long hikes. While she can perform these activities, she experiences soreness in the evening.    She had previous bunion surgery on both feet in the 1980s, performed by Dr. Ruiz. She inquired about addressing the hammer toes during that surgery but was advised against it due to the complexity of the procedure.    She denies pain in areas other than the specified locations on her feet. She denies any medical diagnoses.    MEDICATIONS:  Patient is on Ibuprofen as needed for arthritis.    WORK STATUS:  Patient maintains a very active lifestyle, which includes yoga, Pilates, long hikes, and weighted exercises. She carries 45-50 lbs 3  times per week. Due to her high activity level, she experiences foot pain and discomfort at the end of the day.        Past Medical History:   Diagnosis Date    Abnormal coronary angiogram     Alcohol abuse, in remission     Alcohol related seizure 2012    Anxiety     Bipolar affective     since teenage years    Chronic idiopathic constipation     Depression     H. pylori infection     Headache(784.0)     followed by Dr. Corona    Hypothyroid     Memory loss     Osteopenia     Seizures      Past Surgical History:   Procedure Laterality Date    BUNIONECTOMY      COLONOSCOPY N/A 2017    Procedure: COLONOSCOPY;  Surgeon: Estuardo Salazar MD;  [facility]  Service: Endoscopy;  Laterality: N/A;  PM prep    COLONOSCOPY N/A 2022    Procedure: COLONOSCOPY;  Surgeon: Estuardo Salazar MD;  [facility]  Service: Endoscopy;  Laterality: N/A;  suprep    ESOPHAGOGASTRODUODENOSCOPY N/A 10/23/2018    Procedure: EGD (ESOPHAGOGASTRODUODENOSCOPY);  Surgeon: Irlanda Schwarz MD;  [facility]  Service: Endoscopy;  Laterality: N/A;    ESOPHAGOGASTRODUODENOSCOPY N/A 2022    Procedure: ESOPHAGOGASTRODUODENOSCOPY (EGD);  Surgeon: Estuardo Salazar MD;  [facility]  Service: Endoscopy;  Laterality: N/A;  Pt informed to bring official paper documentation of COVID results w/ test date/name/ . Informed Pt that procedure will not be done w/o this documentation. Pt informed COVID test must be PCR or Rapid RNA.  instructions sent to [email or web address]    hemrrhoidectomy      TUBAL LIGATION       Family History   Problem Relation Name Age of Onset    Alzheimer's disease Maternal Grandmother      Dementia Maternal Grandmother      Hypertension Father      Coronary artery disease Father      Kidney disease Father      Diabetes Father      Stroke Mother      Diabetes Mother      Anuerysm Mother          brain    Insomnia Mother      Alzheimer's disease Mother       Depression Mother      Alzheimer's disease Sister 2     Dementia Sister 2 59    Uterine cancer Sister      Colon cancer Sister  64    Breast cancer Maternal Aunt  60    Cancer Maternal Aunt          breast    Alzheimer's disease Maternal Aunt      Crohn's disease Maternal Aunt      Dementia Maternal Aunt      Crohn's disease Other      Melanoma Neg Hx      Psoriasis Neg Hx      Lupus Neg Hx      Esophageal cancer Neg Hx      Cervical cancer Neg Hx      Ovarian cancer Neg Hx       Social History     Socioeconomic History    Marital status:    Occupational History    Occupation: disability    Tobacco Use    Smoking status: Never    Smokeless tobacco: Never   Substance and Sexual Activity    Alcohol use: No     Comment: History of alcoholism in remission.    Drug use: No    Sexual activity: Yes     Partners: Male   Social History Narrative    She is currently not working, she used to be a cardiology tech, she has 2 boys, age 38 and 30     Social Drivers of Health     Financial Resource Strain: Low Risk  (3/11/2024)    Overall Financial Resource Strain (CARDIA)     Difficulty of Paying Living Expenses: Not hard at all   Food Insecurity: Patient Declined (3/11/2024)    Hunger Vital Sign     Worried About Running Out of Food in the Last Year: Patient declined     Ran Out of Food in the Last Year: Patient declined   Transportation Needs: No Transportation Needs (3/11/2024)    PRAPARE - Transportation     Lack of Transportation (Medical): No     Lack of Transportation (Non-Medical): No   Physical Activity: Sufficiently Active (3/11/2024)    Exercise Vital Sign     Days of Exercise per Week: 7 days     Minutes of Exercise per Session: 60 min   Stress: Patient Declined (3/11/2024)    [employer] - Occupational Stress Questionnaire     Feeling of Stress : Patient declined   Housing Stability: Unknown (3/11/2024)    Housing Stability Vital Sign     Unable to Pay for Housing in the Last Year:  No     Unstable Housing in the Last Year: No       Current Outpatient Medications   Medication Sig Dispense Refill    b complex vitamins tablet Take 1 tablet by mouth once daily.      Ca-D3-mag#11-zinc-cupr-man-damian 600 mg calcium- 800 unit-50 mg Tab Take 1 tablet by mouth once daily.      cholecalciferol, vitamin D3, (VITAMIN D3) 25 mcg (1,000 unit) capsule Take 1,000 Units by mouth once daily.      estradioL (VIVELLE-DOT) 0.0375 mg/24 hr Place 1 patch onto the skin twice a week. 8 patch 11    fish oil-omega-3 fatty acids 300-1,000 mg capsule Take 1 capsule by mouth once daily.      levothyroxine (SYNTHROID) 25 MCG tablet TAKE 1 TABLET BY MOUTH ONCE DAILY TAKE  WITH  13MCG  FOR  TOTAL  DAILY  DOSE  OF  38MCG 90 tablet 0    levothyroxine 13 mcg Cap Take 13 mcg by mouth once daily. Take with 25 mcg for total daily dose of 38 mcg 90 capsule 0    linaCLOtide (LINZESS) 290 mcg Cap capsule Take 1 capsule (290 mcg total) by mouth once daily. 90 capsule 3    multivitamin (THERAGRAN) per tablet Take 1 tablet by mouth once daily.      progesterone (PROMETRIUM) 100 MG capsule Take 1 capsule (100 mg total) by mouth nightly. 30 capsule 11    TURMERIC ORAL Take by mouth once daily.      DULoxetine (CYMBALTA) 20 MG capsule Take 1 capsule (20 mg total) by mouth once daily. 90 capsule 0    meloxicam (MOBIC) 15 MG tablet Take 1 tablet (15 mg total) by mouth once daily. 30 tablet 1    QUEtiapine (SEROQUEL) 400 MG tablet Take 1 tablet (400 mg total) by mouth every evening. 90 tablet 0    [START ON 12/26/2024] temazepam (RESTORIL) 30 mg capsule Take 1 capsule (30 mg total) by mouth nightly as needed for Insomnia. 60 capsule 0    traZODone (DESYREL) 150 MG tablet Take 1 tablet (150 mg total) by mouth every evening. 90 tablet 0     No current facility-administered medications for this visit.     Review of patient's allergies indicates:  No Known Allergies      Objective:      Vitals:    12/18/24 1017   Weight: 52.3 kg (115 lb 6.6 oz)  "  Height: 5' 2.01" (1.575 m)     Physical Exam  On the right, there is a 2nd hammertoe with a dorsal callosity.  Benign-appearing previous bunion surgery sites bilaterally.  No significant residual 1st ray deformity.  She is tender on the left at the base of the 2nd and 3rd toes, without any distal numbness or tingling.  There is some splaying of the central lesser toes with weight-bearing, possibly reflecting synovitis of the MTP joint.  Neurovascularly intact bilaterally.  Normal gait.        Imaging Review:   Patient underwent an X-ray of her left foot. The results revealed a small chip off the edge of the proximal phalanx at the base of the third toe, while the joint appears to be in good alignment.      Assessment/Plan   Right 2nd hammertoe.  Nondisplaced corner fracture, left 3rd toe proximal phalanx.    Recommend using a metatarsal cookie pad on the left foot to redistribute pressure from the ball of the foot.  Prescribed meloxicam for pain relief, to be taken as needed rather than daily.  Referred to Dr. Dumont, an orthopedic foot and ankle specialist, for evaluation and potential surgery of the hammer toe on the right foot.  The patient's pathophysiology was explained in detail with reference to x-rays, models, other visual aids as appropriate.  Treatment options were discussed in detail.  Questions were invited and answered to the patient's satisfaction.    This note was generated with the assistance of ambient listening technology. Verbal consent was obtained by the patient and accompanying visitor(s) for the recording of patient appointment to facilitate this note. I attest to having reviewed and edited the generated note for accuracy, though some syntax or spelling errors may persist. Please contact the author of this note for any clarification.     Jose Roberto Dyer MD  Orthopaedic Surgery    " room air

## 2024-12-21 PROBLEM — M20.42 HAMMER TOE OF LEFT FOOT: Status: ACTIVE | Noted: 2024-12-21

## 2024-12-21 PROBLEM — S92.515A CLOSED NONDISPLACED FRACTURE OF PROXIMAL PHALANX OF LESSER TOE OF LEFT FOOT: Status: ACTIVE | Noted: 2024-12-21

## 2024-12-21 PROBLEM — M20.41 HAMMERTOE OF RIGHT FOOT: Status: ACTIVE | Noted: 2024-12-21

## 2024-12-22 ENCOUNTER — PATIENT MESSAGE (OUTPATIENT)
Dept: OPTOMETRY | Facility: CLINIC | Age: 59
End: 2024-12-22
Payer: MEDICARE

## 2024-12-23 ENCOUNTER — TELEPHONE (OUTPATIENT)
Dept: OPTOMETRY | Facility: CLINIC | Age: 59
End: 2024-12-23
Payer: MEDICARE

## 2024-12-23 ENCOUNTER — PATIENT MESSAGE (OUTPATIENT)
Dept: PRIMARY CARE CLINIC | Facility: CLINIC | Age: 59
End: 2024-12-23
Payer: MEDICARE

## 2024-12-23 ENCOUNTER — TELEPHONE (OUTPATIENT)
Dept: PRIMARY CARE CLINIC | Facility: CLINIC | Age: 59
End: 2024-12-23
Payer: MEDICARE

## 2024-12-23 DIAGNOSIS — H15.101 EPISCLERITIS OF RIGHT EYE: Primary | ICD-10-CM

## 2024-12-23 RX ORDER — PREDNISOLONE ACETATE 10 MG/ML
SUSPENSION/ DROPS OPHTHALMIC
Qty: 10 ML | Refills: 0 | Status: SHIPPED | OUTPATIENT
Start: 2024-12-23 | End: 2025-01-14

## 2024-12-23 NOTE — TELEPHONE ENCOUNTER
----- Message from Blaynevinod sent at 12/23/2024  3:31 PM CST -----  Regarding: Self  Caller is requesting to schedule their Lab appointment prior to annual appointment.    Order is not listed in EPIC.  Please enter order and contact patient to schedule.    Name of Caller:Self     Preferred Date and Time of Labs: chest xray  tomorrow     Date of EPP Appointment: n/a    Where would they like the lab performed? [facility]    Would the patient rather a call back or a response via [facility]? Call back or [facility]     Best Call Back Number:.[phone]      Additional Information:    Thank you.

## 2024-12-23 NOTE — TELEPHONE ENCOUNTER
----- Message from Roz sent at 12/23/2024  8:50 AM CST -----  Regarding: Eye Pain/Rx Request  Type:  Needs Medical Advice    Who Called: Gay Gurrola    Symptoms (please be specific): Right eye pain and swelling     How long has patient had these symptoms:  1 week     Pharmacy name and phone #:    [facility] [address] [place], [place] - [address]  [address] [place] [address]  Phone: [phone] Fax: [phone]        Would the patient rather a call back or a response via [facility]? Call back    Best Call Back Number: [phone]    Additional Information: Patient called to f/u on message sent about symptoms. Patient is requesting a rx called into pharmacy due to leaving [facility].

## 2024-12-24 ENCOUNTER — HOSPITAL ENCOUNTER (OUTPATIENT)
Dept: RADIOLOGY | Facility: OTHER | Age: 59
Discharge: HOME | End: 2024-12-24
Attending: INTERNAL MEDICINE
Payer: MEDICARE

## 2024-12-24 ENCOUNTER — PATIENT MESSAGE (OUTPATIENT)
Dept: PRIMARY CARE CLINIC | Facility: CLINIC | Age: 59
End: 2024-12-24
Payer: MEDICARE

## 2024-12-24 DIAGNOSIS — R91.8 ABNORMAL CT SCAN, LUNG: ICD-10-CM

## 2024-12-24 PROCEDURE — 71046 X-RAY EXAM CHEST 2 VIEWS: CPT | Mod: 26,,, | Performed by: RADIOLOGY

## 2024-12-24 PROCEDURE — 71046 X-RAY EXAM CHEST 2 VIEWS: CPT | Mod: TC,FY

## 2024-12-24 NOTE — TELEPHONE ENCOUNTER
Dr Lopez notes from CT results on 12/9 read:   Your CT scan looks good overall.  You have some mild changes in your lungs that could be from possibly not taking a full inspiration, we can do a dedicated chest x-ray to further evaluate if you would like.     Xray was pended but never signed. Pt has been unable to schedule.

## 2025-01-20 DIAGNOSIS — F51.01 PRIMARY INSOMNIA: ICD-10-CM

## 2025-01-21 RX ORDER — TEMAZEPAM 30 MG/1
30 CAPSULE ORAL NIGHTLY PRN
Qty: 30 CAPSULE | Refills: 0 | Status: SHIPPED | OUTPATIENT
Start: 2025-01-21

## 2025-01-21 RX ORDER — TEMAZEPAM 30 MG/1
30 CAPSULE ORAL NIGHTLY PRN
Qty: 60 CAPSULE | Refills: 0 | Status: SHIPPED | OUTPATIENT
Start: 2025-01-21 | End: 2025-03-22

## 2025-02-12 ENCOUNTER — TELEPHONE (OUTPATIENT)
Facility: CLINIC | Age: 60
End: 2025-02-12
Payer: MEDICARE

## 2025-02-13 DIAGNOSIS — S92.515A CLOSED NONDISPLACED FRACTURE OF PROXIMAL PHALANX OF LESSER TOE OF LEFT FOOT, INITIAL ENCOUNTER: Primary | ICD-10-CM

## 2025-02-14 ENCOUNTER — HOSPITAL ENCOUNTER (OUTPATIENT)
Dept: RADIOLOGY | Facility: HOSPITAL | Age: 60
Discharge: HOME | End: 2025-02-14
Attending: SURGERY
Payer: MEDICARE

## 2025-02-14 ENCOUNTER — OFFICE VISIT (OUTPATIENT)
Dept: ORTHOPEDICS | Facility: CLINIC | Age: 60
End: 2025-02-14
Payer: MEDICARE

## 2025-02-14 VITALS — WEIGHT: 112 LBS | HEIGHT: 62 IN | BODY MASS INDEX: 20.61 KG/M2

## 2025-02-14 DIAGNOSIS — M79.671 BILATERAL FOOT PAIN: ICD-10-CM

## 2025-02-14 DIAGNOSIS — M20.41 HAMMERTOE OF RIGHT FOOT: ICD-10-CM

## 2025-02-14 DIAGNOSIS — M79.672 BILATERAL FOOT PAIN: Primary | ICD-10-CM

## 2025-02-14 DIAGNOSIS — M79.672 BILATERAL FOOT PAIN: ICD-10-CM

## 2025-02-14 DIAGNOSIS — M79.671 BILATERAL FOOT PAIN: Primary | ICD-10-CM

## 2025-02-14 DIAGNOSIS — M20.12 HALLUX VALGUS OF LEFT FOOT: Primary | ICD-10-CM

## 2025-02-14 DIAGNOSIS — S92.515A CLOSED NONDISPLACED FRACTURE OF PROXIMAL PHALANX OF LESSER TOE OF LEFT FOOT, INITIAL ENCOUNTER: ICD-10-CM

## 2025-02-14 PROCEDURE — 73630 X-RAY EXAM OF FOOT: CPT | Mod: TC,50

## 2025-02-14 PROCEDURE — 73630 X-RAY EXAM OF FOOT: CPT | Mod: 26,50,, | Performed by: RADIOLOGY

## 2025-02-14 PROCEDURE — 99999 PR PBB SHADOW E&M-EST. PATIENT-LVL IV: CPT | Mod: PBBFAC,,, | Performed by: SURGERY

## 2025-02-14 PROCEDURE — 99214 OFFICE O/P EST MOD 30 MIN: CPT | Mod: PBBFAC,25 | Performed by: SURGERY

## 2025-02-15 NOTE — PROGRESS NOTES
"History of Present Illness    HPI:  Gay presents with bilateral foot pain, worse in the left foot due to a broken toe sustained in October while in [place]. The initial pain was severe. The broken toe has caused a separation in the foot structure, affecting her gait and causing discomfort when wearing shoes. She anticipates the right foot will eventually develop similar issues.    She has a history of foot problems, including previous bunionectomies on both feet. Over the past couple of years, she has been using improper footwear, exacerbating her foot issues. She changed to New Balance shoes about six months ago but found them too tight.    Gay consulted a podiatrist last year who performed wrapping. She has also been following advice from a physical therapist on social media, trying various home remedies and exercises. These interventions have not provided significant relief.    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 During the pandemic, she was running six miles daily at [facility] before sustaining a hamstring tear about four years ago. This injury has affected her gait, causing her to rely more on her right side.    Gay expresses concern about the appearance of her toes, stating that she was reluctant to go to the beach due to the appearance of her toes. She notes a family history of bunions, with both her grandmother and mother developing them at an early age.    Gay does not explicitly deny any specific medical diagnoses.    PREVIOUS TREATMENTS:  - Gay has tried using towels and following exercises from "G[employer] Happens" on [employer] for foot issues  - Gay has seen a podiatrist who performed wrapping  - Gay has been doing physical therapy exercises at home          OBJECTIVE:      Vitals:    02/14/25 0953   Weight: 50.8 kg (111 lb 15.9 oz)   Height: 5' 2.01" (1.575 m)       Lower Extremity Exam    Physical Exam    Musculoskeletal: Left " deltoid strength 5/5. Right deltoid strength 5/5. Left bicep strength 5/5. Right bicep strength 5/5. Left tricep strength 5/5. Right tricep strength 5/5. Left quadriceps strength 5/5. Right quadriceps strength 5/5. Left hamstring strength 5/5. Right hamstring strength 5/5. Left tibialis anterior strength 5/5. Right tibialis anterior strength 5/5. Left extensor hallucis longus strength 5/5. Right extensor hallucis longus strength 5/5. Strength 5/5 in all extremities. Very swollen foot. Tightness in gastroc.      Hallux valgus deformity. Hammertoe of second digit, rigid, flexible 3-5.     X-rays demonstrate hallux valgus, previous surgery, and hammertoe second digit.    PLAN:  Gay was seen today for pain and pain.    Diagnoses and all orders for this visit:    Hammertoe of right foot  -     Ambulatory referral/consult to Orthopedics    Closed nondisplaced fracture of proximal phalanx of lesser toe of left foot, initial encounter  -     Ambulatory referral/consult to Orthopedics        Fracture care initiated for proximal phalanx fracture of left foot.    Assessment & Plan    M20.11 Hallux valgus (acquired), right foot  M20.12 Hallux valgus (acquired), left foot  M20.42 Other hammer toe(s) (acquired), left foot  M20.41 Other hammer toe(s) (acquired), right foot  M62.831 Muscle spasm of calf  M84.479P Pathological fracture, unspecified toe(s), subsequent encounter for fracture with malunion  S92.524A Nondisplaced fracture of middle phalanx of right lesser toe(s), initial encounter for closed fracture  Z87.828 Personal history of other (healed) physical injury and trauma  Z82.79 Family history of other congenital malformations, deformations and chromosomal abnormalities    - Gay understands the risks and benefits and elects to proceed with surgical procedure for correcting foot deformities on February 26th.  - Plan to perform metatarsal osteotomy and phalanx osteotomy on left foot.  - May release the gastroc  slightly to improve range of motion.  - Risks include potential for broken screws, infection, and need for additional procedures in case of complications.  - Surgery will be performed on one foot at a time, with the left foot being prioritized due to possible infection.        We had a long discussion of the risks and benefits of different operative and non-operative options. I explained the injury using pictures, models, and the patient's x-ray images. I explained the risks of surgery which include but are not limited to continued pain, deformity, bleeding, infection, damage to surrounding structures, non-union, mal-union, arthritis, and in rare cases loss of limb. I explained the risks of co-morbidities which influences the rate of complications. I explained the risks of non-operative management, including continued pain, long term immobilization, malunion, non-union, rapid arthritis progression, and difficulty with ambulation. I discussed all of these risks using non-medical terms and confirmed understanding. The patient elected for procedure above.  Consent signed.    Mukul Dumont MD  [facility]  Orthopedic Surgery      This note was done with voice recognition software. Please excuse any errors missed in proof reading.   This note was generated with the assistance of ambient listening technology. Verbal consent was obtained by the patient and accompanying visitor(s) for the recording of patient appointment to facilitate this note. I attest to having reviewed and edited the generated note for accuracy, though some syntax or spelling errors may persist. Please contact the author of this note for any clarification.

## 2025-02-17 ENCOUNTER — PATIENT MESSAGE (OUTPATIENT)
Dept: ORTHOPEDICS | Facility: CLINIC | Age: 60
End: 2025-02-17
Payer: MEDICARE

## 2025-02-17 ENCOUNTER — PATIENT MESSAGE (OUTPATIENT)
Dept: PREADMISSION TESTING | Facility: HOSPITAL | Age: 60
End: 2025-02-17
Payer: MEDICARE

## 2025-02-17 NOTE — ANESTHESIA PAT ROS NOTE
02/17/2025  Gay Gurrola is a 59 y.o., female.      Pre-op Assessment    I have reviewed the Patient Summary Reports.       I have reviewed the Medications.     Review of Systems  Anesthesia Hx:  No problems with previous Anesthesia   History of prior surgery of interest to airway management or planning:  Previous anesthesia: MAC       3/30/2022  EGD with MAC.  Procedure performed at an [facility].    Denies Personal Hx of Anesthesia complications.                    Social:  Non-Smoker, No Alcohol Use H/O alcohol abuse in remission      Hematology/Oncology:  Hematology Normal   Oncology Normal                                   EENT/Dental:  EENT/Dental Normal           Cardiovascular:  Exercise tolerance: good      Denies MI.     Denies CABG/stent.    Denies Angina.       Denies DOMINGUEZ.  ECG has been reviewed. H/O Precordial chest pain,  Palpitations,  Tachycardia,  abnormal stress echocardiogram suggestive of inferior ischemia in 2018.  Cardiac catheterization was normal.     Patient not on beta blockers                          Pulmonary:  Pulmonary Normal   Denies COPD.  Denies Asthma.   Denies Shortness of breath.                  Renal/:  Renal/ Normal                 Hepatic/GI:      Denies GERD.   Chronic idiopathic constipation,  H/O H. Pylori infection,  Hemorrhoidectomy Not Taking GLP-1 Agonists            Musculoskeletal:     Hallux valgus of left foot,  Hammertoe of right foot,  Closed nondisplaced fracture of proximal phalanx of lesser toe of left foot,   H/O Bunionectomies both feet,  Osteopenia,  Left hamstring injury,  Mild multilevel degenerative change of the cervical spine         Spine Disorders: cervical Degenerative disease           OB/GYN/PEDS:  H/O Tubal Ligation           Neurological:    Denies CVA.   Headaches Seizures, well controlled    H/O alcohol-related  seizure,  Memory loss,  Attention or concentration deficit,  Family history of Alzheimer's disease,  Chronic migraine without aura without status migrainosus, not intractable,  Family history of brain aneurysm                              Endocrine:  Denies Diabetes. Hypothyroidism          Psych:  Psychiatric History anxiety depression H/O Bipolar affective disorder since teen years,  Bipolar I disorder,  Insomnia              Past Medical History:   Diagnosis Date    Abnormal coronary angiogram     Alcohol abuse, in remission     Alcohol related seizure 2012    Anxiety     Bipolar affective     since teenage years    Chronic idiopathic constipation     Depression     H. pylori infection     Headache(784.0)     followed by Dr. Corona    Hypothyroid     Memory loss     Osteopenia     Seizures      Past Surgical History:   Procedure Laterality Date    BUNIONECTOMY      COLONOSCOPY N/A 2017    Procedure: COLONOSCOPY;  Surgeon: Estuardo Salazar MD;  [facility]  Service: Endoscopy;  Laterality: N/A;  PM prep    COLONOSCOPY N/A 2022    Procedure: COLONOSCOPY;  Surgeon: Estuardo Salazar MD;  [facility]  Service: Endoscopy;  Laterality: N/A;  suprep    ESOPHAGOGASTRODUODENOSCOPY N/A 10/23/2018    Procedure: EGD (ESOPHAGOGASTRODUODENOSCOPY);  Surgeon: Irlanda Schwarz MD;  [facility]  Service: Endoscopy;  Laterality: N/A;    ESOPHAGOGASTRODUODENOSCOPY N/A 2022    Procedure: ESOPHAGOGASTRODUODENOSCOPY (EGD);  Surgeon: Estuardo Salazar MD;  [facility]  Service: Endoscopy;  Laterality: N/A;  Pt informed to bring official paper documentation of COVID results w/ test date/name/ . Informed Pt that procedure will not be done w/o this documentation. Pt informed COVID test must be PCR or Rapid RNA.  instructions sent to [id number]    hemrrhoidectomy      TUBAL LIGATION         Anesthesia Assessment: Preoperative EQUATION    Planned  Procedure: Procedure(s) (LRB):  OSTEOTOMY, METATARSAL BONE (Left)  OSTEOTOMY, PHALANX, FOOT (Left)  CORRECTION, HAMMER TOE (Left)  RECESSION, MUSCLE, GASTROCNEMIUS (Left)  Requested Anesthesia Type:Choice  Surgeon: Mukul Dumont MD  Service: Orthopedics  Known or anticipated Date of Surgery:2/26/2025    Surgeon notes: reviewed    Electronic QUestionnaire Assessment completed via nurse interview with patient.        Triage considerations:     The patient has no apparent active cardiac condition (No unstable coronary Syndrome such as severe unstable angina or recent [<1 month] myocardial infarction, decompensated CHF, severe valvular   disease or significant arrhythmia)    Previous anesthesia records:MAC and No problems    Last PCP note: 6-12 months ago , within [facility]   Subspecialty notes: Cardiology: General    Other important co-morbidities: Hypothyroid       EKG 12/13/2023:  Vent. Rate : 079 BPM     Atrial Rate : 079 BPM      P-R Int : 132 ms          QRS Dur : 072 ms       QT Int : 376 ms       P-R-T Axes : 068 065 065 degrees      QTc Int : 431 ms   Normal sinus rhythm   Normal ECG   When compared with ECG of 23-MAY-2018 11:55,   ST no longer depressed in Inferior leads   Confirmed by Lakhwinder Melendez JR., MD ([id number]) on 12/14/2023 12:33:15 PM       Echo 12/19/2023:  Summary  Show Result Comparison     Left Ventricle: The left ventricle is normal in size. Normal wall thickness. Normal wall motion. There is normal systolic function with a visually estimated ejection fraction of 60 - 65%. There is normal diastolic function.    Right Ventricle: Normal right ventricular cavity size. Wall thickness is normal. Right ventricle wall motion  is normal. Systolic function is normal.    Mitral Valve: There is no stenosis.    Pulmonary Artery: No pulmonary hypertension. The estimated pulmonary artery systolic pressure is 26 mmHg.    IVC/SVC: Normal venous pressure at 3 mmHg.   Normal echocardiogram with Doppler      48  Hour Holter Monitor 12/19/2023:  Interpretation Summary  Show Result Comparison     The predominant rhythm is sinus.    No significant dysrhythmia is present.     Monitoring started at 8:48 AM and continued for 47 hr 59 min. The average heart rate was 85 BPM. The minimum heart rate was 61 BPM, occurring at 5:38:03 AM D1. The maximum heart rate was 124 BPM, occurring at 11:59:24 AM D2.    Ventricular ectopic activity consisted of 1 beat, of which, 1 was in single PVCs.    The patient's rhythm included 5 hr 16 min 14 sec of tachycardia. The fastest single episode of tachycardia occurred at 1:44:41 PM D2, lasting 10 sec, with maximum heart rate of 124 BPM.    Supraventricular ectopic activity consisted of 10 beats, of which, 10 were single PACs.    The longest R-R interval was 1.0 second occurring at 8:32:28 PM D1. The longest N-N interval was 1.0 second occurring at 8:32:28 PM D1.       Cath lab Procedure 5/23/2018:   Recommendations :   1. Routine post-cath care.   2. Primary prevention of CAD.   3. Work-up of non-coronary chest pain syndrome       Exercise Stress Echo 5/21/2018:  EKG Conclusions:   1. The EKG portion of this study is negative for ischemia at a high workload, and peak heart rate of 164 bpm (101% of predicted).   2. Exercise capacity is above average.   3. Blood pressure response to exercise was normal (Presenting BP: 111/63 Peak BP: 149/80).   4. The following arrhythmias were present: occasional PVCs.   5. There were no symptoms of chest discomfort or significant dyspnea throughout the protocol.   6. The Duke treadmill score was 10 suggesting a low probability for future cardiovascular events.     CONCLUSIONS     1 - Normal left ventricular systolic function (EF 60-65%).     2 - No wall motion abnormalities.     3 - Normal left ventricular diastolic function.     4 - Normal right ventricular systolic function .     5 - High work load w/o angina.   Positive stress echocardiographic study  demonstrating an ischemic response involving the inferolateral wall.   Post Exercise- The inferolateral wall worsens becoming hypokinetic.       Tests already available:  Results have been reviewed.             Instructions given. (See in Nurse's note)  Preop medication instructions sent via portal message.     Optimization:  Anesthesia Preop Clinic Assessment Not Indicated    Medical Opinion Indicated: No       Sub-specialist consult indicated:  No      Plan: Consultation: Medical clearance is not requested.          Navigation:  Straight Line to surgery.               No tests, anesthesia preop clinic visit, or consult required.                        Patient is OK to proceed with surgery at [facility].       Ht: 5'2  Wt: 50.8 kg (111 lb)  BMI: 20.48

## 2025-02-19 ENCOUNTER — PATIENT MESSAGE (OUTPATIENT)
Dept: OBSTETRICS AND GYNECOLOGY | Facility: CLINIC | Age: 60
End: 2025-02-19
Payer: MEDICARE

## 2025-02-24 DIAGNOSIS — Z00.00 ENCOUNTER FOR MEDICARE ANNUAL WELLNESS EXAM: ICD-10-CM

## 2025-02-25 ENCOUNTER — ANESTHESIA EVENT (OUTPATIENT)
Dept: SURGERY | Facility: HOSPITAL | Age: 60
End: 2025-02-25
Payer: MEDICARE

## 2025-02-25 DIAGNOSIS — S92.515A CLOSED NONDISPLACED FRACTURE OF PROXIMAL PHALANX OF LESSER TOE OF LEFT FOOT, INITIAL ENCOUNTER: Primary | ICD-10-CM

## 2025-02-25 DIAGNOSIS — M20.41 HAMMERTOE OF RIGHT FOOT: ICD-10-CM

## 2025-02-25 RX ORDER — IBUPROFEN 600 MG/1
600 TABLET ORAL 3 TIMES DAILY
Qty: 25 TABLET | Refills: 1 | Status: SHIPPED | OUTPATIENT
Start: 2025-02-25

## 2025-02-25 RX ORDER — OXYCODONE HYDROCHLORIDE 5 MG/1
5 TABLET ORAL EVERY 4 HOURS PRN
Qty: 25 TABLET | Refills: 0 | Status: SHIPPED | OUTPATIENT
Start: 2025-02-25

## 2025-02-25 RX ORDER — ACETAMINOPHEN 500 MG
500 TABLET ORAL EVERY 6 HOURS PRN
Qty: 25 TABLET | Refills: 0 | Status: SHIPPED | OUTPATIENT
Start: 2025-02-25

## 2025-02-25 RX ORDER — ASPIRIN 81 MG/1
81 TABLET ORAL DAILY
Qty: 25 TABLET | Refills: 0 | Status: SHIPPED | OUTPATIENT
Start: 2025-02-25 | End: 2026-02-25

## 2025-02-26 ENCOUNTER — ANESTHESIA (OUTPATIENT)
Dept: SURGERY | Facility: HOSPITAL | Age: 60
End: 2025-02-26
Payer: MEDICARE

## 2025-02-26 ENCOUNTER — HOSPITAL ENCOUNTER (OUTPATIENT)
Facility: HOSPITAL | Age: 60
Discharge: HOME | End: 2025-02-26
Attending: SURGERY | Admitting: SURGERY
Payer: MEDICARE

## 2025-02-26 VITALS
TEMPERATURE: 98 F | HEIGHT: 62 IN | SYSTOLIC BLOOD PRESSURE: 101 MMHG | DIASTOLIC BLOOD PRESSURE: 55 MMHG | HEART RATE: 76 BPM | WEIGHT: 106 LBS | BODY MASS INDEX: 19.51 KG/M2 | OXYGEN SATURATION: 98 % | RESPIRATION RATE: 17 BRPM

## 2025-02-26 DIAGNOSIS — M20.41 HAMMERTOE OF RIGHT FOOT: Primary | ICD-10-CM

## 2025-02-26 DIAGNOSIS — M20.11 HALLUX VALGUS (ACQUIRED), RIGHT FOOT: ICD-10-CM

## 2025-02-26 DIAGNOSIS — M20.11 HALLUX VALGUS, RIGHT: ICD-10-CM

## 2025-02-26 PROCEDURE — C1713 ANCHOR/SCREW BN/BN,TIS/BN: HCPCS | Performed by: SURGERY

## 2025-02-26 PROCEDURE — 27201423 OPTIME MED/SURG SUP & DEVICES STERILE SUPPLY: Performed by: SURGERY

## 2025-02-26 PROCEDURE — 63600175 PHARM REV CODE 636 W HCPCS: Performed by: SURGERY

## 2025-02-26 PROCEDURE — 25000003 PHARM REV CODE 250: Performed by: ANESTHESIOLOGY

## 2025-02-26 PROCEDURE — 37000009 HC ANESTHESIA EA ADD 15 MINS: Performed by: SURGERY

## 2025-02-26 PROCEDURE — 25000003 PHARM REV CODE 250: Performed by: NURSE PRACTITIONER

## 2025-02-26 PROCEDURE — 36000708 HC OR TIME LEV III 1ST 15 MIN: Performed by: SURGERY

## 2025-02-26 PROCEDURE — 28299 COR HLX VLGS DOUBLE OSTEOT: CPT | Mod: RT,,, | Performed by: SURGERY

## 2025-02-26 PROCEDURE — 27200651 HC AIRWAY, LMA: Performed by: ANESTHESIOLOGY

## 2025-02-26 PROCEDURE — 25000003 PHARM REV CODE 250: Performed by: SURGERY

## 2025-02-26 PROCEDURE — 28285 REPAIR OF HAMMERTOE: CPT | Mod: 59,T1,T2,T3 | Performed by: SURGERY

## 2025-02-26 PROCEDURE — 99900035 HC TECH TIME PER 15 MIN (STAT)

## 2025-02-26 PROCEDURE — 71000015 HC POSTOP RECOV 1ST HR: Performed by: SURGERY

## 2025-02-26 PROCEDURE — 25000003 PHARM REV CODE 250: Performed by: NURSE ANESTHETIST, CERTIFIED REGISTERED

## 2025-02-26 PROCEDURE — 36000709 HC OR TIME LEV III EA ADD 15 MIN: Performed by: SURGERY

## 2025-02-26 PROCEDURE — 71000039 HC RECOVERY, EACH ADD'L HOUR: Performed by: SURGERY

## 2025-02-26 PROCEDURE — 94761 N-INVAS EAR/PLS OXIMETRY MLT: CPT

## 2025-02-26 PROCEDURE — 71000033 HC RECOVERY, INTIAL HOUR: Performed by: SURGERY

## 2025-02-26 PROCEDURE — 63600175 PHARM REV CODE 636 W HCPCS: Performed by: NURSE ANESTHETIST, CERTIFIED REGISTERED

## 2025-02-26 PROCEDURE — 63600175 PHARM REV CODE 636 W HCPCS: Performed by: ANESTHESIOLOGY

## 2025-02-26 PROCEDURE — 37000008 HC ANESTHESIA 1ST 15 MINUTES: Performed by: SURGERY

## 2025-02-26 RX ORDER — OXYCODONE HYDROCHLORIDE 5 MG/1
5 TABLET ORAL
Status: DISCONTINUED | OUTPATIENT
Start: 2025-02-26 | End: 2025-02-26 | Stop reason: HOSPADM

## 2025-02-26 RX ORDER — CEFAZOLIN 2 G/1
2 INJECTION, POWDER, FOR SOLUTION INTRAMUSCULAR; INTRAVENOUS
Status: DISCONTINUED | OUTPATIENT
Start: 2025-02-26 | End: 2025-02-26 | Stop reason: HOSPADM

## 2025-02-26 RX ORDER — LIDOCAINE HYDROCHLORIDE 10 MG/ML
INJECTION, SOLUTION INFILTRATION; PERINEURAL
Status: DISCONTINUED | OUTPATIENT
Start: 2025-02-26 | End: 2025-02-26 | Stop reason: HOSPADM

## 2025-02-26 RX ORDER — CELECOXIB 200 MG/1
400 CAPSULE ORAL
Status: COMPLETED | OUTPATIENT
Start: 2025-02-26 | End: 2025-02-26

## 2025-02-26 RX ORDER — MIDAZOLAM HYDROCHLORIDE 1 MG/ML
INJECTION INTRAMUSCULAR; INTRAVENOUS
Status: DISCONTINUED | OUTPATIENT
Start: 2025-02-26 | End: 2025-02-26

## 2025-02-26 RX ORDER — DEXAMETHASONE SODIUM PHOSPHATE 4 MG/ML
INJECTION, SOLUTION INTRA-ARTICULAR; INTRALESIONAL; INTRAMUSCULAR; INTRAVENOUS; SOFT TISSUE
Status: DISCONTINUED | OUTPATIENT
Start: 2025-02-26 | End: 2025-02-26

## 2025-02-26 RX ORDER — LIDOCAINE HYDROCHLORIDE 10 MG/ML
INJECTION, SOLUTION INTRAVENOUS
Status: DISCONTINUED | OUTPATIENT
Start: 2025-02-26 | End: 2025-02-26

## 2025-02-26 RX ORDER — ONDANSETRON HYDROCHLORIDE 2 MG/ML
INJECTION, SOLUTION INTRAVENOUS
Status: DISCONTINUED | OUTPATIENT
Start: 2025-02-26 | End: 2025-02-26

## 2025-02-26 RX ORDER — EPHEDRINE SULFATE 50 MG/ML
INJECTION, SOLUTION INTRAVENOUS
Status: DISCONTINUED | OUTPATIENT
Start: 2025-02-26 | End: 2025-02-26

## 2025-02-26 RX ORDER — SODIUM CHLORIDE 0.9 % (FLUSH) 0.9 %
10 SYRINGE (ML) INJECTION
Status: DISCONTINUED | OUTPATIENT
Start: 2025-02-26 | End: 2025-02-26 | Stop reason: HOSPADM

## 2025-02-26 RX ORDER — PROPOFOL 10 MG/ML
VIAL (ML) INTRAVENOUS CONTINUOUS PRN
Status: DISCONTINUED | OUTPATIENT
Start: 2025-02-26 | End: 2025-02-26

## 2025-02-26 RX ORDER — KETAMINE HCL IN 0.9 % NACL 50 MG/5 ML
SYRINGE (ML) INTRAVENOUS
Status: DISCONTINUED | OUTPATIENT
Start: 2025-02-26 | End: 2025-02-26

## 2025-02-26 RX ORDER — ACETAMINOPHEN 500 MG
1000 TABLET ORAL
Status: COMPLETED | OUTPATIENT
Start: 2025-02-26 | End: 2025-02-26

## 2025-02-26 RX ORDER — CEFAZOLIN SODIUM 1 G/3ML
INJECTION, POWDER, FOR SOLUTION INTRAMUSCULAR; INTRAVENOUS
Status: DISCONTINUED | OUTPATIENT
Start: 2025-02-26 | End: 2025-02-26

## 2025-02-26 RX ORDER — HALOPERIDOL 5 MG/ML
0.5 INJECTION INTRAMUSCULAR EVERY 10 MIN PRN
Status: DISCONTINUED | OUTPATIENT
Start: 2025-02-26 | End: 2025-02-26 | Stop reason: HOSPADM

## 2025-02-26 RX ORDER — SODIUM CHLORIDE 9 MG/ML
INJECTION, SOLUTION INTRAVENOUS CONTINUOUS
Status: DISCONTINUED | OUTPATIENT
Start: 2025-02-26 | End: 2025-02-26 | Stop reason: HOSPADM

## 2025-02-26 RX ORDER — MUPIROCIN 20 MG/G
OINTMENT TOPICAL
Status: DISCONTINUED | OUTPATIENT
Start: 2025-02-26 | End: 2025-02-26 | Stop reason: HOSPADM

## 2025-02-26 RX ORDER — PROPOFOL 10 MG/ML
VIAL (ML) INTRAVENOUS
Status: DISCONTINUED | OUTPATIENT
Start: 2025-02-26 | End: 2025-02-26

## 2025-02-26 RX ORDER — METHOCARBAMOL 500 MG/1
1000 TABLET, FILM COATED ORAL ONCE
Status: COMPLETED | OUTPATIENT
Start: 2025-02-26 | End: 2025-02-26

## 2025-02-26 RX ORDER — GLUCAGON 1 MG
1 KIT INJECTION
Status: DISCONTINUED | OUTPATIENT
Start: 2025-02-26 | End: 2025-02-26 | Stop reason: HOSPADM

## 2025-02-26 RX ORDER — ONDANSETRON HYDROCHLORIDE 2 MG/ML
4 INJECTION, SOLUTION INTRAVENOUS DAILY PRN
Status: DISCONTINUED | OUTPATIENT
Start: 2025-02-26 | End: 2025-02-26 | Stop reason: HOSPADM

## 2025-02-26 RX ORDER — PHENYLEPHRINE HYDROCHLORIDE 10 MG/ML
INJECTION INTRAVENOUS
Status: DISCONTINUED | OUTPATIENT
Start: 2025-02-26 | End: 2025-02-26

## 2025-02-26 RX ORDER — FENTANYL CITRATE 50 UG/ML
25 INJECTION, SOLUTION INTRAMUSCULAR; INTRAVENOUS EVERY 5 MIN PRN
Status: DISCONTINUED | OUTPATIENT
Start: 2025-02-26 | End: 2025-02-26 | Stop reason: HOSPADM

## 2025-02-26 RX ADMIN — PHENYLEPHRINE HYDROCHLORIDE 100 MCG: 10 INJECTION INTRAVENOUS at 07:02

## 2025-02-26 RX ADMIN — CEFAZOLIN 2 G: 330 INJECTION, POWDER, FOR SOLUTION INTRAMUSCULAR; INTRAVENOUS at 07:02

## 2025-02-26 RX ADMIN — PHENYLEPHRINE HYDROCHLORIDE 100 MCG: 10 INJECTION INTRAVENOUS at 08:02

## 2025-02-26 RX ADMIN — EPHEDRINE SULFATE 10 MG: 50 INJECTION INTRAVENOUS at 07:02

## 2025-02-26 RX ADMIN — ACETAMINOPHEN 1000 MG: 500 TABLET ORAL at 06:02

## 2025-02-26 RX ADMIN — PROPOFOL 50 MG: 10 INJECTION, EMULSION INTRAVENOUS at 08:02

## 2025-02-26 RX ADMIN — MUPIROCIN: 20 OINTMENT TOPICAL at 06:02

## 2025-02-26 RX ADMIN — CELECOXIB 400 MG: 200 CAPSULE ORAL at 06:02

## 2025-02-26 RX ADMIN — Medication 25 MG: at 07:02

## 2025-02-26 RX ADMIN — ONDANSETRON 4 MG: 2 INJECTION INTRAMUSCULAR; INTRAVENOUS at 07:02

## 2025-02-26 RX ADMIN — PHENYLEPHRINE HYDROCHLORIDE 100 MCG: 10 INJECTION INTRAVENOUS at 09:02

## 2025-02-26 RX ADMIN — EPHEDRINE SULFATE 10 MG: 50 INJECTION INTRAVENOUS at 08:02

## 2025-02-26 RX ADMIN — PROPOFOL 150 MG: 10 INJECTION, EMULSION INTRAVENOUS at 07:02

## 2025-02-26 RX ADMIN — LIDOCAINE HYDROCHLORIDE 50 MG: 10 INJECTION, SOLUTION INTRAVENOUS at 07:02

## 2025-02-26 RX ADMIN — PROPOFOL 150 MCG/KG/MIN: 10 INJECTION, EMULSION INTRAVENOUS at 07:02

## 2025-02-26 RX ADMIN — SODIUM CHLORIDE, SODIUM GLUCONATE, SODIUM ACETATE, POTASSIUM CHLORIDE, MAGNESIUM CHLORIDE, SODIUM PHOSPHATE, DIBASIC, AND POTASSIUM PHOSPHATE: .53; .5; .37; .037; .03; .012; .00082 INJECTION, SOLUTION INTRAVENOUS at 08:02

## 2025-02-26 RX ADMIN — MIDAZOLAM HYDROCHLORIDE 2 MG: 1 INJECTION, SOLUTION INTRAMUSCULAR; INTRAVENOUS at 06:02

## 2025-02-26 RX ADMIN — FENTANYL CITRATE 25 MCG: 50 INJECTION INTRAMUSCULAR; INTRAVENOUS at 10:02

## 2025-02-26 RX ADMIN — METHOCARBAMOL 1000 MG: 500 TABLET ORAL at 10:02

## 2025-02-26 RX ADMIN — SODIUM CHLORIDE: 9 INJECTION, SOLUTION INTRAVENOUS at 06:02

## 2025-02-26 RX ADMIN — OXYCODONE 5 MG: 5 TABLET ORAL at 10:02

## 2025-02-26 RX ADMIN — DEXAMETHASONE SODIUM PHOSPHATE 4 MG: 4 INJECTION, SOLUTION INTRAMUSCULAR; INTRAVENOUS at 07:02

## 2025-02-26 NOTE — PLAN OF CARE
Pre Op complete with no distress noted.   not here at the moment.  Call light in reach and no questions a this time.  Belongings locked in locker.  Patient will need crutches.

## 2025-02-26 NOTE — DISCHARGE SUMMARY
[facility] - Surgery (Hospital)  Discharge Note  Short Stay    Procedure(s) (LRB):  OSTEOTOMY, METATARSAL BONE (Right)  OSTEOTOMY, PHALANX, FOOT (Right)  CORRECTION, HAMMER TOE (Right)  RECESSION, MUSCLE, GASTROCNEMIUS (Right)      OUTCOME: Patient tolerated treatment/procedure well without complication and is now ready for discharge.    DISPOSITION: Home or Self Care    FINAL DIAGNOSIS:  <principal problem not specified>    FOLLOWUP: In clinic    DISCHARGE INSTRUCTIONS:    Discharge Procedure Orders   X-Ray Foot Complete Right   Standing Status: Future Standing Exp. Date: 02/26/26     Order Specific Question Answer Comments   May the Radiologist modify the order per protocol to meet the clinical needs of the patient? Yes    Release to patient Immediate      Diet Adult Regular     Keep surgical extremity elevated     Ice to affected area     Leave dressing on - Keep it clean, dry, and intact until clinic visit   Order Comments: If numbness / tingling can remove dressing. Cover incisions with bandaids     Weight bearing restrictions (specify):   Order Comments: Weight bear as tolerated to heel in boot. Do not need to sleep in boot. Do not need boot on when resting/sitting at home.      1. weightbear as tolerated in the boot.  2. Tylenol for pain, Motrin for pain, oxycodone p.r.n. for pain  3. Aspirin for DVT prophylaxis   4. Dressings can stay on until clinic.  If she experiences coldness or widening of the toes the dressing can be removed.  The incisions can be covered with Band-Aids.    5. Follow up in Orthopedic Clinic in 1-2 weeks for suture removal  6. Ice and elevate the extremity      TIME SPENT ON DISCHARGE: 10 minutes

## 2025-02-26 NOTE — H&P
Note addendum: spoke to patient to discussed risks benefits and alternative of right versus left foot surgery.  Plan for right foot surgery on 02/26/2025.    HPI as per below.  Plan for procedure as scheduled.  Risks and benefits reviewed.  Plan for surgery to the right foot, hallux valgus, bunion correction, possible Noe osteotomy, hammertoes 2 through 5 correction.      History of Present Illness    HPI:  Gay presents with bilateral foot pain, worse in the left foot due to a broken toe sustained in October while in [place]. The initial pain was severe. The broken toe has caused a separation in the foot structure, affecting her gait and causing discomfort when wearing shoes. She anticipates the right foot will eventually develop similar issues.    She has a history of foot problems, including previous bunionectomies on both feet. Over the past couple of years, she has been using improper footwear, exacerbating her foot issues. She changed to [employer] shoes about six months ago but found them too tight.    Gay consulted a podiatrist last year who performed wrapping. She has also been following advice from a physical therapist on social media, trying various home remedies and exercises. These interventions have not provided significant relief.    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 During the pandemic, she was running six miles daily at [facility] before sustaining a hamstring tear about four years ago. This injury has affected her gait, causing her to rely more on her right side.    Gay expresses concern about the appearance of her toes, stating that she was reluctant to go to the beach due to the appearance of her toes. She notes a family history of bunions, with both her grandmother and mother developing them at an early age.    Gay does not explicitly deny any specific medical diagnoses.    PREVIOUS TREATMENTS:  - Gay has tried using towels  "and following exercises from "[employer]" on [email or web address] for foot issues  - Gay has seen a podiatrist who performed wrapping  - Gay has been doing physical therapy exercises at home          OBJECTIVE:      Vitals:    02/26/25 0553 02/26/25 0554 02/26/25 0556   BP: 104/67 107/67    Pulse: 68  68   Resp: 16     Temp: 97.7 °F (36.5 °C)     TempSrc: Oral     SpO2: 100%     Weight: 48.1 kg (106 lb)     Height: 5' 2" (1.575 m)         Lower Extremity Exam    Physical Exam           Hallux valgus deformity. Hammertoe of second digit, rigid, flexible 3-5.     X-rays demonstrate hallux valgus, previous surgery, and hammertoe second digit.    PLAN:  Gay was seen today for pain and pain.    Diagnoses and all orders for this visit:    Hammertoe of right foot  -     Ambulatory referral/consult to Orthopedics    Closed nondisplaced fracture of proximal phalanx of lesser toe of left foot, initial encounter  -     Ambulatory referral/consult to Orthopedics        Fracture care initiated for proximal phalanx fracture of left foot.    Assessment & Plan    M20.11 Hallux valgus (acquired), right foot  M20.12 Hallux valgus (acquired), left foot  M20.42 Other hammer toe(s) (acquired), left foot  M20.41 Other hammer toe(s) (acquired), right foot  M62.831 Muscle spasm of calf  M84.479P Pathological fracture, unspecified toe(s), subsequent encounter for fracture with malunion  S92.524A Nondisplaced fracture of middle phalanx of right lesser toe(s), initial encounter for closed fracture  Z87.828 Personal history of other (healed) physical injury and trauma  Z82.79 Family history of other congenital malformations, deformations and chromosomal abnormalities    - Gay understands the risks and benefits and elects to proceed with surgical procedure for correcting foot deformities on February 26th.  - Plan to perform metatarsal osteotomy and phalanx osteotomy on right foot.  - May release the gastroc slightly to improve range " of motion.  - Risks include potential for broken screws, infection, and need for additional procedures in case of complications.  - Surgery will be performed on one foot at a time, with the right foot being first       We had a long discussion of the risks and benefits of different operative and non-operative options. I explained the injury using pictures, models, and the patient's x-ray images. I explained the risks of surgery which include but are not limited to continued pain, deformity, bleeding, infection, damage to surrounding structures, non-union, mal-union, arthritis, and in rare cases loss of limb. I explained the risks of co-morbidities which influences the rate of complications. I explained the risks of non-operative management, including continued pain, long term immobilization, malunion, non-union, rapid arthritis progression, and difficulty with ambulation. I discussed all of these risks using non-medical terms and confirmed understanding. The patient elected for procedure above.  Consent signed.          Mukul Dumont MD  [facility]  Orthopedic Surgery      This note was done with voice recognition software. Please excuse any errors missed in proof reading.   This note was generated with the assistance of ambient listening technology. Verbal consent was obtained by the patient and accompanying visitor(s) for the recording of patient appointment to facilitate this note. I attest to having reviewed and edited the generated note for accuracy, though some syntax or spelling errors may persist. Please contact the author of this note for any clarification.

## 2025-02-26 NOTE — DISCHARGE INSTRUCTIONS
1. weightbear as tolerated in the boot.  2. Tylenol for pain, Motrin for pain, oxycodone p.r.n. for pain  3. Aspirin for DVT prophylaxis   4. Dressings can stay on until clinic.  If she experiences coldness or widening of the toes the dressing can be removed.  The incisions can be covered with Band-Aids.    5. Follow up in Orthopedic Clinic in 1-2 weeks for suture removal  6. Ice and elevate the extremity    For other instructions see discharge packet.

## 2025-02-26 NOTE — TRANSFER OF CARE
"Anesthesia Transfer of Care Note    Patient: Gay Gurrola    Procedure(s) Performed: Procedure(s) (LRB):  OSTEOTOMY, METATARSAL BONE (Right)  OSTEOTOMY, PHALANX, FOOT (Right)  CORRECTION, HAMMER TOE (Right)  RECESSION, MUSCLE, GASTROCNEMIUS (Right)    Patient location: [facility]    Anesthesia Type: general and regional    Transport from OR: Transported from OR on room air with adequate spontaneous ventilation. Transported from OR on 6-10 L/min O2 by face mask with adequate spontaneous ventilation    Post pain: adequate analgesia    Post assessment: no apparent anesthetic complications and tolerated procedure well    Post vital signs: stable    Level of consciousness: awake    Nausea/Vomiting: no nausea/vomiting    Complications: none    Transfer of care protocol was followed      Last vitals: Visit Vitals  /67   Pulse 68   Temp 36.5 °C (97.7 °F) (Oral)   Resp 16   Ht 5' 2" (1.575 m)   Wt 48.1 kg (106 lb)   LMP 10/07/2022   SpO2 100%   Breastfeeding No   BMI 19.39 kg/m²     "

## 2025-02-26 NOTE — ANESTHESIA PROCEDURE NOTES
Intubation    Date/Time: 2/26/2025 7:15 AM    Performed by: Shabnam Morejon CRNA  Authorized by: June Daigle MD    Intubation:     Induction:  Intravenous    Intubated:  Postinduction    Mask Ventilation:  Easy mask    Attempts:  1    Attempted By:  CRNA    Method of Intubation:  Fast track LMA    Difficult Airway Encountered?: No      Complications:  None    Airway Device:  Supraglottic airway/LMA    Airway Device Size:  3.0    Style/Cuff Inflation:  Cuffed    Inflation Amount (mL):  4    Secured at:  The lips    Placement Verified By:  Capnometry    Complicating Factors:  None    Findings Post-Intubation:  BS equal bilateral

## 2025-02-26 NOTE — ANESTHESIA PREPROCEDURE EVALUATION
02/26/2025  Gay Gurrola is a 59 y.o., female.    Procedures:      OSTEOTOMY, METATARSAL BONE (Right: Foot)      OSTEOTOMY, PHALANX, FOOT (Right)      CORRECTION, HAMMER TOE (Right: Foot)      RECESSION, MUSCLE, GASTROCNEMIUS (Right)   Anesthesia type: Choice   Diagnosis:      Hammertoe of right foot [M20.41]      Bunion of right foot [M21.611]     Current Discharge Medication List        CONTINUE these medications which have NOT CHANGED    Details   b complex vitamins tablet Take 1 tablet by mouth once daily.      cholecalciferol, vitamin D3, (VITAMIN D3) 25 mcg (1,000 unit) capsule Take 1,000 Units by mouth once daily.      DULoxetine (CYMBALTA) 20 MG capsule Take 1 capsule (20 mg total) by mouth once daily.  Qty: 90 capsule, Refills: 0    Associated Diagnoses: Bipolar disorder in partial remission, most recent episode unspecified type      estradioL (VIVELLE-DOT) 0.0375 mg/24 hr Place 1 patch onto the skin twice a week.  Qty: 8 patch, Refills: 11      fish oil-omega-3 fatty acids 300-1,000 mg capsule Take 1 capsule by mouth once daily.      levothyroxine (SYNTHROID) 25 MCG tablet TAKE 1 TABLET BY MOUTH ONCE DAILY TAKE  WITH  13MCG  FOR  TOTAL  DAILY  DOSE  OF  38MCG  Qty: 90 tablet, Refills: 0    Associated Diagnoses: Hypothyroidism, unspecified type      levothyroxine 13 mcg Cap Take 13 mcg by mouth once daily. Take with 25 mcg for total daily dose of 38 mcg  Qty: 90 capsule, Refills: 0    Associated Diagnoses: Hypothyroidism, unspecified type      linaCLOtide (LINZESS) 290 mcg Cap capsule Take 1 capsule (290 mcg total) by mouth once daily.  Qty: 90 capsule, Refills: 3      multivitamin (THERAGRAN) per tablet Take 1 tablet by mouth once daily.      progesterone (PROMETRIUM) 100 MG capsule Take 1 capsule (100 mg total) by mouth nightly.  Qty: 30 capsule, Refills: 11      QUEtiapine (SEROQUEL)  400 MG tablet Take 1 tablet (400 mg total) by mouth every evening.  Qty: 90 tablet, Refills: 0    Associated Diagnoses: Primary insomnia; Bipolar disorder in partial remission, most recent episode unspecified type      !! temazepam (RESTORIL) 30 mg capsule Take 1 capsule (30 mg total) by mouth nightly as needed for Insomnia.  Qty: 60 capsule, Refills: 0    Associated Diagnoses: Primary insomnia      traZODone (DESYREL) 150 MG tablet Take 1 tablet (150 mg total) by mouth every evening.  Qty: 90 tablet, Refills: 0    Associated Diagnoses: Primary insomnia      acetaminophen (TYLENOL) 500 MG tablet Take 1 tablet (500 mg total) by mouth every 6 (six) hours as needed for Pain.  Qty: 25 tablet, Refills: 0    Associated Diagnoses: Closed nondisplaced fracture of proximal phalanx of lesser toe of left foot, initial encounter; Hammertoe of right foot      aspirin (ECOTRIN) 81 MG EC tablet Take 1 tablet (81 mg total) by mouth once daily.  Qty: 25 tablet, Refills: 0    Associated Diagnoses: Closed nondisplaced fracture of proximal phalanx of lesser toe of left foot, initial encounter; Hammertoe of right foot      Ca-D3-mag#11-zinc-cupr-man-bor 600 mg calcium- 800 unit-50 mg Tab Take 1 tablet by mouth once daily.      ibuprofen (ADVIL,MOTRIN) 600 MG tablet Take 1 tablet (600 mg total) by mouth 3 (three) times daily.  Qty: 25 tablet, Refills: 1    Associated Diagnoses: Closed nondisplaced fracture of proximal phalanx of lesser toe of left foot, initial encounter; Hammertoe of right foot      meloxicam (MOBIC) 15 MG tablet Take 1 tablet (15 mg total) by mouth once daily.  Qty: 30 tablet, Refills: 1      oxyCODONE (ROXICODONE) 5 MG immediate release tablet Take 1 tablet (5 mg total) by mouth every 4 (four) hours as needed for Pain.  Qty: 25 tablet, Refills: 0    Associated Diagnoses: Closed nondisplaced fracture of proximal phalanx of lesser toe of left foot, initial encounter; Hammertoe of right foot      !! temazepam (RESTORIL)  30 mg capsule TAKE 1 CAPSULE BY MOUTH NIGHTLY AS NEEDED FOR INSOMNIA  Qty: 30 capsule, Refills: 0    Associated Diagnoses: Primary insomnia      TURMERIC ORAL Take by mouth once daily.       !! - Potential duplicate medications found. Please discuss with provider.            Pre-op Assessment    I have reviewed the Patient Summary Reports.     I have reviewed the Nursing Notes. I have reviewed the NPO Status.   I have reviewed the Medications.     Review of Systems  Anesthesia Hx:  No problems with previous Anesthesia   History of prior surgery of interest to airway management or planning:  Previous anesthesia: MAC       3/30/2022  EGD with MAC.  Procedure performed at an [facility].  Denies Family Hx of Anesthesia complications.    Denies Personal Hx of Anesthesia complications.                    Social:  Non-Smoker, No Alcohol Use H/O alcohol abuse in remission      Hematology/Oncology:  Hematology Normal   Oncology Normal                                   EENT/Dental:  EENT/Dental Normal           Cardiovascular:  Exercise tolerance: good      Denies MI.     Denies CABG/stent.    Denies Angina.       Denies DOMINGUEZ.  ECG has been reviewed. H/O Precordial chest pain,  Palpitations,  Tachycardia,  abnormal stress echocardiogram suggestive of inferior ischemia in 2018.  Cardiac catheterization was normal.     Patient not on beta blockers                          Pulmonary:  Pulmonary Normal   Denies COPD.  Denies Asthma.   Denies Shortness of breath.                  Renal/:  Renal/ Normal                 Hepatic/GI:      Denies GERD.   Chronic idiopathic constipation,  H/O H. Pylori infection,  Hemorrhoidectomy Not Taking GLP-1 Agonists            Musculoskeletal:     Hallux valgus of left foot,  Hammertoe of right foot,  Closed nondisplaced fracture of proximal phalanx of lesser toe of left foot,   H/O Bunionectomies both feet,  Osteopenia,  Left hamstring injury,  Mild multilevel degenerative change of the  cervical spine         Spine Disorders: cervical Degenerative disease           OB/GYN/PEDS:  H/O Tubal Ligation           Neurological:    Denies CVA.   Headaches Seizures, well controlled    H/O alcohol-related seizure,  Memory loss,  Attention or concentration deficit,  Family history of Alzheimer's disease,  Chronic migraine without aura without status migrainosus, not intractable,  Family history of brain aneurysm                              Endocrine:  Denies Diabetes. Hypothyroidism          Psych:  Psychiatric History anxiety depression H/O Bipolar affective disorder since teen years,  Bipolar I disorder,  Insomnia           Physical Exam    Airway:  Mallampati: II / II  Mouth Opening: Normal  TM Distance: Normal  Tongue: Normal  Neck ROM: Normal ROM    Dental:  Intact  Past Medical History:   Diagnosis Date    Abnormal coronary angiogram     Alcohol abuse, in remission     Alcohol related seizure 07/24/2012    Anxiety     Bipolar affective     since teenage years    Chronic idiopathic constipation     Depression     H. pylori infection     Headache(784.0)     followed by Dr. Corona    Hypothyroid     Memory loss     Osteopenia     Seizures      Past Surgical History:   Procedure Laterality Date    BUNIONECTOMY      COLONOSCOPY N/A 04/12/2017    Procedure: COLONOSCOPY;  Surgeon: Estuardo Salazar MD;  Location: [facility] ([address] [place] [address]);  Service: Endoscopy;  Laterality: N/A;  PM prep    COLONOSCOPY N/A 03/30/2022    Procedure: COLONOSCOPY;  Surgeon: Estuardo Salazar MD;  [facility]  Service: Endoscopy;  Laterality: N/A;  suprep    ESOPHAGOGASTRODUODENOSCOPY N/A 10/23/2018    Procedure: EGD (ESOPHAGOGASTRODUODENOSCOPY);  Surgeon: Irlanda Schwarz MD;  [address]  Service: Endoscopy;  Laterality: N/A;    ESOPHAGOGASTRODUODENOSCOPY N/A 03/30/2022    Procedure: ESOPHAGOGASTRODUODENOSCOPY (EGD);  Surgeon: Estuardo Salazar MD;  [facility]  Service: Endoscopy;   Laterality: N/A;  Pt informed to bring official paper documentation of COVID results w/ test date/name/ . Informed Pt that procedure will not be done w/o this documentation. Pt informed COVID test must be PCR or Rapid RNA.  [id number]    hemrrhoidectomy  1996    TUBAL LIGATION         Anesthesia Assessment: Preoperative EQUATION    Planned Procedure: Procedure(s) (LRB):  OSTEOTOMY, METATARSAL BONE (Right)  OSTEOTOMY, PHALANX, FOOT (Right)  CORRECTION, HAMMER TOE (Right)  RECESSION, MUSCLE, GASTROCNEMIUS (Right)  Requested Anesthesia Type:Choice  Surgeon: Mukul Dumont MD  Service: Orthopedics  Known or anticipated Date of Surgery:2025    Surgeon notes: reviewed    Electronic QUestionnaire Assessment completed via nurse interview with patient.        Triage considerations:     The patient has no apparent active cardiac condition (No unstable coronary Syndrome such as severe unstable angina or recent [<1 month] myocardial infarction, decompensated CHF, severe valvular   disease or significant arrhythmia)    Previous anesthesia records:MAC and No problems    [facility] 6[facility]12 months [facility]   Subspecialty notes: Cardiology: General    Other important co-morbidities: Hypothyroid       EKG 2023:  Vent. Rate : 079 BPM     Atrial Rate : 079 BPM      P-R Int : 132 ms          QRS Dur : 072 ms       QT Int : 376 ms       P-R-T Axes : 068 065 065 degrees      QTc Int : 431 ms   Normal sinus rhythm   Normal ECG   When compared with ECG of 23-MAY-2018 11:55,   ST no longer depressed in Inferior leads   Confirmed by Lakhwinder Melendez JR., MD ([id number]) on 2023 12:33:15 PM       Echo 2023:  Summary  Show Result Comparison     Left Ventricle: The left ventricle is normal in size. Normal wall thickness. Normal wall motion. There is normal systolic function with a visually estimated ejection fraction of 60 - 65%. There is normal diastolic function.    Right  Ventricle: Normal right ventricular cavity size. Wall thickness is normal. Right ventricle wall motion  is normal. Systolic function is normal.    Mitral Valve: There is no stenosis.    Pulmonary Artery: No pulmonary hypertension. The estimated pulmonary artery systolic pressure is 26 mmHg.    IVC/SVC: Normal venous pressure at 3 mmHg.   Normal echocardiogram with Doppler      48 Hour Holter Monitor 12/19/2023:  Interpretation Summary  Show Result Comparison     The predominant rhythm is sinus.    No significant dysrhythmia is present.     Monitoring started at 8:48 AM and continued for 47 hr 59 min. The average heart rate was 85 BPM. The minimum heart rate was 61 BPM, occurring at 5:38:03 AM D1. The maximum heart rate was 124 BPM, occurring at 11:59:24 AM D2.    Ventricular ectopic activity consisted of 1 beat, of which, 1 was in single PVCs.    The patient's rhythm included 5 hr 16 min 14 sec of tachycardia. The fastest single episode of tachycardia occurred at 1:44:41 PM D2, lasting 10 sec, with maximum heart rate of 124 BPM.    Supraventricular ectopic activity consisted of 10 beats, of which, 10 were single PACs.    The longest R-R interval was 1.0 second occurring at 8:32:28 PM D1. The longest N-N interval was 1.0 second occurring at 8:32:28 PM D1.       Cath lab Procedure 5/23/2018:   Recommendations :   1. Routine post-cath care.   2. Primary prevention of CAD.   3. Work-up of non-coronary chest pain syndrome       Exercise Stress Echo 5/21/2018:  EKG Conclusions:   1. The EKG portion of this study is negative for ischemia at a high workload, and peak heart rate of 164 bpm (101% of predicted).   2. Exercise capacity is above average.   3. Blood pressure response to exercise was normal (Presenting BP: 111/63 Peak BP: 149/80).   4. The following arrhythmias were present: occasional PVCs.   5. There were no symptoms of chest discomfort or significant dyspnea throughout the protocol.   6. The Short treadmill  score was 10 suggesting a low probability for future cardiovascular events.     CONCLUSIONS     1 - Normal left ventricular systolic function (EF 60-65%).     2 - No wall motion abnormalities.     3 - Normal left ventricular diastolic function.     4 - Normal right ventricular systolic function .     5 - High work load w/o angina.   Positive stress echocardiographic study demonstrating an ischemic response involving the inferolateral wall.   Post Exercise- The inferolateral wall worsens becoming hypokinetic.       Tests already available:  Results have been reviewed.             Instructions given. (See in Nurse's note)  Preop medication instructions sent via portal message.     Optimization:  Anesthesia Preop Clinic Assessment Not Indicated    Medical Opinion Indicated: No       Sub-specialist consult indicated:  No      Plan: Consultation: Medical clearance is not requested.          Navigation:  Straight Line to surgery.               No tests, anesthesia preop clinic visit, or consult required.                        Patient is OK to proceed with surgery at [facility].       Ht: 5'2  Wt: 50.8 kg (111 lb)  BMI: 20.48    Anesthesia Plan  Type of Anesthesia, risks & benefits discussed:    Anesthesia Type: Gen Natural Airway, Regional  Intra-op Monitoring Plan: Standard ASA Monitors  Post Op Pain Control Plan: multimodal analgesia and IV/PO Opioids PRN  Induction:  IV  Airway Plan: Direct  Informed Consent: Informed consent signed with the Patient and all parties understand the risks and agree with anesthesia plan.  All questions answered.   ASA Score: 2    Ready For Surgery From Anesthesia Perspective.   .

## 2025-02-26 NOTE — PLAN OF CARE
VSS. Pt able to tolerate oral liquids. Pt denies c/o pain. Dressing and boot intact. Spouse received home meds per bedside delivery. Crutches at bedside for home use. No distress noted. Pt states she is ready for D/C. D/C instructions reviewed with pt and spouse, verbalized understanding.

## 2025-02-26 NOTE — OP NOTE
Orthopaedic Surgery Operative Report      DATE OF PROCEDURE: 02/26/2025     PREOPERATIVE DIAGNOSIS: Hammertoe of right foot [M20.41] 2 through 5  Hallux valgus of right foot    POSTOPERATIVE DIAGNOSIS:  Same    PROCEDURE PERFORMED:   1. Metatarsal osteotomy, right foot great toe  2. Proximal phalanx osteotomy, right foot great toe  3. Hammertoe correction 2nd toe  4. PIP arthrodesis, 2nd toe  5. Hammertoe correction, 3rd toe  6. Hammertoe correction, 4th toe  7. Hammertoe correction, 5th toe   SURGEON: Tim  ASSISTANT: Skip  ANESTHESIA: General  ESTIMATED BLOOD LOSS:  50 cc.     INDICATIONS FOR PROCEDURE: The patient is an 59 y.o. female who has a history of bunion surgery to the right foot.  Unfortunately she developed a recurrent painful hallux valgus and painful hammertoes as well as a dorsal callus of the 2nd toe. All of these problems were causing her pain.  We discussed the risks benefits and alternatives of the procedure above. It was decided that the best plan of action was to take the patient back to the operative theatre for the procedure above.  This procedure, as well as, alternatives to this procedure was discussed at length with the patient. Risks and benefits were also discussed. Risks include but are not limited to bleeding, infection, numbness, scarring, damage to major neurovascular structures, limb length/rotation discrepancy, failure of hardware, need for further surgery, loss of function, myocardial infarction, deep venous thrombosis, pulmonary embolism, nonunion, malunion and death. Patient understood these well and consented for the procedure as described.    PROCEDURE IN DETAIL: The patient was identified in the preoperative holding area and site was marked. The patient was wheeled into the Operating Room and general and regional anesthesia was induced. The patient was placed into a supine position with the affected limb in the prime position. The affected limb was then prepped and  draped in the usual sterile fashion. A timeout was taken to confirm the patient, site, surgery, surgeon and administration of preoperative antibiotics. All agreed and we proceeded.     We began with a corrective osteotomy of the 1st metatarsal.  We made a cut at the flare of the metatarsal performing the osteotomy.  And we completed the cut.  We then used a poke hole incision more proximally and placed a wire across the osteotomy site, this was then followed by a screw.  Given the lack of space from her previous bunionectomy, we elected for 1 screw, and we noted that the toe appeared clinically improved.  Next we addressed the proximal phalanx.  We made an osteotomy leaving the cortical hinge intact on the lateral aspect of the proximal phalanx of the great toe.  This was a transverse osteotomy.  We then placed a screw within the bone across the osteotomy site.    At this point we inflated the tourniquet to address the hammertoes.  Next we addressed the 2nd toe.  We performed an ellipsoid incision and remove the extensor tendon about the 2nd toe.  Performed a more proximal extensor tenotomy.  We then performed a PIP arthrodesis with a saw sagittally making transverse cuts on both ends.  The toe appeared clinically much improved with the dorsal callosity much less we then placed a screw through the toe beginning in the DIP and ending past the joint and PIP arthrodesis was thus completed.      Next we performed hammertoe corrections of the 3rd, 4th, and 5th toe.  These were done using 1 cm incisions plantarly and releasing the flexor tendon of the toe.  There was notable improvement about toes 2 through 5 of the foot.    The tourniquet was let down and hemostasis achieved.  There was noted to be excellent blood flow to all 5 toes.    The patient was extubated, awakened and taken to the post anesthesia care unit in stable condition.     PLAN FOR THE PATIENT:   1. weightbear as tolerated in the boot.  2. Tylenol for  pain, Motrin for pain, oxycodone p.r.n. for pain  3. Aspirin for DVT prophylaxis   4. Dressings can stay on until clinic.  If she experiences coldness or widening of the toes the dressing can be removed.  The incisions can be covered with Band-Aids.    5. Follow up in Orthopedic Clinic in 1-2 weeks for suture removal  6. Ice and elevate the extremity

## 2025-02-27 NOTE — ANESTHESIA POSTPROCEDURE EVALUATION
Anesthesia Post Evaluation    Patient: Gay Gurrola    Procedure(s) Performed: Procedure(s) (LRB):  OSTEOTOMY, METATARSAL BONE (Right)  OSTEOTOMY, PHALANX, FOOT (Right)  CORRECTION, HAMMER TOE (Right)  RECESSION, MUSCLE, GASTROCNEMIUS (Right)    Final Anesthesia Type: general      Patient location during evaluation: PACU  Patient participation: Yes- Able to Participate  Level of consciousness: awake and alert and oriented  Post-procedure vital signs: reviewed and stable  Pain management: adequate  Airway patency: patent    PONV status at discharge: No PONV  Anesthetic complications: no      Cardiovascular status: hemodynamically stable  Respiratory status: nasal cannula  Hydration status: euvolemic  Follow-up not needed.          Vitals Value Taken Time   /55 02/26/25 11:17   Temp 36.7 °C (98.1 °F) 02/26/25 11:15   Pulse 72 02/26/25 11:27   Resp 13 02/26/25 11:27   SpO2 98 % 02/26/25 11:27   Vitals shown include unfiled device data.      Event Time   Out of Recovery 10:44:33         Pain/Cat Score: Pain Rating Prior to Med Admin: 8 (2/26/2025 10:41 AM)  Pain Rating Post Med Admin: 6 (2/26/2025 11:00 AM)  Cat Score: 10 (2/26/2025  9:34 AM)

## 2025-02-28 ENCOUNTER — PATIENT MESSAGE (OUTPATIENT)
Dept: ORTHOPEDICS | Facility: CLINIC | Age: 60
End: 2025-02-28
Payer: MEDICARE

## 2025-03-02 ENCOUNTER — PATIENT MESSAGE (OUTPATIENT)
Dept: ORTHOPEDICS | Facility: CLINIC | Age: 60
End: 2025-03-02
Payer: MEDICARE

## 2025-03-02 DIAGNOSIS — F51.01 PRIMARY INSOMNIA: ICD-10-CM

## 2025-03-02 DIAGNOSIS — F31.70 BIPOLAR DISORDER IN PARTIAL REMISSION, MOST RECENT EPISODE UNSPECIFIED TYPE: ICD-10-CM

## 2025-03-03 ENCOUNTER — PATIENT MESSAGE (OUTPATIENT)
Dept: PSYCHIATRY | Facility: CLINIC | Age: 60
End: 2025-03-03
Payer: MEDICARE

## 2025-03-03 DIAGNOSIS — F51.01 PRIMARY INSOMNIA: ICD-10-CM

## 2025-03-03 DIAGNOSIS — F31.70 BIPOLAR DISORDER IN PARTIAL REMISSION, MOST RECENT EPISODE UNSPECIFIED TYPE: ICD-10-CM

## 2025-03-03 RX ORDER — DULOXETIN HYDROCHLORIDE 20 MG/1
20 CAPSULE, DELAYED RELEASE ORAL DAILY
Qty: 90 CAPSULE | Refills: 0 | Status: SHIPPED | OUTPATIENT
Start: 2025-03-03 | End: 2025-06-01

## 2025-03-03 RX ORDER — TEMAZEPAM 30 MG/1
30 CAPSULE ORAL NIGHTLY PRN
Qty: 60 CAPSULE | Refills: 0 | OUTPATIENT
Start: 2025-03-03

## 2025-03-03 RX ORDER — TEMAZEPAM 30 MG/1
30 CAPSULE ORAL NIGHTLY PRN
Qty: 60 CAPSULE | Refills: 0 | Status: SHIPPED | OUTPATIENT
Start: 2025-03-03 | End: 2025-05-02

## 2025-03-03 RX ORDER — QUETIAPINE FUMARATE 400 MG/1
400 TABLET, FILM COATED ORAL NIGHTLY
Qty: 90 TABLET | Refills: 0 | Status: SHIPPED | OUTPATIENT
Start: 2025-03-03

## 2025-03-03 RX ORDER — QUETIAPINE FUMARATE 400 MG/1
400 TABLET, FILM COATED ORAL NIGHTLY
Qty: 90 TABLET | Refills: 0 | OUTPATIENT
Start: 2025-03-03

## 2025-03-05 ENCOUNTER — OFFICE VISIT (OUTPATIENT)
Dept: ORTHOPEDICS | Facility: CLINIC | Age: 60
End: 2025-03-05
Payer: MEDICARE

## 2025-03-05 ENCOUNTER — TELEPHONE (OUTPATIENT)
Dept: ORTHOPEDICS | Facility: CLINIC | Age: 60
End: 2025-03-05
Payer: MEDICARE

## 2025-03-05 DIAGNOSIS — Z98.890 POSTOPERATIVE STATE: Primary | ICD-10-CM

## 2025-03-05 DIAGNOSIS — M20.41 HAMMERTOE OF RIGHT FOOT: ICD-10-CM

## 2025-03-05 PROCEDURE — 99213 OFFICE O/P EST LOW 20 MIN: CPT | Mod: PBBFAC,PN | Performed by: PHYSICIAN ASSISTANT

## 2025-03-05 PROCEDURE — 99024 POSTOP FOLLOW-UP VISIT: CPT | Mod: POP,,, | Performed by: PHYSICIAN ASSISTANT

## 2025-03-05 PROCEDURE — 99999 PR PBB SHADOW E&M-EST. PATIENT-LVL III: CPT | Mod: PBBFAC,,, | Performed by: PHYSICIAN ASSISTANT

## 2025-03-05 NOTE — TELEPHONE ENCOUNTER
Patient was seen today in clinic.----- Message from Luh sent at 3/5/2025  8:51 AM CST -----  Regarding: appt  Name of Who is Calling:Pt What is the request in detail: Running 10-15 late to appt today Can the clinic reply by SURAJNER: What Number to Call Back if not in "[employer]NER:Telephone Information:[employer]          [phone]

## 2025-03-05 NOTE — PROGRESS NOTES
Subjective:     Patient ID: Gay Gurrola is a 59 y.o. female.    Chief Complaint: Postop Follow-Up    HPI:   Gay Gurrola  returns for a postop visit approximately 1 week following:    Surgery:   1. Metatarsal osteotomy, right foot great toe  2. Proximal phalanx osteotomy, right foot great toe  3. Hammertoe correction 2nd toe  4. PIP arthrodesis, 2nd toe  5. Hammertoe correction, 3rd toe  6. Hammertoe correction, 4th toe  7. Hammertoe correction, 5th toe  Date of Surgery:  2-  Surgeon: Dr. Echavarria    She returns for an early postop appointment today over concerns of drainage on her surgical dressing. Pt denies fevers, chills or uncontrolled pain. She reports mobilizing easily. No other specific concerns today.    Past Medical History:   Diagnosis Date    Abnormal coronary angiogram     Alcohol abuse, in remission     Alcohol related seizure 07/24/2012    Anxiety     Bipolar affective     since teenage years    Chronic idiopathic constipation     Depression     H. pylori infection     Headache(784.0)     followed by Dr. Corona    Hypothyroid     Memory loss     Osteopenia     Seizures      Current Medications[1]  Review of patient's allergies indicates:  No Known Allergies    Review of Systems   Constitutional:  Negative for chills and fever.   Respiratory:  Negative for shortness of breath.    Cardiovascular:  Negative for chest pain and leg swelling.   Gastrointestinal:  Negative for nausea and vomiting.   Genitourinary:  Negative for dysuria.   Musculoskeletal:  Negative for joint pain. Negative for falls.   Skin:  Negative for rash.   Neurological:  Negative for dizziness and Negative for sensory change.    Objective:   Orthopedic Physical Exam  LMP 10/07/2022 Comment: pointing  GEN: Well developed, well nourished female. AAOX3. No acute distress.   Breathing unlabored.  Mood and affect appropriate.     RIGHT FOOT exam:    Inspection:   Surgical dressing removed (dry sanguinous  output only).  Surgical wound margins are well approximated with Nylon suture and healing nicely.   No redness, warmth, drainage, or signs of ongoing infection.   moderate swelling.   NV: Sensation intact to toe pads.    Assessment:       ICD-10-CM ICD-9-CM    1. Postoperative state  Z98.890 V45.89       2. Hammertoe of right foot  M20.41 735.4           Plan:   1wks s/p   1. Metatarsal osteotomy, right foot great toe  2. Proximal phalanx osteotomy, right foot great toe  3. Hammertoe correction 2nd toe  4. PIP arthrodesis, 2nd toe  5. Hammertoe correction, 3rd toe  6. Hammertoe correction, 4th toe  7. Hammertoe correction, 5th toe    Wound Care: Dressing change performed with xeroform and dry dressing. Continue postop boot  Pain Control: rest, ice, OTC analgesics, Rx oxycodone for breakthrough  Activity: WB through heel only  Follow Up: 1 week for regularly scheduled 2wk postop appt         [1]   Current Outpatient Medications:     acetaminophen (TYLENOL) 500 MG tablet, Take 1 tablet (500 mg total) by mouth every 6 (six) hours as needed for Pain., Disp: 25 tablet, Rfl: 0    aspirin (ECOTRIN) 81 MG EC tablet, Take 1 tablet (81 mg total) by mouth once daily., Disp: 25 tablet, Rfl: 0    b complex vitamins tablet, Take 1 tablet by mouth once daily., Disp: , Rfl:     Ca-D3-mag#11-zinc-cupr-man-bor 600 mg calcium- 800 unit-50 mg Tab, Take 1 tablet by mouth once daily., Disp: , Rfl:     cholecalciferol, vitamin D3, (VITAMIN D3) 25 mcg (1,000 unit) capsule, Take 1,000 Units by mouth once daily., Disp: , Rfl:     DULoxetine (CYMBALTA) 20 MG capsule, Take 1 capsule (20 mg total) by mouth once daily., Disp: 90 capsule, Rfl: 0    estradioL (VIVELLE-DOT) 0.0375 mg/24 hr, Place 1 patch onto the skin twice a week., Disp: 8 patch, Rfl: 11    fish oil-omega-3 fatty acids 300-1,000 mg capsule, Take 1 capsule by mouth once daily., Disp: , Rfl:     ibuprofen (ADVIL,MOTRIN) 600 MG tablet, Take 1 tablet (600 mg total) by mouth 3 (three)  times daily., Disp: 25 tablet, Rfl: 1    levothyroxine (SYNTHROID) 25 MCG tablet, TAKE 1 TABLET BY MOUTH ONCE DAILY TAKE  WITH  13MCG  FOR  TOTAL  DAILY  DOSE  OF  38MCG, Disp: 90 tablet, Rfl: 0    levothyroxine 13 mcg Cap, Take 13 mcg by mouth once daily. Take with 25 mcg for total daily dose of 38 mcg, Disp: 90 capsule, Rfl: 0    linaCLOtide (LINZESS) 290 mcg Cap capsule, Take 1 capsule (290 mcg total) by mouth once daily., Disp: 90 capsule, Rfl: 3    multivitamin (THERAGRAN) per tablet, Take 1 tablet by mouth once daily., Disp: , Rfl:     oxyCODONE (ROXICODONE) 5 MG immediate release tablet, Take 1 tablet (5 mg total) by mouth every 4 (four) hours as needed for Pain., Disp: 25 tablet, Rfl: 0    progesterone (PROMETRIUM) 100 MG capsule, Take 1 capsule (100 mg total) by mouth nightly., Disp: 30 capsule, Rfl: 11    QUEtiapine (SEROQUEL) 400 MG tablet, Take 1 tablet (400 mg total) by mouth every evening., Disp: 90 tablet, Rfl: 0    temazepam (RESTORIL) 30 mg capsule, Take 1 capsule (30 mg total) by mouth nightly as needed for Insomnia., Disp: 60 capsule, Rfl: 0    traZODone (DESYREL) 150 MG tablet, Take 1 tablet (150 mg total) by mouth every evening., Disp: 90 tablet, Rfl: 0    TURMERIC ORAL, Take by mouth once daily., Disp: , Rfl:

## 2025-03-11 ENCOUNTER — OFFICE VISIT (OUTPATIENT)
Dept: ORTHOPEDICS | Facility: CLINIC | Age: 60
End: 2025-03-11
Payer: MEDICARE

## 2025-03-11 VITALS — BODY MASS INDEX: 19.39 KG/M2 | HEIGHT: 62 IN

## 2025-03-11 DIAGNOSIS — S92.515A CLOSED NONDISPLACED FRACTURE OF PROXIMAL PHALANX OF LESSER TOE OF LEFT FOOT, INITIAL ENCOUNTER: ICD-10-CM

## 2025-03-11 DIAGNOSIS — M20.41 HAMMERTOE OF RIGHT FOOT: ICD-10-CM

## 2025-03-11 PROCEDURE — 99999 PR PBB SHADOW E&M-EST. PATIENT-LVL III: CPT | Mod: PBBFAC,,, | Performed by: SURGERY

## 2025-03-11 PROCEDURE — 99024 POSTOP FOLLOW-UP VISIT: CPT | Mod: POP,,, | Performed by: SURGERY

## 2025-03-11 PROCEDURE — 99213 OFFICE O/P EST LOW 20 MIN: CPT | Mod: PBBFAC,PN | Performed by: SURGERY

## 2025-03-11 RX ORDER — OXYCODONE HYDROCHLORIDE 5 MG/1
5 TABLET ORAL EVERY 4 HOURS PRN
Qty: 25 TABLET | Refills: 0 | Status: CANCELLED | OUTPATIENT
Start: 2025-03-11

## 2025-03-11 RX ORDER — NALOXONE HYDROCHLORIDE 4 MG/.1ML
SPRAY NASAL
Qty: 1 EACH | Refills: 11 | Status: SHIPPED | OUTPATIENT
Start: 2025-03-11

## 2025-03-11 RX ORDER — OXYCODONE HYDROCHLORIDE 5 MG/1
5 TABLET ORAL EVERY 4 HOURS PRN
Qty: 20 TABLET | Refills: 0 | Status: SHIPPED | OUTPATIENT
Start: 2025-03-11

## 2025-03-12 DIAGNOSIS — E03.9 HYPOTHYROIDISM, UNSPECIFIED TYPE: ICD-10-CM

## 2025-03-12 RX ORDER — LEVOTHYROXINE SODIUM 25 UG/1
25 TABLET ORAL DAILY
Qty: 90 TABLET | Refills: 2 | Status: SHIPPED | OUTPATIENT
Start: 2025-03-12

## 2025-03-12 RX ORDER — LEVOTHYROXINE SODIUM 13 UG/1
13 CAPSULE ORAL DAILY
Qty: 90 CAPSULE | Refills: 2 | Status: SHIPPED | OUTPATIENT
Start: 2025-03-12

## 2025-03-12 NOTE — TELEPHONE ENCOUNTER
No care due was identified.  [facility] Embedded Care Due Messages. Reference number: [id number].   3/12/2025 1:03:35 PM CDT

## 2025-03-12 NOTE — TELEPHONE ENCOUNTER
Refill Decision Note   Gay Gurrola  is requesting a refill authorization.  Brief Assessment and Rationale for Refill:  Approve     Medication Therapy Plan:         Alert overridden per protocol: Yes   Comments:     Note composed:3:47 PM 03/12/2025             May 28, 2024       Adolfo Doty MD  [address]   [place] [address]  Via In Basket      Patient: Dickson Bucio   YOB: 1956   Date of Visit: 5/28/2024       Dear Dr. Doty:    I saw your patient, Dickson Bucio, for an evaluation. Below are my notes for this visit with him.    If you have questions, please do not hesitate to call me.          Sincerely,        Adebayo Sesay MD        CC: No Recipients    Adebayo Sesay MD  5/28/2024  3:41 PM  Signed  Neurosurgical Spine Progress Note      Chief Complaint   Patient presents with   • Office Visit   • Surgical Followup       HPI:  Dickson Bucio is a 67 year old male who presents for follow up LBP.          Diagnosis:  Patient Active Problem List   Diagnosis   • SBO (small bowel obstruction)  (CMD)   • Benign essential HTN   • Hypokalemia   • Bilateral carpal tunnel syndrome   • Arthritis of both knees   • Other synovitis and tenosynovitis, right forearm   • Benign paroxysmal positional vertigo of right ear   • Asymmetric SNHL (sensorineural hearing loss)   • Obstructive sleep apnea on CPAP   • Essential (primary) hypertension   • Hypertensive heart disease without heart failure   • Diaphoresis   • Chronic cough   • GERD without esophagitis   • Hypercholesteremia   • Dehydration   • Bradycardia   • Acute kidney injury (CMD)   • Eastman's esophagus   • Biceps tendinitis of right shoulder   • DDD (degenerative disc disease), lumbar   • Duodenitis   • Gastric ulcer   • History of carpal tunnel release   • History of cholecystectomy   • OA (osteoarthritis) of hip   • History of small bowel obstruction   • Primary osteoarthritis of both knees   • Osteoarthritis of left shoulder   • Osteoarthritis of cervical spine   • Shoulder pain   • Schatzki's ring   • Prostatism   • Essential (primary) hypertension   • Esophagitis   • Gastroesophageal reflux disease   • Hiatal hernia   • Obstructive sleep apnea on CPAP   • Sleep apnea   • Lumbar radiculopathy    • Mild major depression (CMD)   • Spinal stenosis of lumbar region with neurogenic claudication   • Chronic bilateral low back pain without sciatica       REVIEW OF SYSTEMS:  12 point review of systems completed and all negative, except as noted in HPI.    Symptoms are:   []  Constant  []  Comes and goes      Type of pain:  []  Sharp  []  Dull  []  Throbbing  []  Ache  []  Shooting  []  Burning  []  Numbness/Tingling             Interferes with:  []  Sitting  []  Standing  []  Bending  []  Walking  []  Sleeping  []  Work  []  Recreation             Treatments tried:  []  Medication  []  Exercise  []  Physical Therapy  []  Chiropractor  []  Acupuncture  []  Steroid Injections  []  Surgery     Past Medical History:   Diagnosis Date   • Arthritis    • Chronic pain     knee and hands   • Colon polyps    • COVID-19    • Essential (primary) hypertension    • Gastroesophageal reflux disease    • Hiatal hernia    • High cholesterol    • EFREN (obstructive sleep apnea)    • Sleep apnea     CPAP   • Small bowel obstruction  (CMD) 01/2017     Past Surgical History:   Procedure Laterality Date   • Colonoscopy     • Egd  12/19/2019   • Egd  12/19/2019   • Esophagogastroduodenoscopy  06/19/2019   • Removal gallbladder       ALLERGIES:  No Known Allergies  Current Outpatient Medications   Medication Sig Dispense Refill   • losartan (COZAAR) 25 MG tablet Take 2 tablets by mouth daily. 60 tablet 0   • ondansetron (ZOFRAN) 4 MG tablet TAKE 1 TABLET BY MOUTH 1 TIME FOR 1 DOSE. (Patient not taking: Reported on 5/28/2024)     • PEG 3350-KCl-NaBcb-NaCl-NaSulf (PEG-3350 and electrolytes) 236 g powder for oral solution TAKE 4000MLS BY MOUTH 1 TIME FOR 1 DOSE. (Patient not taking: Reported on 5/28/2024)     • finasteride (PROSCAR) 5 MG tablet Take 1 tablet by mouth daily. 90 tablet 1   • amLODIPine (NORVASC) 10 MG tablet Take 1 tablet by mouth once daily 90 tablet 3   • Aspirin 81 MG Cap Take 1 capsule by mouth daily. (Patient not taking:  Reported on 5/28/2024) 90 capsule 3   • tamsulosin (FLOMAX) 0.4 MG Cap Take 1 capsule by mouth daily. ONE HALF HOUR AFTER EVENING MEAL 90 capsule 1   • albuterol 108 (90 Base) MCG/ACT inhaler INHALE 2 PUFFS BY MOUTH EVERY 4 HOURS AS NEEDED FOR SHORTNESS OF BREATH OR WHEEZING 18 g 11   • fluticasone-vilanterol (Breo Ellipta) 100-25 MCG/INH inhaler Inhale 1 puff into the lungs daily. 60 each 5     No current facility-administered medications for this visit.      Family History   Problem Relation Age of Onset   • Cancer Father         lung cancer   • Hypertension Father    • Allergic Rhinitis Mother    • Asthma Mother    • Allergic Rhinitis Sister    • Allergic Rhinitis Brother    • Angioedema Neg Hx    • Eczema Neg Hx    • Urticaria Neg Hx    • Immunodeficiency Neg Hx      Social History     Socioeconomic History   • Marital status: /Civil Union     Spouse name: Not on file   • Number of children: Not on file   • Years of education: Not on file   • Highest education level: Not on file   Occupational History   • Not on file   Tobacco Use   • Smoking status: Former     Current packs/day: 1.00     Average packs/day: 1 pack/day for 10.0 years (10.0 ttl pk-yrs)     Types: Cigarettes   • Smokeless tobacco: Never   Vaping Use   • Vaping status: never used   Substance and Sexual Activity   • Alcohol use: No   • Drug use: Not Currently   • Sexual activity: Not on file   Other Topics Concern   • Not on file   Social History Narrative   • Not on file     Social Determinants of Health     Financial Resource Strain: Not on File (1/8/2023)    Received from [facility]     Financial Resource Strain    • Financial Resource Strain: 0   Food Insecurity: Not on File (1/8/2023)    Received from [facility]     Food Insecurity    • Food: 0   Transportation Needs: Not on File (1/8/2023)    Received from [facility]     Transportation Needs    • Transportation: 0   Physical Activity: Not on File (1/8/2023)    Received from [employer]     Physical Activity     • Physical Activity: 0   Stress: Not on File (1/8/2023)    Received from [facility]     Stress    • Stress: 0   Social Connections: Not on File (1/8/2023)    Received from [facility]     Social Connections    • Social Connections and Isolation: 0   Interpersonal Safety: Not At Risk (11/30/2023)    Interpersonal Safety    • Social Determinants: Intimate Partner Violence Past Fear: No    • Social Determinants: Intimate Partner Violence Current Fear: No       PHYSICAL EXAM:  There were no vitals taken for this visit.  Awake, alert, fully oriented  EOMI  Pupils reactive  Face symmetric  Tongue midline  HEENT within normal limits, neck supple  Extremities with no cyanosis, clubbing or edema  Skin intact  Patient has 5/5 strength to deltoid, biceps, triceps, hand   5/5 strength to Iliopsoas, quadriceps, hamstrings, tibialis anterior, extensor hallucis longus and gastrocnemius/soleus  2/4 reflexes to biceps, brachioradialis, patellar and Achilles  No Cannon  No clonus  Gait stable  Sensory stable       IMAGING  MRI:  I have personally reviewed the radiological images and report when available.    None new  Encounter Diagnoses   Name Primary?   • Chronic bilateral low back pain without sciatica Yes     Patient notes 6 months after lumbar laminotomy for decompression.  He has continued low back pain.  At this point like to get a new MRI of the lumbar sacral spine.  He also may benefit from further injections or steroid injections in the epidural spine.  Hopefully will not require further surgery however I did discuss with him that there is a possibility he may need a lumbar fusion.        Adebayo Sesay MD, PhD, [facility]  Department of Neurosurgery  [facility]    T:  [phone]   F:  [phone]   M:  [phone]

## 2025-03-12 NOTE — TELEPHONE ENCOUNTER
Refill Decision Note   Gay Galileo  is requesting a refill authorization.  Brief Assessment and Rationale for Refill:  Approve     Medication Therapy Plan:         Comments:     Note composed:3:41 PM 03/12/2025

## 2025-03-12 NOTE — PROGRESS NOTES
SUBJECTIVE:    Gay Gurrola is here today for a follow up visit.  Status post Osteotomy, Metatarsal Bone - Right, Osteotomy, Phalanx, Foot - Right, Correction, Hammer Toe - Right, and Recession, Muscle, Gastrocnemius - Right  Date of surgery 2/26/2025  Days since surgery 13  Doing well.  Per patient report: no fevers, chills, shortness of breath, chest pain, or increased extremity pain.  Presents for further follow up.      OBJECTIVE:    There were no vitals filed for this visit.      Extremity Exam  Incisions clean dry and intact, no erythema, drainage, exudate, minimal ecchymosis.  Sutures in place.  Fires FHL, EHL, TA, GSC.  Sensation intact to light touch throughout foot.  Neurovascularly intact.     DIAGNOSTIC STUDIES:  No new imaging today    ASSESSMENT:   1. Status post procedure above      PLAN:     Continue ASA for DVT PPX.   Sutures removed, NWB Operative extremity in Cam Boot  PT ordered  Follow up in 4 weeks with standing weight bearing x-rays operative extremity   Discussed precautions for Steri-Strips.  Remove strips if they become dirty, can cover with small Band-Aid.  Report any yellow, green, or white drainage to our office.  Report to the emergency room for increased swelling, fevers, chills, or exudative discharge to the area.     Mukul Dumont MD  [facility]  Orthopedic Surgery      This note was done with voice recognition software. Please excuse any errors missed in proof reading.

## 2025-03-12 NOTE — TELEPHONE ENCOUNTER
No care due was identified.  [facility] Embedded Care Due Messages. Reference number: [id number].   3/12/2025 12:58:07 PM CDT

## 2025-03-18 ENCOUNTER — PATIENT MESSAGE (OUTPATIENT)
Dept: PSYCHIATRY | Facility: CLINIC | Age: 60
End: 2025-03-18
Payer: MEDICARE

## 2025-03-18 NOTE — PROGRESS NOTES
The patient location is: [place], [place]  The chief complaint leading to consultation is: f/u    Visit type: audiovisual    Face to Face time with patient: 28  32 minutes of total time spent on the encounter, which includes face to face time and non-face to face time preparing to see the patient (eg, review of tests), Obtaining and/or reviewing separately obtained history, Documenting clinical information in the electronic or other health record, Independently interpreting results (not separately reported) and communicating results to the patient/family/caregiver, or Care coordination (not separately reported).         Each patient to whom he or she provides medical services by telemedicine is:  (1) informed of the relationship between the physician and patient and the respective role of any other health care provider with respect to management of the patient; and (2) notified that he or she may decline to receive medical services by telemedicine and may withdraw from such care at any time.    Notes:     Outpatient Psychiatry Follow-Up Visit (PA)    3/19/2025    Clinical Status of Patient:  Outpatient (Ambulatory)    Chief Complaint:  Gay Gurrola is a 59 y.o. female who presents today for follow-up of mood disorder.  Met with patient.      Current Medications:  Seroquel 400 mg qhs  Restoril 30 mg qhs  Trazodone 150 mg qhs  Cymbalta 20 mg    Interval History and Content of Current Session:  Interim Events/Subjective Report/Content of Current Session:  Patient last seen 12/19/2024    Patient is a 59 year old female with a psychiatric history of bipolar 1 disorder and insomnia.     Pt presents to follow up today stating she is 3 weeks post op from foot surgery.  She has been recuperating well post op.     Patient returned home from [place] Feb 7. She reports her son has been using heroin and fentanyl regularly.  She called the police to ensure he would seek rehab care; [facility] [place], will be there for 6 weeks. She endorses stress with his continued use, is hopeful now that he is in a rehab care.     Despite this stress, she states "I'm doing well." She endorses some sadness but denies depression.   She endorses stress regarding her son but denies symptoms of anxiety.     She continues to endorse benefit with current medications and denies ASE.   She denies any EPS/TD with serqouel; reports her son mentioned her face twitches when she is excited, neither she nor her  have noticed.   No abnormal movements/evidence of TD/EPS noted via video. Will f/u in person for AIMS score    She is sleeping well with current medications.     She denies ASE from medications.     Patient is doing well and does not want to make med changes today. Discussed decrease to seroquel 300 mg qhs. Patient is not wanting to at this time as "I have so much going on."      Psychotherapy:  Target symptoms: anxiety , insomnia  Why chosen therapy is appropriate versus another modality: relevant to diagnosis  Outcome monitoring methods: self-report, observation  Therapeutic intervention type: supportive psychotherapy  Topics discussed/themes: parenting issues, symptom recognition, substance abuse by a family member  The patient's response to the intervention is accepting. The patient's progress toward treatment goals is good.   Duration of intervention: 15 minutes.    Review of Systems   PSYCHIATRIC: Pertinant items are noted in the narrative.    Past Medical, Family and Social History: The patient's past medical, family and social history have been reviewed and updated as appropriate within the electronic medical record - see encounter notes.    Compliance: yes    Side effects: None    Risk Parameters:  Patient reports no suicidal ideation  Patient reports no homicidal ideation  Patient reports no self-injurious behavior  Patient reports no violent behavior    Exam (detailed: at least 9 elements; " comprehensive: all 15 elements)   Constitutional  Vitals:  Most recent vital signs, dated less than 90 days prior to this appointment, were reviewed.   There were no vitals filed for this visit.         General:  unremarkable, age appropriate, well dressed, neatly groomed     Musculoskeletal  Muscle Strength/Tone:  not examined   Gait & Station:  virtual     Psychiatric  Speech:  no latency; no press   Mood & Affect:  steady, euthymic  congruent and appropriate   Thought Process:  normal and logical   Associations:  intact   Thought Content:  normal, no suicidality, no homicidality, delusions, or paranoia   Insight:  intact   Judgement: behavior is adequate to circumstances   Orientation:  grossly intact   Memory: intact for content of interview   Language: grossly intact   Attention Span & Concentration:  able to focus   Fund of Knowledge:  intact and appropriate to age and level of education     Assessment and Diagnosis   Status/Progress: Based on the examination today, the patient's problem(s) is/are improved and well controlled.  New problems have not been presented today.   Lack of compliance are not complicating management of the primary condition.  There are no active rule-out diagnoses for this patient at this time.     General Impression: Patient is a 59 year old female with a psychiatric history of bipolar 1 disorder and insomnia. Patient reports insomnia better controlled with seroquel 400 mg, restoril 30 mg, trazodone 150 mg. She denies ASE. She reports mood, anxiety, stress improved with addition of cymbalta 20 mg        ICD-10-CM ICD-9-CM   1. Bipolar disorder in partial remission, most recent episode unspecified type  F31.70 296.80   2. Primary insomnia  F51.01 307.42   3. Stress  F43.9 V62.89                 Intervention/Counseling/Treatment Plan   Medication Management: Continue current medications.   Continue Restoril 30 mg nightly- prescription printed as will be out of state for weeks  Continue  Seroquel 400 mg nightly  Consider decrease to 300 mg qhs at f/u  Continue trazodone 150 mg qhs  Continue cymbalta 20 mg  Pt made aware of risk of hypo/abdiel, in event of decreased sleep, euphoria, increased impuslivity, spending, etc. Patient to discontinue.   No history of hypo/abdiel with cymbalta in the past.   Discussed with patient informed consent, risks vs. benefits, alternative treatments, side effect profile and the inherent unpredictability of individual responses to these treatments. The patient expresses understanding of the above and displays the capacity to agree with this current plan and had no other questions.  Encouraged patient to keep future appointments.   Encouraged patient to message or call with questions or concerns  Safety plan reviewed with patient for worsening condition or suicidal ideations. In the event of an emergency patient was advised to go to the emergency room.       Return to Clinic: 2 months

## 2025-03-19 ENCOUNTER — PATIENT MESSAGE (OUTPATIENT)
Dept: ORTHOPEDICS | Facility: CLINIC | Age: 60
End: 2025-03-19
Payer: MEDICARE

## 2025-03-19 ENCOUNTER — OFFICE VISIT (OUTPATIENT)
Dept: PSYCHIATRY | Facility: CLINIC | Age: 60
End: 2025-03-19
Payer: MEDICARE

## 2025-03-19 DIAGNOSIS — F51.01 PRIMARY INSOMNIA: ICD-10-CM

## 2025-03-19 DIAGNOSIS — F43.9 STRESS: ICD-10-CM

## 2025-03-19 DIAGNOSIS — F31.70 BIPOLAR DISORDER IN PARTIAL REMISSION, MOST RECENT EPISODE UNSPECIFIED TYPE: Primary | ICD-10-CM

## 2025-03-19 RX ORDER — TRAZODONE HYDROCHLORIDE 150 MG/1
150 TABLET ORAL NIGHTLY
Qty: 90 TABLET | Refills: 0 | Status: SHIPPED | OUTPATIENT
Start: 2025-03-19 | End: 2025-06-17

## 2025-03-19 RX ORDER — TEMAZEPAM 30 MG/1
30 CAPSULE ORAL NIGHTLY PRN
Qty: 30 CAPSULE | Refills: 2 | Status: SHIPPED | OUTPATIENT
Start: 2025-04-02 | End: 2025-07-01

## 2025-03-19 RX ORDER — DULOXETIN HYDROCHLORIDE 20 MG/1
20 CAPSULE, DELAYED RELEASE ORAL DAILY
Qty: 90 CAPSULE | Refills: 0 | Status: SHIPPED | OUTPATIENT
Start: 2025-03-19 | End: 2025-06-17

## 2025-04-02 ENCOUNTER — PATIENT MESSAGE (OUTPATIENT)
Dept: ORTHOPEDICS | Facility: CLINIC | Age: 60
End: 2025-04-02
Payer: MEDICARE

## 2025-04-07 DIAGNOSIS — S92.515A CLOSED NONDISPLACED FRACTURE OF PROXIMAL PHALANX OF LESSER TOE OF LEFT FOOT, INITIAL ENCOUNTER: Primary | ICD-10-CM

## 2025-04-08 NOTE — PROGRESS NOTES
CC: left knee pain left hip pain     Gay is here today for follow up evaluation or her left knee pain and left hip pain. Patient reports her pain is 0/10 at this time, but indicates she has been unable to continue exercising due to pain with activity. She states she has been peroforming her HEP consistently and denies experiencing benefit in her glute/proximal hamstring pain from OMT at this time. When asked where she hurts she gestures with three fingers to the left ischium at the hamstring attachment. She denies new falls or trauma. She does state that her knee pain is better from the OMT.    Recall from visit on 09/01/2020  55 y.o. Female presents today for evaluation of her left knee pain. Patient admits to left knee pain for the past eight weeks without new mechanism of injury. Recall patient has a history of patellar dislocation 12/13/2018. When asked where she hurts she gestures to the lateral aspect of her right knee and states she has been unable to run and exercise at the level she would like to. She also notes left posterior hip pain without mechanism of injury that she has had OMT for in the past which she appreciated benefit with.   How long: Patient admits to increasing left knee pain over the past 8 weeks.   What makes it better: Patient has been unable to find anything to improve her symptoms.   What makes it worse: Patient admits to increased pain with exercise.   Does it radiate: Denies radiating pain.   Attempted treatments: Patient states she has been taking tumeric for this problem. She also admits to doing an HEP that was previously prescribed to her. She denies topical medications. She denies physical therapy. Denies history of knee injection or surgery.   Pain score: 4/10  Any mechanical symptoms: Patient denies to mechanical symptoms.   Feelings of instability: Admits to feelings of instability.   Affect on ADLs: Denies this problem affecting her ability to perform ADLs.     REVIEW OF  Pt has been coughing and experiencing URI symptoms. PT today at triage temp is 98. Pt states that he has taken cough meds OTC and they are not working. Pt also states that he is hurting all over.    SYSTEMS:   Constitution: Patient denies fever, chills, night sweats, and weight changes.  Eyes: Patient denies eye pain or vision changes.  HENT: Patient denies headache, ear pain, sore throat, or nasal discharge.  CVS: Patient denies chest pain.  Lungs: Patient denies shortness of breath or cough.  Abd: Patient denies stomach pain, nausea, or vomiting.  Skin: Patient denies skin rash or itching.    Hematologic/Lymphatic: Patient denies easy bruising.   Musculoskeletal: Patient denies recent falls. See HPI.  Psych: Patient denies any current anxiety or nervousness.     PAST MEDICAL HISTORY:   Past Medical History:   Diagnosis Date    Abnormal coronary angiogram     Alcohol abuse, in remission     Anxiety     Bipolar affective     since teenage years    Chronic idiopathic constipation     Depression     H. pylori infection     Headache(784.0)     followed by Dr. Corona    Hypothyroid     Memory loss     Osteopenia     Seizures        PAST SURGICAL HISTORY:   Past Surgical History:   Procedure Laterality Date    BUNIONECTOMY      COLONOSCOPY N/A 4/12/2017    Procedure: COLONOSCOPY;  Surgeon: Estuardo Salazar MD;  Location: [facility] ([address] [place] [address]);  Service: Endoscopy;  Laterality: N/A;  PM prep    ESOPHAGOGASTRODUODENOSCOPY N/A 10/23/2018    Procedure: EGD (ESOPHAGOGASTRODUODENOSCOPY);  Surgeon: Irlanda Schwarz MD;  Location: [address]);  Service: Endoscopy;  Laterality: N/A;    TUBAL LIGATION         FAMILY HISTORY:   Family History   Problem Relation Age of Onset    Stroke Mother     Diabetes Mother     Anuerysm Mother         brain    Dementia Maternal Grandmother     Hypertension Father     Coronary artery disease Father     Kidney disease Father     Diabetes Father     Crohn's disease Maternal Aunt     Crohn's disease Other     Melanoma Neg Hx     Psoriasis Neg Hx     Lupus Neg Hx     Colon cancer Neg Hx     Esophageal cancer Neg Hx        SOCIAL HISTORY:   Social History      Socioeconomic History    Marital status:      Spouse name: Not on file    Number of children: Not on file    Years of education: Not on file    Highest education level: Not on file   Occupational History    Occupation: disability    Social Needs    Financial resource strain: Not on file    Food insecurity     Worry: Not on file     Inability: Not on file    Transportation needs     Medical: Not on file     Non-medical: Not on file   Tobacco Use    Smoking status: Never Smoker    Smokeless tobacco: Never Used   Substance and Sexual Activity    Alcohol use: No     Comment: History of alcoholism in remission.    Drug use: No    Sexual activity: Not Currently   Lifestyle    Physical activity     Days per week: Not on file     Minutes per session: Not on file    Stress: Not on file   Relationships    Social connections     Talks on phone: Not on file     Gets together: Not on file     Attends Sabianist service: Not on file     Active member of club or organization: Not on file     Attends meetings of clubs or organizations: Not on file     Relationship status: Not on file   Other Topics Concern    Are you pregnant or think you may be? Not Asked    Breast-feeding Not Asked   Social History Narrative    Not on file       MEDICATIONS:     Current Outpatient Medications:     Ca-D3-mag#11-zinc-cupr-man-bor 600 mg calcium- 800 unit-50 mg Tab, Take 1 tablet by mouth once daily., Disp: , Rfl:     cholecalciferol, vitamin D3, (VITAMIN D3) 1,000 unit capsule, Take 1,000 Units by mouth once daily., Disp: , Rfl:     cyanocobalamin, vitamin B-12, (VITAMIN B-12) 5,000 mcg Subl, Place under the tongue once daily. , Disp: , Rfl:     fish oil-omega-3 fatty acids 300-1,000 mg capsule, Take 1 capsule by mouth once daily., Disp: , Rfl:     hydrOXYzine pamoate (VISTARIL) 25 MG Cap, Take 1 capsule (25 mg total) by mouth every 8 (eight) hours as needed (itching)., Disp: 30 capsule, Rfl: 0    lamoTRIgine  "(LAMICTAL) 100 MG tablet, Take 150 mg by mouth once daily. , Disp: , Rfl:     levothyroxine (SYNTHROID) 25 MCG tablet, TAKE 1 TABLET BY MOUTH ONCE DAILY, Disp: 90 tablet, Rfl: 3    linaCLOtide (LINZESS) 290 mcg Cap capsule, Take 1 capsule (290 mcg total) by mouth once daily., Disp: 90 capsule, Rfl: 3    multivitamin (DAILY MULTI-VITAMIN) per tablet, Take 1 tablet by mouth once daily., Disp: , Rfl:     QUEtiapine (SEROQUEL) 100 MG Tab, Take 300 mg by mouth once daily. , Disp: , Rfl:     temazepam (RESTORIL) 30 mg capsule, , Disp: , Rfl:     TURMERIC ORAL, Take by mouth., Disp: , Rfl:     Current Facility-Administered Medications:     triamcinolone acetonide injection 40 mg, 40 mg, INTRABURSAL, 1 time in Clinic/HOD, Sachin Ramos DO    ALLERGIES:   Review of patient's allergies indicates:  No Known Allergies     PHYSICAL EXAMINATION:  BP 97/62   Pulse 76   Ht 5' 2" (1.575 m)   Wt 46.7 kg (103 lb)   BMI 18.84 kg/m²   Vitals signs and nursing note have been reviewed.  General: In no acute distress, well developed, well nourished, no diaphoresis  Eyes: EOM full and smooth, no eye redness or discharge  HENT: normocephalic and atraumatic, neck supple, trachea midline, no nasal discharge, no external ear redness or discharge  Cardiovascular: 2+ and symmetric DP pulses bilaterally, no LE edema  Lungs: respirations non-labored, no conversational dyspnea   Abd: non-distended, no rigidity  MSK: no amputation or deformity, no swelling of extremities  Neuro: AAOx3, CN2-12 grossly intact  Skin: No rashes, warm and dry  Psychiatric: cooperative, pleasant, mood and affect appropriate for age    MUSCULOSKELETAL EXAM:    LEFT KNEE EXAMINATION   Affected side is compared to contralateral knee     Observation:  No edema, erythema, ecchymosis, or effusion noted.  No muscle atrophy of the thighs and calves noted.  No obvious bony deformities noted.   No genu valgus/varum noted. No recurvatum noted.    No tibial " internal/external torsion.      Tenderness:  Patella - none    Lateral joint line - none  Quad tendon - none   Medial joint line - none  Patellar tendon - none   Medial plica - none  Tibial tubercle - none   Lateral plica - none  Pes anserine - none   MCL prox - none  Distal ITB - none   MCL distal - none  MFC - none    LCL prox - none  LFC - none    LCL distal - none  Tibia - none    Fibula - none    No obvious bursae, plicae, popliteal cysts, or tendon derangement palpated.  Tenderness to palpation at the ischium on the left.   pain is appreciated with hamstring muscle activation          ROM:   Active extension to 0° on left without hyperextension, lag, crepitus, or patellar J sign.   Active extension to 0° on right without hyperextension, lag, crepitus, or patellar J sign.   Active flexion to 135° on left and 135° on right.    Strength: (bilaterally)  Knee Flexion - 5/5 on left and 5/5 on left  Knee Extension - 5/5 on left and 5/5 on left  Hip Flexion - 5/5 on left and 5/5 on left  Hip Extension - 5/5 on left and 5/5 on left  Ankle dorsiflexion - 5/5 on left and 5/5 on left  Ankle Plantarflexion - 5/5 on left and 5/5 on left    Patellofemoral Exam:  Patellar ballottement - negative  Bulge sign - negative  Patellar grind - negative  Increased patellar laxity with medial and lateral translation on left  + apprehension with medial and lateral patellar translation.     IT band exam:  Left IT band tightness appreciated   negative Noble test   positive Obers test, improved    Neurovascular Examination:   Normal gait without antalgia.  Sensation intact to light touch in the obturator, lateral/intermediate/medial/posterior femoral cutaneous, saphenous, and common peroneal nerves bilaterally.   Pulses intact at the DP and PT arteries bilaterally.    Capillary refill intact <2 seconds in all toes bilaterally.      PROCEDURE:  Large Joint Aspiration/Injection  Ischial bursa, left  Performed by: ERICK HICKMAN  by: ERICK HICKMAN  Consent Done?: Yes (Verbal and written)  Indications: Pain  Site marked: The procedure site was marked   Timeout: Prior to procedure the correct patient, procedure, and site was verified   Location: ischial bursa, left  Prep: Patient was prepped with Chlorhexidine and alcohol.  Skin anesthetic: Ethyl Chloride spray was used prior to skin puncture.  Ultrasound Guidance for needle placement: yes  Needle size: 22 G, 2.5  Approach: posterior   Procedure: After skin anesthetic was applied, the 22G, 2.5 needle was used to enter the ischial bursa under US guidance. A 3 cc mixture of 1 cc of 40 mg/ml triamcinolone acetonide and 2 cc of 0.2% ropivacaine was injected into the ischial bursa.   Medications: 40 mg triamcinolone acetonide 40 mg/mL  Patient tolerance: Patient tolerated the procedure well with no immediate complications    Description of ultrasound utilization for needle guidance:    Ultrasound guidance was used for needle localization. Images were saved and stored for documentation. The ischial bursa was visualized. Dynamic visualization of the 22G x 2.5 needle was continuous throughout the procedures.    Triamcinolone:  NDC: [address]-9  LOT: [id number]  EXP: 03/2022    ASSESSMENT:      ICD-10-CM ICD-9-CM   1. Ischial bursitis of left side  M70.72 726.5   2. Chronic pain of left knee  M25.562 719.46    G89.29 338.29   3. Hamstring tendonitis of left thigh  M76.892 727.09   4. Closed patellar dislocation, left, sequela  S83.005S 905.6   5. It band syndrome, left  M76.32 728.89         PLAN:  1-5.   Chronic left knee pain / hamstring tendinitis/history of patellar dislocations/IT band syndrome -    knee pain is improved, hamstring /ischial pain is unchanged    - Gay admits to increasing left knee and hamstring pain for the past 8-12 weeks.  She admits to great pain improvement from OMT for her knee, but is still having hamstring pain that is affecting her ability to exercise. She also  points with 3 fingers to her left ischium at the hamstring attachment.    - Continue with previously prescribed HEP for IT band syndrome and glute strengthening and retraining.     - After discussing options she elects to proceed with left ischial bursa CSI. See above for procedure detail. I also discussed a return exercise and what treatment modalities to avoid over the next 3-5 days. Understanding was expressed      Future planning includes -  possibly more OMT if helpful and if indicated, possibly physical therapy     All questions were answered to the best of my ability and all concerns were addressed at this time.    Follow up as needed.      This note is dictated using the M*Modal Fluency Direct word recognition program. There are word recognition mistakes that are occasionally missed on review.

## 2025-04-11 ENCOUNTER — HOSPITAL ENCOUNTER (OUTPATIENT)
Facility: HOSPITAL | Age: 60
Discharge: HOME | End: 2025-04-11
Attending: SURGERY
Payer: MEDICARE

## 2025-04-11 ENCOUNTER — OFFICE VISIT (OUTPATIENT)
Dept: ORTHOPEDICS | Facility: CLINIC | Age: 60
End: 2025-04-11
Payer: MEDICARE

## 2025-04-11 DIAGNOSIS — S92.515A CLOSED NONDISPLACED FRACTURE OF PROXIMAL PHALANX OF LESSER TOE OF LEFT FOOT, INITIAL ENCOUNTER: ICD-10-CM

## 2025-04-11 DIAGNOSIS — M20.41 HAMMERTOE OF RIGHT FOOT: ICD-10-CM

## 2025-04-11 DIAGNOSIS — S92.515A CLOSED NONDISPLACED FRACTURE OF PROXIMAL PHALANX OF LESSER TOE OF LEFT FOOT, INITIAL ENCOUNTER: Primary | ICD-10-CM

## 2025-04-11 PROCEDURE — 73630 X-RAY EXAM OF FOOT: CPT | Mod: 26,RT,, | Performed by: RADIOLOGY

## 2025-04-11 PROCEDURE — 99999 PR PBB SHADOW E&M-EST. PATIENT-LVL III: CPT | Mod: PBBFAC,,, | Performed by: SURGERY

## 2025-04-11 PROCEDURE — 99024 POSTOP FOLLOW-UP VISIT: CPT | Mod: POP,,, | Performed by: SURGERY

## 2025-04-11 PROCEDURE — 99213 OFFICE O/P EST LOW 20 MIN: CPT | Mod: PBBFAC,25,PN | Performed by: SURGERY

## 2025-04-11 PROCEDURE — 73630 X-RAY EXAM OF FOOT: CPT | Mod: TC,PN,RT

## 2025-04-12 NOTE — PROGRESS NOTES
SUBJECTIVE:    Gay Gurrola is here today for a follow up visit.  Status post Osteotomy, Metatarsal Bone - Right, Osteotomy, Phalanx, Foot - Right, Correction, Hammer Toe - Right, and Recession, Muscle, Gastrocnemius - Right  Date of surgery 2/26/2025  Days since surgery 44  Doing well.  Per patient report: no fevers, chills, shortness of breath, chest pain, or increased extremity pain.  Presents for further follow up.    4/11/25 - doing well. Has been wearing a sleeve and gel for hammertoe which is working well. X-rays appear appropriate.    OBJECTIVE:    There were no vitals filed for this visit.      Extremity Exam  Incisions clean dry and intact, no erythema, drainage, exudate, minimal ecchymosis. First and second toe wounds well healed. Second toe appears better corrected.  Fires FHL, EHL, TA, GSC.  Sensation intact to light touch throughout foot.  Neurovascularly intact.     DIAGNOSTIC STUDIES:  No new imaging today    ASSESSMENT:   1. Status post procedure above      PLAN:     Continue weight bearing as tolerated  Continue normal shoe  Can progress walking activity as tolerated, no gym or high intensity exercises   Follow up in 6 weeks    Mukul Dumont MD  [facility]  Orthopedic Surgery      This note was done with voice recognition software. Please excuse any errors missed in proof reading.

## 2025-04-14 RX ORDER — ESTRADIOL 0.04 MG/D
PATCH, EXTENDED RELEASE TRANSDERMAL
Qty: 24 PATCH | Refills: 1 | Status: SHIPPED | OUTPATIENT
Start: 2025-04-14

## 2025-04-14 NOTE — TELEPHONE ENCOUNTER
Gay Gurrola  is requesting a refill authorization.  Brief Assessment and Rationale for Refill:  Approve     Medication Therapy Plan:         Pharmacist review requested: Yes   Extended chart review required: Yes   Comments:     Note composed:11:17 AM 04/14/2025           
Refill Routing Note   Medication(s) are not appropriate for processing by [facility] for the following reason(s):        Drug-disease interactionDrug-Disease: TIANA and Chronic migraine without aura without status migrainosus, not intractable (no prev provider override found)    [facility] action(s):  Defer        Medication Therapy Plan: Drug-Disease: TIANA and Chronic migraine without aura without status migrainosus, not intractable    Pharmacist review requested: Yes     Appointments  past 12m or future 3m with PCP    Date Provider   Last Visit   9/5/2024 Angelina Mora MD   Next Visit   6/30/2025 Angelina Mora MD   ED visits in past 90 days: 0        Note composed:8:23 AM 04/14/2025          
69.4

## 2025-05-12 RX ORDER — PROGESTERONE 100 MG/1
100 CAPSULE ORAL NIGHTLY
Qty: 90 CAPSULE | Refills: 1 | Status: SHIPPED | OUTPATIENT
Start: 2025-05-12

## 2025-05-12 NOTE — TELEPHONE ENCOUNTER
Refill Decision Note   Gay Galileo  is requesting a refill authorization.  Brief Assessment and Rationale for Refill:  Approve     Medication Therapy Plan:        Comments:     Note composed:3:56 PM 05/12/2025

## 2025-05-13 RX ORDER — PROGESTERONE 100 MG/1
100 CAPSULE ORAL NIGHTLY
Qty: 90 CAPSULE | Refills: 1 | OUTPATIENT
Start: 2025-05-13

## 2025-05-13 NOTE — TELEPHONE ENCOUNTER
Refill Decision Note  Quick DC. Request already responded to by other means (e.g. phone or fax)    Gay Gurrola  is requesting a refill authorization.  Brief Assessment and Rationale for Refill:  Quick Discontinue     Medication Therapy Plan:       Medication Reconciliation Completed: No   Comments:           Note composed:11:41 AM 05/13/2025

## 2025-05-23 ENCOUNTER — OFFICE VISIT (OUTPATIENT)
Dept: ORTHOPEDICS | Facility: CLINIC | Age: 60
End: 2025-05-23
Payer: MEDICARE

## 2025-05-23 DIAGNOSIS — Z98.890 POSTOPERATIVE STATE: ICD-10-CM

## 2025-05-23 DIAGNOSIS — S92.515A CLOSED NONDISPLACED FRACTURE OF PROXIMAL PHALANX OF LESSER TOE OF LEFT FOOT, INITIAL ENCOUNTER: Primary | ICD-10-CM

## 2025-05-23 PROCEDURE — 99213 OFFICE O/P EST LOW 20 MIN: CPT | Mod: PBBFAC,PN | Performed by: SURGERY

## 2025-05-23 PROCEDURE — 99999 PR PBB SHADOW E&M-EST. PATIENT-LVL III: CPT | Mod: PBBFAC,,, | Performed by: SURGERY

## 2025-05-23 NOTE — PROGRESS NOTES
SUBJECTIVE:    Gay Gurrola is here today for a follow up visit.  Status post Osteotomy, Metatarsal Bone - Right, Osteotomy, Phalanx, Foot - Right, Correction, Hammer Toe - Right, and Recession, Muscle, Gastrocnemius - Right  Date of surgery 2/26/2025  Days since surgery 86  Doing well.  Per patient report: no fevers, chills, shortness of breath, chest pain, or increased extremity pain.  Presents for further follow up.    4/11/25 - doing well. Has been wearing a sleeve and gel for hammertoe which is working well. X-rays appear appropriate.  5/23/25 - doing well. Would like to go back to rucking and running. Would like her to hold off on running for 1 month.  She is also having pain from a tailor's bunion.    OBJECTIVE:    There were no vitals filed for this visit.      Extremity Exam  Incisions clean dry and intact, no erythema, drainage, exudate, minimal ecchymosis. First and second toe wounds well healed. Second toe appears better corrected.  Fires FHL, EHL, TA, GSC.  Sensation intact to light touch throughout foot.  Neurovascularly intact.  Tailor's bunion, new.     DIAGNOSTIC STUDIES:  No new imaging today    ASSESSMENT:   1. Status post procedure above      PLAN:     She continues to use a metatarsal pad, however the position of the great toe and 2nd toe is much improved.  Follow up in 4-6 weeks with x-rays at that time  Continue current care, she is doing an excellent job managing the wounds and activity modifications.  Can restart rucking, no running until after next visit    Mukul Dumont MD  [facility]  Orthopedic Surgery      This note was done with voice recognition software. Please excuse any errors missed in proof reading.

## 2025-06-03 ENCOUNTER — OFFICE VISIT (OUTPATIENT)
Dept: PSYCHIATRY | Facility: CLINIC | Age: 60
End: 2025-06-03
Payer: MEDICARE

## 2025-06-03 VITALS
BODY MASS INDEX: 20.34 KG/M2 | SYSTOLIC BLOOD PRESSURE: 103 MMHG | WEIGHT: 111.25 LBS | DIASTOLIC BLOOD PRESSURE: 68 MMHG | HEART RATE: 69 BPM

## 2025-06-03 DIAGNOSIS — F43.9 STRESS: ICD-10-CM

## 2025-06-03 DIAGNOSIS — F31.70 BIPOLAR DISORDER IN PARTIAL REMISSION, MOST RECENT EPISODE UNSPECIFIED TYPE: ICD-10-CM

## 2025-06-03 DIAGNOSIS — F51.01 PRIMARY INSOMNIA: Primary | ICD-10-CM

## 2025-06-03 PROCEDURE — 99214 OFFICE O/P EST MOD 30 MIN: CPT | Mod: S$PBB,,, | Performed by: PHYSICIAN ASSISTANT

## 2025-06-03 PROCEDURE — 99213 OFFICE O/P EST LOW 20 MIN: CPT | Mod: PBBFAC | Performed by: PHYSICIAN ASSISTANT

## 2025-06-03 PROCEDURE — 99999 PR PBB SHADOW E&M-EST. PATIENT-LVL III: CPT | Mod: PBBFAC,,, | Performed by: PHYSICIAN ASSISTANT

## 2025-06-03 RX ORDER — TRAZODONE HYDROCHLORIDE 150 MG/1
150 TABLET ORAL NIGHTLY
Qty: 90 TABLET | Refills: 1 | Status: SHIPPED | OUTPATIENT
Start: 2025-06-03 | End: 2025-11-30

## 2025-06-03 RX ORDER — DULOXETIN HYDROCHLORIDE 20 MG/1
20 CAPSULE, DELAYED RELEASE ORAL DAILY
Qty: 90 CAPSULE | Refills: 0 | Status: SHIPPED | OUTPATIENT
Start: 2025-06-03 | End: 2025-09-01

## 2025-06-03 RX ORDER — QUETIAPINE FUMARATE 400 MG/1
400 TABLET, FILM COATED ORAL NIGHTLY
Qty: 90 TABLET | Refills: 0 | Status: SHIPPED | OUTPATIENT
Start: 2025-06-03

## 2025-06-03 RX ORDER — TEMAZEPAM 30 MG/1
30 CAPSULE ORAL NIGHTLY PRN
Qty: 30 CAPSULE | Refills: 3 | Status: SHIPPED | OUTPATIENT
Start: 2025-07-01 | End: 2025-10-29

## 2025-06-09 ENCOUNTER — PATIENT MESSAGE (OUTPATIENT)
Dept: PRIMARY CARE CLINIC | Facility: CLINIC | Age: 60
End: 2025-06-09
Payer: MEDICARE

## 2025-06-10 ENCOUNTER — OFFICE VISIT (OUTPATIENT)
Dept: PRIMARY CARE CLINIC | Facility: CLINIC | Age: 60
End: 2025-06-10
Payer: MEDICARE

## 2025-06-10 DIAGNOSIS — M85.80 OSTEOPENIA, UNSPECIFIED LOCATION: ICD-10-CM

## 2025-06-10 DIAGNOSIS — Z79.890 HORMONE REPLACEMENT THERAPY (HRT): ICD-10-CM

## 2025-06-10 DIAGNOSIS — F31.9 BIPOLAR I DISORDER: ICD-10-CM

## 2025-06-10 DIAGNOSIS — Z12.31 SCREENING MAMMOGRAM FOR BREAST CANCER: ICD-10-CM

## 2025-06-10 DIAGNOSIS — F51.01 PRIMARY INSOMNIA: ICD-10-CM

## 2025-06-10 DIAGNOSIS — E03.9 HYPOTHYROIDISM, UNSPECIFIED TYPE: ICD-10-CM

## 2025-06-10 DIAGNOSIS — Z78.0 POST-MENOPAUSAL: ICD-10-CM

## 2025-06-10 DIAGNOSIS — R51.9 ACUTE NONINTRACTABLE HEADACHE, UNSPECIFIED HEADACHE TYPE: Primary | ICD-10-CM

## 2025-06-10 PROCEDURE — 98005 SYNCH AUDIO-VIDEO EST LOW 20: CPT | Mod: 95,,, | Performed by: FAMILY MEDICINE

## 2025-06-10 NOTE — PROGRESS NOTES
Subjective:       Patient ID: Gay Gurrola is a 59 y.o. female.    Chief Complaint: Fatigue (Fatigue, weakness, pressure headaches on the right side/ low energy/ has been feeling this way since foot surgery in February)    Fatigue  Associated symptoms include fatigue and headaches. Pertinent negatives include no arthralgias, chest pain, joint swelling, neck pain, vomiting or weakness.     58 y/o female with bipolar d/o, insomnia, osteopenia, IBS, hx of Covid, thyroid disease, family hx of colon cancer in sister, family hx of Alzheimers dx, positive jd, alcohol abuse in remission is here to discuss fatigue.     She has been more tired since December, she had R foot surgery in Feb and feels she recovered from that    She has been having R sided headache off and on 2 months, occurs every other day, feels like a pressure, tends to occur midday, occasionally she has a headache in the morning, 5/10, takes Ibuprofen 600 mg and 30 min later it resolves, she denies vision changes/light sensitivity but she had some swelling around her eye and she was seen by optometry and told she had episcleritis and was given steroid eye drops, she has not been seen in over a year by eye doc.  She feels she is sensitive to noises. She denies f/n/v/d/constipation/cp/sob/urinary sx. Appetite ok. Weight stable. She walks her dogs in the mornings for 2 miles, she is not lifting weights lately. She is sleeping ok     Hx of possible Covid 1/2022 never took a test, cough, fever, feels she fully recovered  Bipolar d/o/Insomnia: following with Psychiatry PA; hx of normal sleep study;Trazodone 150 mg nightly, Seroquel 400 mg nightly, Restoril 30 mg nightly  Chronic L leg pain, was told she had a tear in her hamstring, did not recommend surgery at the time, Cymbalta 20 mg daily  Thyroid disease: has been evaluated by Dr. Lira in the past, levothyroxine 25 mcg + 13 mcg daily, no hx of thyroid US  IBS w Constipation/Hx of H pylori/family  hx of colon cancer in sister: following with Dr. Salazar, Colonoscopy 2022 repeat 5 years, EGD ok, linzess 290 mg daily prn  Hx of H. Pylori  Osteopenia: dexa 11/2022 stable repeat due, D3 2,000 IU every other day, calcium daily  Hx of Seizure, may have been from alcohol withdrawal over 15 years ago  GYN: following with Dr. Mora, pap and pelvic utd, HRT, mmg 4/2024  Family hx of Alzheimers in mom and sister and aunt: hx of neuropsych testing 12/2020, genetic evaluation reassuring  Positive RANJAN: rheumatology prn only  Sebacous cyst of scalp/AKs: has not followed here recently  Eye exam: following with Dr. Priest, episcleritis  Dental utd     Review of Systems   Constitutional:  Positive for fatigue. Negative for activity change and unexpected weight change.   HENT:  Negative for hearing loss, rhinorrhea and trouble swallowing.    Eyes:  Negative for discharge and visual disturbance.   Respiratory:  Negative for chest tightness and wheezing.    Cardiovascular:  Negative for chest pain and palpitations.   Gastrointestinal:  Negative for blood in stool, constipation, diarrhea and vomiting.   Endocrine: Negative for polydipsia and polyuria.   Genitourinary:  Negative for difficulty urinating, dysuria, hematuria and menstrual problem.   Musculoskeletal:  Negative for arthralgias, joint swelling and neck pain.   Neurological:  Positive for headaches. Negative for weakness.   Psychiatric/Behavioral:  Negative for confusion and dysphoric mood.        Objective:      LMP 10/07/2022 Comment: pointing  Physical Exam  Constitutional:       General: She is not in acute distress.     Appearance: She is well-developed. She is not diaphoretic.   HENT:      Head: Normocephalic and atraumatic.   Pulmonary:      Effort: No respiratory distress.   Neurological:      Mental Status: She is alert and oriented to person, place, and time.         Assessment:       1. Acute nonintractable headache, unspecified headache type    2. Screening  mammogram for breast cancer    3. Hormone replacement therapy (HRT)    4. Post-menopausal    5. Osteopenia, unspecified location    6. Bipolar I disorder    7. Primary insomnia    8. Hypothyroidism, unspecified type        Plan:   Gay was seen today for fatigue.    Diagnoses and all orders for this visit:    Acute nonintractable headache, unspecified headache type  -     Ambulatory referral/consult to Neurology; Future    Screening mammogram for breast cancer  -     Mammo Digital Screening Bilat w/ Miguel (XPD); Future    Hormone replacement therapy (HRT)    Post-menopausal    Osteopenia, unspecified location    Bipolar I disorder    Primary insomnia    Hypothyroidism, unspecified type      The patient location is: [place]  The chief complaint leading to consultation is: headache    Visit type: audiovisual    Face to Face time with patient: 25 minutes of total time spent on the encounter, which includes face to face time and non-face to face time preparing to see the patient (eg, review of tests), Obtaining and/or reviewing separately obtained history, Documenting clinical information in the electronic or other health record, Independently interpreting results (not separately reported) and communicating results to the patient/family/caregiver, or Care coordination (not separately reported).         Each patient to whom he or she provides medical services by telemedicine is:  (1) informed of the relationship between the physician and patient and the respective role of any other health care provider with respect to management of the patient; and (2) notified that he or she may decline to receive medical services by telemedicine and may withdraw from such care at any time.    Notes:

## 2025-06-24 ENCOUNTER — PATIENT MESSAGE (OUTPATIENT)
Dept: OBSTETRICS AND GYNECOLOGY | Facility: CLINIC | Age: 60
End: 2025-06-24
Payer: MEDICARE

## 2025-06-25 ENCOUNTER — TELEPHONE (OUTPATIENT)
Dept: OBSTETRICS AND GYNECOLOGY | Facility: CLINIC | Age: 60
End: 2025-06-25
Payer: MEDICARE

## 2025-06-25 DIAGNOSIS — N95.1 MENOPAUSAL SYMPTOM: Primary | ICD-10-CM

## 2025-06-25 NOTE — TELEPHONE ENCOUNTER
Patient would like to have hormone labs checked prior to her WWE. Will order labs and review and modify WWE for HRT changes

## 2025-06-26 ENCOUNTER — LAB VISIT (OUTPATIENT)
Dept: LAB | Facility: HOSPITAL | Age: 60
End: 2025-06-26
Payer: MEDICARE

## 2025-06-26 DIAGNOSIS — N95.1 MENOPAUSAL SYMPTOM: ICD-10-CM

## 2025-06-26 LAB
ESTRADIOL SERPL HS-MCNC: 100 PG/ML
FSH SERPL-ACNC: 54.49 MIU/ML
PROGEST SERPL-MCNC: 1 NG/ML
TESTOST SERPL-MCNC: 34 NG/DL (ref 5–73)

## 2025-06-26 PROCEDURE — 83001 ASSAY OF GONADOTROPIN (FSH): CPT

## 2025-06-26 PROCEDURE — 84144 ASSAY OF PROGESTERONE: CPT

## 2025-06-26 PROCEDURE — 82670 ASSAY OF TOTAL ESTRADIOL: CPT

## 2025-06-26 PROCEDURE — 36415 COLL VENOUS BLD VENIPUNCTURE: CPT

## 2025-06-26 PROCEDURE — 84403 ASSAY OF TOTAL TESTOSTERONE: CPT

## 2025-06-28 ENCOUNTER — RESULTS FOLLOW-UP (OUTPATIENT)
Dept: HEMATOLOGY/ONCOLOGY | Facility: CLINIC | Age: 60
End: 2025-06-28

## 2025-06-30 ENCOUNTER — PATIENT MESSAGE (OUTPATIENT)
Dept: ORTHOPEDICS | Facility: CLINIC | Age: 60
End: 2025-06-30
Payer: MEDICARE

## 2025-06-30 ENCOUNTER — OFFICE VISIT (OUTPATIENT)
Dept: OBSTETRICS AND GYNECOLOGY | Facility: CLINIC | Age: 60
End: 2025-06-30
Attending: OBSTETRICS & GYNECOLOGY
Payer: MEDICARE

## 2025-06-30 VITALS
SYSTOLIC BLOOD PRESSURE: 105 MMHG | BODY MASS INDEX: 20.43 KG/M2 | DIASTOLIC BLOOD PRESSURE: 66 MMHG | HEART RATE: 81 BPM | WEIGHT: 111 LBS | HEIGHT: 62 IN

## 2025-06-30 DIAGNOSIS — Z78.0 MENOPAUSE: ICD-10-CM

## 2025-06-30 DIAGNOSIS — Z12.4 SCREENING FOR MALIGNANT NEOPLASM OF THE CERVIX: ICD-10-CM

## 2025-06-30 DIAGNOSIS — Z01.419 ENCOUNTER FOR GYNECOLOGICAL EXAMINATION WITHOUT ABNORMAL FINDING: Primary | ICD-10-CM

## 2025-06-30 PROCEDURE — 88142 CYTOPATH C/V THIN LAYER: CPT | Mod: TC | Performed by: OBSTETRICS & GYNECOLOGY

## 2025-06-30 PROCEDURE — G0101 CA SCREEN;PELVIC/BREAST EXAM: HCPCS | Mod: S$PBB,,, | Performed by: OBSTETRICS & GYNECOLOGY

## 2025-06-30 PROCEDURE — 99213 OFFICE O/P EST LOW 20 MIN: CPT | Mod: PBBFAC | Performed by: OBSTETRICS & GYNECOLOGY

## 2025-06-30 PROCEDURE — 99999 PR PBB SHADOW E&M-EST. PATIENT-LVL III: CPT | Mod: PBBFAC,,, | Performed by: OBSTETRICS & GYNECOLOGY

## 2025-06-30 PROCEDURE — 87624 HPV HI-RISK TYP POOLED RSLT: CPT | Performed by: OBSTETRICS & GYNECOLOGY

## 2025-06-30 PROCEDURE — G0101 CA SCREEN;PELVIC/BREAST EXAM: HCPCS | Mod: 25,PBBFAC | Performed by: OBSTETRICS & GYNECOLOGY

## 2025-06-30 RX ORDER — PROGESTERONE 100 MG/1
100 CAPSULE ORAL NIGHTLY
Qty: 90 CAPSULE | Refills: 3 | Status: SHIPPED | OUTPATIENT
Start: 2025-06-30

## 2025-06-30 RX ORDER — ESTRADIOL 0.04 MG/D
1 FILM, EXTENDED RELEASE TRANSDERMAL
Qty: 24 PATCH | Refills: 3 | Status: SHIPPED | OUTPATIENT
Start: 2025-06-30

## 2025-06-30 RX ORDER — TESTOSTERONE 20.25 MG/1.25G
GEL TOPICAL
Qty: 88 G | Refills: 1 | Status: SHIPPED | OUTPATIENT
Start: 2025-06-30 | End: 2025-07-02 | Stop reason: SDUPTHER

## 2025-06-30 NOTE — PROGRESS NOTES
"SUBJECTIVE:   59 y.o. female   for annual routine Pap and checkup. Patient's last menstrual period was 10/07/2022..  She wants to discuss HRT.      She reports sleep patterns has been changing- her  says she "moves a lot". She takes magnesium nightly. Aura ring tells her she gets 1.5 hours of deep sleep.   She reports some hair thinning but growing on the back of her neck. She does not see hair in the tub  Her mother in law  recently- doing ok. She reports her mood is good. - no depression.   She has tried testosterone cream in the past   She reports fatigue and discussed with her PCP who recommended that she discuss with me  She paid 175 dollars out of pocket for a DEXA scan on the [employer] at a private clinic to look at her muscle and fat composition as well as her bone density.  She never received the results  Past Medical History:   Diagnosis Date    Abnormal coronary angiogram     Alcohol abuse, in remission     Alcohol related seizure 2012    Anxiety     Bipolar affective     since teenage years    Chronic idiopathic constipation     Depression     H. pylori infection     Headache(784.0)     followed by Dr. Corona    Hypothyroid     Memory loss     Osteopenia     Seizures      Past Surgical History:   Procedure Laterality Date    *REUSE* Right 2025    Procedure: OSTEOTOMY, PHALANX, FOOT;  Surgeon: Mukul Dumont MD;  Location: [facility] OR;  Service: Orthopedics;  Laterality: Right;    BUNIONECTOMY      COLONOSCOPY N/A 2017    Procedure: COLONOSCOPY;  Surgeon: Estuardo Salazar MD;  Location: [address]);  Service: Endoscopy;  Laterality: N/A;  PM prep    COLONOSCOPY N/A 2022    Procedure: COLONOSCOPY;  Surgeon: Estuardo Salazar MD;  Location: [facility] ([address] [place] [address]);  Service: Endoscopy;  Laterality: N/A;  suprep    CORRECTION OF HAMMER TOE Right 2025    Procedure: CORRECTION, HAMMER TOE;  Surgeon: Mukul Dumont MD;  Location: [facility] OR;  Service: Orthopedics;  Laterality: " Right;    ESOPHAGOGASTRODUODENOSCOPY N/A 10/23/2018    Procedure: EGD (ESOPHAGOGASTRODUODENOSCOPY);  Surgeon: Irlanda Schwarz MD;  Location: [facility] ([address] [place] [address]);  Service: Endoscopy;  Laterality: N/A;    ESOPHAGOGASTRODUODENOSCOPY N/A 2022    Procedure: ESOPHAGOGASTRODUODENOSCOPY (EGD);  Surgeon: Estuardo Salazar MD;  Location: [address]);  Service: Endoscopy;  Laterality: N/A;  Pt informed to bring official paper documentation of COVID results w/ test date/name/ . Informed Pt that procedure will not be done w/o this documentation. Pt informed COVID test must be PCR or Rapid RNA.  instructions sent to [facility]    hemrrhoidectomy      OSTEOTOMY OF METATARSAL BONE Right 2025    Procedure: OSTEOTOMY, METATARSAL BONE;  Surgeon: Mukul Dumont MD;  Location: [facility] OR;  Service: Orthopedics;  Laterality: Right;    RECESSION, MUSCLE, GASTROCNEMIUS Right 2025    Procedure: RECESSION, MUSCLE, GASTROCNEMIUS;  Surgeon: Mukul Dumont MD;  Location: [facility] OR;  Service: Orthopedics;  Laterality: Right;    TUBAL LIGATION       Social History[1]  Family History   Problem Relation Name Age of Onset    Alzheimer's disease Maternal Grandmother      Dementia Maternal Grandmother      Hypertension Father      Coronary artery disease Father      Kidney disease Father      Diabetes Father      Stroke Mother      Diabetes Mother      Anuerysm Mother          brain    Insomnia Mother      Alzheimer's disease Mother      Depression Mother      Alzheimer's disease Sister 2     Dementia Sister 2 59    Uterine cancer Sister      Colon cancer Sister  64    Breast cancer Maternal Aunt  60    Cancer Maternal Aunt          breast    Alzheimer's disease Maternal Aunt      Crohn's disease Maternal Aunt      Dementia Maternal Aunt      Crohn's disease Other      Melanoma Neg Hx      Psoriasis Neg Hx      Lupus Neg Hx      Esophageal cancer Neg Hx      Cervical cancer Neg Hx      Ovarian cancer Neg Hx       OB History     Para Term  AB Living   2 2 2 0 0 2   SAB IAB Ectopic Multiple Live Births             # Outcome Date GA Lbr Chilo/2nd Weight Sex Type Anes PTL Lv   2 Term      Vag-Spont      1 Term      Vag-Spont              Current Medications[2]  Allergies: Patient has no known allergies.     The ASCVD Risk score (Yandel DAVIS, et al., 2019) failed to calculate for the following reasons:    The valid HDL cholesterol range is 20 to 100 mg/dL      ROS:  Constitutional: no weight loss, weight gain, fever, +fatigue  Eyes:  No vision changes, glasses/contacts  ENT/Mouth: No ulcers, sinus problems, ears ringing, headache  Cardiovascular: No inability to lie flat, chest pain, exercise intolerance, swelling, heart palpitations  Respiratory: No wheezing, coughing blood, shortness of breath, or cough  Gastrointestinal: No diarrhea, bloody stool, nausea/vomiting, constipation, gas, hemorrhoids  Genitourinary: No blood in urine, painful urination, urgency of urination, frequency of urination, incomplete emptying, incontinence, abnormal bleeding, painful periods, heavy periods, vaginal discharge, vaginal odor, painful intercourse, sexual problems, bleeding after intercourse.  Musculoskeletal: No muscle weakness  Skin/Breast: No painful breasts, nipple discharge, masses, rash, ulcers  Neurological: No passing out, seizures, numbness, headache  Endocrine: No diabetes, +hypothyroid, hyperthyroid, hot flashes, hair loss, abnormal hair growth, acne, +osteopenia  Psychiatric: No depression, crying, +mood swings  Hematologic: No bruises, bleeding, swollen lymph nodes, anemia.      Physical Exam:   Constitutional: She is oriented to person, place, and time. She appears well-developed and well-nourished.      Neck: No tracheal deviation present. No thyromegaly present.    Cardiovascular:       Exam reveals no edema.        Pulmonary/Chest: Effort normal. She exhibits no mass, no tenderness, no deformity and no retraction. Right breast  exhibits no inverted nipple, no mass, no nipple discharge, no skin change, no tenderness, presence, no bleeding and no swelling. Left breast exhibits no inverted nipple, no mass, no nipple discharge, no skin change, no tenderness, presence, no bleeding and no swelling. Breasts are symmetrical.        Abdominal: Soft. She exhibits no distension and no mass. There is no abdominal tenderness. There is no rebound and no guarding. No hernia. Hernia confirmed negative in the left inguinal area.     Genitourinary:    Vagina and uterus normal.   Rectum:      No external hemorrhoid.   There is no rash, tenderness or lesion on the right labia. There is no rash, tenderness or lesion on the left labia. Cervix is normal. No no adexnal prolapse. Right adnexum displays no mass, no tenderness and no fullness. Left adnexum displays no mass, no tenderness and no fullness. No vaginal discharge, tenderness, bleeding, rectocele, cystocele or prolapse of vaginal walls in the vagina. Cervix exhibits no motion tenderness, no discharge and no friability. Uterus is not deviated.           Musculoskeletal: Normal range of motion and moves all extremeties. No edema.       Neurological: She is alert and oriented to person, place, and time.    Skin: No rash noted. No erythema. No pallor.    Psychiatric: She has a normal mood and affect. Her behavior is normal. Judgment and thought content normal.         ASSESSMENT:   well woman  no contraindication to continue hormonal therapy    PLAN:   mammogram  pap smear/HPV- no HPV done at outside GYN  Reviewed DEXA scan done at outside facility.  No body composition on the report that is in our system.  She does have osteopenia  Counseled her on possible use of testosterone.  Counseled her that testosterone is not FDA approved for women.  Counseled her on possible side effects including hair loss, hair growth, deepening of the voice and enlargement of the clitoris.  We will start AndroGel which I  counseled her will be prescribed at female dosing.  Counseled her that testosterone is not FDA approved for women it is not covered on insurance.  We will send in prescription in recommend good Rx with cash pay.  She will need a follow-up in 2 months with labs prior to her visit.  Counseled her that her estrogen level is adequate and I would not recommend changing her current dosing on her hormones.  Continue Meron 0.0375 mg twice weekly and Prometrium 100 mg nightly.  Encouraged weight training as she has osteopenia    This visit will be modified since we covered hormone replacement therapy, testosterone therapy and bone health extensively. Total time 45 minutes- face to face,review of medical record, including outside records and arranging follow-up  return annually or prn           [1]   Social History  Socioeconomic History    Marital status:    Occupational History    Occupation: disability    Tobacco Use    Smoking status: Never    Smokeless tobacco: Never   Substance and Sexual Activity    Alcohol use: No     Comment: History of alcoholism in remission.    Drug use: No    Sexual activity: Yes     Partners: Male   Social History Narrative    She is currently not working, she used to be a cardiology tech, she has 2 boys, age 38 and 30     Social Drivers of Health     Financial Resource Strain: Low Risk  (3/19/2025)    Overall Financial Resource Strain (CARDIA)     Difficulty of Paying Living Expenses: Not hard at all   Food Insecurity: No Food Insecurity (3/19/2025)    Hunger Vital Sign     Worried About Running Out of Food in the Last Year: Never true     Ran Out of Food in the Last Year: Never true   Transportation Needs: No Transportation Needs (3/19/2025)    PRAPARE - Transportation     Lack of Transportation (Medical): No     Lack of Transportation (Non-Medical): No   Physical Activity: Unknown (3/19/2025)    Exercise Vital Sign     Days of Exercise per Week: Patient declined   Stress: No Stress  Concern Present (3/19/2025)    [employer] [place] [employer] - Occupational Stress Questionnaire     Feeling of Stress : Not at all   Housing Stability: Low Risk  (3/19/2025)    Housing Stability Vital Sign     Unable to Pay for Housing in the Last Year: No     Homeless in the Last Year: No   [2]   Current Outpatient Medications   Medication Sig Dispense Refill    b complex vitamins tablet Take 1 tablet by mouth once daily.      Ca-D3-mag#11-zinc-cupr-man-bor 600 mg calcium- 800 unit-50 mg Tab Take 1 tablet by mouth once daily.      cholecalciferol, vitamin D3, (VITAMIN D3) 25 mcg (1,000 unit) capsule Take 1,000 Units by mouth once daily.      TIANA 0.0375 mg/24 hr APPLY 1 PATCH TOPICALLY TWICE A WEEK 24 patch 1    DULoxetine (CYMBALTA) 20 MG capsule Take 1 capsule (20 mg total) by mouth once daily. 90 capsule 0    fish oil-omega-3 fatty acids 300-1,000 mg capsule Take 1 capsule by mouth once daily.      levothyroxine (SYNTHROID) 25 MCG tablet Take 1 tablet (25 mcg total) by mouth once daily. TAKE  WITH  13MCG  FOR  TOTAL  DAILY  DOSE  OF  38MCG 90 tablet 2    levothyroxine 13 mcg Cap Take 13 mcg by mouth once daily. Take with 25 mcg for total daily dose of 38 mcg 90 capsule 2    linaCLOtide (LINZESS) 290 mcg Cap capsule Take 1 capsule (290 mcg total) by mouth once daily. 90 capsule 3    multivitamin (THERAGRAN) per tablet Take 1 tablet by mouth once daily.      naloxone (NARCAN) 4 mg/actuation Spry 4mg by nasal route as needed for opioid overdose; may repeat every 2-3 minutes in alternating nostrils until medical help arrives. Call 911 1 each 11    progesterone (PROMETRIUM) 100 MG capsule TAKE 1 CAPSULE BY MOUTH NIGHTLY 90 capsule 1    QUEtiapine (SEROQUEL) 400 MG tablet Take 1 tablet (400 mg total) by mouth every evening. 90 tablet 0    [START ON 7/1/2025] temazepam (RESTORIL) 30 mg capsule Take 1 capsule (30 mg total) by mouth nightly as needed for Insomnia. 30 capsule 3    traZODone (DESYREL) 150 MG  tablet Take 1 tablet (150 mg total) by mouth every evening. 90 tablet 1    TURMERIC ORAL Take by mouth once daily.       No current facility-administered medications for this visit.

## 2025-07-02 DIAGNOSIS — Z78.0 MENOPAUSE: ICD-10-CM

## 2025-07-03 ENCOUNTER — PATIENT MESSAGE (OUTPATIENT)
Dept: OBSTETRICS AND GYNECOLOGY | Facility: CLINIC | Age: 60
End: 2025-07-03
Payer: MEDICARE

## 2025-07-03 DIAGNOSIS — S92.515A CLOSED NONDISPLACED FRACTURE OF PROXIMAL PHALANX OF LESSER TOE OF LEFT FOOT, INITIAL ENCOUNTER: Primary | ICD-10-CM

## 2025-07-03 LAB
INSULIN SERPL-ACNC: NORMAL U[IU]/ML
LAB AP BETHESDA CATEGORY: NORMAL
LAB AP CLINICAL FINDINGS: NORMAL
LAB AP CONTRACEPTIVES: NORMAL
LAB AP GYN ADDITIONAL FINDINGS: NORMAL
LAB AP LMP DATE: NORMAL
LAB AP OCHS PAP SPECIMEN ADEQUACY: NORMAL
LAB AP OHS PAP INTERPRETATION: NORMAL
LAB AP PAP DISCLAIMER COMMENTS: NORMAL
LAB AP PAP ESTROGEN REPLACEMENT THERAPY: NORMAL
LAB AP PAP PMP: NORMAL
LAB AP PAP PREVIOUS BX: NORMAL
LAB AP PAP PRIOR TREATMENT: NORMAL
LAB AP PERFORMING LOCATION(S): NORMAL

## 2025-07-03 RX ORDER — FLUCONAZOLE 150 MG/1
TABLET ORAL
Qty: 2 TABLET | Refills: 0 | Status: SHIPPED | OUTPATIENT
Start: 2025-07-03

## 2025-07-03 RX ORDER — ESTRADIOL 0.04 MG/D
1 FILM, EXTENDED RELEASE TRANSDERMAL
Qty: 24 PATCH | Refills: 3 | OUTPATIENT
Start: 2025-07-03

## 2025-07-03 RX ORDER — TESTOSTERONE 20.25 MG/1.25G
GEL TOPICAL
Qty: 88 G | Refills: 1 | Status: SHIPPED | OUTPATIENT
Start: 2025-07-03

## 2025-07-03 NOTE — TELEPHONE ENCOUNTER
Refill Routing Note   Medication(s) are not appropriate for processing by [facility] for the following reason(s):        Outside of protocol: Sent in on 6/30/25, please C    [facility] action(s):  Quick Discontinue  Route        Medication Therapy Plan: E-Prescribing Status: Receipt confirmed by pharmacy (6/30/2025 10:22 AM CDT)      Appointments  past 12m or future 3m with PCP    Date Provider   Last Visit   6/30/2025 Angelina Mora MD   Next Visit   9/11/2025 Angelina Mora MD   ED visits in past 90 days: 0        Note composed:9:45 AM 07/03/2025

## 2025-07-07 ENCOUNTER — RESULTS FOLLOW-UP (OUTPATIENT)
Dept: OBSTETRICS AND GYNECOLOGY | Facility: CLINIC | Age: 60
End: 2025-07-07

## 2025-07-07 ENCOUNTER — OFFICE VISIT (OUTPATIENT)
Dept: ORTHOPEDICS | Facility: CLINIC | Age: 60
End: 2025-07-07
Payer: MEDICARE

## 2025-07-07 ENCOUNTER — HOSPITAL ENCOUNTER (OUTPATIENT)
Facility: HOSPITAL | Age: 60
Discharge: HOME | End: 2025-07-07
Attending: SURGERY
Payer: MEDICARE

## 2025-07-07 VITALS — BODY MASS INDEX: 20.4 KG/M2 | HEIGHT: 62 IN | WEIGHT: 110.88 LBS

## 2025-07-07 DIAGNOSIS — M20.42 HAMMER TOE OF LEFT FOOT: Primary | ICD-10-CM

## 2025-07-07 DIAGNOSIS — S92.515A CLOSED NONDISPLACED FRACTURE OF PROXIMAL PHALANX OF LESSER TOE OF LEFT FOOT, INITIAL ENCOUNTER: ICD-10-CM

## 2025-07-07 DIAGNOSIS — M20.41 HAMMERTOE OF RIGHT FOOT: ICD-10-CM

## 2025-07-07 PROCEDURE — 73630 X-RAY EXAM OF FOOT: CPT | Mod: TC,PN,RT

## 2025-07-07 PROCEDURE — 99213 OFFICE O/P EST LOW 20 MIN: CPT | Mod: PBBFAC,25,PN | Performed by: SURGERY

## 2025-07-07 PROCEDURE — 99999 PR PBB SHADOW E&M-EST. PATIENT-LVL III: CPT | Mod: PBBFAC,,, | Performed by: SURGERY

## 2025-07-07 PROCEDURE — 99214 OFFICE O/P EST MOD 30 MIN: CPT | Mod: S$PBB,,, | Performed by: SURGERY

## 2025-07-08 ENCOUNTER — LAB VISIT (OUTPATIENT)
Dept: LAB | Facility: HOSPITAL | Age: 60
End: 2025-07-08
Payer: MEDICARE

## 2025-07-08 ENCOUNTER — PATIENT MESSAGE (OUTPATIENT)
Dept: OBSTETRICS AND GYNECOLOGY | Facility: CLINIC | Age: 60
End: 2025-07-08
Payer: MEDICARE

## 2025-07-08 ENCOUNTER — PATIENT MESSAGE (OUTPATIENT)
Dept: NEUROLOGY | Facility: CLINIC | Age: 60
End: 2025-07-08

## 2025-07-08 ENCOUNTER — OFFICE VISIT (OUTPATIENT)
Dept: NEUROLOGY | Facility: CLINIC | Age: 60
End: 2025-07-08
Payer: MEDICARE

## 2025-07-08 VITALS — HEIGHT: 68 IN | WEIGHT: 111.56 LBS | BODY MASS INDEX: 16.91 KG/M2

## 2025-07-08 DIAGNOSIS — R41.3 MEMORY LOSS: ICD-10-CM

## 2025-07-08 DIAGNOSIS — R51.9 ACUTE NONINTRACTABLE HEADACHE, UNSPECIFIED HEADACHE TYPE: ICD-10-CM

## 2025-07-08 DIAGNOSIS — Z78.0 MENOPAUSE: ICD-10-CM

## 2025-07-08 DIAGNOSIS — G44.039 PAROXYSMAL HEMICRANIA: Primary | ICD-10-CM

## 2025-07-08 LAB
CRP SERPL-MCNC: 7.5 MG/L
ERYTHROCYTE [SEDIMENTATION RATE] IN BLOOD BY PHOTOMETRIC METHOD: 8 MM/HR
TESTOST SERPL-MCNC: 115 NG/DL (ref 5–73)
TSH SERPL-ACNC: 2.58 UIU/ML (ref 0.4–4)
VIT B12 SERPL-MCNC: 919 PG/ML (ref 210–950)

## 2025-07-08 PROCEDURE — 84443 ASSAY THYROID STIM HORMONE: CPT

## 2025-07-08 PROCEDURE — 82607 VITAMIN B-12: CPT

## 2025-07-08 PROCEDURE — 85652 RBC SED RATE AUTOMATED: CPT

## 2025-07-08 PROCEDURE — 84403 ASSAY OF TOTAL TESTOSTERONE: CPT

## 2025-07-08 PROCEDURE — 86140 C-REACTIVE PROTEIN: CPT

## 2025-07-08 PROCEDURE — 36415 COLL VENOUS BLD VENIPUNCTURE: CPT

## 2025-07-08 PROCEDURE — 99999 PR PBB SHADOW E&M-EST. PATIENT-LVL IV: CPT | Mod: PBBFAC,,, | Performed by: STUDENT IN AN ORGANIZED HEALTH CARE EDUCATION/TRAINING PROGRAM

## 2025-07-08 PROCEDURE — 99214 OFFICE O/P EST MOD 30 MIN: CPT | Mod: PBBFAC | Performed by: STUDENT IN AN ORGANIZED HEALTH CARE EDUCATION/TRAINING PROGRAM

## 2025-07-08 RX ORDER — INDOMETHACIN 25 MG/1
25 CAPSULE ORAL 3 TIMES DAILY
Qty: 90 CAPSULE | Refills: 3 | Status: SHIPPED | OUTPATIENT
Start: 2025-07-08

## 2025-07-08 NOTE — PROGRESS NOTES
"  [facility] - NEUROLOGY [address]TH [address] [facility], [place]    Date: 7/8/25  Patient Name: Gay Gurrola   MRN: [id number]   PCP: Bhavana Lopez  Referring Provider: Bhavana Lopez MD    Assessment:   Gay Gurrola is a 59 y.o. female presenting ***    Plan:     Problem List Items Addressed This Visit    None      Paz Thibodeaux MD    Patient note was created using MModal Dictation.  Any errors in syntax or even information may not have been identified and edited on initial review prior to signing this note.  Subjective:     Interval history 7/8/25:      HPI: 10/13/23  MsCharlotte Gurrola is a 58 y.o. female presenting for evaluation of multiple neurological problems listed below including memory loss and pertinent past family hx of dementia and PD.      In terms of memory difficulties - Previously seeing neurologist named Dr. Reis.  Hx of low b12, on b12 supplements.  She is currently here for evaluation of brain fog, frequently forgetting where she placed her keys, forgetting what she was going to do.  Symptoms occurring since covid, some mild improvement recently. Previously saw neuropsychology who determined stress/psychiatric problems were a significant problem for her memory and she did not meet criteria for a neurocognitive disorder at this time.     The patient has a history of psychiatry of bipolar I disorder and is relatively stable.  However her stress level of high, taking care of her mother in law who has dementia.  Children are causing stress as well.      She is very active, lifts weights, walks at least 15,000 steps a day.   Current mood: "ok"     She reports episodes of right sided numbness starting in July.  Reports symptoms are in the whole right arm, also associated with excruciating pain and tingling in the hands.  She also reports some right arm weakness.  Thought maybe she was sleeping on arm wrong.  Often occurring at night. "  She reports episodes often last throughout the night, then resolves.  Rare neck pain.  She is not having weakness currently on exam.       She is also concerned about her eyes.  also reports episodes of her eyes becoming bloodshot and having pain in her right eye.  She reports she get associated headaches on the right side.  Takes 800 mg ibuprofen occasionally, does get rid of the headaches.   She reports pain in her eye/headache every day.  Also endorses photophobia.  She reports previously being on steroids for this and was told that she had a possible autoimmune condition.  She then saw a rheumatologist who ruled out an autoimmune condition.  Review of optho notes in past mention episcleritis of the eye.      Pertinent family history: Hx of alzheimer's in sister who is 68, 66 other sister is having symptoms of forgetting, repeats stories.  Grandmother  of dementia. Mother was 53 years old, had optic nerve inflammation on left, then age 64 started to have headaches, then developed right handed weakness.  Had workup at hospital but did not find a reason for the right hand weakness.  She continued to have headaches, 2 weeks later had a cerebral hemorrhage.  9 siblings of grandmother all passed away with dementia and parkinson's disease.  Has an uncle that recently  of a stroke.  Multiple family members have also had parkinson's disease and her son is having some symptoms of right hand tremor and is being worked up for a movement disorder.    Remote hx of seizures - one episode - 15 years ago in the setting of a parent's death and alcohol abuse.  No further episodes.  No longer using alcohol          PAST MEDICAL HISTORY:  Past Medical History:   Diagnosis Date    Abnormal coronary angiogram     Alcohol abuse, in remission     Alcohol related seizure 2012    Anxiety     Bipolar affective     since teenage years    Chronic idiopathic constipation     Depression     H. pylori infection      Headache(784.0)     followed by Dr. Corona    Hypothyroid     Memory loss     Osteopenia     Seizures        PAST SURGICAL HISTORY:  Past Surgical History:   Procedure Laterality Date    *REUSE* Right 2025    Procedure: OSTEOTOMY, PHALANX, FOOT;  Surgeon: Mukul Dmuont MD;  Location: [facility] OR;  Service: Orthopedics;  Laterality: Right;    BUNIONECTOMY      COLONOSCOPY N/A 2017    Procedure: COLONOSCOPY;  Surgeon: Estuardo Salazar MD;  Location: [facility] ENDO (4TH FLR);  Service: Endoscopy;  Laterality: N/A;  PM prep    COLONOSCOPY N/A 2022    Procedure: COLONOSCOPY;  Surgeon: Estuardo Salazar MD;  Location: [facility] ENDO (4TH FLR);  Service: Endoscopy;  Laterality: N/A;  suprep    CORRECTION OF HAMMER TOE Right 2025    Procedure: CORRECTION, HAMMER TOE;  Surgeon: Mukul Dumont MD;  Location: [facility] OR;  Service: Orthopedics;  Laterality: Right;    ESOPHAGOGASTRODUODENOSCOPY N/A 10/23/2018    Procedure: EGD (ESOPHAGOGASTRODUODENOSCOPY);  Surgeon: Ilranda Schwarz MD;  Location: [facility] (4TH FLR);  Service: Endoscopy;  Laterality: N/A;    ESOPHAGOGASTRODUODENOSCOPY N/A 2022    Procedure: ESOPHAGOGASTRODUODENOSCOPY (EGD);  Surgeon: Estuardo Salazar MD;  Location: [facility] (4TH FLR);  Service: Endoscopy;  Laterality: N/A;  Pt informed to bring official paper documentation of COVID results w/ test date/name/ . Informed Pt that procedure will not be done w/o this documentation. Pt informed COVID test must be PCR or Rapid RNA.  instructions sent to [facility]    hemrrhoidectomy      OSTEOTOMY OF METATARSAL BONE Right 2025    Procedure: OSTEOTOMY, METATARSAL BONE;  Surgeon: Mukul Dumont MD;  Location: [facility] OR;  Service: Orthopedics;  Laterality: Right;    RECESSION, MUSCLE, GASTROCNEMIUS Right 2025    Procedure: RECESSION, MUSCLE, GASTROCNEMIUS;  Surgeon: Mukul Dumont MD;  Location: [facility] OR;  Service: Orthopedics;  Laterality: Right;    TUBAL LIGATION         CURRENT MEDS:  Current  Medications[1]    ALLERGIES:  Review of patient's allergies indicates:  No Known Allergies    FAMILY HISTORY:  Family History   Problem Relation Name Age of Onset    Alzheimer's disease Maternal Grandmother      Dementia Maternal Grandmother      Hypertension Father      Coronary artery disease Father      Kidney disease Father      Diabetes Father      Stroke Mother      Diabetes Mother      Anuerysm Mother          brain    Insomnia Mother      Alzheimer's disease Mother      Depression Mother      Alzheimer's disease Sister 2     Dementia Sister 2 59    Uterine cancer Sister      Colon cancer Sister  64    Breast cancer Maternal Aunt  60    Cancer Maternal Aunt          breast    Alzheimer's disease Maternal Aunt      Crohn's disease Maternal Aunt      Dementia Maternal Aunt      Crohn's disease Other      Melanoma Neg Hx      Psoriasis Neg Hx      Lupus Neg Hx      Esophageal cancer Neg Hx      Cervical cancer Neg Hx      Ovarian cancer Neg Hx         SOCIAL HISTORY:  Social History[2]    Review of Systems:  12 system review of systems is negative except for the symptoms mentioned in HPI.      Objective:   There were no vitals filed for this visit.  General: NAD, well nourished   Eyes: no tearing, discharge, no erythema   ENT: moist mucous membranes of the oral cavity, nares patent    Neck: Supple, full range of motion  Cardiovascular: Warm and well perfused, pulses equal and symmetrical  Lungs: Normal work of breathing, normal chest wall excursions  Skin: No rash, lesions, or breakdown on exposed skin  Psychiatry: Mood and affect are appropriate   Abdomen: soft, non tender, non distended  Extremeties: No cyanosis, clubbing or edema.    Neurological   MENTAL STATUS: Alert and oriented to person, place, and time. Attention and concentration within normal limits. Speech without dysarthria, able to name and repeat without difficulty. Recent and remote memory within normal limits   CRANIAL NERVES: Visual fields  intact. PERRL. EOMI. Facial sensation intact. Face symmetrical. Hearing grossly intact. Full shoulder shrug bilaterally. Tongue protrudes midline   SENSORY: Sensation is intact to {sensation:59002} throughout.  Joint position perception intact. Negative Romberg.   MOTOR: Normal bulk and tone. No pronator drift.  5/5 deltoid, biceps, triceps, interosseous, hand  bilaterally. 5/5 iliopsoas, knee extension/flexion, foot dorsi/plantarflexion bilaterally.    REFLEXES: Symmetric and 2+ throughout. Toes down going bilaterally.   CEREBELLAR/COORDINATION/GAIT: Gait steady with normal arm swing and stride length.  Heel to shin intact. Finger to nose intact. Normal rapid alternating movements.   ***          [1]   Current Outpatient Medications   Medication Sig Dispense Refill    b complex vitamins tablet Take 1 tablet by mouth once daily.      Ca-D3-mag#11-zinc-cupr-man-bor 600 mg calcium- 800 unit-50 mg Tab Take 1 tablet by mouth once daily.      cholecalciferol, vitamin D3, (VITAMIN D3) 25 mcg (1,000 unit) capsule Take 1,000 Units by mouth once daily.      DULoxetine (CYMBALTA) 20 MG capsule Take 1 capsule (20 mg total) by mouth once daily. 90 capsule 0    estradioL (TIANA) 0.0375 mg/24 hr Place 1 patch onto the skin twice a week. 24 patch 3    fish oil-omega-3 fatty acids 300-1,000 mg capsule Take 1 capsule by mouth once daily.      fluconazole (DIFLUCAN) 150 MG Tab 1 PO Qd x 1, repeat in 3 days PRN 2 tablet 0    levothyroxine (SYNTHROID) 25 MCG tablet Take 1 tablet (25 mcg total) by mouth once daily. TAKE  WITH  13MCG  FOR  TOTAL  DAILY  DOSE  OF  38MCG 90 tablet 2    levothyroxine 13 mcg Cap Take 13 mcg by mouth once daily. Take with 25 mcg for total daily dose of 38 mcg 90 capsule 2    linaCLOtide (LINZESS) 290 mcg Cap capsule Take 1 capsule (290 mcg total) by mouth once daily. 90 capsule 3    multivitamin (THERAGRAN) per tablet Take 1 tablet by mouth once daily.      naloxone (NARCAN) 4 mg/actuation Spry 4mg by  nasal route as needed for opioid overdose; may repeat every 2-3 minutes in alternating nostrils until medical help arrives. Call 911 1 each 11    progesterone (PROMETRIUM) 100 MG capsule Take 1 capsule (100 mg total) by mouth every evening. 90 capsule 3    QUEtiapine (SEROQUEL) 400 MG tablet Take 1 tablet (400 mg total) by mouth every evening. 90 tablet 0    temazepam (RESTORIL) 30 mg capsule Take 1 capsule (30 mg total) by mouth nightly as needed for Insomnia. 30 capsule 3    testosterone (ANDROGEL) 20.25 mg/1.25 gram (1.62 %) GlPm Apply 1/2 pump to the upper thigh daily 88 g 1    traZODone (DESYREL) 150 MG tablet Take 1 tablet (150 mg total) by mouth every evening. 90 tablet 1    TURMERIC ORAL Take by mouth once daily.       No current facility-administered medications for this visit.   [2]   Social History  Tobacco Use    Smoking status: Never    Smokeless tobacco: Never   Substance Use Topics    Alcohol use: No     Comment: History of alcoholism in remission.    Drug use: No

## 2025-07-08 NOTE — PROGRESS NOTES
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   Consider differential diagnosis including cluster headaches and temporal arteritis.    ACUTE NONINTRACTABLE HEADACHE, UNSPECIFIED HEADACHE TYPE:   Discontinued ibuprofen 600 mg Discussed potential long-term risks of regular NSAID use, including GI effects Ordered MRI W Contrast to rule out structural causes, given family history of neurological issues Ordered labs to recheck inflammatory markers (ESR, CRP) to rule out underlying inflammatory condition    PAROXYSMAL HEMICRANIA:   Explained paroxysmal hemicrania as a primary headache disorder, similar to migraine, without underlying structural cause Started indomethacin for headache management, to be taken 2-3 times daily Discontinued ibuprofen 600 mg Discussed potential long-term risks of regular NSAID use, including GI effects Ordered MRI W Contrast to rule out structural causes, given family history of neurological issues Follow up with headache specialist PA (Tawnya Diaz) in approximately 2 months to assess response to indomethacin Ordered labs to recheck inflammatory markers (ESR, CRP) to rule out underlying inflammatory condition    MEMORY LOSS:   Ordered neuropsychological testing to further evaluate reported memory concerns            Paz Thibodeaux MD  [facility]   Department of Neurology      Subjective:            Interval history 7/8/25:    History of Present Illness    Patient presents today for evaluation of headaches, fatigue, and memory concerns. She reports recurrent headaches characterized by frontal pressure and  swelling on the right side occurring approximately every other day, typically upon waking or mid-afternoon. She describes the sensation as pressure and swelling rather than a typical headache or migraine. Associated symptoms include redness in the right eye. She manages symptoms with ibuprofen 600 mg, which provides 24-hour relief and reduces inflammation. She also uses infrared light therapy at home with a red light panel. These headaches began several months ago, potentially in May or June. She denies facial swelling but notices changes in hat fit due to frontal inflammation. She reports progressive short-term memory issues. Her son has noted that she repeatedly tells the same stories without realizing, indicating potential memory retention problems. She experiences episodes of forgetting the purpose of entering rooms and acknowledges these memory lapses consistently. She expresses concern about their frequency and appears motivated to understand and address these concerns, expressing interest in comprehensive memory testing. She has a history of episcleritis diagnosed by Dr. Priest and treated with prednisone eye drops. Initial swelling resolved with treatment but she was advised about potential long-term eye drop management. She also has a history of positive RANJAN test with subsequent rheumatology evaluation revealing negative inflammatory markers including ESR, CRP, rheumatoid factor, and ACE. The rheumatologist did not pursue further workup after physical exam. She underwent foot surgery in February for hammer toe correction and bunionectomy. She reports current status as satisfactory with ongoing recovery. She has a 71-year-old sister diagnosed with Alzheimer's and a 69-year-old sister experiencing memory loss and getting lost while driving. Her mother experienced TIAs in early 50s, ventricle bleed in brain at age 64, and lost optic nerve vision at age 52. There is a strong family history of neurological  conditions including dementia, Alzheimer's, Parkinson's, and memory loss. She is currently on Hormone Replacement Therapy reporting significant improvement in overall well-being. She takes daily supplements including B vitamin, Omega-3, turmeric, and vitamin D3. She maintains an active lifestyle with regular biking and morning dog walking. She utilizes an [employer] ring to track her sleep and reports optimal sleep quality.      ROS:  10-point ROS is negative unless otherwise indicated in the HPI.            PAST MEDICAL HISTORY:  Past Medical History:   Diagnosis Date    Abnormal coronary angiogram     Alcohol abuse, in remission     Alcohol related seizure 07/24/2012    Anxiety     Bipolar affective     since teenage years    Chronic idiopathic constipation     Depression     H. pylori infection     Headache(784.0)     followed by Dr. Corona    Hypothyroid     Memory loss     Osteopenia     Seizures        PAST SURGICAL HISTORY:  Past Surgical History:   Procedure Laterality Date    *REUSE* Right 2/26/2025    Procedure: OSTEOTOMY, PHALANX, FOOT;  Surgeon: Mukul Dumont MD;  Location: [facility] OR;  Service: Orthopedics;  Laterality: Right;    BUNIONECTOMY      COLONOSCOPY N/A 04/12/2017    Procedure: COLONOSCOPY;  Surgeon: Estuardo Salazar MD;  Location: [facility] (4TH FLR);  Service: Endoscopy;  Laterality: N/A;  PM prep    COLONOSCOPY N/A 03/30/2022    Procedure: COLONOSCOPY;  Surgeon: Estuardo Salazar MD;  Location: [facility] ENDO (4TH FLR);  Service: Endoscopy;  Laterality: N/A;  suprep    CORRECTION OF HAMMER TOE Right 2/26/2025    Procedure: CORRECTION, HAMMER TOE;  Surgeon: Mukul Dumont MD;  Location: [facility] OR;  Service: Orthopedics;  Laterality: Right;    ESOPHAGOGASTRODUODENOSCOPY N/A 10/23/2018    Procedure: EGD (ESOPHAGOGASTRODUODENOSCOPY);  Surgeon: Irlanda Schwarz MD;  Location: [facility] ENDO (4TH FLR);  Service: Endoscopy;  Laterality: N/A;    ESOPHAGOGASTRODUODENOSCOPY N/A 03/30/2022    Procedure: ESOPHAGOGASTRODUODENOSCOPY  "(EGD);  Surgeon: Estuardo Salazar MD;  Location: [facility] ENDO (4TH FLR);  Service: Endoscopy;  Laterality: N/A;  Pt informed to bring official paper documentation of COVID results w/ test date/name/ . Informed Pt that procedure will not be done w/o this documentation. Pt informed COVID test must be PCR or Rapid RNA.  instructions sent to [facility]    hemrrhoidectomy  1996    OSTEOTOMY OF METATARSAL BONE Right 2025    Procedure: OSTEOTOMY, METATARSAL BONE;  Surgeon: Mukul Dumont MD;  Location: [facility];  Service: Orthopedics;  Laterality: Right;    RECESSION, MUSCLE, GASTROCNEMIUS Right 2025    Procedure: RECESSION, MUSCLE, GASTROCNEMIUS;  Surgeon: Mukul Dumont MD;  Location: [facility];  Service: Orthopedics;  Laterality: Right;    TUBAL LIGATION         CURRENT MEDS:  Current Medications[1]    ALLERGIES:  Review of patient's allergies indicates:  No Known Allergies    FAMILY HISTORY:  Family History   Problem Relation Name Age of Onset    Alzheimer's disease Maternal Grandmother      Dementia Maternal Grandmother      Hypertension Father      Coronary artery disease Father      Kidney disease Father      Diabetes Father      Stroke Mother      Diabetes Mother      Anuerysm Mother          brain    Insomnia Mother      Alzheimer's disease Mother      Depression Mother      Alzheimer's disease Sister 2     Dementia Sister 2 59    Uterine cancer Sister      Colon cancer Sister  64    Breast cancer Maternal Aunt  60    Cancer Maternal Aunt          breast    Alzheimer's disease Maternal Aunt      Crohn's disease Maternal Aunt      Dementia Maternal Aunt      Crohn's disease Other      Melanoma Neg Hx      Psoriasis Neg Hx      Lupus Neg Hx      Esophageal cancer Neg Hx      Cervical cancer Neg Hx      Ovarian cancer Neg Hx         SOCIAL HISTORY:  Social History[2]       Objective:     Vitals:    25 1205   Weight: 50.6 kg (111 lb 8.8 oz)   Height: 5' 8" (1.727 m)     General: NAD, well nourished "   Eyes: no tearing, discharge, no erythema   ENT: moist mucous membranes of the oral cavity, nares patent    Neck: Supple, full range of motion  Cardiovascular: Warm and well perfused, pulses equal and symmetrical  Lungs: Normal work of breathing, normal chest wall excursions  Skin: No rash, lesions, or breakdown on exposed skin  Psychiatry: Mood and affect are appropriate   Abdomen: soft, non tender, non distended  Extremeties: No cyanosis, clubbing or edema.    Neurological   MENTAL STATUS: Alert and oriented to person, place, and time. Attention and concentration within normal limits. Speech without dysarthria, able to name and repeat without difficulty. Recent and remote memory within normal limits   CRANIAL NERVES: Visual fields intact. PERRL. EOMI. Facial sensation intact. Face symmetrical. Hearing grossly intact. Full shoulder shrug bilaterally. Tongue protrudes midline   SENSORY: Sensation is intact to light touch throughout.  Joint position perception intact. Negative Romberg.   MOTOR: Normal bulk and tone. No pronator drift.  5/5 deltoid, biceps, triceps, interosseous, hand  bilaterally. 5/5 iliopsoas, knee extension/flexion, foot dorsi/plantarflexion bilaterally.    REFLEXES: Symmetric and 2+ throughout. Toes down going bilaterally.   CEREBELLAR/COORDINATION/GAIT: Gait steady with normal arm swing and stride length.  Heel to shin intact. Finger to nose intact. Normal rapid alternating movements.       This note was generated with the assistance of ambient listening technology. Verbal consent was obtained by the patient and accompanying visitor(s) for the recording of patient appointment to facilitate this note. I attest to having reviewed and edited the generated note for accuracy, though some syntax or spelling errors may persist. Please contact the author of this note for any clarification.          [1]   Current Outpatient Medications   Medication Sig Dispense Refill    b complex vitamins tablet  Take 1 tablet by mouth once daily.      Ca-D3-mag#11-zinc-cupr-man-bor 600 mg calcium- 800 unit-50 mg Tab Take 1 tablet by mouth once daily.      cholecalciferol, vitamin D3, (VITAMIN D3) 25 mcg (1,000 unit) capsule Take 1,000 Units by mouth once daily.      DULoxetine (CYMBALTA) 20 MG capsule Take 1 capsule (20 mg total) by mouth once daily. 90 capsule 0    estradioL (TIANA) 0.0375 mg/24 hr Place 1 patch onto the skin twice a week. 24 patch 3    fish oil-omega-3 fatty acids 300-1,000 mg capsule Take 1 capsule by mouth once daily.      fluconazole (DIFLUCAN) 150 MG Tab 1 PO Qd x 1, repeat in 3 days PRN 2 tablet 0    indomethacin (INDOCIN) 25 MG capsule Take 1 capsule (25 mg total) by mouth 3 (three) times daily. 90 capsule 3    levothyroxine (SYNTHROID) 25 MCG tablet Take 1 tablet (25 mcg total) by mouth once daily. TAKE  WITH  13MCG  FOR  TOTAL  DAILY  DOSE  OF  38MCG 90 tablet 2    levothyroxine 13 mcg Cap Take 13 mcg by mouth once daily. Take with 25 mcg for total daily dose of 38 mcg 90 capsule 2    linaCLOtide (LINZESS) 290 mcg Cap capsule Take 1 capsule (290 mcg total) by mouth once daily. 90 capsule 3    multivitamin (THERAGRAN) per tablet Take 1 tablet by mouth once daily.      naloxone (NARCAN) 4 mg/actuation Spry 4mg by nasal route as needed for opioid overdose; may repeat every 2-3 minutes in alternating nostrils until medical help arrives. Call 911 1 each 11    progesterone (PROMETRIUM) 100 MG capsule Take 1 capsule (100 mg total) by mouth every evening. 90 capsule 3    QUEtiapine (SEROQUEL) 400 MG tablet Take 1 tablet (400 mg total) by mouth every evening. 90 tablet 0    temazepam (RESTORIL) 30 mg capsule Take 1 capsule (30 mg total) by mouth nightly as needed for Insomnia. 30 capsule 3    testosterone (ANDROGEL) 20.25 mg/1.25 gram (1.62 %) GlPm Apply 1/2 pump to the upper thigh daily 88 g 1    traZODone (DESYREL) 150 MG tablet Take 1 tablet (150 mg total) by mouth every evening. 90 tablet 1    TURMERIC  ORAL Take by mouth once daily.       No current facility-administered medications for this visit.   [2]   Social History  Tobacco Use    Smoking status: Never    Smokeless tobacco: Never   Substance Use Topics    Alcohol use: No     Comment: History of alcoholism in remission.    Drug use: No

## 2025-07-08 NOTE — PROGRESS NOTES
SUBJECTIVE:    Gay Gurrola is here today for a follow up visit.  Status post Osteotomy, Metatarsal Bone - Right, Osteotomy, Phalanx, Foot - Right, Correction, Hammer Toe - Right, and Recession, Muscle, Gastrocnemius - Right  Date of surgery 2/26/2025  Days since surgery 131  Doing well.  Per patient report: no fevers, chills, shortness of breath, chest pain, or increased extremity pain.  Presents for further follow up.    4/11/25 - doing well. Has been wearing a sleeve and gel for hammertoe which is working well. X-rays appear appropriate.  5/23/25 - doing well. Would like to go back to rucking and running. Would like her to hold off on running for 1 month.  She is also having pain from a tailor's bunion.  07/07/2025: She can return to running.  Osteotomy sites have appropriate healing.  We discussed the risks benefits and alternatives to surgery on the left side.  She would like to wait until December or January.    OBJECTIVE:    There were no vitals filed for this visit.      Extremity Exam  Incisions clean dry and intact, no erythema, drainage, exudate, minimal ecchymosis. First and second toe wounds well healed. Second toe appears better corrected.  Fires FHL, EHL, TA, GSC.  Sensation intact to light touch throughout foot.  Neurovascularly intact.  Tailor's bunion, new.     DIAGNOSTIC STUDIES:  Status post 1st and 2nd toe corrections.  Osteotomy site visible about the 1st toe, hardware in place in appropriate.  Second toe with the appropriate healing.    ASSESSMENT:   1. Status post procedure above      PLAN:     She continues to use a metatarsal pad, however the position of the great toe and 2nd toe is much improved.  Follow up in October or November for discussion of surgery to the contralateral side.  Discussed risks benefits and warning signs for when to return to emergency room regarding foot.  X-rays bilateral feet next visit, standing weightbearing.  Continue pads and OTC medications  PRN.    Mukul Dumont MD  [facility]  Orthopedic Surgery      This note was done with voice recognition software. Please excuse any errors missed in proof reading.

## 2025-07-09 RX ORDER — FLUCONAZOLE 150 MG/1
150 TABLET ORAL DAILY
Qty: 1 TABLET | Refills: 0 | Status: SHIPPED | OUTPATIENT
Start: 2025-07-09 | End: 2025-07-10

## 2025-07-09 NOTE — TELEPHONE ENCOUNTER
Pt sent message stating I am reaching out to Dr Mora about prescription medication to treat yeast. I believe it was Diflucanx two doses. I took first dose Friday night and was to take second dose on Monday. I am sorry but I misplaced where I put the medication since it was just one tablet. Now unable to find it. Can you pls send Rx to [facility] pharmacy so that can finish treatment. I will greatly appreciate. I also noticed testosterone level to be high.   Advised pt Dr Mora will send in a diflucan.  Also she will send pt lab results and recommendations

## 2025-07-15 ENCOUNTER — PATIENT MESSAGE (OUTPATIENT)
Dept: OBSTETRICS AND GYNECOLOGY | Facility: CLINIC | Age: 60
End: 2025-07-15
Payer: MEDICARE

## 2025-07-21 ENCOUNTER — HOSPITAL ENCOUNTER (OUTPATIENT)
Dept: RADIOLOGY | Facility: HOSPITAL | Age: 60
Discharge: HOME | End: 2025-07-21
Attending: FAMILY MEDICINE
Payer: MEDICARE

## 2025-07-21 ENCOUNTER — HOSPITAL ENCOUNTER (OUTPATIENT)
Dept: RADIOLOGY | Facility: HOSPITAL | Age: 60
Discharge: HOME | End: 2025-07-21
Attending: STUDENT IN AN ORGANIZED HEALTH CARE EDUCATION/TRAINING PROGRAM
Payer: MEDICARE

## 2025-07-21 VITALS — HEIGHT: 68 IN | WEIGHT: 111 LBS | BODY MASS INDEX: 16.82 KG/M2

## 2025-07-21 DIAGNOSIS — R51.9 ACUTE NONINTRACTABLE HEADACHE, UNSPECIFIED HEADACHE TYPE: ICD-10-CM

## 2025-07-21 DIAGNOSIS — Z12.31 SCREENING MAMMOGRAM FOR BREAST CANCER: ICD-10-CM

## 2025-07-21 PROCEDURE — 70553 MRI BRAIN STEM W/O & W/DYE: CPT | Mod: 26,,, | Performed by: STUDENT IN AN ORGANIZED HEALTH CARE EDUCATION/TRAINING PROGRAM

## 2025-07-21 PROCEDURE — 70553 MRI BRAIN STEM W/O & W/DYE: CPT | Mod: TC

## 2025-07-21 PROCEDURE — 77063 BREAST TOMOSYNTHESIS BI: CPT | Mod: TC

## 2025-07-21 PROCEDURE — 25500020 PHARM REV CODE 255: Performed by: STUDENT IN AN ORGANIZED HEALTH CARE EDUCATION/TRAINING PROGRAM

## 2025-07-21 PROCEDURE — A9585 GADOBUTROL INJECTION: HCPCS | Performed by: STUDENT IN AN ORGANIZED HEALTH CARE EDUCATION/TRAINING PROGRAM

## 2025-07-21 RX ORDER — GADOBUTROL 604.72 MG/ML
5 INJECTION INTRAVENOUS
Status: COMPLETED | OUTPATIENT
Start: 2025-07-21 | End: 2025-07-21

## 2025-07-21 RX ADMIN — GADOBUTROL 5 ML: 604.72 INJECTION INTRAVENOUS at 10:07

## 2025-08-25 ENCOUNTER — LAB VISIT (OUTPATIENT)
Dept: LAB | Facility: HOSPITAL | Age: 60
End: 2025-08-25
Attending: OBSTETRICS & GYNECOLOGY
Payer: MEDICARE

## 2025-08-25 DIAGNOSIS — Z78.0 MENOPAUSE: ICD-10-CM

## 2025-08-25 DIAGNOSIS — F51.01 PRIMARY INSOMNIA: ICD-10-CM

## 2025-08-25 DIAGNOSIS — F31.70 BIPOLAR DISORDER IN PARTIAL REMISSION, MOST RECENT EPISODE UNSPECIFIED TYPE: ICD-10-CM

## 2025-08-25 LAB
ESTRADIOL SERPL HS-MCNC: 16 PG/ML
TESTOST SERPL-MCNC: 94 NG/DL (ref 5–73)

## 2025-08-25 PROCEDURE — 84403 ASSAY OF TOTAL TESTOSTERONE: CPT

## 2025-08-25 PROCEDURE — 82670 ASSAY OF TOTAL ESTRADIOL: CPT

## 2025-08-25 PROCEDURE — 36415 COLL VENOUS BLD VENIPUNCTURE: CPT | Mod: PO

## 2025-08-26 RX ORDER — QUETIAPINE FUMARATE 400 MG/1
400 TABLET, FILM COATED ORAL NIGHTLY
Qty: 90 TABLET | Refills: 0 | Status: SHIPPED | OUTPATIENT
Start: 2025-08-26

## (undated) DEVICE — PAD ABDOMINAL STERILE 8X10IN

## (undated) DEVICE — Device

## (undated) DEVICE — GLOVE SENSICARE PI SURG 7.5

## (undated) DEVICE — STOCKINETTE TUBULAR 2PL 6 X 4

## (undated) DEVICE — COVER LIGHT HANDLE 80/CA

## (undated) DEVICE — WALKER BOOT SHORT UNIV MED

## (undated) DEVICE — DRAPE THREE-QTR REINF 53X77IN

## (undated) DEVICE — GLOVE SENSICARE PI GRN 8.5

## (undated) DEVICE — DRAPE T EXTRM SURG 121X128X90

## (undated) DEVICE — DRAPE U SPLIT SHEET 54X76IN

## (undated) DEVICE — ALCOHOL 70% ANTISEPTIC ISO 4OZ

## (undated) DEVICE — DRESSING XEROFORM NONADH 1X8IN

## (undated) DEVICE — APPLICATOR CHLORAPREP ORN 26ML

## (undated) DEVICE — SPONGE GAUZE 16PLY 4X4

## (undated) DEVICE — ELECTRODE REM PLYHSV RETURN 9

## (undated) DEVICE — TOURNIQUET SB QC DP 34X4IN

## (undated) DEVICE — DRAPE C-ARM MINI DISP

## (undated) DEVICE — GLOVE SENSICARE PI SURG 8

## (undated) DEVICE — BANDAGE ESMARK 6X12

## (undated) DEVICE — DRAPE TOP 53X102IN

## (undated) DEVICE — TRAY MINOR ORTHO

## (undated) DEVICE — GLOVE SENSICARE PI GRN 7.5

## (undated) DEVICE — SUT ETHILON 3-0 PS2 18 BLK

## (undated) DEVICE — PAD CAST SPECIALIST STRL 4

## (undated) DEVICE — COVER CAMERA OPERATING ROOM

## (undated) DEVICE — SPONGE COTTON TRAY 4X4IN

## (undated) DEVICE — SUT MONOCRYL 2-0 S UND

## (undated) DEVICE — GOWN ECLIPSE REINF L4 XLNG XXL

## (undated) DEVICE — BLADE SAGITTAL FINE 5.5 X 18.5

## (undated) DEVICE — GOWN POLY REINF X-LONG XL

## (undated) DEVICE — IMPLANTABLE DEVICE
Type: IMPLANTABLE DEVICE | Site: FOOT | Status: NON-FUNCTIONAL
Removed: 2025-02-26